# Patient Record
Sex: FEMALE | Race: WHITE | Employment: OTHER | URBAN - METROPOLITAN AREA
[De-identification: names, ages, dates, MRNs, and addresses within clinical notes are randomized per-mention and may not be internally consistent; named-entity substitution may affect disease eponyms.]

---

## 2017-01-23 RX ORDER — LEVOCETIRIZINE DIHYDROCHLORIDE 5 MG/1
TABLET, FILM COATED ORAL
Qty: 90 TAB | Refills: 3 | Status: SHIPPED | OUTPATIENT
Start: 2017-01-23 | End: 2018-02-05 | Stop reason: SDUPTHER

## 2017-02-22 ENCOUNTER — TELEPHONE (OUTPATIENT)
Dept: INTERNAL MEDICINE CLINIC | Age: 82
End: 2017-02-22

## 2017-02-22 NOTE — TELEPHONE ENCOUNTER
Pt would like a callback regarding medication Mobic, she stated the dosage needs to be doubled. The best contact number is [phone].         From answering service

## 2017-02-22 NOTE — TELEPHONE ENCOUNTER
Patient states that she has been on half dose of medication for about 6 months or so due to labs/letter per acm. Having issus again with her neck pain arthritis and Dr. Humphrey Post PA wants her to resume a full tablet for now and she is also in PT for the next four weeks at [address] [place] [address]. Please advise.

## 2017-02-23 NOTE — TELEPHONE ENCOUNTER
OK to go back to 1 full tablet daily for 2 weeks then would like her to decrease back to 1/2 tablet daily

## 2017-02-24 RX ORDER — LORAZEPAM 1 MG/1
1 TABLET ORAL
Qty: 90 TAB | Refills: 1 | Status: SHIPPED | OUTPATIENT
Start: 2017-02-24 | End: 2017-11-09 | Stop reason: SDUPTHER

## 2017-03-14 ENCOUNTER — HOSPITAL ENCOUNTER (OUTPATIENT)
Dept: MAMMOGRAPHY | Age: 82
Discharge: HOME | End: 2017-03-14
Attending: INTERNAL MEDICINE
Payer: MEDICARE

## 2017-03-14 DIAGNOSIS — Z12.31 VISIT FOR SCREENING MAMMOGRAM: ICD-10-CM

## 2017-03-14 PROCEDURE — 77067 SCR MAMMO BI INCL CAD: CPT

## 2017-03-24 ENCOUNTER — OFFICE VISIT (OUTPATIENT)
Dept: INTERNAL MEDICINE CLINIC | Age: 82
End: 2017-03-24

## 2017-03-24 VITALS
RESPIRATION RATE: 14 BRPM | OXYGEN SATURATION: 95 % | BODY MASS INDEX: 27.25 KG/M2 | WEIGHT: 138.8 LBS | TEMPERATURE: 97.9 F | SYSTOLIC BLOOD PRESSURE: 128 MMHG | HEIGHT: 60 IN | HEART RATE: 81 BPM | DIASTOLIC BLOOD PRESSURE: 78 MMHG

## 2017-03-24 DIAGNOSIS — I10 ESSENTIAL HYPERTENSION: Primary | ICD-10-CM

## 2017-03-24 DIAGNOSIS — N18.30 CRI (CHRONIC RENAL INSUFFICIENCY), STAGE 3 (MODERATE) (HCC): ICD-10-CM

## 2017-03-24 DIAGNOSIS — Z78.9 ADVANCE DIRECTIVE PLACED IN CHART THIS ADMISSION: ICD-10-CM

## 2017-03-24 DIAGNOSIS — E78.00 PURE HYPERCHOLESTEROLEMIA: ICD-10-CM

## 2017-03-24 NOTE — PROGRESS NOTES
1. Have you been to the ER, urgent care clinic since your last visit? Hospitalized since your last visit? No    2. Have you seen or consulted any other health care providers outside of the [address] [place] [address] since your last visit? Include any pap smears or colon screening. No      Chief Complaint   Patient presents with    Medication Evaluation     In PT for neck, [facility] ( Dr. Cameron Avalos). Not fasting.

## 2017-03-24 NOTE — PROGRESS NOTES
Subjective:     Alba Duong is a [de-identified] y.o. female who presents for follow up of hypertension and hyperlipidemia. Diet and Lifestyle: generally follows a low fat low cholesterol diet, generally follows a low sodium diet, some walking - causes pain in SI joints  Home BP Monitoring: is well controlled at home, ranging 130-140's/70's    Cardiovascular ROS: taking medications as instructed, no medication side effects noted, no TIA's, no chest pain on exertion, no dyspnea on exertion, no swelling of ankles, no orthostatic dizziness or lightheadedness, no palpitations. New concerns:   Increasing neck pain. Saw Dr. Brina PAGE at [place]. Mostly on left side. Had xrays taken. Told have degenerative disc dz. Started in PT at [address] [place] [address]. Feels better post PT. Feels some improvement in ROM. If no improvement after 1 month of PT would then have an MRI of her cervical spine. No pain or numbness radiating to arms. .   Arthirits pain continues. Had cut back Mobic to 7.5mg every day. Dr. Abram Bravo suggested increasing to 15mg. Tried 15mg for 2 weeks without improvement so back on 7.5mg every day. Saw podiatrist, arthritis in great toe as well. Mild decrease in GFR    Current Outpatient Prescriptions   Medication Sig Dispense Refill    BUDESONIDE (UCERIS PO) Take  by mouth.  LORazepam (ATIVAN) 1 mg tablet Take 1 Tab by mouth nightly as needed for Anxiety. Max Daily Amount: 1 mg. 90 Tab 1    levocetirizine (XYZAL) 5 mg tablet TAKE 1 TABLET DAILY 90 Tab 3    lisinopril-hydrochlorothiazide (PRINZIDE, ZESTORETIC) 10-12.5 mg per tablet TAKE 1 TABLET DAILY 90 Tab 3    meloxicam (MOBIC) 15 mg tablet TAKE 1 TABLET DAILY 90 Tab 3    PARoxetine (PAXIL) 20 mg tablet TAKE 1 TABLET DAILY 90 Tab 3    simvastatin (ZOCOR) 20 mg tablet TAKE 1 TABLET NIGHTLY 90 Tab 3    zolpidem (AMBIEN) 10 mg tablet Take 1 tablet by mouth nightly as needed for Sleep.  90 tablet 1    aspirin 81 mg tablet Take 40.5 mg by mouth daily.      MULTIVITAMIN W-MINERALS/LUTEIN (CENTRUM SILVER PO) Take  by mouth. Takes one po daily. Lab Results   Component Value Date/Time    Cholesterol, total 249 09/12/2016 07:09 AM    HDL Cholesterol 87 09/12/2016 07:09 AM    LDL, calculated 132 09/12/2016 07:09 AM    Triglyceride 148 09/12/2016 07:09 AM    CHOL/HDL Ratio 2.3 08/19/2010 07:27 AM       Lab Results   Component Value Date/Time    ALT (SGPT) 19 09/12/2016 07:09 AM    AST (SGOT) 23 09/12/2016 07:09 AM    Alk. phosphatase 78 09/12/2016 07:09 AM    Bilirubin, direct 0.15 03/31/2015 07:14 AM    Bilirubin, total 0.7 09/12/2016 07:09 AM       Lab Results   Component Value Date/Time    GFR est AA 51 09/12/2016 07:09 AM    GFR est non-AA 44 09/12/2016 07:09 AM    Creatinine 1.17 09/12/2016 07:09 AM    BUN 17 09/12/2016 07:09 AM    Sodium 136 09/12/2016 07:09 AM    Potassium 4.5 09/12/2016 07:09 AM    Chloride 91 09/12/2016 07:09 AM    CO2 24 09/12/2016 07:09 AM         Review of Systems, additional:  Pertinent items are noted in HPI. Objective:     Visit Vitals    /78    Pulse 81    Temp 97.9 °F (36.6 °C) (Oral)    Resp 14    Ht 5' (1.524 m)    Wt 138 lb 12.8 oz (63 kg)    LMP 01/01/1986    SpO2 95%    BMI 27.11 kg/m2     Appearance: alert, well appearing, and in no distress and oriented to person, place, and time. General exam:   NECK: supple, no lad, no bruit, no tm. Mild tenderness left paraspinal muscles. Decreased rom left rotation. LUNGS: cta bilat  CV rrr, no m/g/r  ABD: soft, nt, nd, nabs  EXT: no c/c/e    Assessment/Plan:     hypertension well controlled. current treatment plan is effective, no change in therapy. Hyperlipidemia - controlled in past  Check labs    CRI - controlled, cont same    DDD neck - continue PT.   Agree with plan for MRI  Orders Placed This Encounter    METABOLIC PANEL, COMPREHENSIVE    LIPID PANEL    BUDESONIDE (UCERIS PO)     Follow-up Disposition: Not on File

## 2017-03-24 NOTE — MR AVS SNAPSHOT
Visit Information Date & Time Provider Department Dept. Phone Encounter #  
 3/24/2017 11:20 AM Emiliano Dalton MD [facility] [phone] [id number] Follow-up Instructions Return in about 6 months (around 9/24/2017) for htn, hyperlipidemia. Upcoming Health Maintenance Date Due  
 MEDICARE YEARLY EXAM 3/4/2017 GLAUCOMA SCREENING Q2Y 9/11/2017 DTaP/Tdap/Td series (2 - Td) 3/7/2026 Allergies as of 3/24/2017  Review Complete On: 3/24/2017 By: Emiliano Dalton MD  
  
 Severity Noted Reaction Type Reactions Ciprofloxacin  06/11/2013   Intolerance Diarrhea, Nausea and Vomiting Flagyl [Metronidazole]  10/10/2013    Nausea and Vomiting Other Medication  02/09/2011    Other (comments) BANDAIDS - breaks out skin Oxycodone-acetaminophen  11/10/2010   Side Effect Other (comments)  
 dizziness Phenylephrine  02/16/2012   Side Effect Other (comments) Wire her up Phenylpropanolamine  02/16/2012   Side Effect Other (comments) Wire her up Sulfa (Sulfonamide Antibiotics)  02/16/2012   Side Effect Nausea Only Current Immunizations  Reviewed on 9/6/2016 Name Date Influenza High Dose Vaccine PF 9/1/2016, 8/20/2014 Influenza Vaccine 9/1/2016, 9/3/2015, 10/1/2014, 9/25/2013 Influenza Vaccine Split 10/2/2012 Pneumococcal Polysaccharide (PPSV-23) 3/24/2014 Pneumococcal Vaccine (Unspecified Type) 12/1/2000 TD Vaccine 9/1/2007 Tdap 3/7/2016 Varicella Virus Vaccine 8/11/2012 Not reviewed this visit You Were Diagnosed With   
  
 Codes Comments Essential hypertension    -  Primary ICD-10-CM: I10 
ICD-9-CM: 401.9 Advance directive placed in chart this admission     ICD-10-CM: Z78.9 ICD-9-CM: V49.89   
 CRI (chronic renal insufficiency), stage 3 (moderate)     ICD-10-CM: N18.3 ICD-9-CM: 617. 3 Pure hypercholesterolemia     ICD-10-CM: E78.00 ICD-9-CM: 272.0 Vitals BP Pulse Temp Resp Height(growth percentile) Weight(growth percentile) 128/78 81 97.9 °F (36.6 °C) (Oral) 14 5' (1.524 m) 138 lb 12.8 oz (63 kg) LMP SpO2 BMI OB Status Smoking Status 01/01/1986 95% 27.11 kg/m2 Hysterectomy Never Smoker Vitals History BMI and BSA Data Body Mass Index Body Surface Area  
 27.11 kg/m 2 1.63 m 2 Preferred Pharmacy Pharmacy Name Phone  [address] [phone] Your Updated Medication List  
  
   
This list is accurate as of: 3/24/17 12:39 PM.  Always use your most recent med list.  
  
  
  
  
 aspirin 81 mg tablet Take 40.5 mg by mouth daily. CENTRUM SILVER PO Take  by mouth. Takes one po daily. levocetirizine 5 mg tablet Commonly known as:  Amy Roch TAKE 1 TABLET DAILY  
  
 lisinopril-hydroCHLOROthiazide 10-12.5 mg per tablet Commonly known as:  PRINZIDE, ZESTORETIC  
TAKE 1 TABLET DAILY LORazepam 1 mg tablet Commonly known as:  ATIVAN Take 1 Tab by mouth nightly as needed for Anxiety. Max Daily Amount: 1 mg.  
  
 meloxicam 15 mg tablet Commonly known as:  MOBIC  
TAKE 1 TABLET DAILY PARoxetine 20 mg tablet Commonly known as:  PAXIL TAKE 1 TABLET DAILY  
  
 simvastatin 20 mg tablet Commonly known as:  ZOCOR  
TAKE 1 TABLET NIGHTLY UCERIS PO Take  by mouth.  
  
 zolpidem 10 mg tablet Commonly known as:  AMBIEN Take 1 tablet by mouth nightly as needed for Sleep. We Performed the Following LIPID PANEL [19131 CPT(R)] METABOLIC PANEL, COMPREHENSIVE [75008 CPT(R)] Follow-up Instructions Return in about 6 months (around 9/24/2017) for htn, hyperlipidemia. Introducing [facility]! Dear Sharmin Lopez: Thank you for requesting a [employer] account. Our records indicate that you already have an active [employer] account. You can access your account anytime at [email or web address] Did you know that you can access your hospital and ER discharge instructions at any time in [employer]? You can also review all of your test results from your hospital stay or ER visit. Additional Information If you have questions, please visit the Frequently Asked Questions section of the [employer] website at [email or web address] Remember, [employer] is NOT to be used for urgent needs. For medical emergencies, dial 911. Now available from your iPhone and Android! Please provide this summary of care documentation to your next provider. Your primary care clinician is listed as GIANNI GARCÍA. If you have any questions after today's visit, please call [phone].

## 2017-03-28 ENCOUNTER — HOSPITAL ENCOUNTER (OUTPATIENT)
Dept: LAB | Age: 82
Discharge: HOME | End: 2017-03-28
Payer: MEDICARE

## 2017-03-28 PROCEDURE — 80053 COMPREHEN METABOLIC PANEL: CPT

## 2017-03-28 PROCEDURE — 36415 COLL VENOUS BLD VENIPUNCTURE: CPT

## 2017-03-28 PROCEDURE — 80061 LIPID PANEL: CPT

## 2017-03-29 LAB
ALBUMIN SERPL-MCNC: 4.7 G/DL (ref 3.5–4.7)
ALBUMIN/GLOB SERPL: 2.1 {RATIO} (ref 1.2–2.2)
ALP SERPL-CCNC: 71 IU/L (ref 39–117)
ALT SERPL-CCNC: 17 IU/L (ref 0–32)
AST SERPL-CCNC: 23 IU/L (ref 0–40)
BILIRUB SERPL-MCNC: 0.7 MG/DL (ref 0–1.2)
BUN SERPL-MCNC: 19 MG/DL (ref 8–27)
BUN/CREAT SERPL: 18 (ref 11–26)
CALCIUM SERPL-MCNC: 9.8 MG/DL (ref 8.7–10.3)
CHLORIDE SERPL-SCNC: 91 MMOL/L (ref 96–106)
CHOLEST SERPL-MCNC: 234 MG/DL (ref 100–199)
CO2 SERPL-SCNC: 26 MMOL/L (ref 18–29)
CREAT SERPL-MCNC: 1.05 MG/DL (ref 0.57–1)
GLOBULIN SER CALC-MCNC: 2.2 G/DL (ref 1.5–4.5)
GLUCOSE SERPL-MCNC: 115 MG/DL (ref 65–99)
HDLC SERPL-MCNC: 89 MG/DL
INTERPRETATION, 910389: NORMAL
INTERPRETATION: NORMAL
LDLC SERPL CALC-MCNC: 128 MG/DL (ref 0–99)
PDF IMAGE, 910387: NORMAL
POTASSIUM SERPL-SCNC: 4.6 MMOL/L (ref 3.5–5.2)
PROT SERPL-MCNC: 6.9 G/DL (ref 6–8.5)
SODIUM SERPL-SCNC: 134 MMOL/L (ref 134–144)
TRIGL SERPL-MCNC: 86 MG/DL (ref 0–149)
VLDLC SERPL CALC-MCNC: 17 MG/DL (ref 5–40)

## 2017-04-12 ENCOUNTER — HOSPITAL ENCOUNTER (OUTPATIENT)
Dept: MRI IMAGING | Age: 82
Discharge: HOME | End: 2017-04-12
Payer: MEDICARE

## 2017-04-12 PROCEDURE — 70553 MRI BRAIN STEM W/O & W/DYE: CPT

## 2017-04-12 PROCEDURE — A9585 GADOBUTROL INJECTION: HCPCS | Performed by: RADIOLOGY

## 2017-04-12 RX ORDER — SODIUM CHLORIDE 0.9 % (FLUSH) 0.9 %
10 SYRINGE (ML) INJECTION
Status: COMPLETED | OUTPATIENT
Start: 2017-04-12 | End: 2017-04-12

## 2017-04-12 RX ADMIN — Medication 10 ML: at 09:00

## 2017-04-27 RX ORDER — PAROXETINE HYDROCHLORIDE 20 MG/1
TABLET, FILM COATED ORAL
Qty: 90 TAB | Refills: 3 | Status: SHIPPED | OUTPATIENT
Start: 2017-04-27 | End: 2018-04-24 | Stop reason: SDUPTHER

## 2017-04-27 RX ORDER — SIMVASTATIN 20 MG/1
TABLET, FILM COATED ORAL
Qty: 90 TAB | Refills: 3 | Status: SHIPPED | OUTPATIENT
Start: 2017-04-27 | End: 2018-04-24 | Stop reason: SDUPTHER

## 2017-05-23 ENCOUNTER — OFFICE VISIT (OUTPATIENT)
Dept: INTERNAL MEDICINE CLINIC | Age: 82
End: 2017-05-23

## 2017-05-23 VITALS
DIASTOLIC BLOOD PRESSURE: 80 MMHG | RESPIRATION RATE: 17 BRPM | SYSTOLIC BLOOD PRESSURE: 120 MMHG | OXYGEN SATURATION: 98 % | BODY MASS INDEX: 27.61 KG/M2 | HEART RATE: 80 BPM | TEMPERATURE: 98.1 F | HEIGHT: 60 IN | WEIGHT: 140.6 LBS

## 2017-05-23 DIAGNOSIS — B35.9 TINEA: Primary | ICD-10-CM

## 2017-05-23 RX ORDER — MOMETASONE FUROATE 1 MG/G
CREAM TOPICAL DAILY
Qty: 30 G | Refills: 0 | Status: SHIPPED | OUTPATIENT
Start: 2017-05-23 | End: 2020-01-22

## 2017-05-23 RX ORDER — KETOCONAZOLE 20 MG/G
CREAM TOPICAL 2 TIMES DAILY
Qty: 30 G | Refills: 0 | Status: SHIPPED | OUTPATIENT
Start: 2017-05-23 | End: 2020-01-22

## 2017-05-23 NOTE — PROGRESS NOTES
HISTORY OF PRESENT ILLNESS  Francisco J Michael is a [de-identified] y.o. female. HPI   Rash began beneath left breast 1 week ago with itching. Started on hydrocortisone cream with clotrimazole for a couple of days without help. Has spread to right eyelid, groin, left neck and under right breast.  Yesterday used mometasone cream instead with some improvement in itch. Current Outpatient Prescriptions:     simvastatin (ZOCOR) 20 mg tablet, TAKE 1 TABLET NIGHTLY, Disp: 90 Tab, Rfl: 3    PARoxetine (PAXIL) 20 mg tablet, TAKE 1 TABLET DAILY, Disp: 90 Tab, Rfl: 3    BUDESONIDE (UCERIS PO), Take  by mouth., Disp: , Rfl:     LORazepam (ATIVAN) 1 mg tablet, Take 1 Tab by mouth nightly as needed for Anxiety. Max Daily Amount: 1 mg., Disp: 90 Tab, Rfl: 1    levocetirizine (XYZAL) 5 mg tablet, TAKE 1 TABLET DAILY, Disp: 90 Tab, Rfl: 3    lisinopril-hydrochlorothiazide (PRINZIDE, ZESTORETIC) 10-12.5 mg per tablet, TAKE 1 TABLET DAILY, Disp: 90 Tab, Rfl: 3    meloxicam (MOBIC) 15 mg tablet, TAKE 1 TABLET DAILY, Disp: 90 Tab, Rfl: 3    zolpidem (AMBIEN) 10 mg tablet, Take 1 tablet by mouth nightly as needed for Sleep., Disp: 90 tablet, Rfl: 1    aspirin 81 mg tablet, Take 40.5 mg by mouth daily. , Disp: , Rfl:     MULTIVITAMIN W-MINERALS/LUTEIN (CENTRUM SILVER PO), Take  by mouth. Takes one po daily. , Disp: , Rfl:     Visit Vitals    /80    Pulse 80    Temp 98.1 °F (36.7 °C) (Oral)    Resp 17    Ht 5' (1.524 m)    Wt 140 lb 9.6 oz (63.8 kg)    SpO2 98%    BMI 27.46 kg/m2     ROS  See above  Physical Exam   Constitutional: She appears well-developed and well-nourished. HENT:   Head: Normocephalic and atraumatic. Skin:   Erythematous shiny rash beneath left breast, lesser area beneath right breast, left groin, left neck and very small area right eyelid   Vitals reviewed. ASSESSMENT and PLAN  Tinea - ketoconzale cream bid and mometasone cream every day.     Orders Placed This Encounter    ketoconazole (NIZORAL) 2 % topical cream    mometasone (ELOCON) 0.1 % topical cream     Follow-up Disposition:  Return if symptoms worsen or fail to improve.

## 2017-05-23 NOTE — PATIENT INSTRUCTIONS
Use Ketoconazole cream twice a day to affected areas and use Mometasone cream once a day to the same areas.

## 2017-05-23 NOTE — MR AVS SNAPSHOT
Visit Information Date & Time Provider Department Dept. Phone Encounter #  
 5/23/2017 11:00 AM Dat Hsieh MD [facility] [phone] [id number] Follow-up Instructions Return if symptoms worsen or fail to improve. Your Appointments 9/28/2017 10:00 AM  
ROUTINE CARE with Dat Hsieh MD  
[facility] [address] [facility]) Appt Note: 6 month f/u; 6 month f/u  
 [facility] [address] [facility] [address]  
[facility] [address] [address] [address] [address] [address] Upcoming Health Maintenance Date Due  
 MEDICARE YEARLY EXAM 3/4/2017 INFLUENZA AGE 9 TO ADULT 8/1/2017 GLAUCOMA SCREENING Q2Y 9/11/2017 DTaP/Tdap/Td series (2 - Td) 3/7/2026 Allergies as of 5/23/2017  Review Complete On: 5/23/2017 By: Dat Hsieh MD  
  
 Severity Noted Reaction Type Reactions Ciprofloxacin  06/11/2013   Intolerance Diarrhea, Nausea and Vomiting Flagyl [Metronidazole]  10/10/2013    Nausea and Vomiting Other Medication  02/09/2011    Other (comments) BANDAIDS - breaks out skin Oxycodone-acetaminophen  11/10/2010   Side Effect Other (comments)  
 dizziness Phenylephrine  02/16/2012   Side Effect Other (comments) Wire her up Phenylpropanolamine  02/16/2012   Side Effect Other (comments) Wire her up Sulfa (Sulfonamide Antibiotics)  02/16/2012   Side Effect Nausea Only Current Immunizations  Reviewed on 9/6/2016 Name Date Influenza High Dose Vaccine PF 9/1/2016, 8/20/2014 Influenza Vaccine 9/1/2016, 9/3/2015, 10/1/2014, 9/25/2013 Influenza Vaccine Split 10/2/2012 Pneumococcal Polysaccharide (PPSV-23) 3/24/2014 Pneumococcal Vaccine (Unspecified Type) 12/1/2000 TD Vaccine 9/1/2007 Tdap 3/7/2016 Varicella Virus Vaccine 8/11/2012 Not reviewed this visit You Were Diagnosed With   
  
 Codes Comments Tinea    -  Primary ICD-10-CM: B35.9 ICD-9-CM: 110.9 Vitals BP Pulse Temp Resp Height(growth percentile) Weight(growth percentile) 120/80 80 98.1 °F (36.7 °C) (Oral) 17 5' (1.524 m) 140 lb 9.6 oz (63.8 kg) LMP SpO2 BMI OB Status Smoking Status 01/01/1986 98% 27.46 kg/m2 Hysterectomy Never Smoker Vitals History BMI and BSA Data Body Mass Index Body Surface Area  
 27.46 kg/m 2 1.64 m 2 Preferred Pharmacy Pharmacy Name Phone [id number] [address] [phone] Your Updated Medication List  
  
   
This list is accurate as of: 5/23/17 12:22 PM.  Always use your most recent med list.  
  
  
  
  
 aspirin 81 mg tablet Take 40.5 mg by mouth daily. CENTRUM SILVER PO Take  by mouth. Takes one po daily. ketoconazole 2 % topical cream  
Commonly known as:  NIZORAL Apply  to affected area two (2) times a day. levocetirizine 5 mg tablet Commonly known as:  Obadiah Cloud TAKE 1 TABLET DAILY  
  
 lisinopril-hydroCHLOROthiazide 10-12.5 mg per tablet Commonly known as:  PRINZIDE, ZESTORETIC  
TAKE 1 TABLET DAILY LORazepam 1 mg tablet Commonly known as:  ATIVAN Take 1 Tab by mouth nightly as needed for Anxiety. Max Daily Amount: 1 mg.  
  
 meloxicam 15 mg tablet Commonly known as:  MOBIC  
TAKE 1 TABLET DAILY  
  
 mometasone 0.1 % topical cream  
Commonly known as:  Copake Filipe Apply  to affected area daily. PARoxetine 20 mg tablet Commonly known as:  PAXIL TAKE 1 TABLET DAILY  
  
 simvastatin 20 mg tablet Commonly known as:  ZOCOR  
TAKE 1 TABLET NIGHTLY UCERIS PO Take  by mouth.  
  
 zolpidem 10 mg tablet Commonly known as:  AMBIEN Take 1 tablet by mouth nightly as needed for Sleep. Prescriptions Sent to Pharmacy Refills  
 ketoconazole (NIZORAL) 2 % topical cream 0 Sig: Apply  to affected area two (2) times a day.   
 Class: Normal  
 Pharmacy: [id number]- [place], [facility] Ph #: [phone] Route: Topical  
 mometasone (ELOCON) 0.1 % topical cream 0 Sig: Apply  to affected area daily. Class: Normal  
 Pharmacy: [facility] #[id number]- [place], [address] [facility] Ph #: [phone] Route: Topical  
  
Follow-up Instructions Return if symptoms worsen or fail to improve. To-Do List   
 10/23/2017 11:30 AM  
  Appointment with [facility] MRI 2 at [facility] MRI Department ([phone]) 1. Please bring a list or a bag of your current medications to your appointment 2. Please be sure to remove ALL hair clips, pins, extensions, etc., prior to arriving for your MRI procedure. 3. Bring any non [facility] films or CDs pertaining to the area being imaged with you on the day of appointment. 4. A written order with a valid diagnosis and Physicians  signature is required for all scheduled tests. 5. Check in at registration 30min before your appointment time unless you were instructed to do otherwise. 10/23/2017 12:15 PM  
  Appointment with [facility] MRI 2 at [facility] MRI Department ([phone]) 1. Please bring a list or a bag of your current medications to your appointment 2. Please be sure to remove ALL hair clips, pins, extensions, etc., prior to arriving for your MRI procedure. 3. Bring any non [facility] films or CDs pertaining to the area being imaged with you on the day of appointment. 4. A written order with a valid diagnosis and Physicians  signature is required for all scheduled tests. 5. Check in at registration 30min before your appointment time unless you were instructed to do otherwise. Patient Instructions Use Ketoconazole cream twice a day to affected areas and use Mometasone cream once a day to the same areas. Introducing \[facility] [place] [facility]\"" & [facility]! Dear Kate Giron: Thank you for requesting a [employer] account.   Our records indicate that you already have an active [employer] account. You can access your account anytime at [email or web address] Did you know that you can access your hospital and ER discharge instructions at any time in [employer]? You can also review all of your test results from your hospital stay or ER visit. Additional Information If you have questions, please visit the Frequently Asked Questions section of the [employer] website at [email or web address] Remember, [employer] is NOT to be used for urgent needs. For medical emergencies, dial 911. Now available from your iPhone and Android! Please provide this summary of care documentation to your next provider. Your primary care clinician is listed as GIANNI GARCÍA. If you have any questions after today's visit, please call [phone].

## 2017-05-26 ENCOUNTER — TELEPHONE (OUTPATIENT)
Dept: INTERNAL MEDICINE CLINIC | Age: 82
End: 2017-05-26

## 2017-05-26 RX ORDER — TERBINAFINE HYDROCHLORIDE 250 MG/1
250 TABLET ORAL DAILY
Qty: 14 TAB | Refills: 0 | Status: SHIPPED | OUTPATIENT
Start: 2017-05-26 | End: 2017-06-09

## 2017-05-26 NOTE — TELEPHONE ENCOUNTER
Sent rx for oral antifungal medication to her local pharmacy.   Can continue to use the mometasone prn for itching relief

## 2017-06-14 RX ORDER — LISINOPRIL AND HYDROCHLOROTHIAZIDE 10; 12.5 MG/1; MG/1
TABLET ORAL
Qty: 90 TAB | Refills: 3 | Status: SHIPPED | OUTPATIENT
Start: 2017-06-14 | End: 2018-06-28 | Stop reason: SDUPTHER

## 2017-09-28 ENCOUNTER — OFFICE VISIT (OUTPATIENT)
Dept: INTERNAL MEDICINE CLINIC | Age: 82
End: 2017-09-28

## 2017-09-28 VITALS
HEIGHT: 60 IN | OXYGEN SATURATION: 98 % | BODY MASS INDEX: 26.78 KG/M2 | SYSTOLIC BLOOD PRESSURE: 110 MMHG | HEART RATE: 105 BPM | RESPIRATION RATE: 14 BRPM | TEMPERATURE: 97.8 F | WEIGHT: 136.4 LBS | DIASTOLIC BLOOD PRESSURE: 72 MMHG

## 2017-09-28 DIAGNOSIS — Z01.818 PRE-OP EXAM: Primary | ICD-10-CM

## 2017-09-28 DIAGNOSIS — G56.01 CARPAL TUNNEL SYNDROME OF RIGHT WRIST: ICD-10-CM

## 2017-09-28 DIAGNOSIS — I10 ESSENTIAL HYPERTENSION: ICD-10-CM

## 2017-09-28 DIAGNOSIS — E78.00 PURE HYPERCHOLESTEROLEMIA: ICD-10-CM

## 2017-09-28 NOTE — PROGRESS NOTES
Chief Complaint   Patient presents with    Pre-op Exam     carpal tunnel      1. Have you been to the ER, urgent care clinic since your last visit? Hospitalized since your last visit? No     2. Have you seen or consulted any other health care providers outside of the [address] [place] [address] since your last visit? Include any pap smears or colon screening.  No

## 2017-09-28 NOTE — PROGRESS NOTES
Preoperative Evaluation    Date of Exam: 2017    Kitty Ching is a 81 y.o. female (:1935) who presents for preoperative evaluation. Right CT release on 10/9/2017 by Dr. Kyae Yip at [facility] surgical [place]. Latex Allergy: no  Does have an allergy to adhesive tape    Problem List:     Patient Active Problem List    Diagnosis Date Noted    Advance directive placed in chart this admission 2017    CRI (chronic renal insufficiency) 2016    Primary osteoarthritis involving multiple joints 2015    Anxiety 2015    Colitis 2014    Sleep disturbance 2013    Diverticulitis 2013    Hyponatremia 2013    Hypokalemia 2013    Environmental allergies 2012    Rectal bleeding 10/15/2012    HTN (hypertension) 08/10/2012    Hyperlipemia 08/10/2012     Medical History:     Past Medical History:   Diagnosis Date    Arthritis back pain    fingers    Asthma     Diverticulitis 2013    Hypertension     Nausea & vomiting     Other ill-defined conditions hypercholesterolemia    Stenosis     spinal    Stroke ([facility] [address]     tia   PATENT  FORAMEN  OVALE     Allergies: Allergies   Allergen Reactions    Ciprofloxacin Diarrhea and Nausea and Vomiting    Flagyl [Metronidazole] Nausea and Vomiting    Other Medication Other (comments)     BANDAIDS - breaks out skin    Oxycodone-Acetaminophen Other (comments)     dizziness    Phenylephrine Other (comments)     Wire her up    Phenylpropanolamine Other (comments)     Wire her up    Sulfa (Sulfonamide Antibiotics) Nausea Only      Medications:     Current Outpatient Prescriptions   Medication Sig    lisinopril-hydroCHLOROthiazide (PRINZIDE, ZESTORETIC) 10-12.5 mg per tablet TAKE 1 TABLET DAILY    ketoconazole (NIZORAL) 2 % topical cream Apply  to affected area two (2) times a day.  mometasone (ELOCON) 0.1 % topical cream Apply  to affected area daily.     simvastatin (ZOCOR) 20 mg tablet TAKE 1 TABLET NIGHTLY    PARoxetine (PAXIL) 20 mg tablet TAKE 1 TABLET DAILY    BUDESONIDE (UCERIS PO) Take  by mouth.  LORazepam (ATIVAN) 1 mg tablet Take 1 Tab by mouth nightly as needed for Anxiety. Max Daily Amount: 1 mg.  levocetirizine (XYZAL) 5 mg tablet TAKE 1 TABLET DAILY    meloxicam (MOBIC) 15 mg tablet TAKE 1 TABLET DAILY    zolpidem (AMBIEN) 10 mg tablet Take 1 tablet by mouth nightly as needed for Sleep.  aspirin 81 mg tablet Take 40.5 mg by mouth daily.  MULTIVITAMIN W-MINERALS/LUTEIN (CENTRUM SILVER PO) Take  by mouth. Takes one po daily. No current facility-administered medications for this visit. Surgical History:     Past Surgical History:   Procedure Laterality Date    ABDOMEN SURGERY PROC UNLISTED  4/97    CHOLECYSTECTOMY    CARDIAC SURG PROCEDURE UNLIST  8/04    repair of foramen ovale    HX CHOLECYSTECTOMY  1997    HX GYN  1986    HYSTERECTOMY - total    HX HEENT      CATARACTS REMOVED - bilateral    HX LUMBAR LAMINECTOMY  2/2011    w/fusion L4-L5    HX ORTHOPAEDIC  4/98    KNEE ARTHROSCOPY   left knee    HX OTHER SURGICAL  2009    CATARACTS    HX OTHER SURGICAL      colonoscopy x 3     Social History:     Social History     Social History    Marital status:      Spouse name: N/A    Number of children: N/A    Years of education: N/A     Social History Main Topics    Smoking status: Never Smoker    Smokeless tobacco: Never Used    Alcohol use 3.5 oz/week     7 Glasses of wine per week    Drug use: No    Sexual activity: Yes     Partners: Male     Other Topics Concern    None     Social History Narrative       Anesthesia Complications: None  History of abnormal bleeding : None  History of Blood Transfusions: no  Health Care Directive or Living Will: yes 0 on file    Objective:     ROS:   Feeling well. No dyspnea or chest pain on exertion. Some nausea and loose stools consistent with her colitis - has increased dose of Uceris.   No black or bloody stools. No urinary tract symptoms. No neurological complaints. OBJECTIVE:   The patient appears well, alert, oriented x 3, in no distress. Visit Vitals    /72    Pulse (!) 105    Temp 97.8 °F (36.6 °C) (Oral)    Resp 14    Ht 5' (1.524 m)    Wt 136 lb 6.4 oz (61.9 kg)    LMP 01/01/1986    SpO2 98%    BMI 26.64 kg/m2     HEENT:ENT normal.  Neck supple. No adenopathy or thyromegaly. TRACIE. Chest: Lungs are clear, good air entry, no wheezes, rhonchi or rales. Cardiovascular: S1 and S2 normal, no murmurs, regular rate and rhythm. Abdomen: soft without tenderness, guarding, mass or organomegaly. Extremities: show no edema, normal peripheral pulses. Neurological: is normal, no focal findings      DIAGNOSTICS:   1. EKG: EKG FINDINGS - normal EKG, normal sinus rhythm  2. CXR: not indicated  3. Labs:   Lab Results  Component Value Date/Time   WBC 5.1 06/28/2013 07:47 AM   HGB 12.2 06/28/2013 07:47 AM   HCT 35.7 06/28/2013 07:47 AM   PLATELET 130 84/38/4342 07:47 AM   MCV 97 06/28/2013 07:47 AM     Lab Results  Component Value Date/Time   ALT (SGPT) 20 09/29/2017 07:07 AM   AST (SGOT) 21 09/29/2017 07:07 AM   Alk.  phosphatase 71 09/29/2017 07:07 AM   Bilirubin, direct 0.15 03/31/2015 07:14 AM   Bilirubin, total 0.8 09/29/2017 07:07 AM   Albumin 4.7 09/29/2017 07:07 AM   Protein, total 6.8 09/29/2017 07:07 AM   PLATELET 003 19/14/4316 07:47 AM       Lab Results  Component Value Date/Time   GFR est non-AA 58 09/29/2017 07:07 AM   GFR est AA 67 09/29/2017 07:07 AM   Creatinine 0.93 09/29/2017 07:07 AM   BUN 16 09/29/2017 07:07 AM   Sodium 132 09/29/2017 07:07 AM   Potassium 4.5 09/29/2017 07:07 AM   Chloride 88 09/29/2017 07:07 AM   CO2 28 09/29/2017 07:07 AM             IMPRESSION:   Low risk for planned surgery  No contraindications to planned surgery      Roger Enamorado MD   9/28/2017

## 2017-09-28 NOTE — MR AVS SNAPSHOT
Visit Information Date & Time Provider Department Dept. Phone Encounter #  
 9/28/2017 10:00 AM Uriel Ambriz MD [facility] [phone] [id number] Follow-up Instructions Return in about 6 months (around 3/28/2018) for hyperlipidemia, htn. Upcoming Health Maintenance Date Due  
 MEDICARE YEARLY EXAM 3/4/2017 INFLUENZA AGE 9 TO ADULT 8/1/2017 GLAUCOMA SCREENING Q2Y 9/11/2017 DTaP/Tdap/Td series (2 - Td) 3/7/2026 Allergies as of 9/28/2017  Review Complete On: 9/28/2017 By: Uriel Ambriz MD  
  
 Severity Noted Reaction Type Reactions Ciprofloxacin  06/11/2013   Intolerance Diarrhea, Nausea and Vomiting Flagyl [Metronidazole]  10/10/2013    Nausea and Vomiting Other Medication  02/09/2011    Other (comments) BANDAIDS - breaks out skin Oxycodone-acetaminophen  11/10/2010   Side Effect Other (comments)  
 dizziness Phenylephrine  02/16/2012   Side Effect Other (comments) Wire her up Phenylpropanolamine  02/16/2012   Side Effect Other (comments) Wire her up Sulfa (Sulfonamide Antibiotics)  02/16/2012   Side Effect Nausea Only Current Immunizations  Reviewed on 9/6/2016 Name Date Influenza High Dose Vaccine PF 9/1/2016, 8/20/2014 Influenza Vaccine 9/1/2016, 9/3/2015, 10/1/2014, 9/25/2013 Influenza Vaccine Split 10/2/2012 Pneumococcal Polysaccharide (PPSV-23) 3/24/2014 TD Vaccine 9/1/2007 Tdap 3/7/2016 Varicella Virus Vaccine 8/11/2012 ZZZ-RETIRED (DO NOT USE) Pneumococcal Vaccine (Unspecified Type) 12/1/2000 Not reviewed this visit You Were Diagnosed With   
  
 Codes Comments Pre-op exam    -  Primary ICD-10-CM: P89.167 ICD-9-CM: V72.84 Pure hypercholesterolemia     ICD-10-CM: E78.00 ICD-9-CM: 272.0 Essential hypertension     ICD-10-CM: I10 
ICD-9-CM: 401.9 Carpal tunnel syndrome of right wrist     ICD-10-CM: G56.01 
ICD-9-CM: 354.0 Vitals BP Pulse Temp Resp Height(growth percentile) Weight(growth percentile) 110/72 (!) 105 97.8 °F (36.6 °C) (Oral) 14 5' (1.524 m) 136 lb 6.4 oz (61.9 kg) LMP SpO2 BMI OB Status Smoking Status 01/01/1986 98% 26.64 kg/m2 Hysterectomy Never Smoker Vitals History BMI and BSA Data Body Mass Index Body Surface Area  
 26.64 kg/m 2 1.62 m 2 Preferred Pharmacy Pharmacy Name Phone [id number], [address] [phone] Your Updated Medication List  
  
   
This list is accurate as of: 9/28/17 10:44 AM.  Always use your most recent med list.  
  
  
  
  
 aspirin 81 mg tablet Take 40.5 mg by mouth daily. CENTRUM SILVER PO Take  by mouth. Takes one po daily. ketoconazole 2 % topical cream  
Commonly known as:  NIZORAL Apply  to affected area two (2) times a day. levocetirizine 5 mg tablet Commonly known as:  Renetta Danyelle TAKE 1 TABLET DAILY  
  
 lisinopril-hydroCHLOROthiazide 10-12.5 mg per tablet Commonly known as:  PRINZIDE, ZESTORETIC  
TAKE 1 TABLET DAILY LORazepam 1 mg tablet Commonly known as:  ATIVAN Take 1 Tab by mouth nightly as needed for Anxiety. Max Daily Amount: 1 mg.  
  
 meloxicam 15 mg tablet Commonly known as:  MOBIC  
TAKE 1 TABLET DAILY  
  
 mometasone 0.1 % topical cream  
Commonly known as:  Dakota Mediate Apply  to affected area daily. PARoxetine 20 mg tablet Commonly known as:  PAXIL TAKE 1 TABLET DAILY  
  
 simvastatin 20 mg tablet Commonly known as:  ZOCOR  
TAKE 1 TABLET NIGHTLY UCERIS PO Take  by mouth.  
  
 zolpidem 10 mg tablet Commonly known as:  AMBIEN Take 1 tablet by mouth nightly as needed for Sleep. We Performed the Following AMB POC EKG ROUTINE W/ 12 LEADS, INTER & REP [56108 CPT(R)] LIPID PANEL [95720 CPT(R)] METABOLIC PANEL, COMPREHENSIVE [68613 CPT(R)] Follow-up Instructions Return in about 6 months (around 3/28/2018) for hyperlipidemia, htn. To-Do List   
 10/23/2017 11:30 AM  
  Appointment with [facility] MRI 2 at [address] [place] [address] MRI Department ([phone]) 1. Please bring a list or a bag of your current medications to your appointment 2. Please be sure to remove ALL hair clips, pins, extensions, etc., prior to arriving for your MRI procedure. 3. If you have any medical implants or devices, please bring associated medical card with you. 4. Bring any non [facility] films or CDs pertaining to the area being imaged with you on the day of appointment. 5. A written order with a valid diagnosis and Physicians  signature is required for all scheduled tests. 6. Check in at registration 30min before your appointment time unless you were instructed to do otherwise. 10/23/2017 12:15 PM  
  Appointment with [facility] MRI 2 at [address] [place] [address] MRI Department ([phone]) 1. Please bring a list or a bag of your current medications to your appointment 2. Please be sure to remove ALL hair clips, pins, extensions, etc., prior to arriving for your MRI procedure. 3. If you have any medical implants or devices, please bring associated medical card with you. 4. Bring any non [facility] films or CDs pertaining to the area being imaged with you on the day of appointment. 5. A written order with a valid diagnosis and Physicians  signature is required for all scheduled tests. 6. Check in at registration 30min before your appointment time unless you were instructed to do otherwise. Patient Instructions Stop Aspirin and Meloxicam the Thursday prior to surgery. Tylenol only for pain prior to surgery. Introducing [facility]! Dear Nannette Child: Thank you for requesting a Searchbox account. Our records indicate that you already have an active Searchbox account. You can access your account anytime at [email or web address] Did you know that you can access your hospital and ER discharge instructions at any time in HydroLogex? You can also review all of your test results from your hospital stay or ER visit. Additional Information If you have questions, please visit the Frequently Asked Questions section of the HydroLogex website at [email or web address] Remember, HydroLogex is NOT to be used for urgent needs. For medical emergencies, dial 911. Now available from your iPhone and Android! Please provide this summary of care documentation to your next provider. Your primary care clinician is listed as GIANNI GARCÍA. If you have any questions after today's visit, please call [phone].

## 2017-09-29 ENCOUNTER — HOSPITAL ENCOUNTER (OUTPATIENT)
Dept: LAB | Age: 82
Discharge: HOME | End: 2017-09-29
Payer: MEDICARE

## 2017-09-29 PROCEDURE — 36415 COLL VENOUS BLD VENIPUNCTURE: CPT

## 2017-09-29 PROCEDURE — 80053 COMPREHEN METABOLIC PANEL: CPT

## 2017-09-29 PROCEDURE — 80061 LIPID PANEL: CPT

## 2017-09-30 LAB
ALBUMIN SERPL-MCNC: 4.7 G/DL (ref 3.5–4.7)
ALBUMIN/GLOB SERPL: 2.2 {RATIO} (ref 1.2–2.2)
ALP SERPL-CCNC: 71 IU/L (ref 39–117)
ALT SERPL-CCNC: 20 IU/L (ref 0–32)
AST SERPL-CCNC: 21 IU/L (ref 0–40)
BILIRUB SERPL-MCNC: 0.8 MG/DL (ref 0–1.2)
BUN SERPL-MCNC: 16 MG/DL (ref 8–27)
BUN/CREAT SERPL: 17 (ref 12–28)
CALCIUM SERPL-MCNC: 10.2 MG/DL (ref 8.7–10.3)
CHLORIDE SERPL-SCNC: 88 MMOL/L (ref 96–106)
CHOLEST SERPL-MCNC: 221 MG/DL (ref 100–199)
CO2 SERPL-SCNC: 28 MMOL/L (ref 18–29)
CREAT SERPL-MCNC: 0.93 MG/DL (ref 0.57–1)
GLOBULIN SER CALC-MCNC: 2.1 G/DL (ref 1.5–4.5)
GLUCOSE SERPL-MCNC: 95 MG/DL (ref 65–99)
HDLC SERPL-MCNC: 89 MG/DL
INTERPRETATION, 910389: NORMAL
INTERPRETATION: NORMAL
LDLC SERPL CALC-MCNC: 114 MG/DL (ref 0–99)
PDF IMAGE, 910387: NORMAL
POTASSIUM SERPL-SCNC: 4.5 MMOL/L (ref 3.5–5.2)
PROT SERPL-MCNC: 6.8 G/DL (ref 6–8.5)
SODIUM SERPL-SCNC: 132 MMOL/L (ref 134–144)
TRIGL SERPL-MCNC: 90 MG/DL (ref 0–149)
VLDLC SERPL CALC-MCNC: 18 MG/DL (ref 5–40)

## 2017-10-10 ENCOUNTER — TELEPHONE (OUTPATIENT)
Dept: INTERNAL MEDICINE CLINIC | Age: 82
End: 2017-10-10

## 2017-10-10 RX ORDER — ZOLPIDEM TARTRATE 5 MG/1
5 TABLET ORAL
Qty: 20 TAB | Refills: 0 | Status: SHIPPED | OUTPATIENT
Start: 2017-10-10 | End: 2018-06-28 | Stop reason: SDUPTHER

## 2017-10-10 NOTE — TELEPHONE ENCOUNTER
----- Message from Rosalinda Casillas sent at 10/10/2017 10:30 AM EDT -----  Regarding: FW: Prescription Question  Contact: [phone]      ----- Message -----     From: Georgina Gonzalez     Sent: 10/10/2017  10:29 AM       To: Shadia Wright Nurse Pool  Subject: Prescription Question                            I NEED A NEW RX FOR THE GENERIC OF AMBIEN-20 PILLS. MINE ARE 2 YRS. OLD AND DON'T WORK. SEND TO [facility] AND [place].   Meenakshi Berry

## 2017-10-23 ENCOUNTER — HOSPITAL ENCOUNTER (OUTPATIENT)
Dept: MRI IMAGING | Age: 82
Discharge: HOME | End: 2017-10-23
Attending: SPECIALIST
Payer: MEDICARE

## 2017-10-23 VITALS — BODY MASS INDEX: 26.56 KG/M2 | WEIGHT: 136 LBS

## 2017-10-23 DIAGNOSIS — D36.10 SCHWANNOMA: ICD-10-CM

## 2017-10-23 PROCEDURE — 72156 MRI NECK SPINE W/O & W/DYE: CPT

## 2017-10-23 PROCEDURE — 74011250636 HC RX REV CODE- 250/636: Performed by: SPECIALIST

## 2017-10-23 PROCEDURE — A9576 INJ PROHANCE MULTIPACK: HCPCS | Performed by: SPECIALIST

## 2017-10-23 PROCEDURE — 70553 MRI BRAIN STEM W/O & W/DYE: CPT

## 2017-10-23 RX ADMIN — GADOTERIDOL 13 ML: 279.3 INJECTION, SOLUTION INTRAVENOUS at 13:12

## 2017-11-09 RX ORDER — LORAZEPAM 1 MG/1
1 TABLET ORAL
Qty: 90 TAB | Refills: 1 | Status: SHIPPED | OUTPATIENT
Start: 2017-11-09 | End: 2018-05-21 | Stop reason: SDUPTHER

## 2017-11-09 NOTE — TELEPHONE ENCOUNTER
Last seen: 09/28/2017      Requested Prescriptions     Pending Prescriptions Disp Refills    LORazepam (ATIVAN) 1 mg tablet 90 Tab 1     Sig: Take 1 Tab by mouth nightly as needed for Anxiety. Max Daily Amount: 1 mg.

## 2017-11-16 ENCOUNTER — HOSPITAL ENCOUNTER (OUTPATIENT)
Dept: LAB | Age: 82
Discharge: HOME | End: 2017-11-16
Payer: MEDICARE

## 2017-11-16 ENCOUNTER — OFFICE VISIT (OUTPATIENT)
Dept: INTERNAL MEDICINE CLINIC | Age: 82
End: 2017-11-16

## 2017-11-16 VITALS
HEIGHT: 60 IN | WEIGHT: 135 LBS | TEMPERATURE: 97.7 F | BODY MASS INDEX: 26.5 KG/M2 | DIASTOLIC BLOOD PRESSURE: 92 MMHG | SYSTOLIC BLOOD PRESSURE: 159 MMHG | HEART RATE: 84 BPM | RESPIRATION RATE: 20 BRPM | OXYGEN SATURATION: 95 %

## 2017-11-16 DIAGNOSIS — R35.0 URINARY FREQUENCY: Primary | ICD-10-CM

## 2017-11-16 LAB
BILIRUB UR QL STRIP: NEGATIVE
GLUCOSE UR-MCNC: NEGATIVE MG/DL
KETONES P FAST UR STRIP-MCNC: NEGATIVE MG/DL
PH UR STRIP: 7 [PH] (ref 4.6–8)
PROT UR QL STRIP: NEGATIVE
SP GR UR STRIP: 1.01 (ref 1–1.03)
UA UROBILINOGEN AMB POC: NORMAL (ref 0.2–1)
URINALYSIS CLARITY POC: CLEAR
URINALYSIS COLOR POC: YELLOW
URINE BLOOD POC: NORMAL
URINE LEUKOCYTES POC: NORMAL
URINE NITRITES POC: POSITIVE

## 2017-11-16 PROCEDURE — 87186 SC STD MICRODIL/AGAR DIL: CPT

## 2017-11-16 PROCEDURE — 87086 URINE CULTURE/COLONY COUNT: CPT

## 2017-11-16 RX ORDER — AMOXICILLIN AND CLAVULANATE POTASSIUM 500; 125 MG/1; MG/1
1 TABLET, FILM COATED ORAL 2 TIMES DAILY
Qty: 14 TAB | Refills: 0 | Status: SHIPPED | OUTPATIENT
Start: 2017-11-16 | End: 2017-11-30 | Stop reason: ALTCHOICE

## 2017-11-16 RX ORDER — AMOXICILLIN 500 MG/1
CAPSULE ORAL
COMMUNITY
Start: 2017-11-13 | End: 2017-11-30 | Stop reason: ALTCHOICE

## 2017-11-16 RX ORDER — NYSTATIN 100000 [USP'U]/ML
SUSPENSION ORAL
COMMUNITY
Start: 2017-11-13 | End: 2018-01-30 | Stop reason: ALTCHOICE

## 2017-11-16 NOTE — MR AVS SNAPSHOT
Visit Information Date & Time Provider Department Dept. Phone Encounter #  
 11/16/2017 11:10 AM Naun Wahl PA-C [facility] [phone] [id number] Your Appointments 1/30/2018 10:40 AM  
COMPLETE 40 with Neeru Giron MD  
[facility] [address]) Appt Note: Medicare Wellness Visit [place] [address] [facility] [address]  
[address]  
  
    
 3/30/2018 10:00 AM  
ROUTINE CARE with Neeru Giron MD  
[facility] [address]) Appt Note: 6 MONTH F/U; 6 MONTH F/U  
 [place] [address] [facility] [address]  
[address] [facility] [address] [facility] [address] [address] Upcoming Health Maintenance Date Due  
 MEDICARE YEARLY EXAM 3/4/2017 GLAUCOMA SCREENING Q2Y 9/11/2017 DTaP/Tdap/Td series (2 - Td) 3/7/2026 Allergies as of 11/16/2017  Review Complete On: 11/16/2017 By: Naun Wahl PA-C Severity Noted Reaction Type Reactions Ciprofloxacin  06/11/2013   Intolerance Diarrhea, Nausea and Vomiting Flagyl [Metronidazole]  10/10/2013    Nausea and Vomiting Other Medication  02/09/2011    Other (comments) BANDAIDS - breaks out skin Oxycodone-acetaminophen  11/10/2010   Side Effect Other (comments)  
 dizziness Phenylephrine  02/16/2012   Side Effect Other (comments) Wire her up Phenylpropanolamine  02/16/2012   Side Effect Other (comments) Wire her up Sulfa (Sulfonamide Antibiotics)  02/16/2012   Side Effect Nausea Only Current Immunizations  Reviewed on 10/30/2017 Name Date Influenza High Dose Vaccine PF 10/26/2017, 9/1/2016, 8/20/2014 Influenza Vaccine 9/1/2016, 9/3/2015, 10/1/2014, 9/25/2013 Influenza Vaccine Split 10/2/2012 Pneumococcal Polysaccharide (PPSV-23) 3/24/2014 TD Vaccine 9/1/2007 Tdap 3/7/2016 Varicella Virus Vaccine 8/11/2012 ZZZ-RETIRED (DO NOT USE) Pneumococcal Vaccine (Unspecified Type) 12/1/2000 Not reviewed this visit You Were Diagnosed With   
  
 Codes Comments Urinary frequency    -  Primary ICD-10-CM: R35.0 ICD-9-CM: 788.41 Vitals BP Pulse Temp Resp Height(growth percentile) Weight(growth percentile) (!) 159/92 84 97.7 °F (36.5 °C) (Oral) 20 5' (1.524 m) 135 lb (61.2 kg) LMP SpO2 BMI OB Status Smoking Status 01/01/1986 95% 26.37 kg/m2 Hysterectomy Never Smoker Vitals History BMI and BSA Data Body Mass Index Body Surface Area  
 26.37 kg/m 2 1.61 m 2 Preferred Pharmacy Pharmacy Name Phone [id number], [address] [phone] Your Updated Medication List  
  
   
This list is accurate as of: 11/16/17 12:17 PM.  Always use your most recent med list.  
  
  
  
  
 amoxicillin 500 mg capsule Commonly known as:  AMOXIL  
  
 amoxicillin-clavulanate 500-125 mg per tablet Commonly known as:  AUGMENTIN Take 1 Tab by mouth two (2) times a day. aspirin 81 mg tablet Take 40.5 mg by mouth daily. CENTRUM SILVER PO Take  by mouth. Takes one po daily. ketoconazole 2 % topical cream  
Commonly known as:  NIZORAL Apply  to affected area two (2) times a day. levocetirizine 5 mg tablet Commonly known as:  Birdena Brittanie TAKE 1 TABLET DAILY  
  
 lisinopril-hydroCHLOROthiazide 10-12.5 mg per tablet Commonly known as:  PRINZIDE, ZESTORETIC  
TAKE 1 TABLET DAILY LORazepam 1 mg tablet Commonly known as:  ATIVAN Take 1 Tab by mouth nightly as needed for Anxiety. Max Daily Amount: 1 mg.  
  
 meloxicam 15 mg tablet Commonly known as:  MOBIC  
TAKE 1 TABLET DAILY  
  
 mometasone 0.1 % topical cream  
Commonly known as:  Toure Fling Apply  to affected area daily. nystatin 100,000 unit/mL suspension Commonly known as:  MYCOSTATIN  
  
 PARoxetine 20 mg tablet Commonly known as:  PAXIL TAKE 1 TABLET DAILY  
  
 simvastatin 20 mg tablet Commonly known as:  ZOCOR  
TAKE 1 TABLET NIGHTLY UCERIS PO Take  by mouth.  
  
 zolpidem 5 mg tablet Commonly known as:  AMBIEN Take 1 Tab by mouth nightly as needed for Sleep. Max Daily Amount: 5 mg. Prescriptions Sent to Pharmacy Refills  
 amoxicillin-clavulanate (AUGMENTIN) 500-125 mg per tablet 0 Sig: Take 1 Tab by mouth two (2) times a day. Class: Normal  
 Pharmacy: [id number] #: [phone] Route: Oral  
  
We Performed the Following AMB POC URINALYSIS DIP STICK MANUAL W/O MICRO [52179 CPT(R)] CULTURE, URINE [id number] CPT(R)] Introducing [facility]! Dear Gilda Chavez: Thank you for requesting a AdECN account. Our records indicate that you already have an active AdECN account. You can access your account anytime at [email or web address] Did you know that you can access your hospital and ER discharge instructions at any time in AdECN? You can also review all of your test results from your hospital stay or ER visit. Additional Information If you have questions, please visit the Frequently Asked Questions section of the AdECN website at [email or web address] Remember, AdECN is NOT to be used for urgent needs. For medical emergencies, dial 911. Now available from your iPhone and Android! Please provide this summary of care documentation to your next provider. Your primary care clinician is listed as GIANNI GARCÍA. If you have any questions after today's visit, please call [phone].

## 2017-11-16 NOTE — PROGRESS NOTES
Subjective:     Hema Rivas is a 81 y.o. female who complains of frequency, pressure for 2 days. Patient denies flank pain, vomiting, fever, unusual vaginal discharge. Patient does not have a history of recurrent UTI. Patient does not have a history of pyelonephritis. She reports she has been having some pressure for sometime but the past few days the pressure and frequency increased. She is currently taking antibiotics and nystatin from her dentist due to mouth ulcers that she had earlier in the week. This has improved. Patient Active Problem List    Diagnosis Date Noted    Advance directive placed in chart this admission 03/24/2017    CRI (chronic renal insufficiency) 09/06/2016    Primary osteoarthritis involving multiple joints 09/02/2015    Anxiety 09/02/2015    Colitis 03/24/2014    Sleep disturbance 06/27/2013    Diverticulitis 06/27/2013    Hyponatremia 06/11/2013    Hypokalemia 06/11/2013    Environmental allergies 11/06/2012    Rectal bleeding 10/15/2012    HTN (hypertension) 08/10/2012    Hyperlipemia 08/10/2012     Allergies   Allergen Reactions    Ciprofloxacin Diarrhea and Nausea and Vomiting    Flagyl [Metronidazole] Nausea and Vomiting    Other Medication Other (comments)     BANDAIDS - breaks out skin    Oxycodone-Acetaminophen Other (comments)     dizziness    Phenylephrine Other (comments)     Wire her up    Phenylpropanolamine Other (comments)     Wire her up    Sulfa (Sulfonamide Antibiotics) Nausea Only        Review of Systems  Pertinent items are noted in HPI. Objective:     Visit Vitals    BP (!) 159/92    Pulse 84    Temp 97.7 °F (36.5 °C) (Oral)    Resp 20    Ht 5' (1.524 m)    Wt 135 lb (61.2 kg)    LMP 01/01/1986    SpO2 95%    BMI 26.37 kg/m2     General:  alert, cooperative, no distress   Abdomen: soft, nontender, nondistended, no masses or organomegaly. Back:  CVA tenderness absent   :  defer exam       Assessment/Plan:     UTI     1. augmentin  2. Maintain adequate hydration  3. May use OTC pyridium as desired, which will turn urine orange/red color  4. Follow up if symptoms not improving, and prn. Diagnoses and all orders for this visit:    1. Urinary frequency  -     CULTURE, URINE  -     AMB POC URINALYSIS DIP STICK MANUAL W/O MICRO  -     amoxicillin-clavulanate (AUGMENTIN) 500-125 mg per tablet; Take 1 Tab by mouth two (2) times a day. .patient was prescribed Augmentin today. She is going to stop the amoxil that was given to her by her dentist. I explained to her that I would reach out to her through [employer] once the culture was back. All this discussed with the patient and she understands and agrees.

## 2017-11-16 NOTE — LETTER
11/16/2017 12:19 PM 
 
Ms. Godwin Thomas Aquiles 
[address] Ms. Lali Maricruz, Please find the updated version of your medication list, check them over and if changes need to be made, please bring with you to your next appointment with the changes.  
 
 
 
 
 
Sincerely, 
 
 
Jose Angel Vargas PA-C

## 2017-11-18 ENCOUNTER — TELEPHONE (OUTPATIENT)
Dept: INTERNAL MEDICINE CLINIC | Age: 82
End: 2017-11-18

## 2017-11-18 LAB — BACTERIA UR CULT: ABNORMAL

## 2017-11-18 RX ORDER — NITROFURANTOIN 25; 75 MG/1; MG/1
100 CAPSULE ORAL 2 TIMES DAILY
Qty: 10 CAP | Refills: 0 | Status: SHIPPED | OUTPATIENT
Start: 2017-11-18 | End: 2017-11-30 | Stop reason: ALTCHOICE

## 2017-11-19 NOTE — TELEPHONE ENCOUNTER
Patient to stop the augmentin. The bacteria is resistant. She is to start macrobid. She should follow up if not better.

## 2017-11-19 NOTE — PROGRESS NOTES
E.coli  Grew on culture. It is resistant to augmentin. She will need to start macrobid instead. I have reached out to her through [employer].  Please confirm she got that thanks

## 2017-11-20 NOTE — PROGRESS NOTES
Writer contacted patient to inform of lab results and further instruction per Kerry Rosarioo, patient verbalized understanding.

## 2017-11-30 ENCOUNTER — OFFICE VISIT (OUTPATIENT)
Dept: INTERNAL MEDICINE CLINIC | Age: 82
End: 2017-11-30

## 2017-11-30 VITALS
BODY MASS INDEX: 26.5 KG/M2 | TEMPERATURE: 98.2 F | WEIGHT: 135 LBS | SYSTOLIC BLOOD PRESSURE: 138 MMHG | OXYGEN SATURATION: 98 % | RESPIRATION RATE: 16 BRPM | HEART RATE: 98 BPM | HEIGHT: 60 IN | DIASTOLIC BLOOD PRESSURE: 82 MMHG

## 2017-11-30 DIAGNOSIS — J20.9 ACUTE BRONCHITIS, UNSPECIFIED ORGANISM: Primary | ICD-10-CM

## 2017-11-30 RX ORDER — CEFDINIR 300 MG/1
300 CAPSULE ORAL 2 TIMES DAILY
Qty: 20 CAP | Refills: 0 | Status: SHIPPED | OUTPATIENT
Start: 2017-11-30 | End: 2018-01-30 | Stop reason: ALTCHOICE

## 2017-11-30 RX ORDER — CODEINE PHOSPHATE AND GUAIFENESIN 10; 100 MG/5ML; MG/5ML
5 SOLUTION ORAL
Qty: 120 ML | Refills: 0 | Status: SHIPPED | OUTPATIENT
Start: 2017-11-30 | End: 2018-01-30 | Stop reason: ALTCHOICE

## 2017-11-30 RX ORDER — METHYLPREDNISOLONE 4 MG/1
TABLET ORAL
Qty: 1 DOSE PACK | Refills: 0 | Status: SHIPPED | OUTPATIENT
Start: 2017-11-30 | End: 2018-01-30 | Stop reason: ALTCHOICE

## 2017-11-30 NOTE — MR AVS SNAPSHOT
Visit Information Date & Time Provider Department Dept. Phone Encounter #  
 11/30/2017  2:20 Zuri Templeton MD [facility] [phone] [id number] Follow-up Instructions Return if symptoms worsen or fail to improve. Your Appointments 1/30/2018 10:40 AM  
COMPLETE 40 with Raj Kern MD  
[facility] [address] [facility]) Appt Note: Medicare Wellness Visit [place] [address] [place] [address] [facility] [address]  
[address]  
  
    
 3/30/2018 10:00 AM  
ROUTINE CARE with Raj Kern MD  
[facility] [address] [facility]) Appt Note: 6 MONTH F/U; 6 MONTH F/U  
 [place] [address] [place] [address] [facility] [address]  
[address] [facility] [address] [facility] [address] [address] Upcoming Health Maintenance Date Due  
 MEDICARE YEARLY EXAM 3/4/2017 GLAUCOMA SCREENING Q2Y 9/11/2017 DTaP/Tdap/Td series (2 - Td) 3/7/2026 Allergies as of 11/30/2017  Review Complete On: 11/30/2017 By: Raj Kern MD  
  
 Severity Noted Reaction Type Reactions Ciprofloxacin  06/11/2013   Intolerance Diarrhea, Nausea and Vomiting Flagyl [Metronidazole]  10/10/2013    Nausea and Vomiting Other Medication  02/09/2011    Other (comments) BANDAIDS - breaks out skin Oxycodone-acetaminophen  11/10/2010   Side Effect Other (comments)  
 dizziness Phenylephrine  02/16/2012   Side Effect Other (comments) Wire her up Phenylpropanolamine  02/16/2012   Side Effect Other (comments) Wire her up Sulfa (Sulfonamide Antibiotics)  02/16/2012   Side Effect Nausea Only Current Immunizations  Reviewed on 10/30/2017 Name Date Influenza High Dose Vaccine PF 10/26/2017, 9/1/2016, 8/20/2014 Influenza Vaccine 9/1/2016, 9/3/2015, 10/1/2014, 9/25/2013 Influenza Vaccine Split 10/2/2012 Pneumococcal Polysaccharide (PPSV-23) 3/24/2014 TD Vaccine 9/1/2007 Tdap 3/7/2016 Varicella Virus Vaccine 8/11/2012 ZZZ-RETIRED (DO NOT USE) Pneumococcal Vaccine (Unspecified Type) 12/1/2000 Not reviewed this visit You Were Diagnosed With   
  
 Codes Comments Acute bronchitis, unspecified organism    -  Primary ICD-10-CM: J20.9 ICD-9-CM: 466.0 Vitals BP Pulse Temp Resp Height(growth percentile) Weight(growth percentile) 138/82 98 98.2 °F (36.8 °C) (Oral) 16 5' (1.524 m) 135 lb (61.2 kg) LMP SpO2 BMI OB Status Smoking Status 01/01/1986 98% 26.37 kg/m2 Hysterectomy Never Smoker Vitals History BMI and BSA Data Body Mass Index Body Surface Area  
 26.37 kg/m 2 1.61 m 2 Preferred Pharmacy Pharmacy Name Phone [facility]ACY [facility][id number], [address] [phone] Your Updated Medication List  
  
   
This list is accurate as of: 11/30/17  2:50 PM.  Always use your most recent med list.  
  
  
  
  
 aspirin 81 mg tablet Take 40.5 mg by mouth daily. cefdinir 300 mg capsule Commonly known as:  OMNICEF Take 1 Cap by mouth two (2) times a day. CENTRUM SILVER PO Take  by mouth. Takes one po daily. guaiFENesin-codeine 100-10 mg/5 mL solution Commonly known as:  ROBITUSSIN AC Take 5 mL by mouth three (3) times daily as needed for Cough. Max Daily Amount: 15 mL.  
  
 ketoconazole 2 % topical cream  
Commonly known as:  NIZORAL Apply  to affected area two (2) times a day. levocetirizine 5 mg tablet Commonly known as:  Buster Loss TAKE 1 TABLET DAILY  
  
 lisinopril-hydroCHLOROthiazide 10-12.5 mg per tablet Commonly known as:  PRINZIDE, ZESTORETIC  
TAKE 1 TABLET DAILY LORazepam 1 mg tablet Commonly known as:  ATIVAN Take 1 Tab by mouth nightly as needed for Anxiety. Max Daily Amount: 1 mg.  
  
 meloxicam 15 mg tablet Commonly known as:  MOBIC  
TAKE 1 TABLET DAILY methylPREDNISolone 4 mg tablet Commonly known as:  MEDROL (MIKE) Take as directed  
  
 mometasone 0.1 % topical cream  
Commonly known as:  Easter Rumpf Apply  to affected area daily. nystatin 100,000 unit/mL suspension Commonly known as:  MYCOSTATIN  
  
 PARoxetine 20 mg tablet Commonly known as:  PAXIL TAKE 1 TABLET DAILY  
  
 simvastatin 20 mg tablet Commonly known as:  ZOCOR  
TAKE 1 TABLET NIGHTLY UCERIS PO Take  by mouth.  
  
 zolpidem 5 mg tablet Commonly known as:  AMBIEN Take 1 Tab by mouth nightly as needed for Sleep. Max Daily Amount: 5 mg. Prescriptions Printed Refills  
 guaiFENesin-codeine (ROBITUSSIN AC) 100-10 mg/5 mL solution 0 Sig: Take 5 mL by mouth three (3) times daily as needed for Cough. Max Daily Amount: 15 mL. Class: Print Route: Oral  
  
Prescriptions Sent to Pharmacy Refills  
 cefdinir (OMNICEF) 300 mg capsule 0 Sig: Take 1 Cap by mouth two (2) times a day. Class: Normal  
 Pharmacy: [facility]/pharmacy #[id number], [address] [place] [address] Ph #: [phone] Route: Oral  
 methylPREDNISolone (MEDROL, MIKE,) 4 mg tablet 0 Sig: Take as directed Class: Normal  
 Pharmacy: [facility]/pharmacy #[id number], [address] [place] [address] Ph #: [phone] Follow-up Instructions Return if symptoms worsen or fail to improve. [facility] & HEALTH SERVICES! Dear Jorden Broderick: Thank you for requesting a Timetovisit account. Our records indicate that you already have an active Timetovisit account. You can access your account anytime at [email or web address] Did you know that you can access your hospital and ER discharge instructions at any time in Timetovisit? You can also review all of your test results from your hospital stay or ER visit. Additional Information If you have questions, please visit the Frequently Asked Questions section of the [employer]egra technologies website at [email or web address] Remember, [employer] technologies is NOT to be used for urgent needs. For medical emergencies, dial 911. Now available from your iPhone and Android! Please provide this summary of care documentation to your next provider. Your primary care clinician is listed as GIANNI GARCÍA. If you have any questions after today's visit, please call [phone].

## 2017-11-30 NOTE — PROGRESS NOTES
Coordination of Care Questions    1. Have you been to the ER, urgent care clinic since your last visit? No       Hospitalized since your last visit? No    2. Have you seen or consulted any other health care providers outside of the [address] [place] [address] since your last visit? Include any pap smears or colon screening.  No

## 2017-11-30 NOTE — PROGRESS NOTES
Subjective:   Jennifer Grant is a 81 y.o. female who complains of congestion, sore throat, post nasal drip, dry cough, headache and wheezing for 5 days, gradually worsening since that time. She denies a history of chills, fevers and shortness of breath. Evaluation to date: none. Treatment to date: Tylenol, saline ns and mucinex bid. Patient does not smoke cigarettes. Relevant PMH: No pertinent additional PMH. Current Outpatient Prescriptions   Medication Sig Dispense Refill    nystatin (MYCOSTATIN) 100,000 unit/mL suspension       LORazepam (ATIVAN) 1 mg tablet Take 1 Tab by mouth nightly as needed for Anxiety. Max Daily Amount: 1 mg. 90 Tab 1    zolpidem (AMBIEN) 5 mg tablet Take 1 Tab by mouth nightly as needed for Sleep. Max Daily Amount: 5 mg. 20 Tab 0    lisinopril-hydroCHLOROthiazide (PRINZIDE, ZESTORETIC) 10-12.5 mg per tablet TAKE 1 TABLET DAILY 90 Tab 3    ketoconazole (NIZORAL) 2 % topical cream Apply  to affected area two (2) times a day. 30 g 0    mometasone (ELOCON) 0.1 % topical cream Apply  to affected area daily. 30 g 0    simvastatin (ZOCOR) 20 mg tablet TAKE 1 TABLET NIGHTLY 90 Tab 3    PARoxetine (PAXIL) 20 mg tablet TAKE 1 TABLET DAILY 90 Tab 3    BUDESONIDE (UCERIS PO) Take  by mouth.  levocetirizine (XYZAL) 5 mg tablet TAKE 1 TABLET DAILY 90 Tab 3    meloxicam (MOBIC) 15 mg tablet TAKE 1 TABLET DAILY 90 Tab 3    aspirin 81 mg tablet Take 40.5 mg by mouth daily.  MULTIVITAMIN W-MINERALS/LUTEIN (CENTRUM SILVER PO) Take  by mouth. Takes one po daily. Review of Systems  Pertinent items are noted in HPI. Objective:     Visit Vitals    /82    Pulse 98    Temp 98.2 °F (36.8 °C) (Oral)    Resp 16    Ht 5' (1.524 m)    Wt 135 lb (61.2 kg)    LMP 01/01/1986    SpO2 98%    BMI 26.37 kg/m2     General:  alert, cooperative, no distress   Eyes: conjunctivae/corneas clear.    Ears: normal TM's and external ear canals AU   Sinuses: Normal paranasal sinuses without tenderness   Mouth:  abnormal findings: mild oropharyngeal erythema and post nasal drainage   Neck: supple, symmetrical, trachea midline and no adenopathy. Lungs: rhonchi L base, wheezes throughout all lung fields - expiratory   Nares   Clear discharge        Assessment/Plan:   Bronchitis vs pneumonia  Suggested symptomatic OTC remedies. Antibiotics per orders. RTC prn.   mucinex  Steroids  Using husbands albuterol  Robitussin ac  Orders Placed This Encounter    cefdinir (OMNICEF) 300 mg capsule    methylPREDNISolone (MEDROL, MIKE,) 4 mg tablet    guaiFENesin-codeine (ROBITUSSIN AC) 100-10 mg/5 mL solution     Follow-up Disposition:  Return if symptoms worsen or fail to improve. Ros Minden

## 2017-12-11 RX ORDER — MELOXICAM 15 MG/1
TABLET ORAL
Qty: 90 TAB | Refills: 3 | Status: SHIPPED | OUTPATIENT
Start: 2017-12-11 | End: 2019-01-01 | Stop reason: SDUPTHER

## 2018-01-30 ENCOUNTER — OFFICE VISIT (OUTPATIENT)
Dept: INTERNAL MEDICINE CLINIC | Age: 83
End: 2018-01-30

## 2018-01-30 VITALS
HEART RATE: 97 BPM | HEIGHT: 60 IN | SYSTOLIC BLOOD PRESSURE: 130 MMHG | DIASTOLIC BLOOD PRESSURE: 76 MMHG | TEMPERATURE: 97.5 F | BODY MASS INDEX: 26.46 KG/M2 | OXYGEN SATURATION: 98 % | WEIGHT: 134.8 LBS | RESPIRATION RATE: 16 BRPM

## 2018-01-30 DIAGNOSIS — Z00.00 MEDICARE ANNUAL WELLNESS VISIT, SUBSEQUENT: Primary | ICD-10-CM

## 2018-01-30 NOTE — PROGRESS NOTES
Chief Complaint   Patient presents with   [facility] Annual Wellness Visit     medicare     1. Have you been to the ER, urgent care clinic since your last visit? Hospitalized since your last visit? No    2. Have you seen or consulted any other health care providers outside of the [address] [place] [address] since your last visit? Include any pap smears or colon screening.  No

## 2018-01-30 NOTE — PROGRESS NOTES
This is a Subsequent Medicare Annual Wellness Exam (AWV) (Performed 12 months after IPPE or effective date of Medicare Part B enrollment)    I have reviewed the patient's medical history in detail and updated the computerized patient record. 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 Has seen Dr. Tao Duong in the past for this. Taking meloxicam daily and 2 Tylenol ES at night as well. Uses heat. Worse bending forheard. Shoulders stay sore but no pain radiation down left arm. History     Past Medical History:   Diagnosis Date    Arthritis back pain    fingers    Asthma     Diverticulitis 6/11/2013    Hypertension     Nausea & vomiting     Other ill-defined conditions(799.89) hypercholesterolemia    Stenosis     spinal    Stroke ([facility] [address] 1996    tia   PATENT  FORAMEN  OVALE      Past Surgical History:   Procedure Laterality Date    ABDOMEN SURGERY PROC UNLISTED  4/97    CHOLECYSTECTOMY    CARDIAC SURG PROCEDURE UNLIST  8/04    repair of foramen ovale    HX CHOLECYSTECTOMY  1997    HX GYN  1986    HYSTERECTOMY - total    HX HEENT      CATARACTS REMOVED - bilateral    HX LUMBAR LAMINECTOMY  2/2011    w/fusion L4-L5    HX ORTHOPAEDIC  4/98    KNEE ARTHROSCOPY   left knee    HX OTHER SURGICAL  2009    CATARACTS    HX OTHER SURGICAL      colonoscopy x 3     Current Outpatient Prescriptions   Medication Sig Dispense Refill    meloxicam (MOBIC) 15 mg tablet TAKE 1 TABLET DAILY (Patient taking differently: TAKE 1/2 TABLET DAILY) 90 Tab 3    LORazepam (ATIVAN) 1 mg tablet Take 1 Tab by mouth nightly as needed for Anxiety. Max Daily Amount: 1 mg. 90 Tab 1    lisinopril-hydroCHLOROthiazide (PRINZIDE, ZESTORETIC) 10-12.5 mg per tablet TAKE 1 TABLET DAILY 90 Tab 3    ketoconazole (NIZORAL) 2 % topical cream Apply  to affected area two (2) times a day. 30 g 0    mometasone (ELOCON) 0.1 % topical cream Apply  to affected area daily.  30 g 0    simvastatin (ZOCOR) 20 mg tablet TAKE 1 TABLET NIGHTLY 90 Tab 3    PARoxetine (PAXIL) 20 mg tablet TAKE 1 TABLET DAILY 90 Tab 3    BUDESONIDE (UCERIS PO) Take  by mouth.  levocetirizine (XYZAL) 5 mg tablet TAKE 1 TABLET DAILY 90 Tab 3    aspirin 81 mg tablet Take 40.5 mg by mouth daily.  MULTIVITAMIN W-MINERALS/LUTEIN (CENTRUM SILVER PO) Take  by mouth. Takes one po daily.  zolpidem (AMBIEN) 5 mg tablet Take 1 Tab by mouth nightly as needed for Sleep. Max Daily Amount: 5 mg. 20 Tab 0     Allergies   Allergen Reactions    Ciprofloxacin Diarrhea and Nausea and Vomiting    Flagyl [Metronidazole] Nausea and Vomiting    Other Medication Other (comments)     BANDAIDS - breaks out skin    Oxycodone-Acetaminophen Other (comments)     dizziness    Phenylephrine Other (comments)     Wire her up    Phenylpropanolamine Other (comments)     Wire her up    Sulfa (Sulfonamide Antibiotics) Nausea Only     Family History   Problem Relation Age of Onset    Other Mother      SUARACHNOID HEMORRHAGE-ANEURYSM    Stroke Mother     Cancer Father      PANCREATIC     Social History   Substance Use Topics    Smoking status: Never Smoker    Smokeless tobacco: Never Used    Alcohol use 3.5 oz/week     7 Glasses of wine per week     Patient Active Problem List   Diagnosis Code    HTN (hypertension) I10    Hyperlipemia E78.5    Rectal bleeding K62.5    Environmental allergies Z91.09    Hyponatremia E87.1    Hypokalemia E87.6    Sleep disturbance G47.9    Diverticulitis K57.92    Colitis K52.9    Primary osteoarthritis involving multiple joints M15.0    Anxiety F41.9    CRI (chronic renal insufficiency) N18.9    Advance directive placed in chart this admission Z78.9       Depression Risk Factor Screening:     PHQ over the last two weeks 11/16/2017   Little interest or pleasure in doing things Not at all   Feeling down, depressed or hopeless Not at all   Total Score PHQ 2 0     Alcohol Risk Factor Screening: You do not drink alcohol or very rarely.     Functional Ability and Level of Safety:   Hearing Loss  Mild decreased hearing in a crowd and tinnitus. Activities of Daily Living  The home contains: handrails and grab bars in shower - just renovated bathroom. Patient does total self care    Fall Risk  Fall Risk Assessment, last 12 mths 11/16/2017   Able to walk? Yes   Fall in past 12 months? No   Fall with injury? -   Number of falls in past 12 months -   Fall Risk Score -       Abuse Screen  Patient is not abused    Cognitive Screening   Evaluation of Cognitive Function:  Has your family/caregiver stated any concerns about your memory: can't remember numbers since TIA. Normal    Patient Care Team   Patient Care Team:  [address], MD as PCP - General (Internal Medicine)  Christian Hinton MD (Gastroenterology)  Mendez Bunch MD (Ophthalmology)    Assessment/Plan   Education and counseling provided:  Are appropriate based on today's review and evaluation   Advanced directives on call. Diagnoses and all orders for this visit:    1. Medicare annual wellness visit, subsequent    2. OA neck -  Continue Mobic and Tylenol. Stretching and heat.       Health Maintenance Due   Topic Date Due    MEDICARE YEARLY EXAM  01/30/2019    GLAUCOMA SCREENING Q2Y  Up to date

## 2018-01-30 NOTE — PATIENT INSTRUCTIONS

## 2018-01-30 NOTE — MR AVS SNAPSHOT
[address] [place] [address] [address] [facility] 
[phone] Patient: Singh Castillo MRN: [id number] ZFY:[id number][birth year]60/[id number] Visit Information Date & Time Provider Department Dept. Phone Encounter #  
 1/30/2018 10:40 AM Fabiano Newton MD [facility] [phone] [id number] Follow-up Instructions Follow-up and Disposition History Your Appointments 3/30/2018 10:00 AM  
ROUTINE CARE with Fabiano Newton MD  
[facility]: [facility] F/U; 6 MONTH F/U  
 [facility] [address] [facility] [address]  
[facility] [address] [address] Upcoming Health Maintenance Date Due  
 MEDICARE YEARLY EXAM 3/4/2017 GLAUCOMA SCREENING Q2Y 9/11/2017 DTaP/Tdap/Td series (2 - Td) 3/7/2026 Allergies as of 1/30/2018  Review Complete On: 1/30/2018 By: Fabiano Newton MD  
  
 Severity Noted Reaction Type Reactions Ciprofloxacin  06/11/2013   Intolerance Diarrhea, Nausea and Vomiting Flagyl [Metronidazole]  10/10/2013    Nausea and Vomiting Other Medication  02/09/2011    Other (comments) BANDAIDS - breaks out skin Oxycodone-acetaminophen  11/10/2010   Side Effect Other (comments)  
 dizziness Phenylephrine  02/16/2012   Side Effect Other (comments) Wire her up Phenylpropanolamine  02/16/2012   Side Effect Other (comments) Wire her up Sulfa (Sulfonamide Antibiotics)  02/16/2012   Side Effect Nausea Only Current Immunizations  Reviewed on 10/30/2017 Name Date Influenza High Dose Vaccine PF 10/26/2017, 9/1/2016, 8/20/2014 Influenza Vaccine 9/1/2016, 9/3/2015, 10/1/2014, 9/25/2013 Influenza Vaccine Split 10/2/2012 Pneumococcal Polysaccharide (PPSV-23) 3/24/2014 TD Vaccine 9/1/2007 Tdap 3/7/2016 Varicella Virus Vaccine 8/11/2012 ZZZ-RETIRED (DO NOT USE) Pneumococcal Vaccine (Unspecified Type) 12/1/2000 Not reviewed this visit You Were Diagnosed With   
  
 Codes Comments Medicare annual wellness visit, subsequent    -  Primary ICD-10-CM: Z00.00 ICD-9-CM: V70.0 Vitals BP Pulse Temp Resp Height(growth percentile) Weight(growth percentile) 130/76 (BP 1 Location: Left arm, BP Patient Position: Sitting) 97 97.5 °F (36.4 °C) (Oral) 16 5' (1.524 m) 134 lb 12.8 oz (61.1 kg) LMP SpO2 BMI OB Status Smoking Status 01/01/1986 98% 26.33 kg/m2 Hysterectomy Never Smoker BMI and BSA Data Body Mass Index Body Surface Area  
 26.33 kg/m 2 1.61 m 2 Preferred Pharmacy Pharmacy Name Phone  [facility] [phone] Your Updated Medication List  
  
   
This list is accurate as of: 1/30/18 11:25 AM.  Always use your most recent med list.  
  
  
  
  
 aspirin 81 mg tablet Take 40.5 mg by mouth daily. CENTRUM SILVER PO Take  by mouth. Takes one po daily. ketoconazole 2 % topical cream  
Commonly known as:  NIZORAL Apply  to affected area two (2) times a day. levocetirizine 5 mg tablet Commonly known as:  [place] TAKE 1 TABLET DAILY  
  
 lisinopril-hydroCHLOROthiazide 10-12.5 mg per tablet Commonly known as:  PRINZIDE, ZESTORETIC  
TAKE 1 TABLET DAILY LORazepam 1 mg tablet Commonly known as:  ATIVAN Take 1 Tab by mouth nightly as needed for Anxiety. Max Daily Amount: 1 mg.  
  
 meloxicam 15 mg tablet Commonly known as:  MOBIC  
TAKE 1 TABLET DAILY  
  
 mometasone 0.1 % topical cream  
Commonly known as:  Jaren Dung Apply  to affected area daily. PARoxetine 20 mg tablet Commonly known as:  PAXIL TAKE 1 TABLET DAILY  
  
 simvastatin 20 mg tablet Commonly known as:  ZOCOR  
TAKE 1 TABLET NIGHTLY UCERIS PO Take  by mouth.  
  
 zolpidem 5 mg tablet Commonly known as:  AMBIEN  
 Take 1 Tab by mouth nightly as needed for Sleep. Max Daily Amount: 5 mg. Patient Instructions Medicare Wellness Visit, Female The best way to live healthy is to have a healthy lifestyle by eating a well-balanced diet, exercising regularly, limiting alcohol and stopping smoking. Regular physical exams and screening tests are another way to keep healthy. Preventive exams provided by your health care provider can find health problems before they become diseases or illnesses. Preventive services including immunizations, screening tests, monitoring and exams can help you take care of your own health. All people over age 72 should have a pneumovax  and and a prevnar shot to prevent pneumonia. These are once in a lifetime unless you and your provider decide differently. All people over 65 should have a yearly flu shot and a tetanus vaccine every 10 years. A bone mass density to screen for osteoporosis or thinning of the bones should be done every 2 years after 65. Screening for diabetes mellitus with a blood sugar test should be done every year. Glaucoma is a disease of the eye due to increased ocular pressure that can lead to blindness and it should be done every year by an eye professional. 
 
Cardiovascular screening tests that check for elevated lipids (fatty part of blood) which can lead to heart disease and strokes should be done every 5 years. Colorectal screening that evaluates for blood or polyps in your colon should be done yearly as a stool test or every five years as a flexible sigmoidoscope or every 10 years as a colonoscopy up to age 76. Breast cancer screening with a mammogram is recommended biennially  for women age 54-69. Screening for cervical cancer with a pap smear and pelvic exam is recommended for women after age 72 years every 2 years up to age 79 or when the provider and patient decide to stop. If there is a history of cervical abnormalities or other increased risk for cancer then the test is recommended yearly. Hepatitis C screening is also recommended for anyone born between [birth year] through [address]. A shingles vaccine is also recommended once in a lifetime after age 61. Your Medicare Wellness Exam is recommended annually. Here is a list of your current Health Maintenance items with a due date: 
Health Maintenance Due Topic Date Due  
 Annual Well Visit  01/30/2019  Glaucoma Screening   Up to date Introducing [facility] & HEALTH SERVICES! Dear Juan David Rodriguez: Thank you for requesting a SensorLogic account. Our records indicate that you already have an active SensorLogic account. You can access your account anytime at [email or web address] Did you know that you can access your hospital and ER discharge instructions at any time in SensorLogic? You can also review all of your test results from your hospital stay or ER visit. Additional Information If you have questions, please visit the Frequently Asked Questions section of the SensorLogic website at [email or web address] Remember, SensorLogic is NOT to be used for urgent needs. For medical emergencies, dial 911. Now available from your iPhone and Android! Please provide this summary of care documentation to your next provider. Your primary care clinician is listed as GIANNI GARCÍA. If you have any questions after today's visit, please call [phone].

## 2018-02-05 RX ORDER — LEVOCETIRIZINE DIHYDROCHLORIDE 5 MG/1
TABLET, FILM COATED ORAL
Qty: 90 TAB | Refills: 3 | Status: SHIPPED | OUTPATIENT
Start: 2018-02-05 | End: 2019-01-29 | Stop reason: SDUPTHER

## 2018-03-16 ENCOUNTER — HOSPITAL ENCOUNTER (OUTPATIENT)
Dept: MAMMOGRAPHY | Age: 83
Discharge: HOME | End: 2018-03-16
Attending: INTERNAL MEDICINE
Payer: MEDICARE

## 2018-03-16 DIAGNOSIS — Z12.39 SCREENING BREAST EXAMINATION: ICD-10-CM

## 2018-03-16 PROCEDURE — 77067 SCR MAMMO BI INCL CAD: CPT

## 2018-03-30 ENCOUNTER — OFFICE VISIT (OUTPATIENT)
Dept: INTERNAL MEDICINE CLINIC | Age: 83
End: 2018-03-30

## 2018-03-30 VITALS
DIASTOLIC BLOOD PRESSURE: 68 MMHG | TEMPERATURE: 98 F | HEIGHT: 60 IN | BODY MASS INDEX: 26.93 KG/M2 | WEIGHT: 137.2 LBS | SYSTOLIC BLOOD PRESSURE: 138 MMHG | OXYGEN SATURATION: 98 % | RESPIRATION RATE: 17 BRPM | HEART RATE: 85 BPM

## 2018-03-30 DIAGNOSIS — E78.00 PURE HYPERCHOLESTEROLEMIA: ICD-10-CM

## 2018-03-30 DIAGNOSIS — M54.50 CHRONIC LEFT-SIDED LOW BACK PAIN WITHOUT SCIATICA: ICD-10-CM

## 2018-03-30 DIAGNOSIS — I10 ESSENTIAL HYPERTENSION: Primary | ICD-10-CM

## 2018-03-30 DIAGNOSIS — E87.1 HYPONATREMIA: ICD-10-CM

## 2018-03-30 DIAGNOSIS — G89.29 CHRONIC LEFT-SIDED LOW BACK PAIN WITHOUT SCIATICA: ICD-10-CM

## 2018-03-30 DIAGNOSIS — R26.9 GAIT ABNORMALITY: ICD-10-CM

## 2018-03-30 NOTE — PROGRESS NOTES
Chief Complaint   Patient presents with    Follow Up Chronic Condition     HTN,Lipids     1. Have you been to the ER, urgent care clinic since your last visit? Hospitalized since your last visit? No    2. Have you seen or consulted any other health care providers outside of the [address] [place] [address] since your last visit? Include any pap smears or colon screening.  No

## 2018-03-30 NOTE — MR AVS SNAPSHOT
[address] [place] [address] [address] [place] [address] 
[phone] Patient: Kareem Alexander MRN: [id number] Visit Information Date & Time Provider Department Dept. Phone Encounter #  
 3/30/2018 10:00 AM Beth Rogers MD [facility] [phone] [id number] Follow-up Instructions Return in about 6 months (around 9/30/2018) for htn, hyperlipidemia. Upcoming Health Maintenance Date Due  
 MEDICARE YEARLY EXAM 1/31/2019 GLAUCOMA SCREENING Q2Y 9/25/2019 DTaP/Tdap/Td series (2 - Td) 3/7/2026 Allergies as of 3/30/2018  Review Complete On: 3/30/2018 By: Beth Rogers MD  
  
 Severity Noted Reaction Type Reactions Ciprofloxacin  06/11/2013   Intolerance Diarrhea, Nausea and Vomiting Flagyl [Metronidazole]  10/10/2013    Nausea and Vomiting Other Medication  02/09/2011    Other (comments) BANDAIDS - breaks out skin Oxycodone-acetaminophen  11/10/2010   Side Effect Other (comments)  
 dizziness Phenylephrine  02/16/2012   Side Effect Other (comments) Wire her up Phenylpropanolamine  02/16/2012   Side Effect Other (comments) Wire her up Sulfa (Sulfonamide Antibiotics)  02/16/2012   Side Effect Nausea Only Current Immunizations  Reviewed on 10/30/2017 Name Date Influenza High Dose Vaccine PF 10/26/2017, 9/1/2016, 8/20/2014 Influenza Vaccine 9/1/2016, 9/3/2015, 10/1/2014, 9/25/2013 Influenza Vaccine Split 10/2/2012 Pneumococcal Polysaccharide (PPSV-23) 3/24/2014 TD Vaccine 9/1/2007 Tdap 3/7/2016 Varicella Virus Vaccine 8/11/2012 ZZZ-RETIRED (DO NOT USE) Pneumococcal Vaccine (Unspecified Type) 12/1/2000 Not reviewed this visit You Were Diagnosed With   
  
 Codes Comments Essential hypertension    -  Primary ICD-10-CM: I10 
ICD-9-CM: 401.9  Chronic left-sided low back pain without sciatica     ICD-10-CM: M54.5, G89.29 
 ICD-9-CM: 724.2, 338.29 Gait abnormality     ICD-10-CM: R26.9 ICD-9-CM: 979. 2 Pure hypercholesterolemia     ICD-10-CM: E78.00 ICD-9-CM: 272.0 Hyponatremia     ICD-10-CM: E87.1 ICD-9-CM: 276.1 Vitals BP Pulse Temp Resp Height(growth percentile) Weight(growth percentile)  
 138/68 85 98 °F (36.7 °C) (Oral) 17 5' (1.524 m) 137 lb 3.2 oz (62.2 kg) LMP SpO2 BMI OB Status Smoking Status 01/01/1986 98% 26.8 kg/m2 Hysterectomy Never Smoker Vitals History BMI and BSA Data Body Mass Index Body Surface Area  
 26.8 kg/m 2 1.62 m 2 Preferred Pharmacy Pharmacy Name Phone  [facility] [phone] Your Updated Medication List  
  
   
This list is accurate as of 3/30/18 10:54 AM.  Always use your most recent med list.  
  
  
  
  
 aspirin 81 mg tablet Take 40.5 mg by mouth daily. CENTRUM SILVER PO Take  by mouth. Takes one po daily. ketoconazole 2 % topical cream  
Commonly known as:  NIZORAL Apply  to affected area two (2) times a day. levocetirizine 5 mg tablet Commonly known as:  Pervis Pooler TAKE 1 TABLET DAILY  
  
 lisinopril-hydroCHLOROthiazide 10-12.5 mg per tablet Commonly known as:  PRINZIDE, ZESTORETIC  
TAKE 1 TABLET DAILY LORazepam 1 mg tablet Commonly known as:  ATIVAN Take 1 Tab by mouth nightly as needed for Anxiety. Max Daily Amount: 1 mg.  
  
 meloxicam 15 mg tablet Commonly known as:  MOBIC  
TAKE 1 TABLET DAILY  
  
 mometasone 0.1 % topical cream  
Commonly known as:  Luisa Dolin Apply  to affected area daily. PARoxetine 20 mg tablet Commonly known as:  PAXIL TAKE 1 TABLET DAILY  
  
 simvastatin 20 mg tablet Commonly known as:  ZOCOR  
TAKE 1 TABLET NIGHTLY UCERIS PO Take  by mouth.  
  
 zolpidem 5 mg tablet Commonly known as:  AMBIEN Take 1 Tab by mouth nightly as needed for Sleep. Max Daily Amount: 5 mg. We Performed the Following LIPID PANEL [14674 CPT(R)] METABOLIC PANEL, COMPREHENSIVE [17322 CPT(R)] REFERRAL TO PHYSICAL THERAPY [GOZ86 Custom] Comments: Low back pain with abnormal gait. Follow-up Instructions Return in about 6 months (around 9/30/2018) for htn, hyperlipidemia. Referral Information Referral ID Referred By Referred To  
  
 [id number] [facility] AMANDA SLIME, GIANNI WEINBERG Not Available Visits Status Start Date End Date 1 New Request 3/30/18 3/30/19 If your referral has a status of pending review or denied, additional information will be sent to support the outcome of this decision. Introducing [facility]! Dear Char Montes: Thank you for requesting a [employer] account. Our records indicate that you already have an active [employer] account. You can access your account anytime at [email or web address] Did you know that you can access your hospital and ER discharge instructions at any time in [employer]? You can also review all of your test results from your hospital stay or ER visit. Additional Information If you have questions, please visit the Frequently Asked Questions section of the [employer] website at [email or web address] Remember, [employer] is NOT to be used for urgent needs. For medical emergencies, dial 911. Now available from your iPhone and Android! Please provide this summary of care documentation to your next provider. Your primary care clinician is listed as GIANNI GARCÍA. If you have any questions after today's visit, please call [phone].

## 2018-03-30 NOTE — PROGRESS NOTES
Subjective:     Reid Prader is a 81 y.o. female who presents for follow up of hypertension and hyperlipidemia. Diet and Lifestyle: exercises sporadically. Not regular walking secondary to intermittent hip pain. Home BP Monitoring: is well controlled at home    Cardiovascular ROS: taking medications as instructed, no medication side effects noted, no TIA's, no chest pain on exertion, no dyspnea on exertion, no swelling of ankles, no orthostatic dizziness or lightheadedness, no palpitations. New concerns:   Hip pain left hip. Severe pain last year now left with stiffness and weakness in hip. .     Current Outpatient Prescriptions   Medication Sig Dispense Refill    levocetirizine (XYZAL) 5 mg tablet TAKE 1 TABLET DAILY 90 Tab 3    meloxicam (MOBIC) 15 mg tablet TAKE 1 TABLET DAILY (Patient taking differently: TAKE 1/2 TABLET DAILY) 90 Tab 3    LORazepam (ATIVAN) 1 mg tablet Take 1 Tab by mouth nightly as needed for Anxiety. Max Daily Amount: 1 mg. 90 Tab 1    zolpidem (AMBIEN) 5 mg tablet Take 1 Tab by mouth nightly as needed for Sleep. Max Daily Amount: 5 mg. 20 Tab 0    lisinopril-hydroCHLOROthiazide (PRINZIDE, ZESTORETIC) 10-12.5 mg per tablet TAKE 1 TABLET DAILY 90 Tab 3    ketoconazole (NIZORAL) 2 % topical cream Apply  to affected area two (2) times a day. 30 g 0    mometasone (ELOCON) 0.1 % topical cream Apply  to affected area daily. 30 g 0    simvastatin (ZOCOR) 20 mg tablet TAKE 1 TABLET NIGHTLY 90 Tab 3    PARoxetine (PAXIL) 20 mg tablet TAKE 1 TABLET DAILY 90 Tab 3    BUDESONIDE (UCERIS PO) Take  by mouth.  aspirin 81 mg tablet Take 40.5 mg by mouth daily.  MULTIVITAMIN W-MINERALS/LUTEIN (CENTRUM SILVER PO) Take  by mouth. Takes one po daily.            Lab Results  Component Value Date/Time   Cholesterol, total 221 (H) 09/29/2017 07:07 AM   HDL Cholesterol 89 09/29/2017 07:07 AM   LDL, calculated 114 (H) 09/29/2017 07:07 AM   Triglyceride 90 09/29/2017 07:07 AM   CHOL/HDL Ratio 2.3 08/19/2010 07:27 AM     Lab Results  Component Value Date/Time   ALT (SGPT) 20 09/29/2017 07:07 AM   AST (SGOT) 21 09/29/2017 07:07 AM   Alk. phosphatase 71 09/29/2017 07:07 AM   Bilirubin, direct 0.15 03/31/2015 07:14 AM   Bilirubin, total 0.8 09/29/2017 07:07 AM   Albumin 4.7 09/29/2017 07:07 AM   Protein, total 6.8 09/29/2017 07:07 AM   PLATELET 806 78/42/2079 07:47 AM       Lab Results  Component Value Date/Time   GFR est non-AA 58 (L) 09/29/2017 07:07 AM   GFR est AA 67 09/29/2017 07:07 AM   Creatinine 0.93 09/29/2017 07:07 AM   BUN 16 09/29/2017 07:07 AM   Sodium 132 (L) 09/29/2017 07:07 AM   Potassium 4.5 09/29/2017 07:07 AM   Chloride 88 (L) 09/29/2017 07:07 AM   CO2 28 09/29/2017 07:07 AM        Review of Systems, additional:  Pertinent items are noted in HPI. Objective:     Visit Vitals    /89 (BP 1 Location: Right arm, BP Patient Position: Sitting)    Pulse 85    Temp 98 °F (36.7 °C) (Oral)    Resp 17    Ht 5' (1.524 m)    Wt 137 lb 3.2 oz (62.2 kg)    LMP 01/01/1986    SpO2 98%    BMI 26.8 kg/m2     Appearance: alert, well appearing, and in no distress and oriented to person, place, and time. General exam:   NECK: supple, no lad, no bruit, no tm  LUNGS: cta bilat  CV rrr, no m/g/r  ABD: soft, nt, nd, nabs  EXT: no c/c/e  Lspine nontender. Mild tenderness left paraspinal muscles. Buttocks, lateral hips notnender. SLR negataive. Full rom in hips. Limping left leg. Assessment/Plan:     hypertension well controlled. current treatment plan is effective, no change in therapy. Hyperlipidemia- controlled  Continue same  Check labs    Low back pain - ref to PT. Stretching exercises. Hx hyponatremia - repeat labs. Orders Placed This Encounter    METABOLIC PANEL, COMPREHENSIVE    LIPID PANEL    REFERRAL TO PHYSICAL THERAPY     Follow-up Disposition:  Return in about 6 months (around 9/30/2018) for htn, hyperlipidemia.

## 2018-04-02 ENCOUNTER — HOSPITAL ENCOUNTER (OUTPATIENT)
Dept: LAB | Age: 83
Discharge: HOME | End: 2018-04-02
Payer: MEDICARE

## 2018-04-02 PROCEDURE — 36415 COLL VENOUS BLD VENIPUNCTURE: CPT

## 2018-04-02 PROCEDURE — 80053 COMPREHEN METABOLIC PANEL: CPT

## 2018-04-02 PROCEDURE — 80061 LIPID PANEL: CPT

## 2018-04-03 LAB
ALBUMIN SERPL-MCNC: 4.3 G/DL (ref 3.5–4.7)
ALBUMIN/GLOB SERPL: 2.3 {RATIO} (ref 1.2–2.2)
ALP SERPL-CCNC: 67 IU/L (ref 39–117)
ALT SERPL-CCNC: 21 IU/L (ref 0–32)
AST SERPL-CCNC: 29 IU/L (ref 0–40)
BILIRUB SERPL-MCNC: 0.4 MG/DL (ref 0–1.2)
BUN SERPL-MCNC: 19 MG/DL (ref 8–27)
BUN/CREAT SERPL: 17 (ref 12–28)
CALCIUM SERPL-MCNC: 9.2 MG/DL (ref 8.7–10.3)
CHLORIDE SERPL-SCNC: 92 MMOL/L (ref 96–106)
CHOLEST SERPL-MCNC: 202 MG/DL (ref 100–199)
CO2 SERPL-SCNC: 26 MMOL/L (ref 18–29)
CREAT SERPL-MCNC: 1.09 MG/DL (ref 0.57–1)
GFR SERPLBLD CREATININE-BSD FMLA CKD-EPI: 48 ML/MIN/1.73
GFR SERPLBLD CREATININE-BSD FMLA CKD-EPI: 55 ML/MIN/1.73
GLOBULIN SER CALC-MCNC: 1.9 G/DL (ref 1.5–4.5)
GLUCOSE SERPL-MCNC: 102 MG/DL (ref 65–99)
HDLC SERPL-MCNC: 71 MG/DL
INTERPRETATION, 910389: NORMAL
INTERPRETATION: NORMAL
LDLC SERPL CALC-MCNC: 111 MG/DL (ref 0–99)
PDF IMAGE, 910387: NORMAL
POTASSIUM SERPL-SCNC: 4.2 MMOL/L (ref 3.5–5.2)
PROT SERPL-MCNC: 6.2 G/DL (ref 6–8.5)
SODIUM SERPL-SCNC: 134 MMOL/L (ref 134–144)
TRIGL SERPL-MCNC: 100 MG/DL (ref 0–149)
VLDLC SERPL CALC-MCNC: 20 MG/DL (ref 5–40)

## 2018-04-24 RX ORDER — SIMVASTATIN 20 MG/1
TABLET, FILM COATED ORAL
Qty: 90 TAB | Refills: 3 | Status: SHIPPED | OUTPATIENT
Start: 2018-04-24 | End: 2019-04-23 | Stop reason: SDUPTHER

## 2018-04-24 RX ORDER — PAROXETINE HYDROCHLORIDE 20 MG/1
TABLET, FILM COATED ORAL
Qty: 90 TAB | Refills: 3 | Status: SHIPPED | OUTPATIENT
Start: 2018-04-24 | End: 2018-09-27 | Stop reason: DRUGHIGH

## 2018-05-07 ENCOUNTER — TELEPHONE (OUTPATIENT)
Dept: INTERNAL MEDICINE CLINIC | Age: 83
End: 2018-05-07

## 2018-05-07 NOTE — TELEPHONE ENCOUNTER
Felt flushed last night - checked blood pressure repeated last night. Climbed from 140/80 to 160/90. This AM BP initially high normal  But then checked and much higher - see nursing notes - 212/104. Took 1/2 lorazepam at 11am.  At 12N BP was 194/97 and at 1pm 158/81. Discussed.   Continue same meds,

## 2018-05-07 NOTE — TELEPHONE ENCOUNTER
Patient is concerned about her B/P,althought she hadnt taken her medication its running about 157/88. She is going to eat and then take her medication. Requesting a return call.

## 2018-05-07 NOTE — TELEPHONE ENCOUNTER
Writer took triage call from patient in regards to her BP. Patient states taking her BP this am 146/79, then now 11:29 212/104. Patient states face flushed, but no other symptoms. Writer spoke with Dr. Reina Valencia and patient was instructed to take 1/2 of her lorazepam and retake BP in 30 minutes and return call to office with reading, patient verbalized understanding.

## 2018-05-21 DIAGNOSIS — F41.9 ANXIETY: Primary | ICD-10-CM

## 2018-05-21 RX ORDER — LORAZEPAM 1 MG/1
1 TABLET ORAL
Qty: 90 TAB | Refills: 1 | Status: SHIPPED | OUTPATIENT
Start: 2018-05-21 | End: 2018-05-24 | Stop reason: SDUPTHER

## 2018-05-24 DIAGNOSIS — F41.9 ANXIETY: ICD-10-CM

## 2018-05-24 RX ORDER — LORAZEPAM 1 MG/1
1 TABLET ORAL
Qty: 90 TAB | Refills: 1 | Status: SHIPPED | OUTPATIENT
Start: 2018-05-24 | End: 2018-11-09 | Stop reason: SDUPTHER

## 2018-06-28 DIAGNOSIS — F51.01 PRIMARY INSOMNIA: Primary | ICD-10-CM

## 2018-06-28 RX ORDER — LISINOPRIL AND HYDROCHLOROTHIAZIDE 10; 12.5 MG/1; MG/1
TABLET ORAL
Qty: 90 TAB | Refills: 3 | Status: SHIPPED | OUTPATIENT
Start: 2018-06-28 | End: 2019-07-01 | Stop reason: SDUPTHER

## 2018-06-28 RX ORDER — ZOLPIDEM TARTRATE 5 MG/1
TABLET ORAL
Qty: 20 TAB | Refills: 0 | Status: SHIPPED | OUTPATIENT
Start: 2018-06-28 | End: 2019-03-26 | Stop reason: ALTCHOICE

## 2018-09-27 ENCOUNTER — OFFICE VISIT (OUTPATIENT)
Dept: INTERNAL MEDICINE CLINIC | Age: 83
End: 2018-09-27

## 2018-09-27 VITALS
SYSTOLIC BLOOD PRESSURE: 139 MMHG | BODY MASS INDEX: 26.23 KG/M2 | RESPIRATION RATE: 16 BRPM | HEART RATE: 81 BPM | WEIGHT: 133.6 LBS | TEMPERATURE: 98.3 F | HEIGHT: 60 IN | OXYGEN SATURATION: 97 % | DIASTOLIC BLOOD PRESSURE: 79 MMHG

## 2018-09-27 DIAGNOSIS — F41.9 ANXIETY: ICD-10-CM

## 2018-09-27 DIAGNOSIS — E78.00 PURE HYPERCHOLESTEROLEMIA: ICD-10-CM

## 2018-09-27 DIAGNOSIS — N18.30 CHRONIC RENAL IMPAIRMENT, STAGE 3 (MODERATE) (HCC): ICD-10-CM

## 2018-09-27 DIAGNOSIS — K52.9 COLITIS: ICD-10-CM

## 2018-09-27 DIAGNOSIS — I10 ESSENTIAL HYPERTENSION: Primary | ICD-10-CM

## 2018-09-27 RX ORDER — PAROXETINE 30 MG/1
30 TABLET, FILM COATED ORAL DAILY
Qty: 90 TAB | Refills: 3 | Status: SHIPPED | OUTPATIENT
Start: 2018-09-27 | End: 2019-10-16 | Stop reason: SDUPTHER

## 2018-09-27 NOTE — PROGRESS NOTES
Subjective:     Cristopher Solitario is a 81 y.o. female who presents for follow up of hypertension and hyperlipidemia. Diet and Lifestyle: generally follows a low sodium diet, exercises sporadically  Home BP Monitoring: is borderline controlled at home,     Cardiovascular ROS: taking medications as instructed, no medication side effects noted, no TIA's, no chest pain on exertion, no dyspnea on exertion, no swelling of ankles, no orthostatic dizziness or lightheadedness, no palpitations. New concerns:   Anxiety has increased in the last several months. Colitis has increased with increased diarrhea and cramping. Saw Dr. Edward Siegel. Uceris helps but not covered by insurance. Given Dicyclomine to take tid prn and low FODMAT diet. Saw Dr. Manpreet Yanez per her neck pain. Told was arthritis. Had PT without much relief. Last night painful left occipital ridge. Also having pain in left shoulder -  [facility] has injected it x 2 with relief. Having hip pain if walks too long - therapy helped with this as well. Current Outpatient Prescriptions   Medication Sig Dispense Refill    PARoxetine (PAXIL) 30 mg tablet Take 1 Tab by mouth daily. 90 Tab 3    lisinopril-hydroCHLOROthiazide (PRINZIDE, ZESTORETIC) 10-12.5 mg per tablet TAKE 1 TABLET BY MOUTH ONCE DAILY 90 Tab 3    zolpidem (AMBIEN) 5 mg tablet TAKE 1 TABLET BY MOUTH ONCE NIGHTLY AS NEEDED FOR SLEEP 20 Tab 0    LORazepam (ATIVAN) 1 mg tablet Take 1 Tab by mouth nightly as needed for Anxiety. Max Daily Amount: 1 mg. 90 Tab 1    simvastatin (ZOCOR) 20 mg tablet TAKE 1 TABLET NIGHTLY 90 Tab 3    levocetirizine (XYZAL) 5 mg tablet TAKE 1 TABLET DAILY 90 Tab 3    meloxicam (MOBIC) 15 mg tablet TAKE 1 TABLET DAILY (Patient taking differently: TAKE 1/2 TABLET DAILY) 90 Tab 3    ketoconazole (NIZORAL) 2 % topical cream Apply  to affected area two (2) times a day. 30 g 0    mometasone (ELOCON) 0.1 % topical cream Apply  to affected area daily.  30 g 0    BUDESONIDE (UCERIS PO) Take  by mouth.  aspirin 81 mg tablet Take 40.5 mg by mouth daily.  MULTIVITAMIN W-MINERALS/LUTEIN (CENTRUM SILVER PO) Take  by mouth. Takes one po daily. Lab Results  Component Value Date/Time   Cholesterol, total 202 (H) 04/02/2018 07:41 AM   HDL Cholesterol 71 04/02/2018 07:41 AM   LDL, calculated 111 (H) 04/02/2018 07:41 AM   Triglyceride 100 04/02/2018 07:41 AM   CHOL/HDL Ratio 2.3 08/19/2010 07:27 AM     Lab Results  Component Value Date/Time   ALT (SGPT) 21 04/02/2018 07:41 AM   AST (SGOT) 29 04/02/2018 07:41 AM   Alk. phosphatase 67 04/02/2018 07:41 AM   Bilirubin, direct 0.15 03/31/2015 07:14 AM   Bilirubin, total 0.4 04/02/2018 07:41 AM   Albumin 4.3 04/02/2018 07:41 AM   Protein, total 6.2 04/02/2018 07:41 AM   PLATELET 274 87/23/3507 07:47 AM       Lab Results  Component Value Date/Time   GFR est non-AA 48 (L) 04/02/2018 07:41 AM   GFR est AA 55 (L) 04/02/2018 07:41 AM   Creatinine 1.09 (H) 04/02/2018 07:41 AM   BUN 19 04/02/2018 07:41 AM   Sodium 134 04/02/2018 07:41 AM   Potassium 4.2 04/02/2018 07:41 AM   Chloride 92 (L) 04/02/2018 07:41 AM   CO2 26 04/02/2018 07:41 AM        Review of Systems, additional:  Pertinent items are noted in HPI. Objective:     Visit Vitals    /79    Pulse 81    Temp 98.3 °F (36.8 °C) (Oral)    Resp 16    Ht 5' (1.524 m)    Wt 133 lb 9.6 oz (60.6 kg)    LMP 01/01/1986    SpO2 97%    BMI 26.09 kg/m2     Appearance: alert, well appearing, and in no distress and oriented to person, place, and time. General exam:   NECK: supple, no lad, no bruit, no tm  LUNGS: cta bilat  CV rrr, no m/g/r  ABD: soft, nt, nd, nabs  EXT: no c/c/e    Assessment/Plan:     hypertension well controlled. current treatment plan is effective, no change in therapy. Hyperlipidemia - controlled in past  Continue same  Check labs    Anxiety - not controled  Increase Paxil to 30mg every day.       Left shoulder pain - referred back to [facility]    [facility] - repeat labs. Stopped nsaids. Orders Placed This Encounter    METABOLIC PANEL, COMPREHENSIVE    LIPID PANEL    PARoxetine (PAXIL) 30 mg tablet     Follow-up Disposition:  Return in about 6 months (around 3/27/2019) for hyperlipidemia, htn.

## 2018-09-27 NOTE — PROGRESS NOTES
Sharyn Sanz is a 81 y.o. female    No chief complaint on file. 1. Have you been to the ER, urgent care clinic since your last visit? Hospitalized since your last visit? No     2. Have you seen or consulted any other health care providers outside of the [address] [place] [address] since your last visit? Include any pap smears or colon screening.   No     Visit Vitals    /79    Pulse 81    Temp 98.3 °F (36.8 °C) (Oral)    Resp 16    Ht 5' (1.524 m)    Wt 133 lb 9.6 oz (60.6 kg)    SpO2 97%    BMI 26.09 kg/m2

## 2018-09-27 NOTE — MR AVS SNAPSHOT
[address] [place] [address] Drive [address] [facility] 
[phone] Patient: Gricelda Graves MRN: [id number] Visit Information Date & Time Provider Department Dept. Phone Encounter #  
 9/27/2018 11:20 AM Raul Cosme MD [facility] [phone] [id number] Follow-up Instructions Return in about 6 months (around 3/27/2019) for hyperlipidemia, htn. Follow-up and Disposition History Upcoming Health Maintenance Date Due Shingrix Vaccine Age 50> (1 of 2) 6/21/1986 Influenza Age 5 to Adult 8/1/2018 MEDICARE YEARLY EXAM 1/31/2019 GLAUCOMA SCREENING Q2Y 9/25/2019 DTaP/Tdap/Td series (2 - Td) 3/7/2026 Allergies as of 9/27/2018  Review Complete On: 9/27/2018 By: Raul Cosme MD  
  
 Severity Noted Reaction Type Reactions Ciprofloxacin  06/11/2013   Intolerance Diarrhea, Nausea and Vomiting Flagyl [Metronidazole]  10/10/2013    Nausea and Vomiting Other Medication  02/09/2011    Other (comments) BANDAIDS - breaks out skin Oxycodone-acetaminophen  11/10/2010   Side Effect Other (comments)  
 dizziness Phenylephrine  02/16/2012   Side Effect Other (comments) Wire her up Phenylpropanolamine  02/16/2012   Side Effect Other (comments) Wire her up Sulfa (Sulfonamide Antibiotics)  02/16/2012   Side Effect Nausea Only Current Immunizations  Reviewed on 10/30/2017 Name Date Influenza High Dose Vaccine PF 10/26/2017, 9/1/2016, 8/20/2014 Influenza Vaccine 9/1/2016, 9/3/2015, 10/1/2014, 9/25/2013 Influenza Vaccine Split 10/2/2012 Pneumococcal Polysaccharide (PPSV-23) 3/24/2014 TD Vaccine 9/1/2007 Tdap 3/7/2016 Varicella Virus Vaccine 8/11/2012 ZZZ-RETIRED (DO NOT USE) Pneumococcal Vaccine (Unspecified Type) 12/1/2000 Not reviewed this visit You Were Diagnosed With   
  
 Codes Comments Essential hypertension    -  Primary ICD-10-CM: I10 
ICD-9-CM: 401.9 Anxiety     ICD-10-CM: F41.9 ICD-9-CM: 300.00 Colitis     ICD-10-CM: K52.9 ICD-9-CM: 558.9 Pure hypercholesterolemia     ICD-10-CM: E78.00 ICD-9-CM: 272.0 Chronic renal impairment, stage 3 (moderate)     ICD-10-CM: N18.3 ICD-9-CM: 643. 3 Vitals BP Pulse Temp Resp Height(growth percentile) Weight(growth percentile) 139/79 81 98.3 °F (36.8 °C) (Oral) 16 5' (1.524 m) 133 lb 9.6 oz (60.6 kg) LMP SpO2 BMI OB Status Smoking Status 01/01/1986 97% 26.09 kg/m2 Hysterectomy Never Smoker Vitals History BMI and BSA Data Body Mass Index Body Surface Area 26.09 kg/m 2 1.6 m 2 Preferred Pharmacy Pharmacy Name Phone  [facility] [phone] Your Updated Medication List  
  
   
This list is accurate as of 9/27/18 12:04 PM.  Always use your most recent med list.  
  
  
  
  
 aspirin 81 mg tablet Take 40.5 mg by mouth daily. CENTRUM SILVER PO Take  by mouth. Takes one po daily. ketoconazole 2 % topical cream  
Commonly known as:  NIZORAL Apply  to affected area two (2) times a day. levocetirizine 5 mg tablet Commonly known as:  Amy Roch TAKE 1 TABLET DAILY  
  
 lisinopril-hydroCHLOROthiazide 10-12.5 mg per tablet Commonly known as:  PRINZIDE, ZESTORETIC  
TAKE 1 TABLET BY MOUTH ONCE DAILY LORazepam 1 mg tablet Commonly known as:  ATIVAN Take 1 Tab by mouth nightly as needed for Anxiety. Max Daily Amount: 1 mg.  
  
 meloxicam 15 mg tablet Commonly known as:  MOBIC  
TAKE 1 TABLET DAILY  
  
 mometasone 0.1 % topical cream  
Commonly known as:  Izetta Daily Apply  to affected area daily. PARoxetine 30 mg tablet Commonly known as:  PAXIL Take 1 Tab by mouth daily. simvastatin 20 mg tablet Commonly known as:  ZOCOR  
TAKE 1 TABLET NIGHTLY UCERIS PO Take  by mouth. zolpidem 5 mg tablet Commonly known as:  AMBIEN  
TAKE 1 TABLET BY MOUTH ONCE NIGHTLY AS NEEDED FOR SLEEP Prescriptions Sent to Pharmacy Refills PARoxetine (PAXIL) 30 mg tablet 3 Sig: Take 1 Tab by mouth daily. Class: Normal  
 Pharmacy: [facility] [address]  #: [phone] Route: Oral  
  
We Performed the Following LIPID PANEL [69812 CPT(R)] METABOLIC PANEL, COMPREHENSIVE [88436 CPT(R)] Follow-up Instructions Return in about 6 months (around 3/27/2019) for hyperlipidemia, htn. Introducing [address]! Dear Aries Shaikh: Thank you for requesting a [employer] account. Our records indicate that you already have an active [employer] account. You can access your account anytime at [email or web address] Did you know that you can access your hospital and ER discharge instructions at any time in [employer]? You can also review all of your test results from your hospital stay or ER visit. Additional Information If you have questions, please visit the Frequently Asked Questions section of the [employer] website at [email or web address] Remember, [employer] is NOT to be used for urgent needs. For medical emergencies, dial 911. Now available from your iPhone and Android! Please provide this summary of care documentation to your next provider. Your primary care clinician is listed as GIANNI GARCÍA. If you have any questions after today's visit, please call [phone].

## 2018-09-28 ENCOUNTER — HOSPITAL ENCOUNTER (OUTPATIENT)
Dept: LAB | Age: 83
Discharge: HOME | End: 2018-09-28
Payer: MEDICARE

## 2018-09-28 PROCEDURE — 36415 COLL VENOUS BLD VENIPUNCTURE: CPT

## 2018-09-28 PROCEDURE — 80053 COMPREHEN METABOLIC PANEL: CPT

## 2018-09-28 PROCEDURE — 80061 LIPID PANEL: CPT

## 2018-09-29 LAB
ALBUMIN SERPL-MCNC: 4.5 G/DL (ref 3.5–4.7)
ALBUMIN/GLOB SERPL: 2.6 {RATIO} (ref 1.2–2.2)
ALP SERPL-CCNC: 71 IU/L (ref 39–117)
ALT SERPL-CCNC: 15 IU/L (ref 0–32)
AST SERPL-CCNC: 22 IU/L (ref 0–40)
BILIRUB SERPL-MCNC: 1 MG/DL (ref 0–1.2)
BUN SERPL-MCNC: 13 MG/DL (ref 8–27)
BUN/CREAT SERPL: 13 (ref 12–28)
CALCIUM SERPL-MCNC: 9.6 MG/DL (ref 8.7–10.3)
CHLORIDE SERPL-SCNC: 91 MMOL/L (ref 96–106)
CHOLEST SERPL-MCNC: 198 MG/DL (ref 100–199)
CO2 SERPL-SCNC: 26 MMOL/L (ref 20–29)
CREAT SERPL-MCNC: 1.02 MG/DL (ref 0.57–1)
GLOBULIN SER CALC-MCNC: 1.7 G/DL (ref 1.5–4.5)
GLUCOSE SERPL-MCNC: 89 MG/DL (ref 65–99)
HDLC SERPL-MCNC: 85 MG/DL
INTERPRETATION, 910389: NORMAL
INTERPRETATION: NORMAL
LDLC SERPL CALC-MCNC: 97 MG/DL (ref 0–99)
PDF IMAGE, 910387: NORMAL
POTASSIUM SERPL-SCNC: 4.2 MMOL/L (ref 3.5–5.2)
PROT SERPL-MCNC: 6.2 G/DL (ref 6–8.5)
SODIUM SERPL-SCNC: 132 MMOL/L (ref 134–144)
TRIGL SERPL-MCNC: 81 MG/DL (ref 0–149)
VLDLC SERPL CALC-MCNC: 16 MG/DL (ref 5–40)

## 2018-11-09 ENCOUNTER — TELEPHONE (OUTPATIENT)
Dept: INTERNAL MEDICINE CLINIC | Age: 83
End: 2018-11-09

## 2018-11-09 DIAGNOSIS — F41.9 ANXIETY: ICD-10-CM

## 2018-11-09 RX ORDER — LORAZEPAM 1 MG/1
1 TABLET ORAL
Qty: 90 TAB | Refills: 1 | Status: SHIPPED | OUTPATIENT
Start: 2018-11-09 | End: 2019-05-08 | Stop reason: SDUPTHER

## 2018-12-27 ENCOUNTER — OFFICE VISIT (OUTPATIENT)
Dept: INTERNAL MEDICINE CLINIC | Age: 83
End: 2018-12-27

## 2018-12-27 VITALS
DIASTOLIC BLOOD PRESSURE: 77 MMHG | OXYGEN SATURATION: 98 % | SYSTOLIC BLOOD PRESSURE: 148 MMHG | RESPIRATION RATE: 20 BRPM | TEMPERATURE: 98.1 F | BODY MASS INDEX: 25.91 KG/M2 | HEIGHT: 60 IN | WEIGHT: 132 LBS | HEART RATE: 93 BPM

## 2018-12-27 DIAGNOSIS — J20.9 ACUTE BRONCHITIS, UNSPECIFIED ORGANISM: Primary | ICD-10-CM

## 2018-12-27 RX ORDER — METHYLPREDNISOLONE 4 MG/1
TABLET ORAL
Qty: 1 DOSE PACK | Refills: 0 | Status: SHIPPED | OUTPATIENT
Start: 2018-12-27 | End: 2019-01-15 | Stop reason: ALTCHOICE

## 2018-12-27 RX ORDER — CEFDINIR 300 MG/1
300 CAPSULE ORAL 2 TIMES DAILY
Qty: 20 CAP | Refills: 0 | Status: SHIPPED | OUTPATIENT
Start: 2018-12-27 | End: 2019-03-26 | Stop reason: ALTCHOICE

## 2018-12-27 RX ORDER — DICYCLOMINE HYDROCHLORIDE 10 MG/1
10 CAPSULE ORAL AS NEEDED
COMMUNITY

## 2018-12-27 NOTE — PROGRESS NOTES
Subjective:   Milan Rae is a 81 y.o. female who complains of congestion, sore throat, nasal blockage, post nasal drip, cough described as productive of green sputum, headache, bilateral sinus pain, bilateral ear pressure and green nasal discharge for 5 days, gradually worsening since that time. Feels tight in her chest.    She denies a history of chills, fevers, shortness of breath and wheezing. Evaluation to date: none. Treatment to date: Tylenol, diabetic Tussin at night and Saline nasal spray. Patient does not smoke cigarettes. Relevant PMH: No pertinent additional PMH. Current Outpatient Medications   Medication Sig Dispense Refill    dicyclomine (BENTYL) 10 mg capsule Take 10 mg by mouth three (3) times daily.  cefdinir (OMNICEF) 300 mg capsule Take 1 Cap by mouth two (2) times a day. 20 Cap 0    methylPREDNISolone (MEDROL, MIKE,) 4 mg tablet Take as directed 1 Dose Pack 0    varicella-zoster recombinant, PF, (SHINGRIX, PF,) 50 mcg/0.5 mL susr injection 0.5 mL by IntraMUSCular route once for 1 dose. Repeat x 1 in 2-6 months 0.5 mL 1    LORazepam (ATIVAN) 1 mg tablet Take 1 Tab by mouth nightly as needed for Anxiety. Max Daily Amount: 1 mg. 90 Tab 1    PARoxetine (PAXIL) 30 mg tablet Take 1 Tab by mouth daily. 90 Tab 3    lisinopril-hydroCHLOROthiazide (PRINZIDE, ZESTORETIC) 10-12.5 mg per tablet TAKE 1 TABLET BY MOUTH ONCE DAILY 90 Tab 3    zolpidem (AMBIEN) 5 mg tablet TAKE 1 TABLET BY MOUTH ONCE NIGHTLY AS NEEDED FOR SLEEP 20 Tab 0    simvastatin (ZOCOR) 20 mg tablet TAKE 1 TABLET NIGHTLY 90 Tab 3    levocetirizine (XYZAL) 5 mg tablet TAKE 1 TABLET DAILY 90 Tab 3    meloxicam (MOBIC) 15 mg tablet TAKE 1 TABLET DAILY (Patient taking differently: TAKE 1/2 TABLET DAILY) 90 Tab 3    ketoconazole (NIZORAL) 2 % topical cream Apply  to affected area two (2) times a day. 30 g 0    mometasone (ELOCON) 0.1 % topical cream Apply  to affected area daily.  30 g 0    aspirin 81 mg tablet Take 40.5 mg by mouth daily.  MULTIVITAMIN W-MINERALS/LUTEIN (CENTRUM SILVER PO) Take  by mouth. Takes one po daily. Review of Systems  Pertinent items are noted in HPI. Objective:     Visit Vitals  /77   Pulse 93   Temp 98.1 °F (36.7 °C) (Oral)   Resp 20   Ht 5' (1.524 m)   Wt 132 lb (59.9 kg)   LMP 01/01/1986   SpO2 98%   BMI 25.78 kg/m²     General:  alert, cooperative, no distress   Eyes: conjunctivae/corneas clear. PERRL   Ears: normal TM's and external ear canals AU   Sinuses: Normal paranasal sinuses without tenderness   Mouth:  abnormal findings: post nasal drainage   Neck: supple, symmetrical, trachea midline and no adenopathy. Lungs: wheezes bilat apex   Nares   Edematous with clear discharge. Assessment/Plan:   Bronchitis acute with wheezing. RTC prn. Continue saline ns and Tussin  Add mucinex  Cefdinir and medrol dose pack. Orders Placed This Encounter    dicyclomine (BENTYL) 10 mg capsule    cefdinir (OMNICEF) 300 mg capsule    methylPREDNISolone (MEDROL, MIKE,) 4 mg tablet    varicella-zoster recombinant, PF, (SHINGRIX, PF,) 50 mcg/0.5 mL susr injection     Follow-up Disposition:  Return if symptoms worsen or fail to improve. Chantelle Dixon

## 2019-01-01 RX ORDER — MELOXICAM 15 MG/1
TABLET ORAL
Qty: 90 TAB | Refills: 3 | Status: SHIPPED | OUTPATIENT
Start: 2019-01-01 | End: 2020-02-01

## 2019-01-15 ENCOUNTER — TELEPHONE (OUTPATIENT)
Dept: INTERNAL MEDICINE CLINIC | Age: 84
End: 2019-01-15

## 2019-01-15 RX ORDER — PREDNISONE 10 MG/1
TABLET ORAL
Qty: 63 TAB | Refills: 0 | Status: SHIPPED | OUTPATIENT
Start: 2019-01-15 | End: 2019-03-26 | Stop reason: ALTCHOICE

## 2019-01-15 RX ORDER — ALBUTEROL SULFATE 0.83 MG/ML
2.5 SOLUTION RESPIRATORY (INHALATION)
Qty: 50 EACH | Refills: 0 | Status: SHIPPED | OUTPATIENT
Start: 2019-01-15 | End: 2022-05-02

## 2019-01-28 ENCOUNTER — TELEPHONE (OUTPATIENT)
Dept: INTERNAL MEDICINE CLINIC | Age: 84
End: 2019-01-28

## 2019-01-29 RX ORDER — LEVOCETIRIZINE DIHYDROCHLORIDE 5 MG/1
TABLET, FILM COATED ORAL
Qty: 90 TAB | Refills: 3 | Status: SHIPPED | OUTPATIENT
Start: 2019-01-29 | End: 2020-02-24

## 2019-03-18 ENCOUNTER — HOSPITAL ENCOUNTER (OUTPATIENT)
Dept: MAMMOGRAPHY | Age: 84
Discharge: HOME | End: 2019-03-18
Attending: INTERNAL MEDICINE
Payer: MEDICARE

## 2019-03-18 DIAGNOSIS — Z12.39 SCREENING BREAST EXAMINATION: ICD-10-CM

## 2019-03-18 PROCEDURE — 77063 BREAST TOMOSYNTHESIS BI: CPT

## 2019-03-26 ENCOUNTER — OFFICE VISIT (OUTPATIENT)
Dept: INTERNAL MEDICINE CLINIC | Age: 84
End: 2019-03-26

## 2019-03-26 VITALS
WEIGHT: 133.6 LBS | DIASTOLIC BLOOD PRESSURE: 70 MMHG | BODY MASS INDEX: 26.23 KG/M2 | OXYGEN SATURATION: 97 % | SYSTOLIC BLOOD PRESSURE: 114 MMHG | HEIGHT: 60 IN | TEMPERATURE: 98.6 F | HEART RATE: 91 BPM

## 2019-03-26 DIAGNOSIS — E78.00 PURE HYPERCHOLESTEROLEMIA: ICD-10-CM

## 2019-03-26 DIAGNOSIS — Z78.9 ADVANCE DIRECTIVE IN CHART: ICD-10-CM

## 2019-03-26 DIAGNOSIS — N18.30 CHRONIC RENAL IMPAIRMENT, STAGE 3 (MODERATE) (HCC): ICD-10-CM

## 2019-03-26 DIAGNOSIS — F41.9 ANXIETY: ICD-10-CM

## 2019-03-26 DIAGNOSIS — Z00.00 MEDICARE ANNUAL WELLNESS VISIT, SUBSEQUENT: ICD-10-CM

## 2019-03-26 DIAGNOSIS — Z71.89 ADVANCE DIRECTIVE DISCUSSED WITH PATIENT: ICD-10-CM

## 2019-03-26 DIAGNOSIS — I10 ESSENTIAL HYPERTENSION: Primary | ICD-10-CM

## 2019-03-26 NOTE — PATIENT INSTRUCTIONS
Medicare Wellness Visit, Female The best way to live healthy is to have a lifestyle where you eat a well-balanced diet, exercise regularly, limit alcohol use, and quit all forms of tobacco/nicotine, if applicable. Regular preventive services are another way to keep healthy. Preventive services (vaccines, screening tests, monitoring & exams) can help personalize your care plan, which helps you manage your own care. Screening tests can find health problems at the earliest stages, when they are easiest to treat. [facility] follows the current, evidence-based guidelines published by the [facility] ([facility]) when recommending preventive services for our patients. Because we follow these guidelines, sometimes recommendations change over time as research supports it. (For example, mammograms used to be recommended annually. Even though Medicare will still pay for an annual mammogram, the newer guidelines recommend a mammogram every two years for women of average risk.) Of course, you and your doctor may decide to screen more often for some diseases, based on your risk and your health status. Preventive services for you include: - Medicare offers their members a free annual wellness visit, which is time for you and your primary care provider to discuss and plan for your preventive service needs. Take advantage of this benefit every year! 
-All adults over the age of 72 should receive the recommended pneumonia vaccines. Current USPSTF guidelines recommend a series of two vaccines for the best pneumonia protection.  
-All adults should have a flu vaccine yearly and a tetanus vaccine every 10 years. All adults age 61 and older should receive a shingles vaccine once in their lifetime.   
-A bone mass density test is recommended when a woman turns 65 to screen for osteoporosis. This test is only recommended one time, as a screening. Some providers will use this same test as a disease monitoring tool if you already have osteoporosis. -All adults age 38-68 who are overweight should have a diabetes screening test once every three years.  
-Other screening tests and preventive services for persons with diabetes include: an eye exam to screen for diabetic retinopathy, a kidney function test, a foot exam, and stricter control over your cholesterol.  
-Cardiovascular screening for adults with routine risk involves an electrocardiogram (ECG) at intervals determined by your doctor.  
-Colorectal cancer screenings should be done for adults age 54-65 with no increased risk factors for colorectal cancer. There are a number of acceptable methods of screening for this type of cancer. Each test has its own benefits and drawbacks. Discuss with your doctor what is most appropriate for you during your annual wellness visit. The different tests include: colonoscopy (considered the best screening method), a fecal occult blood test, a fecal DNA test, and sigmoidoscopy. -Breast cancer screenings are recommended every other year for women of normal risk, age 54-69. 
-Cervical cancer screenings for women over age 72 are only recommended with certain risk factors.  
-All adults born between [facility] should be screened once for Hepatitis C. Here is a list of your current Health Maintenance items (your personalized list of preventive services) with a due date: 
 
 
Health Maintenance Due Topic Date Due  Shingrix Vaccine Age 50> (1 of 2) Has received 1 of 2  
 Pneumococcal 65+ years (2 of 2 - PCV13) 03/24/2015 - check with pharmacy  MEDICARE YEARLY EXAM  03/26/2020

## 2019-03-26 NOTE — PROGRESS NOTES
Subjective:  
 
Namrata Matt is a 81 y.o. female who presents for follow up of hypertension, hyperlipidemia and IBS. Diet and Lifestyle: generally follows a low sodium diet, sedentary - working in yard but cant walk secondary to low back pain 
Home BP Monitoring: is not measured at home Cardiovascular ROS: taking medications as instructed, no medication side effects noted, no TIA's, no chest pain on exertion, no dyspnea on exertion, no swelling of ankles, no orthostatic dizziness or lightheadedness, no palpitations. New concerns: 
 has been sick so increased IBS symptoms with diarrhea. Taking Bentyl every am.  Continues to see Dr. Cleveland Ortega. Had bout of Cdiff this winter as well. Had respiratory issues this winter. Better but still coughing intermittently Sore inside right mouth. Saw dentist.  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 Current Outpatient Medications Medication Sig Dispense Refill  pneumococcal 13 alfa conj dip (PREVNAR 13, PF,) 0.5 mL syrg injection 0.5 mL by IntraMUSCular route once for 1 dose. 0.5 mL 0  
 levocetirizine (XYZAL) 5 mg tablet TAKE 1 TABLET DAILY 90 Tab 3  
 meloxicam (MOBIC) 15 mg tablet TAKE 1 TABLET DAILY (Patient taking differently: TAKE 1/2 TABLET DAILY) 90 Tab 3  
 dicyclomine (BENTYL) 10 mg capsule Take 10 mg by mouth three (3) times daily. 1 tablet by mouth daily  LORazepam (ATIVAN) 1 mg tablet Take 1 Tab by mouth nightly as needed for Anxiety. Max Daily Amount: 1 mg. 90 Tab 1  
 PARoxetine (PAXIL) 30 mg tablet Take 1 Tab by mouth daily. 90 Tab 3  
 lisinopril-hydroCHLOROthiazide (PRINZIDE, ZESTORETIC) 10-12.5 mg per tablet TAKE 1 TABLET BY MOUTH ONCE DAILY 90 Tab 3  
 simvastatin (ZOCOR) 20 mg tablet TAKE 1 TABLET NIGHTLY 90 Tab 3  
 ketoconazole (NIZORAL) 2 % topical cream Apply  to affected area two (2) times a day. 30 g 0  
 mometasone (ELOCON) 0.1 % topical cream Apply  to affected area daily.  30 g 0  
  aspirin 81 mg tablet Take 40.5 mg by mouth daily.  MULTIVITAMIN W-MINERALS/LUTEIN (CENTRUM SILVER PO) Take  by mouth. Takes one po daily.  albuterol (PROVENTIL VENTOLIN) 2.5 mg /3 mL (0.083 %) nebulizer solution 3 mL by Nebulization route three (3) times daily as needed for Wheezing or Shortness of Breath. 50 Each 0 Lab Results Component Value Date/Time Cholesterol, total 198 09/28/2018 07:45 AM  
 HDL Cholesterol 85 09/28/2018 07:45 AM  
 LDL, calculated 97 09/28/2018 07:45 AM  
 Triglyceride 81 09/28/2018 07:45 AM  
 CHOL/HDL Ratio 2.3 08/19/2010 07:27 AM  
 
Lab Results Component Value Date/Time ALT (SGPT) 15 09/28/2018 07:45 AM  
 AST (SGOT) 22 09/28/2018 07:45 AM  
 Alk. phosphatase 71 09/28/2018 07:45 AM  
 Bilirubin, direct 0.15 03/31/2015 07:14 AM  
 Bilirubin, total 1.0 09/28/2018 07:45 AM  
 Albumin 4.5 09/28/2018 07:45 AM  
 Protein, total 6.2 09/28/2018 07:45 AM  
 PLATELET 315 85/93/6107 07:47 AM  
 
Lab Results Component Value Date/Time GFR est non-AA 51 (L) 09/28/2018 07:45 AM  
 GFR est AA 59 (L) 09/28/2018 07:45 AM  
 Creatinine 1.02 (H) 09/28/2018 07:45 AM  
 BUN 13 09/28/2018 07:45 AM  
 Sodium 132 (L) 09/28/2018 07:45 AM  
 Potassium 4.2 09/28/2018 07:45 AM  
 Chloride 91 (L) 09/28/2018 07:45 AM  
 CO2 26 09/28/2018 07:45 AM  
  
 
Review of Systems, additional: 
Pertinent items are noted in HPI. Objective:  
 
Visit Vitals /70 Pulse 91 Temp 98.6 °F (37 °C) (Oral) Ht 5' (1.524 m) Wt 133 lb 9.6 oz (60.6 kg) LMP 01/01/1986 SpO2 97% BMI 26.09 kg/m² Appearance: alert, well appearing, and in no distress and oriented to person, place, and time. General exam: OP - white linear lesion inside right cheek with some surrounding erythema Frutoso Golder NECK: supple, no lad, no bruit, no tm LUNGS: cta bilat CV rrr, no m/g/r ABD: soft, nt, nd, nabs EXT: no c/c/e Assessment/Plan:  
 
hypertension well controlled. current treatment plan is effective, no change in therapy. Hyperlipidemia- controlled, cont same Check labs CRI - controlled Continue to treat BP as above Check labs At good weight for age ? Lichen planus inside right cheek - referred back to her dentist 
 
Anxiety - increased secondary to health of  Continue same Orders Placed This Encounter  METABOLIC PANEL, COMPREHENSIVE  LIPID PANEL  pneumococcal 13 alfa conj dip (PREVNAR 13, PF,) 0.5 mL syrg injection This is the Subsequent Medicare Annual Wellness Exam, performed 12 months or more after the Initial AWV or the last Subsequent AWV I have reviewed the patient's medical history in detail and updated the computerized patient record. History Past Medical History:  
Diagnosis Date  Arthritis back pain  
 fingers  Asthma  Diverticulitis 6/11/2013  Hypertension  Nausea & vomiting  Other ill-defined conditions(799.89) hypercholesterolemia  Stenosis   
 spinal  
 Stroke ([facility] [address] 1996  
 tia   PATENT  FORAMEN  OVALE Past Surgical History:  
Procedure Laterality Date  ABDOMEN SURGERY PROC UNLISTED  4/97 CHOLECYSTECTOMY  CARDIAC SURG PROCEDURE UNLIST  8/04  
 repair of foramen ovale 54 [facility] [facility] HYSTERECTOMY - total  
 HX HEENT    
 CATARACTS REMOVED - bilateral  
 HX LUMBAR LAMINECTOMY  2/2011  
 w/fusion L4-L5  HX ORTHOPAEDIC  4/98 KNEE ARTHROSCOPY   left knee  HX OTHER SURGICAL  2009 CATARACTS  
 HX OTHER SURGICAL    
 colonoscopy x 3 Current Outpatient Medications Medication Sig Dispense Refill  pneumococcal 13 alfa conj dip (PREVNAR 13, PF,) 0.5 mL syrg injection 0.5 mL by IntraMUSCular route once for 1 dose.  0.5 mL 0  
 levocetirizine (XYZAL) 5 mg tablet TAKE 1 TABLET DAILY 90 Tab 3  
 meloxicam (MOBIC) 15 mg tablet TAKE 1 TABLET DAILY (Patient taking differently: TAKE 1/2 TABLET DAILY) 90 Tab 3  
  dicyclomine (BENTYL) 10 mg capsule Take 10 mg by mouth three (3) times daily. 1 tablet by mouth daily  LORazepam (ATIVAN) 1 mg tablet Take 1 Tab by mouth nightly as needed for Anxiety. Max Daily Amount: 1 mg. 90 Tab 1  
 PARoxetine (PAXIL) 30 mg tablet Take 1 Tab by mouth daily. 90 Tab 3  
 lisinopril-hydroCHLOROthiazide (PRINZIDE, ZESTORETIC) 10-12.5 mg per tablet TAKE 1 TABLET BY MOUTH ONCE DAILY 90 Tab 3  
 simvastatin (ZOCOR) 20 mg tablet TAKE 1 TABLET NIGHTLY 90 Tab 3  
 ketoconazole (NIZORAL) 2 % topical cream Apply  to affected area two (2) times a day. 30 g 0  
 mometasone (ELOCON) 0.1 % topical cream Apply  to affected area daily. 30 g 0  
 aspirin 81 mg tablet Take 40.5 mg by mouth daily.  MULTIVITAMIN W-MINERALS/LUTEIN (CENTRUM SILVER PO) Take  by mouth. Takes one po daily.  albuterol (PROVENTIL VENTOLIN) 2.5 mg /3 mL (0.083 %) nebulizer solution 3 mL by Nebulization route three (3) times daily as needed for Wheezing or Shortness of Breath. 50 Each 0 Allergies Allergen Reactions  Ciprofloxacin Diarrhea and Nausea and Vomiting  Flagyl [Metronidazole] Nausea and Vomiting  Other Medication Other (comments) BANDAIDS - breaks out skin  Oxycodone-Acetaminophen Other (comments)  
  dizziness  Phenylephrine Other (comments) Wire her up  Phenylpropanolamine Other (comments) Wire her up  Sulfa (Sulfonamide Antibiotics) Nausea Only Family History Problem Relation Age of Onset [facility] Other Mother [address] [facility]  
 Stroke Mother  Cancer Father PANCREATIC Social History Tobacco Use  Smoking status: Never Smoker  Smokeless tobacco: Never Used Substance Use Topics  Alcohol use: Yes Alcohol/week: 3.5 oz Types: 7 Glasses of wine per week Patient Active Problem List  
Diagnosis Code  
 HTN (hypertension) I10  
 Hyperlipemia E78.5  Rectal bleeding K62.5  Environmental allergies Z91.09  
 Hyponatremia E87.1  Hypokalemia E87.6  Sleep disturbance G47.9  Diverticulitis K57.92  
 Colitis K52.9  Primary osteoarthritis involving multiple joints M15.0  Anxiety F41.9  CRI (chronic renal insufficiency) N18.9  Advance directive placed in chart this admission Z78.9 Depression Risk Factor Screening:  
 
3 most recent PHQ Screens 12/27/2018 Little interest or pleasure in doing things Not at all Feeling down, depressed, irritable, or hopeless Not at all Total Score PHQ 2 0 Alcohol Risk Factor Screening: You do not drink alcohol or very rarely. Functional Ability and Level of Safety:  
Hearing Loss Hearing is mildly decreased. Activities of Daily Living The home contains: handrails and grab bars Patient does total self care Fall Risk Fall Risk Assessment, last 12 mths 12/27/2018 Able to walk? Yes Fall in past 12 months? No  
Fall with injury? -  
Number of falls in past 12 months - Fall Risk Score -  
 
 
Abuse Screen Patient is not abused Cognitive Screening Evaluation of Cognitive Function: 
Has your family/caregiver stated any concerns about your memory: no 
Normal 
 
Patient Care Team  
Patient Care Team: Michael Ramirez MD as PCP - General (Internal Medicine) Héctor Fernandes MD (Gastroenterology) Fabiana Davila MD (Ophthalmology) Assessment/Plan Education and counseling provided: 
Are appropriate based on today's review and evaluation Advanced directive discussed with pt. Has UTD copy on file. Diagnoses and all orders for this visit: 1. Essential hypertension -     METABOLIC PANEL, COMPREHENSIVE 
-     LIPID PANEL 2. Medicare annual wellness visit, subsequent -     pneumococcal 13 alfa conj dip (PREVNAR 13, PF,) 0.5 mL syrg injection; 0.5 mL by IntraMUSCular route once for 1 dose. 3. Pure hypercholesterolemia -     METABOLIC PANEL, COMPREHENSIVE 
-     LIPID PANEL 
 
 4. Anxiety 5. Chronic renal impairment, stage 3 (moderate) (HCC) 6. Advanced directive discussed with pt. Copy on file. Health Maintenance Due Topic Date Due  Shingrix Vaccine Age 50> (1 of 2) Has received 1 of 2  
 Pneumococcal 65+ years (2 of 2 - PCV13) 03/24/2015 - check with pharmacy  MEDICARE YEARLY EXAM  03/26/2020

## 2019-03-27 ENCOUNTER — HOSPITAL ENCOUNTER (OUTPATIENT)
Dept: LAB | Age: 84
Discharge: HOME | End: 2019-03-27
Payer: MEDICARE

## 2019-03-27 PROCEDURE — 36415 COLL VENOUS BLD VENIPUNCTURE: CPT

## 2019-03-27 PROCEDURE — 80061 LIPID PANEL: CPT

## 2019-03-27 PROCEDURE — 80053 COMPREHEN METABOLIC PANEL: CPT

## 2019-03-28 LAB
ALBUMIN SERPL-MCNC: 4.5 G/DL (ref 3.5–4.7)
ALBUMIN/GLOB SERPL: 2 {RATIO} (ref 1.2–2.2)
ALP SERPL-CCNC: 86 IU/L (ref 39–117)
ALT SERPL-CCNC: 25 IU/L (ref 0–32)
AST SERPL-CCNC: 30 IU/L (ref 0–40)
BILIRUB SERPL-MCNC: 0.5 MG/DL (ref 0–1.2)
BUN SERPL-MCNC: 20 MG/DL (ref 8–27)
BUN/CREAT SERPL: 18 (ref 12–28)
CALCIUM SERPL-MCNC: 9.5 MG/DL (ref 8.7–10.3)
CHLORIDE SERPL-SCNC: 107 MMOL/L (ref 96–106)
CHOLEST SERPL-MCNC: 174 MG/DL (ref 100–199)
CO2 SERPL-SCNC: 20 MMOL/L (ref 20–29)
CREAT SERPL-MCNC: 1.13 MG/DL (ref 0.57–1)
GLOBULIN SER CALC-MCNC: 2.2 G/DL (ref 1.5–4.5)
GLUCOSE SERPL-MCNC: 76 MG/DL (ref 65–99)
HDLC SERPL-MCNC: 97 MG/DL
INTERPRETATION, 910389: NORMAL
INTERPRETATION: NORMAL
LDLC SERPL CALC-MCNC: 45 MG/DL (ref 0–99)
PDF IMAGE, 910387: NORMAL
POTASSIUM SERPL-SCNC: 4.6 MMOL/L (ref 3.5–5.2)
PROT SERPL-MCNC: 6.7 G/DL (ref 6–8.5)
SODIUM SERPL-SCNC: 143 MMOL/L (ref 134–144)
TRIGL SERPL-MCNC: 161 MG/DL (ref 0–149)
VLDLC SERPL CALC-MCNC: 32 MG/DL (ref 5–40)

## 2019-04-23 RX ORDER — SIMVASTATIN 20 MG/1
TABLET, FILM COATED ORAL
Qty: 90 TAB | Refills: 3 | Status: SHIPPED | OUTPATIENT
Start: 2019-04-23 | End: 2020-05-21 | Stop reason: SDUPTHER

## 2019-05-08 DIAGNOSIS — F41.9 ANXIETY: ICD-10-CM

## 2019-05-08 RX ORDER — LORAZEPAM 1 MG/1
1 TABLET ORAL
Qty: 90 TAB | Refills: 1 | Status: SHIPPED | OUTPATIENT
Start: 2019-05-08 | End: 2019-10-30 | Stop reason: SDUPTHER

## 2019-06-26 ENCOUNTER — OFFICE VISIT (OUTPATIENT)
Dept: INTERNAL MEDICINE CLINIC | Age: 84
End: 2019-06-26

## 2019-06-26 VITALS
DIASTOLIC BLOOD PRESSURE: 72 MMHG | SYSTOLIC BLOOD PRESSURE: 132 MMHG | HEART RATE: 81 BPM | OXYGEN SATURATION: 98 % | BODY MASS INDEX: 25.52 KG/M2 | HEIGHT: 60 IN | TEMPERATURE: 97.7 F | WEIGHT: 130 LBS

## 2019-06-26 DIAGNOSIS — M19.011 PRIMARY OSTEOARTHRITIS OF BOTH SHOULDERS: ICD-10-CM

## 2019-06-26 DIAGNOSIS — R23.4 SKIN THINNING: ICD-10-CM

## 2019-06-26 DIAGNOSIS — M19.012 PRIMARY OSTEOARTHRITIS OF BOTH SHOULDERS: ICD-10-CM

## 2019-06-26 DIAGNOSIS — I10 ESSENTIAL HYPERTENSION: Primary | ICD-10-CM

## 2019-06-26 NOTE — PROGRESS NOTES
HISTORY OF PRESENT ILLNESS  Clifford Hand is a 81 y.o. female. Here for increased bruising of legs and arms. Barely brushes area and bruises or skin tears. Hx of \"mini stroke\" = taking 1/2 81mg asa every day but had PFO which was found to be the cause and was patched. Wonders about bioflavanoids. She is also on Mobic 7.5mg every day. Feels she cannot stop this medication secondary to her arthritis pain. Increased pain in her shoulder. Worse putting on husbands brace. Dr. Mio Mello gave her shots in bilat shoulders which did not help. Told her the left shoulder needed a reverse replacement. Cannot reach to back of head or much above shoulder level. Current Outpatient Medications:     LORazepam (ATIVAN) 1 mg tablet, Take 1 Tab by mouth nightly as needed for Anxiety. Max Daily Amount: 1 mg., Disp: 90 Tab, Rfl: 1    simvastatin (ZOCOR) 20 mg tablet, TAKE 1 TABLET NIGHTLY, Disp: 90 Tab, Rfl: 3    levocetirizine (XYZAL) 5 mg tablet, TAKE 1 TABLET DAILY, Disp: 90 Tab, Rfl: 3    albuterol (PROVENTIL VENTOLIN) 2.5 mg /3 mL (0.083 %) nebulizer solution, 3 mL by Nebulization route three (3) times daily as needed for Wheezing or Shortness of Breath., Disp: 50 Each, Rfl: 0    meloxicam (MOBIC) 15 mg tablet, TAKE 1 TABLET DAILY (Patient taking differently: TAKE 1/2 TABLET DAILY), Disp: 90 Tab, Rfl: 3    dicyclomine (BENTYL) 10 mg capsule, Take 10 mg by mouth three (3) times daily. 1 tablet by mouth daily, Disp: , Rfl:     PARoxetine (PAXIL) 30 mg tablet, Take 1 Tab by mouth daily. , Disp: 90 Tab, Rfl: 3    lisinopril-hydroCHLOROthiazide (PRINZIDE, ZESTORETIC) 10-12.5 mg per tablet, TAKE 1 TABLET BY MOUTH ONCE DAILY, Disp: 90 Tab, Rfl: 3    ketoconazole (NIZORAL) 2 % topical cream, Apply  to affected area two (2) times a day., Disp: 30 g, Rfl: 0    mometasone (ELOCON) 0.1 % topical cream, Apply  to affected area daily. , Disp: 30 g, Rfl: 0    aspirin 81 mg tablet, Take 40.5 mg by mouth daily. , Disp: , Rfl:   MULTIVITAMIN W-MINERALS/LUTEIN (CENTRUM SILVER PO), Take  by mouth. Takes one po daily. , Disp: , Rfl:     Visit Vitals  /72   Pulse 81   Temp 97.7 °F (36.5 °C) (Oral)   Ht 5' (1.524 m)   Wt 130 lb (59 kg)   SpO2 98%   BMI 25.39 kg/m²       ROS  See above   Physical Exam   Constitutional: She appears well-developed and well-nourished. HENT:   Head: Normocephalic and atraumatic. Neck: Neck supple. No thyromegaly present. Cardiovascular: Normal rate, regular rhythm and normal heart sounds. Pulmonary/Chest: Effort normal and breath sounds normal.   Musculoskeletal:   bilat shoulders enlarged and anterior tenderness. Decreased rom as above   Lymphadenopathy:     She has no cervical adenopathy. Vitals reviewed. ASSESSMENT and PLAN  htn - controlled, cont same  bilat shoulder arthritis - referred back to Dr. Ean Matos  Skin thinning with bruising and skin tears - trial bioflavanoids. Stop asa. No orders of the defined types were placed in this encounter. Follow-up and Dispositions    · Return in about 3 months (around 9/26/2019) for hyperlipidemia, htn.

## 2019-06-26 NOTE — PROGRESS NOTES
Chief Complaint   Patient presents with    Follow-up     Visit Vitals  BP (!) 176/93   Pulse 81   Temp 97.7 °F (36.5 °C) (Oral)   Ht 5' (1.524 m)   Wt 130 lb (59 kg)   SpO2 98%   BMI 25.39 kg/m²     1. Have you been to the ER, urgent care clinic since your last visit? Hospitalized since your last visit? no    2. Have you seen or consulted any other health care providers outside of the [address] [place] [address] since your last visit? Include any pap smears or colon screening.  no

## 2019-07-01 RX ORDER — LISINOPRIL AND HYDROCHLOROTHIAZIDE 10; 12.5 MG/1; MG/1
TABLET ORAL
Qty: 90 TAB | Refills: 3 | Status: SHIPPED | OUTPATIENT
Start: 2019-07-01 | End: 2019-09-20 | Stop reason: SINTOL

## 2019-09-19 NOTE — PROGRESS NOTES
Subjective:     Nicole Hodge is a 81 y.o. female who presents for follow up of hypertension and hyperlipidemia. Diet and Lifestyle: generally follows a low sodium diet, exercises sporadically  Home BP Monitoring: is not measured at home    Cardiovascular ROS: taking medications as instructed, no medication side effects noted, no TIA's, no chest pain on exertion, no dyspnea on exertion, no swelling of ankles, no orthostatic dizziness or lightheadedness, no palpitations. New concerns:   Chronic cough just at night but not after eating. Dry cough but no related sob or wheezing. Take cough syrup (OTC Tussin) with improvement. Denies post nasal drainage or sinus congestion. No cough or sob during the daytime. Just completed course of Vanc for 2nd episode of c diff. Going more often but formed. Has dicyclomine at home but she is not taking. [facility] Pain in bilat shoulders. Saw Dr. Eneida Kern in the past but has also seen Dr. Candace Whitney. Recommended she see Dr. Reynold Rinne. Right is now worse than left. Taking Advil - taking 1 a night. Cannot lift over shoulder night, right worse than left. Pain ahs increased and wants to look into surgery. Current Outpatient Medications   Medication Sig Dispense Refill    losartan-hydroCHLOROthiazide (HYZAAR) 50-12.5 mg per tablet Take 1 Tab by mouth daily. 90 Tab 3    zolpidem (AMBIEN) 5 mg tablet TAKE 1 TABLET BY MOUTH ONCE DAILY AS NEEDED FOR SLEEP 20 Tab 0    LORazepam (ATIVAN) 1 mg tablet Take 1 Tab by mouth nightly as needed for Anxiety. Max Daily Amount: 1 mg. 90 Tab 1    simvastatin (ZOCOR) 20 mg tablet TAKE 1 TABLET NIGHTLY 90 Tab 3    levocetirizine (XYZAL) 5 mg tablet TAKE 1 TABLET DAILY 90 Tab 3    albuterol (PROVENTIL VENTOLIN) 2.5 mg /3 mL (0.083 %) nebulizer solution 3 mL by Nebulization route three (3) times daily as needed for Wheezing or Shortness of Breath.  50 Each 0    meloxicam (MOBIC) 15 mg tablet TAKE 1 TABLET DAILY (Patient taking differently: TAKE 1/2 TABLET DAILY) 90 Tab 3    PARoxetine (PAXIL) 30 mg tablet Take 1 Tab by mouth daily. 90 Tab 3    mometasone (ELOCON) 0.1 % topical cream Apply  to affected area daily. 30 g 0    MULTIVITAMIN W-MINERALS/LUTEIN (CENTRUM SILVER PO) Take  by mouth. Takes one po daily.  ondansetron (ZOFRAN ODT) 4 mg disintegrating tablet PLACE 1 TAB ON TONGUE DISSOLVE AND SWALLOW EVERY 6 HOURS AS NEEDED  0    dicyclomine (BENTYL) 10 mg capsule Take 10 mg by mouth three (3) times daily. 1 tablet by mouth daily      ketoconazole (NIZORAL) 2 % topical cream Apply  to affected area two (2) times a day. 30 g 0        Lab Results   Component Value Date/Time    Cholesterol, total 174 03/27/2019 07:09 AM    HDL Cholesterol 97 03/27/2019 07:09 AM    LDL, calculated 45 03/27/2019 07:09 AM    Triglyceride 161 (H) 03/27/2019 07:09 AM    CHOL/HDL Ratio 2.3 08/19/2010 07:27 AM     Lab Results   Component Value Date/Time    ALT (SGPT) 25 03/27/2019 07:09 AM    AST (SGOT) 30 03/27/2019 07:09 AM    Alk. phosphatase 86 03/27/2019 07:09 AM    Bilirubin, direct 0.15 03/31/2015 07:14 AM    Bilirubin, total 0.5 03/27/2019 07:09 AM    Albumin 4.5 03/27/2019 07:09 AM    Protein, total 6.7 03/27/2019 07:09 AM    PLATELET 677 67/23/3657 07:47 AM     Lab Results   Component Value Date/Time    GFR est non-AA 45 (L) 03/27/2019 07:09 AM    GFR est AA 52 (L) 03/27/2019 07:09 AM    Creatinine 1.13 (H) 03/27/2019 07:09 AM    BUN 20 03/27/2019 07:09 AM    Sodium 143 03/27/2019 07:09 AM    Potassium 4.6 03/27/2019 07:09 AM    Chloride 107 (H) 03/27/2019 07:09 AM    CO2 20 03/27/2019 07:09 AM        Review of Systems, additional:  Pertinent items are noted in HPI.     Objective:     Visit Vitals  BP (!) 148/97   Pulse 87   Temp 98.4 °F (36.9 °C) (Oral)   Ht 5' (1.524 m)   Wt 130 lb (59 kg)   LMP 01/01/1986   SpO2 97%   BMI 25.39 kg/m²     Appearance: alert, well appearing, and in no distress and oriented to person, place, and time. General exam:   NECK: supple, no lad, no bruit, no tm  LUNGS: cta bilat  CV rrr, no m/g/r  ABD: soft, nt, nd, nabs  EXT: no c/c/e. Nares - clear  OP  - clear  Sinuses nontender  bilat shoulders - severely limited rom active and passive, right worse than left. Assessment/Plan:     hypertension ? Cough secondary to lisinopril. BP elevated today but off meds x 1 week  the following changes are made - stop lisinopril/hctz, start losartan/hctz 50/12.5mg qd. Check labs     Hyperlipidemia - controlled in past, cont same  Check labs    Cough - ? Ace - I, ? Vearl Fusi. Will change ACE to ARB as above, trial Pepcid at night. Chronic diarrhea - improved post treatment for C diff.      bilat shoulder OA severe - referred to Dr. Asa Dela Cruz This Encounter    METABOLIC PANEL, COMPREHENSIVE    LIPID PANEL    REFERRAL TO ORTHOPEDICS    ondansetron (ZOFRAN ODT) 4 mg disintegrating tablet    losartan-hydroCHLOROthiazide (HYZAAR) 50-12.5 mg per tablet     Follow-up and Dispositions    · Return in about 4 weeks (around 10/18/2019) for htn, cough.

## 2019-09-20 ENCOUNTER — OFFICE VISIT (OUTPATIENT)
Dept: INTERNAL MEDICINE CLINIC | Age: 84
End: 2019-09-20

## 2019-09-20 VITALS
DIASTOLIC BLOOD PRESSURE: 97 MMHG | SYSTOLIC BLOOD PRESSURE: 148 MMHG | HEART RATE: 87 BPM | BODY MASS INDEX: 25.52 KG/M2 | HEIGHT: 60 IN | TEMPERATURE: 98.4 F | WEIGHT: 130 LBS | OXYGEN SATURATION: 97 %

## 2019-09-20 DIAGNOSIS — M25.512 CHRONIC PAIN OF BOTH SHOULDERS: ICD-10-CM

## 2019-09-20 DIAGNOSIS — R05.3 CHRONIC COUGH: ICD-10-CM

## 2019-09-20 DIAGNOSIS — E78.00 PURE HYPERCHOLESTEROLEMIA: ICD-10-CM

## 2019-09-20 DIAGNOSIS — I10 ESSENTIAL HYPERTENSION: Primary | ICD-10-CM

## 2019-09-20 DIAGNOSIS — M25.511 CHRONIC PAIN OF BOTH SHOULDERS: ICD-10-CM

## 2019-09-20 DIAGNOSIS — G89.29 CHRONIC PAIN OF BOTH SHOULDERS: ICD-10-CM

## 2019-09-20 RX ORDER — ONDANSETRON 4 MG/1
TABLET, ORALLY DISINTEGRATING ORAL
Refills: 0 | COMMUNITY
Start: 2019-08-09 | End: 2020-01-22

## 2019-09-20 RX ORDER — LOSARTAN POTASSIUM AND HYDROCHLOROTHIAZIDE 12.5; 5 MG/1; MG/1
1 TABLET ORAL DAILY
Qty: 90 TAB | Refills: 3 | Status: SHIPPED | OUTPATIENT
Start: 2019-09-20 | End: 2020-03-19 | Stop reason: SDUPTHER

## 2019-09-23 ENCOUNTER — HOSPITAL ENCOUNTER (OUTPATIENT)
Dept: LAB | Age: 84
Discharge: HOME | End: 2019-09-23
Payer: MEDICARE

## 2019-09-23 PROCEDURE — 80053 COMPREHEN METABOLIC PANEL: CPT

## 2019-09-23 PROCEDURE — 80061 LIPID PANEL: CPT

## 2019-09-23 PROCEDURE — 36415 COLL VENOUS BLD VENIPUNCTURE: CPT

## 2019-09-24 LAB
ALBUMIN SERPL-MCNC: 4.5 G/DL (ref 3.5–4.7)
ALBUMIN/GLOB SERPL: 2.3 {RATIO} (ref 1.2–2.2)
ALP SERPL-CCNC: 95 IU/L (ref 39–117)
ALT SERPL-CCNC: 16 IU/L (ref 0–32)
AST SERPL-CCNC: 22 IU/L (ref 0–40)
BILIRUB SERPL-MCNC: 0.4 MG/DL (ref 0–1.2)
BUN SERPL-MCNC: 17 MG/DL (ref 8–27)
BUN/CREAT SERPL: 19 (ref 12–28)
CALCIUM SERPL-MCNC: 9.5 MG/DL (ref 8.7–10.3)
CHLORIDE SERPL-SCNC: 98 MMOL/L (ref 96–106)
CHOLEST SERPL-MCNC: 214 MG/DL (ref 100–199)
CO2 SERPL-SCNC: 27 MMOL/L (ref 20–29)
CREAT SERPL-MCNC: 0.9 MG/DL (ref 0.57–1)
GLOBULIN SER CALC-MCNC: 2 G/DL (ref 1.5–4.5)
GLUCOSE SERPL-MCNC: 89 MG/DL (ref 65–99)
HDLC SERPL-MCNC: 93 MG/DL
INTERPRETATION, 910389: NORMAL
INTERPRETATION: NORMAL
LDLC SERPL CALC-MCNC: 105 MG/DL (ref 0–99)
PDF IMAGE, 910387: NORMAL
POTASSIUM SERPL-SCNC: 4.2 MMOL/L (ref 3.5–5.2)
PROT SERPL-MCNC: 6.5 G/DL (ref 6–8.5)
SODIUM SERPL-SCNC: 142 MMOL/L (ref 134–144)
TRIGL SERPL-MCNC: 78 MG/DL (ref 0–149)
VLDLC SERPL CALC-MCNC: 16 MG/DL (ref 5–40)

## 2019-10-16 RX ORDER — PAROXETINE 30 MG/1
TABLET, FILM COATED ORAL
Qty: 90 TAB | Refills: 3 | Status: SHIPPED | OUTPATIENT
Start: 2019-10-16 | End: 2020-10-27 | Stop reason: SDUPTHER

## 2019-12-02 ENCOUNTER — HOSPITAL ENCOUNTER (OUTPATIENT)
Dept: CT IMAGING | Age: 84
Discharge: HOME | End: 2019-12-02
Attending: ORTHOPAEDIC SURGERY
Payer: MEDICARE

## 2019-12-02 DIAGNOSIS — M12.811 RIGHT ROTATOR CUFF TEAR ARTHROPATHY: ICD-10-CM

## 2019-12-02 DIAGNOSIS — M75.101 RIGHT ROTATOR CUFF TEAR ARTHROPATHY: ICD-10-CM

## 2019-12-02 PROCEDURE — 73200 CT UPPER EXTREMITY W/O DYE: CPT

## 2020-01-10 ENCOUNTER — OFFICE VISIT (OUTPATIENT)
Dept: INTERNAL MEDICINE CLINIC | Age: 85
End: 2020-01-10

## 2020-01-10 ENCOUNTER — HOSPITAL ENCOUNTER (OUTPATIENT)
Dept: LAB | Age: 85
Discharge: HOME | End: 2020-01-10

## 2020-01-10 VITALS
BODY MASS INDEX: 26.31 KG/M2 | WEIGHT: 134 LBS | OXYGEN SATURATION: 98 % | DIASTOLIC BLOOD PRESSURE: 78 MMHG | HEIGHT: 60 IN | HEART RATE: 89 BPM | TEMPERATURE: 98.4 F | SYSTOLIC BLOOD PRESSURE: 136 MMHG

## 2020-01-10 DIAGNOSIS — M19.011 PRIMARY OSTEOARTHRITIS OF RIGHT SHOULDER: ICD-10-CM

## 2020-01-10 DIAGNOSIS — I10 ESSENTIAL HYPERTENSION: ICD-10-CM

## 2020-01-10 DIAGNOSIS — Z01.818 PREOP EXAMINATION: ICD-10-CM

## 2020-01-10 DIAGNOSIS — E78.00 PURE HYPERCHOLESTEROLEMIA: ICD-10-CM

## 2020-01-10 DIAGNOSIS — I10 ESSENTIAL HYPERTENSION: Primary | ICD-10-CM

## 2020-01-10 PROBLEM — Z96.1 PSEUDOPHAKIA OF BOTH EYES: Status: ACTIVE | Noted: 2017-10-02

## 2020-01-10 PROBLEM — Z98.42 HISTORY OF YAG LASER CAPSULOTOMY OF LENS OF LEFT EYE: Status: ACTIVE | Noted: 2017-10-02

## 2020-01-10 PROBLEM — H43.812 POSTERIOR VITREOUS DETACHMENT OF LEFT EYE: Status: ACTIVE | Noted: 2017-10-02

## 2020-01-10 PROBLEM — H26.491 POSTERIOR CAPSULAR OPACIFICATION VISUALLY SIGNIFICANT OF RIGHT EYE: Status: ACTIVE | Noted: 2017-10-02

## 2020-01-10 LAB
ANION GAP SERPL CALC-SCNC: 7 MMOL/L (ref 5–15)
APPEARANCE UR: CLEAR
BACTERIA URNS QL MICRO: NEGATIVE /HPF
BILIRUB UR QL: NEGATIVE
BUN SERPL-MCNC: 20 MG/DL (ref 6–20)
BUN/CREAT SERPL: 21 (ref 12–20)
CALCIUM SERPL-MCNC: 9.6 MG/DL (ref 8.5–10.1)
CHLORIDE SERPL-SCNC: 96 MMOL/L (ref 97–108)
CO2 SERPL-SCNC: 31 MMOL/L (ref 21–32)
COLOR UR: ABNORMAL
CREAT SERPL-MCNC: 0.94 MG/DL (ref 0.55–1.02)
EPITH CASTS URNS QL MICRO: ABNORMAL /LPF
ERYTHROCYTE [DISTWIDTH] IN BLOOD BY AUTOMATED COUNT: 13.4 % (ref 11.5–14.5)
EST. AVERAGE GLUCOSE BLD GHB EST-MCNC: 114 MG/DL
GLUCOSE SERPL-MCNC: 103 MG/DL (ref 65–100)
GLUCOSE UR STRIP.AUTO-MCNC: NEGATIVE MG/DL
HBA1C MFR BLD: 5.6 % (ref 4–5.6)
HCT VFR BLD AUTO: 37 % (ref 35–47)
HGB BLD-MCNC: 12.6 G/DL (ref 11.5–16)
HGB UR QL STRIP: ABNORMAL
HYALINE CASTS URNS QL MICRO: ABNORMAL /LPF (ref 0–5)
INR PPP: 1 (ref 0.9–1.1)
KETONES UR QL STRIP.AUTO: NEGATIVE MG/DL
LEUKOCYTE ESTERASE UR QL STRIP.AUTO: NEGATIVE
MCH RBC QN AUTO: 33.2 PG (ref 26–34)
MCHC RBC AUTO-ENTMCNC: 34.1 G/DL (ref 30–36.5)
MCV RBC AUTO: 97.4 FL (ref 80–99)
NITRITE UR QL STRIP.AUTO: NEGATIVE
NRBC # BLD: 0 K/UL (ref 0–0.01)
NRBC BLD-RTO: 0 PER 100 WBC
PH UR STRIP: 6.5 [PH] (ref 5–8)
PLATELET # BLD AUTO: 288 K/UL (ref 150–400)
PMV BLD AUTO: 9.3 FL (ref 8.9–12.9)
POTASSIUM SERPL-SCNC: 3.6 MMOL/L (ref 3.5–5.1)
PROT UR STRIP-MCNC: NEGATIVE MG/DL
PROTHROMBIN TIME: 10.1 SEC (ref 9–11.1)
RBC # BLD AUTO: 3.8 M/UL (ref 3.8–5.2)
RBC #/AREA URNS HPF: ABNORMAL /HPF (ref 0–5)
SODIUM SERPL-SCNC: 134 MMOL/L (ref 136–145)
SP GR UR REFRACTOMETRY: 1.01 (ref 1–1.03)
UA: UC IF INDICATED,UAUC: ABNORMAL
UROBILINOGEN UR QL STRIP.AUTO: 0.2 EU/DL (ref 0.2–1)
WBC # BLD AUTO: 8.1 K/UL (ref 3.6–11)
WBC URNS QL MICRO: ABNORMAL /HPF (ref 0–4)

## 2020-01-10 RX ORDER — BUDESONIDE 3 MG/1
9 CAPSULE, COATED PELLETS ORAL
COMMUNITY
Start: 2019-11-21 | End: 2022-10-03 | Stop reason: ALTCHOICE

## 2020-01-10 NOTE — PROGRESS NOTES
Here for preop for right rotator cuff arthroplasty on 1/30/20 with Dr. Radha Tsai at [address] [facility]. See preop form.

## 2020-01-14 ENCOUNTER — DOCUMENTATION ONLY (OUTPATIENT)
Dept: INTERNAL MEDICINE CLINIC | Age: 85
End: 2020-01-14

## 2020-01-22 ENCOUNTER — HOSPITAL ENCOUNTER (OUTPATIENT)
Dept: PREADMISSION TESTING | Age: 85
Discharge: HOME | End: 2020-01-22
Payer: MEDICARE

## 2020-01-22 VITALS
HEIGHT: 60 IN | TEMPERATURE: 97.8 F | HEART RATE: 90 BPM | WEIGHT: 132.5 LBS | RESPIRATION RATE: 14 BRPM | SYSTOLIC BLOOD PRESSURE: 143 MMHG | DIASTOLIC BLOOD PRESSURE: 82 MMHG | BODY MASS INDEX: 26.01 KG/M2

## 2020-01-22 LAB
ABO + RH BLD: NORMAL
BLOOD GROUP ANTIBODIES SERPL: NORMAL
SPECIMEN EXP DATE BLD: NORMAL

## 2020-01-22 PROCEDURE — 36415 COLL VENOUS BLD VENIPUNCTURE: CPT

## 2020-01-22 PROCEDURE — 86900 BLOOD TYPING SEROLOGIC ABO: CPT

## 2020-01-23 LAB
BACTERIA SPEC CULT: NORMAL
BACTERIA SPEC CULT: NORMAL
SERVICE CMNT-IMP: NORMAL

## 2020-01-29 ENCOUNTER — ANESTHESIA EVENT (OUTPATIENT)
Dept: SURGERY | Age: 85
DRG: 483 | End: 2020-01-29
Payer: MEDICARE

## 2020-01-30 ENCOUNTER — ANESTHESIA (OUTPATIENT)
Dept: SURGERY | Age: 85
DRG: 483 | End: 2020-01-30
Payer: MEDICARE

## 2020-01-30 ENCOUNTER — HOSPITAL ENCOUNTER (INPATIENT)
Age: 85
LOS: 2 days | Discharge: HOME | DRG: 483 | End: 2020-02-01
Attending: ORTHOPAEDIC SURGERY | Admitting: ORTHOPAEDIC SURGERY
Payer: MEDICARE

## 2020-01-30 DIAGNOSIS — M12.811 ROTATOR CUFF ARTHROPATHY OF RIGHT SHOULDER: Primary | ICD-10-CM

## 2020-01-30 PROBLEM — M12.819 ROTATOR CUFF ARTHROPATHY: Status: ACTIVE | Noted: 2020-01-30

## 2020-01-30 LAB
GLUCOSE BLD STRIP.AUTO-MCNC: 100 MG/DL (ref 65–100)
SERVICE CMNT-IMP: NORMAL

## 2020-01-30 PROCEDURE — 65270000029 HC RM PRIVATE

## 2020-01-30 PROCEDURE — 74011250636 HC RX REV CODE- 250/636: Performed by: ANESTHESIOLOGY

## 2020-01-30 PROCEDURE — 77030040361 HC SLV COMPR DVT MDII -B: Performed by: ORTHOPAEDIC SURGERY

## 2020-01-30 PROCEDURE — 77030018673: Performed by: ORTHOPAEDIC SURGERY

## 2020-01-30 PROCEDURE — C1776 JOINT DEVICE (IMPLANTABLE): HCPCS | Performed by: ORTHOPAEDIC SURGERY

## 2020-01-30 PROCEDURE — 77030010396 HC WRE FIX C CNMD -A: Performed by: ORTHOPAEDIC SURGERY

## 2020-01-30 PROCEDURE — 77030040922 HC BLNKT HYPOTHRM STRY -A

## 2020-01-30 PROCEDURE — 0RRJ00Z REPLACEMENT OF RIGHT SHOULDER JOINT WITH REVERSE BALL AND SOCKET SYNTHETIC SUBSTITUTE, OPEN APPROACH: ICD-10-PCS | Performed by: ORTHOPAEDIC SURGERY

## 2020-01-30 PROCEDURE — 77030006822 HC BLD SAW SAG BRSM -B: Performed by: ORTHOPAEDIC SURGERY

## 2020-01-30 PROCEDURE — 77030029099 HC BN WAX SSPC -A: Performed by: ORTHOPAEDIC SURGERY

## 2020-01-30 PROCEDURE — 77030031139 HC SUT VCRL2 J&J -A: Performed by: ORTHOPAEDIC SURGERY

## 2020-01-30 PROCEDURE — 82962 GLUCOSE BLOOD TEST: CPT

## 2020-01-30 PROCEDURE — 74011000250 HC RX REV CODE- 250: Performed by: NURSE ANESTHETIST, CERTIFIED REGISTERED

## 2020-01-30 PROCEDURE — 77030032497 HC WRP SHLDR WO BGS SOLM -B

## 2020-01-30 PROCEDURE — 76060000039 HC ANESTHESIA 4 TO 4.5 HR: Performed by: ORTHOPAEDIC SURGERY

## 2020-01-30 PROCEDURE — 77030026438 HC STYL ET INTUB CARD -A: Performed by: ANESTHESIOLOGY

## 2020-01-30 PROCEDURE — 77030016379 HC TIP IRR IM ZIMM -D: Performed by: ORTHOPAEDIC SURGERY

## 2020-01-30 PROCEDURE — 74011250636 HC RX REV CODE- 250/636: Performed by: ORTHOPAEDIC SURGERY

## 2020-01-30 PROCEDURE — 74011000250 HC RX REV CODE- 250: Performed by: ORTHOPAEDIC SURGERY

## 2020-01-30 PROCEDURE — 77030008684 HC TU ET CUF COVD -B: Performed by: ANESTHESIOLOGY

## 2020-01-30 PROCEDURE — 77030002922 HC SUT FBRWRE ARTH -B: Performed by: ORTHOPAEDIC SURGERY

## 2020-01-30 PROCEDURE — 77030027138 HC INCENT SPIROMETER -A

## 2020-01-30 PROCEDURE — 77030018846 HC SOL IRR STRL H20 ICUM -A: Performed by: ORTHOPAEDIC SURGERY

## 2020-01-30 PROCEDURE — 77030011640 HC PAD GRND REM COVD -A: Performed by: ORTHOPAEDIC SURGERY

## 2020-01-30 PROCEDURE — 77030036560 HC SHLDR ARM PAD ABDUCTN S2SG -B: Performed by: ORTHOPAEDIC SURGERY

## 2020-01-30 PROCEDURE — 76010000174 HC OR TIME 3.5 TO 4 HR INTENSV-TIER 1: Performed by: ORTHOPAEDIC SURGERY

## 2020-01-30 PROCEDURE — 77030002912 HC SUT ETHBND J&J -A: Performed by: ORTHOPAEDIC SURGERY

## 2020-01-30 PROCEDURE — 77030002933 HC SUT MCRYL J&J -A: Performed by: ORTHOPAEDIC SURGERY

## 2020-01-30 PROCEDURE — 64415 NJX AA&/STRD BRCH PLXS IMG: CPT

## 2020-01-30 PROCEDURE — 77030003601 HC NDL NRV BLK BBMI -A

## 2020-01-30 PROCEDURE — P9045 ALBUMIN (HUMAN), 5%, 250 ML: HCPCS | Performed by: NURSE ANESTHETIST, CERTIFIED REGISTERED

## 2020-01-30 PROCEDURE — 74011250636 HC RX REV CODE- 250/636: Performed by: NURSE ANESTHETIST, CERTIFIED REGISTERED

## 2020-01-30 PROCEDURE — 77030036638 HC ACC KT GPS KNE V2 EXAC -D: Performed by: ORTHOPAEDIC SURGERY

## 2020-01-30 PROCEDURE — 77030018836 HC SOL IRR NACL ICUM -A: Performed by: ORTHOPAEDIC SURGERY

## 2020-01-30 PROCEDURE — 76210000016 HC OR PH I REC 1 TO 1.5 HR: Performed by: ORTHOPAEDIC SURGERY

## 2020-01-30 PROCEDURE — 77030013079 HC BLNKT BAIR HGGR 3M -A: Performed by: ANESTHESIOLOGY

## 2020-01-30 PROCEDURE — 3E0T3BZ INTRODUCTION OF ANESTHETIC AGENT INTO PERIPHERAL NERVES AND PLEXI, PERCUTANEOUS APPROACH: ICD-10-PCS | Performed by: ANESTHESIOLOGY

## 2020-01-30 DEVICE — SHOULDER REV IMPL -- S4 BSV SC: Type: IMPLANTABLE DEVICE | Status: FUNCTIONAL

## 2020-01-30 DEVICE — SCR BNE LCK GLENOSPHERE -- EQUINOXE: Type: IMPLANTABLE DEVICE | Site: SHOULDER | Status: FUNCTIONAL

## 2020-01-30 DEVICE — KIT BNE SCR L26MM DIA4.5MM GLEN SHLDR ORNG COMPR LOK CAP: Type: IMPLANTABLE DEVICE | Site: SHOULDER | Status: FUNCTIONAL

## 2020-01-30 DEVICE — KIT BNE SCR L34MM DIA4.5MM GLEN SHLDR RED COMPR LOK CAP REV: Type: IMPLANTABLE DEVICE | Site: SHOULDER | Status: FUNCTIONAL

## 2020-01-30 DEVICE — SCR TORQUE DEFINING KIT -- EQUINOXE: Type: IMPLANTABLE DEVICE | Site: SHOULDER | Status: FUNCTIONAL

## 2020-01-30 DEVICE — SCR LCK CAP KIT 4.5X30MM BLU -- EQUINOXE: Type: IMPLANTABLE DEVICE | Site: SHOULDER | Status: FUNCTIONAL

## 2020-01-30 DEVICE — KIT BONE SCR L42MM DIA4.5MM GLEN SHLDR YEL COMPR LCK CAP RVS: Type: IMPLANTABLE DEVICE | Site: SHOULDER | Status: FUNCTIONAL

## 2020-01-30 DEVICE — IMPLANTABLE DEVICE: Type: IMPLANTABLE DEVICE | Site: SHOULDER | Status: FUNCTIONAL

## 2020-01-30 RX ORDER — ALBUTEROL SULFATE 0.83 MG/ML
2.5 SOLUTION RESPIRATORY (INHALATION)
Status: DISCONTINUED | OUTPATIENT
Start: 2020-01-30 | End: 2020-02-01 | Stop reason: HOSPADM

## 2020-01-30 RX ORDER — FENTANYL CITRATE 50 UG/ML
50 INJECTION, SOLUTION INTRAMUSCULAR; INTRAVENOUS AS NEEDED
Status: DISCONTINUED | OUTPATIENT
Start: 2020-01-30 | End: 2020-01-30 | Stop reason: HOSPADM

## 2020-01-30 RX ORDER — LIDOCAINE HYDROCHLORIDE 20 MG/ML
INJECTION, SOLUTION EPIDURAL; INFILTRATION; INTRACAUDAL; PERINEURAL AS NEEDED
Status: DISCONTINUED | OUTPATIENT
Start: 2020-01-30 | End: 2020-01-30 | Stop reason: HOSPADM

## 2020-01-30 RX ORDER — ZOLPIDEM TARTRATE 5 MG/1
5 TABLET ORAL
Status: DISCONTINUED | OUTPATIENT
Start: 2020-01-30 | End: 2020-02-01 | Stop reason: HOSPADM

## 2020-01-30 RX ORDER — PROPOFOL 10 MG/ML
INJECTION, EMULSION INTRAVENOUS AS NEEDED
Status: DISCONTINUED | OUTPATIENT
Start: 2020-01-30 | End: 2020-01-30 | Stop reason: HOSPADM

## 2020-01-30 RX ORDER — CEFAZOLIN SODIUM/WATER 2 G/20 ML
2 SYRINGE (ML) INTRAVENOUS EVERY 8 HOURS
Status: COMPLETED | OUTPATIENT
Start: 2020-01-31 | End: 2020-01-31

## 2020-01-30 RX ORDER — SIMVASTATIN 20 MG/1
20 TABLET, FILM COATED ORAL DAILY
Status: DISCONTINUED | OUTPATIENT
Start: 2020-01-31 | End: 2020-02-01 | Stop reason: HOSPADM

## 2020-01-30 RX ORDER — CEFAZOLIN SODIUM/WATER 2 G/20 ML
2 SYRINGE (ML) INTRAVENOUS ONCE
Status: COMPLETED | OUTPATIENT
Start: 2020-01-30 | End: 2020-01-30

## 2020-01-30 RX ORDER — MIDAZOLAM HYDROCHLORIDE 1 MG/ML
1 INJECTION, SOLUTION INTRAMUSCULAR; INTRAVENOUS AS NEEDED
Status: DISCONTINUED | OUTPATIENT
Start: 2020-01-30 | End: 2020-01-30 | Stop reason: HOSPADM

## 2020-01-30 RX ORDER — FACIAL-BODY WIPES
10 EACH TOPICAL DAILY PRN
Status: DISCONTINUED | OUTPATIENT
Start: 2020-02-01 | End: 2020-02-01 | Stop reason: HOSPADM

## 2020-01-30 RX ORDER — BACITRACIN 500 [USP'U]/G
OINTMENT TOPICAL 3 TIMES DAILY
Status: DISCONTINUED | OUTPATIENT
Start: 2020-01-30 | End: 2020-01-30

## 2020-01-30 RX ORDER — SUCCINYLCHOLINE CHLORIDE 20 MG/ML
INJECTION INTRAMUSCULAR; INTRAVENOUS AS NEEDED
Status: DISCONTINUED | OUTPATIENT
Start: 2020-01-30 | End: 2020-01-30 | Stop reason: HOSPADM

## 2020-01-30 RX ORDER — HYDROCHLOROTHIAZIDE 25 MG/1
12.5 TABLET ORAL DAILY
Status: DISCONTINUED | OUTPATIENT
Start: 2020-01-31 | End: 2020-02-01 | Stop reason: HOSPADM

## 2020-01-30 RX ORDER — LIDOCAINE HYDROCHLORIDE 10 MG/ML
0.1 INJECTION, SOLUTION EPIDURAL; INFILTRATION; INTRACAUDAL; PERINEURAL AS NEEDED
Status: DISCONTINUED | OUTPATIENT
Start: 2020-01-30 | End: 2020-01-30 | Stop reason: HOSPADM

## 2020-01-30 RX ORDER — ASPIRIN 325 MG
325 TABLET, DELAYED RELEASE (ENTERIC COATED) ORAL 2 TIMES DAILY
Status: DISCONTINUED | OUTPATIENT
Start: 2020-01-31 | End: 2020-02-01 | Stop reason: HOSPADM

## 2020-01-30 RX ORDER — ALBUMIN HUMAN 50 G/1000ML
SOLUTION INTRAVENOUS AS NEEDED
Status: DISCONTINUED | OUTPATIENT
Start: 2020-01-30 | End: 2020-01-30 | Stop reason: HOSPADM

## 2020-01-30 RX ORDER — LOSARTAN POTASSIUM 50 MG/1
50 TABLET ORAL DAILY
Status: DISCONTINUED | OUTPATIENT
Start: 2020-01-31 | End: 2020-02-01 | Stop reason: HOSPADM

## 2020-01-30 RX ORDER — PHENYLEPHRINE HCL IN 0.9% NACL 0.4MG/10ML
SYRINGE (ML) INTRAVENOUS AS NEEDED
Status: DISCONTINUED | OUTPATIENT
Start: 2020-01-30 | End: 2020-01-30 | Stop reason: HOSPADM

## 2020-01-30 RX ORDER — ROPIVACAINE HYDROCHLORIDE 5 MG/ML
150 INJECTION, SOLUTION EPIDURAL; INFILTRATION; PERINEURAL AS NEEDED
Status: DISCONTINUED | OUTPATIENT
Start: 2020-01-30 | End: 2020-01-30 | Stop reason: HOSPADM

## 2020-01-30 RX ORDER — NALOXONE HYDROCHLORIDE 0.4 MG/ML
0.4 INJECTION, SOLUTION INTRAMUSCULAR; INTRAVENOUS; SUBCUTANEOUS AS NEEDED
Status: DISCONTINUED | OUTPATIENT
Start: 2020-01-30 | End: 2020-02-01 | Stop reason: HOSPADM

## 2020-01-30 RX ORDER — FENTANYL CITRATE 50 UG/ML
25 INJECTION, SOLUTION INTRAMUSCULAR; INTRAVENOUS
Status: DISCONTINUED | OUTPATIENT
Start: 2020-01-30 | End: 2020-01-30 | Stop reason: HOSPADM

## 2020-01-30 RX ORDER — ONDANSETRON 2 MG/ML
INJECTION INTRAMUSCULAR; INTRAVENOUS AS NEEDED
Status: DISCONTINUED | OUTPATIENT
Start: 2020-01-30 | End: 2020-01-30 | Stop reason: HOSPADM

## 2020-01-30 RX ORDER — SODIUM CHLORIDE 0.9 % (FLUSH) 0.9 %
5-40 SYRINGE (ML) INJECTION AS NEEDED
Status: DISCONTINUED | OUTPATIENT
Start: 2020-01-30 | End: 2020-01-30 | Stop reason: HOSPADM

## 2020-01-30 RX ORDER — FAMOTIDINE 20 MG/1
20 TABLET, FILM COATED ORAL DAILY
Status: DISCONTINUED | OUTPATIENT
Start: 2020-01-31 | End: 2020-02-01 | Stop reason: HOSPADM

## 2020-01-30 RX ORDER — HYDROXYZINE HYDROCHLORIDE 10 MG/1
10 TABLET, FILM COATED ORAL
Status: DISCONTINUED | OUTPATIENT
Start: 2020-01-30 | End: 2020-02-01 | Stop reason: HOSPADM

## 2020-01-30 RX ORDER — ROCURONIUM BROMIDE 10 MG/ML
INJECTION, SOLUTION INTRAVENOUS AS NEEDED
Status: DISCONTINUED | OUTPATIENT
Start: 2020-01-30 | End: 2020-01-30 | Stop reason: HOSPADM

## 2020-01-30 RX ORDER — THERA TABS 400 MCG
1 TAB ORAL DAILY
Status: DISCONTINUED | OUTPATIENT
Start: 2020-01-31 | End: 2020-02-01 | Stop reason: HOSPADM

## 2020-01-30 RX ORDER — CETIRIZINE HCL 10 MG
10 TABLET ORAL
Status: DISCONTINUED | OUTPATIENT
Start: 2020-01-30 | End: 2020-02-01 | Stop reason: HOSPADM

## 2020-01-30 RX ORDER — ROPIVACAINE HYDROCHLORIDE 5 MG/ML
INJECTION, SOLUTION EPIDURAL; INFILTRATION; PERINEURAL
Status: COMPLETED | OUTPATIENT
Start: 2020-01-30 | End: 2020-01-30

## 2020-01-30 RX ORDER — SODIUM CHLORIDE, SODIUM LACTATE, POTASSIUM CHLORIDE, CALCIUM CHLORIDE 600; 310; 30; 20 MG/100ML; MG/100ML; MG/100ML; MG/100ML
100 INJECTION, SOLUTION INTRAVENOUS CONTINUOUS
Status: DISCONTINUED | OUTPATIENT
Start: 2020-01-30 | End: 2020-01-30 | Stop reason: HOSPADM

## 2020-01-30 RX ORDER — LORAZEPAM 1 MG/1
0.5 TABLET ORAL
Status: DISCONTINUED | OUTPATIENT
Start: 2020-01-30 | End: 2020-02-01 | Stop reason: HOSPADM

## 2020-01-30 RX ORDER — SODIUM CHLORIDE 0.9 % (FLUSH) 0.9 %
5-40 SYRINGE (ML) INJECTION EVERY 8 HOURS
Status: DISCONTINUED | OUTPATIENT
Start: 2020-01-30 | End: 2020-01-30 | Stop reason: HOSPADM

## 2020-01-30 RX ORDER — NEOSTIGMINE METHYLSULFATE 1 MG/ML
INJECTION INTRAVENOUS AS NEEDED
Status: DISCONTINUED | OUTPATIENT
Start: 2020-01-30 | End: 2020-01-30

## 2020-01-30 RX ORDER — EPHEDRINE SULFATE/0.9% NACL/PF 50 MG/5 ML
SYRINGE (ML) INTRAVENOUS AS NEEDED
Status: DISCONTINUED | OUTPATIENT
Start: 2020-01-30 | End: 2020-01-30 | Stop reason: HOSPADM

## 2020-01-30 RX ORDER — CEFAZOLIN SODIUM 1 G/3ML
INJECTION, POWDER, FOR SOLUTION INTRAMUSCULAR; INTRAVENOUS AS NEEDED
Status: DISCONTINUED | OUTPATIENT
Start: 2020-01-30 | End: 2020-01-30

## 2020-01-30 RX ORDER — FENTANYL CITRATE 50 UG/ML
INJECTION, SOLUTION INTRAMUSCULAR; INTRAVENOUS AS NEEDED
Status: DISCONTINUED | OUTPATIENT
Start: 2020-01-30 | End: 2020-01-30 | Stop reason: HOSPADM

## 2020-01-30 RX ORDER — OXYCODONE HYDROCHLORIDE 5 MG/1
5 TABLET ORAL
Status: DISCONTINUED | OUTPATIENT
Start: 2020-01-30 | End: 2020-02-01 | Stop reason: HOSPADM

## 2020-01-30 RX ORDER — GENTAMICIN SULFATE 40 MG/ML
INJECTION, SOLUTION INTRAMUSCULAR; INTRAVENOUS AS NEEDED
Status: DISCONTINUED | OUTPATIENT
Start: 2020-01-30 | End: 2020-01-30 | Stop reason: HOSPADM

## 2020-01-30 RX ORDER — ACETAMINOPHEN 325 MG/1
650 TABLET ORAL
Status: DISCONTINUED | OUTPATIENT
Start: 2020-01-30 | End: 2020-02-01 | Stop reason: HOSPADM

## 2020-01-30 RX ORDER — SODIUM CHLORIDE 9 MG/ML
100 INJECTION, SOLUTION INTRAVENOUS CONTINUOUS
Status: DISPENSED | OUTPATIENT
Start: 2020-01-30 | End: 2020-01-31

## 2020-01-30 RX ORDER — DIPHENHYDRAMINE HYDROCHLORIDE 50 MG/ML
12.5 INJECTION, SOLUTION INTRAMUSCULAR; INTRAVENOUS AS NEEDED
Status: DISCONTINUED | OUTPATIENT
Start: 2020-01-30 | End: 2020-01-30 | Stop reason: HOSPADM

## 2020-01-30 RX ORDER — HYDROMORPHONE HYDROCHLORIDE 1 MG/ML
0.5 INJECTION, SOLUTION INTRAMUSCULAR; INTRAVENOUS; SUBCUTANEOUS
Status: DISPENSED | OUTPATIENT
Start: 2020-01-30 | End: 2020-01-31

## 2020-01-30 RX ORDER — SODIUM CHLORIDE 0.9 % (FLUSH) 0.9 %
5-40 SYRINGE (ML) INJECTION EVERY 8 HOURS
Status: DISCONTINUED | OUTPATIENT
Start: 2020-01-31 | End: 2020-02-01 | Stop reason: HOSPADM

## 2020-01-30 RX ORDER — BACITRACIN 500 [USP'U]/G
OINTMENT TOPICAL AS NEEDED
Status: DISCONTINUED | OUTPATIENT
Start: 2020-01-30 | End: 2020-01-30 | Stop reason: HOSPADM

## 2020-01-30 RX ORDER — SODIUM CHLORIDE 0.9 % (FLUSH) 0.9 %
5-40 SYRINGE (ML) INJECTION AS NEEDED
Status: DISCONTINUED | OUTPATIENT
Start: 2020-01-30 | End: 2020-02-01 | Stop reason: HOSPADM

## 2020-01-30 RX ORDER — OXYCODONE HYDROCHLORIDE 5 MG/1
10 TABLET ORAL
Status: DISCONTINUED | OUTPATIENT
Start: 2020-01-30 | End: 2020-02-01 | Stop reason: HOSPADM

## 2020-01-30 RX ORDER — DEXAMETHASONE SODIUM PHOSPHATE 4 MG/ML
INJECTION, SOLUTION INTRA-ARTICULAR; INTRALESIONAL; INTRAMUSCULAR; INTRAVENOUS; SOFT TISSUE AS NEEDED
Status: DISCONTINUED | OUTPATIENT
Start: 2020-01-30 | End: 2020-01-30 | Stop reason: HOSPADM

## 2020-01-30 RX ORDER — MIDAZOLAM HYDROCHLORIDE 1 MG/ML
0.5 INJECTION, SOLUTION INTRAMUSCULAR; INTRAVENOUS
Status: DISCONTINUED | OUTPATIENT
Start: 2020-01-30 | End: 2020-01-30 | Stop reason: HOSPADM

## 2020-01-30 RX ORDER — EPINEPHRINE 1 MG/ML
INJECTION, SOLUTION, CONCENTRATE INTRAVENOUS AS NEEDED
Status: DISCONTINUED | OUTPATIENT
Start: 2020-01-30 | End: 2020-01-30 | Stop reason: HOSPADM

## 2020-01-30 RX ORDER — BUDESONIDE 3 MG/1
6 CAPSULE, COATED PELLETS ORAL
Status: DISCONTINUED | OUTPATIENT
Start: 2020-01-31 | End: 2020-02-01 | Stop reason: HOSPADM

## 2020-01-30 RX ORDER — HYDROMORPHONE HYDROCHLORIDE 1 MG/ML
0.2 INJECTION, SOLUTION INTRAMUSCULAR; INTRAVENOUS; SUBCUTANEOUS
Status: DISCONTINUED | OUTPATIENT
Start: 2020-01-30 | End: 2020-01-30 | Stop reason: HOSPADM

## 2020-01-30 RX ORDER — NEOSTIGMINE METHYLSULFATE 1 MG/ML
INJECTION INTRAVENOUS AS NEEDED
Status: DISCONTINUED | OUTPATIENT
Start: 2020-01-30 | End: 2020-01-30 | Stop reason: HOSPADM

## 2020-01-30 RX ORDER — PAROXETINE HYDROCHLORIDE 20 MG/1
30 TABLET, FILM COATED ORAL DAILY
Status: DISCONTINUED | OUTPATIENT
Start: 2020-01-31 | End: 2020-02-01 | Stop reason: HOSPADM

## 2020-01-30 RX ORDER — POLYETHYLENE GLYCOL 3350 17 G/17G
17 POWDER, FOR SOLUTION ORAL DAILY
Status: DISCONTINUED | OUTPATIENT
Start: 2020-01-31 | End: 2020-02-01 | Stop reason: HOSPADM

## 2020-01-30 RX ORDER — GLYCOPYRROLATE 0.2 MG/ML
INJECTION INTRAMUSCULAR; INTRAVENOUS AS NEEDED
Status: DISCONTINUED | OUTPATIENT
Start: 2020-01-30 | End: 2020-01-30 | Stop reason: HOSPADM

## 2020-01-30 RX ORDER — ONDANSETRON 2 MG/ML
4 INJECTION INTRAMUSCULAR; INTRAVENOUS
Status: DISPENSED | OUTPATIENT
Start: 2020-01-30 | End: 2020-01-31

## 2020-01-30 RX ORDER — DICYCLOMINE HYDROCHLORIDE 10 MG/1
10 CAPSULE ORAL AS NEEDED
Status: DISCONTINUED | OUTPATIENT
Start: 2020-01-30 | End: 2020-01-30

## 2020-01-30 RX ADMIN — LIDOCAINE HYDROCHLORIDE 60 MG: 20 INJECTION, SOLUTION EPIDURAL; INFILTRATION; INTRACAUDAL; PERINEURAL at 11:09

## 2020-01-30 RX ADMIN — NEOSTIGMINE METHYLSULFATE 3 MG: 1 INJECTION, SOLUTION INTRAMUSCULAR; INTRAVENOUS; SUBCUTANEOUS at 14:48

## 2020-01-30 RX ADMIN — ONDANSETRON HYDROCHLORIDE 4 MG: 2 INJECTION, SOLUTION INTRAMUSCULAR; INTRAVENOUS at 11:25

## 2020-01-30 RX ADMIN — FENTANYL CITRATE 50 MCG: 50 INJECTION, SOLUTION INTRAMUSCULAR; INTRAVENOUS at 14:31

## 2020-01-30 RX ADMIN — DEXAMETHASONE SODIUM PHOSPHATE 4 MG: 4 INJECTION, SOLUTION INTRAMUSCULAR; INTRAVENOUS at 11:25

## 2020-01-30 RX ADMIN — SODIUM CHLORIDE, SODIUM LACTATE, POTASSIUM CHLORIDE, AND CALCIUM CHLORIDE 100 ML/HR: 600; 310; 30; 20 INJECTION, SOLUTION INTRAVENOUS at 10:05

## 2020-01-30 RX ADMIN — ROCURONIUM BROMIDE 30 MG: 10 SOLUTION INTRAVENOUS at 11:15

## 2020-01-30 RX ADMIN — FENTANYL CITRATE 100 MCG: 50 INJECTION, SOLUTION INTRAMUSCULAR; INTRAVENOUS at 10:15

## 2020-01-30 RX ADMIN — ALBUMIN (HUMAN) 250 ML: 12.5 INJECTION, SOLUTION INTRAVENOUS at 12:43

## 2020-01-30 RX ADMIN — ROPIVACAINE HYDROCHLORIDE 150 MG: 5 INJECTION, SOLUTION EPIDURAL; INFILTRATION; PERINEURAL at 10:15

## 2020-01-30 RX ADMIN — ALBUMIN (HUMAN) 250 ML: 12.5 INJECTION, SOLUTION INTRAVENOUS at 15:13

## 2020-01-30 RX ADMIN — ROCURONIUM BROMIDE 10 MG: 10 SOLUTION INTRAVENOUS at 13:14

## 2020-01-30 RX ADMIN — Medication 10 MG: at 15:03

## 2020-01-30 RX ADMIN — Medication 80 MCG: at 11:26

## 2020-01-30 RX ADMIN — ROPIVACAINE HYDROCHLORIDE 30 ML: 5 INJECTION, SOLUTION EPIDURAL; INFILTRATION; PERINEURAL at 10:15

## 2020-01-30 RX ADMIN — Medication 40 MCG: at 12:17

## 2020-01-30 RX ADMIN — GLYCOPYRROLATE 0.4 MG: 0.2 INJECTION, SOLUTION INTRAMUSCULAR; INTRAVENOUS at 14:48

## 2020-01-30 RX ADMIN — FENTANYL CITRATE 50 MCG: 50 INJECTION, SOLUTION INTRAMUSCULAR; INTRAVENOUS at 11:09

## 2020-01-30 RX ADMIN — ROCURONIUM BROMIDE 10 MG: 10 SOLUTION INTRAVENOUS at 11:09

## 2020-01-30 RX ADMIN — PHENYLEPHRINE HYDROCHLORIDE 40 MCG/MIN: 10 INJECTION INTRAVENOUS at 11:26

## 2020-01-30 RX ADMIN — MIDAZOLAM 2 MG: 1 INJECTION INTRAMUSCULAR; INTRAVENOUS at 10:15

## 2020-01-30 RX ADMIN — SODIUM CHLORIDE 100 ML/HR: 900 INJECTION, SOLUTION INTRAVENOUS at 15:30

## 2020-01-30 RX ADMIN — SODIUM CHLORIDE, SODIUM LACTATE, POTASSIUM CHLORIDE, AND CALCIUM CHLORIDE: 600; 310; 30; 20 INJECTION, SOLUTION INTRAVENOUS at 14:30

## 2020-01-30 RX ADMIN — PROPOFOL 70 MG: 10 INJECTION, EMULSION INTRAVENOUS at 11:09

## 2020-01-30 RX ADMIN — Medication 60 MCG: at 12:05

## 2020-01-30 RX ADMIN — SUCCINYLCHOLINE CHLORIDE 120 MG: 20 INJECTION, SOLUTION INTRAMUSCULAR; INTRAVENOUS at 11:10

## 2020-01-30 RX ADMIN — Medication 2 G: at 11:24

## 2020-01-30 NOTE — PERIOP NOTES
Notified patient's  via volunteer services at start of procedure. Also stated patient's condition was good. The  expressed appreciation.

## 2020-01-30 NOTE — H&P
[facility] PRE-OP HISTORY AND PHYSICAL    Subjective:     Patient is a 81 y.o. female presented with a history of right shoulder dysfunction. Onset of symptoms was gradual with gradually worsening course since that time. She is being admitted for surgical management of this condition.          Patient Active Problem List    Diagnosis Date Noted    Advance directive in chart 03/26/2019    History of YAG laser capsulotomy of lens of left eye 10/02/2017    Posterior capsular opacification visually significant of right eye 10/02/2017    Posterior vitreous detachment of left eye 10/02/2017    Pseudophakia of both eyes 10/02/2017    Advance directive placed in chart this admission 03/24/2017    CRI (chronic renal insufficiency) 09/06/2016    Primary osteoarthritis involving multiple joints 09/02/2015    Anxiety 09/02/2015    Colitis 03/24/2014    Sleep disturbance 06/27/2013    Diverticulitis 06/27/2013    Hyponatremia 06/11/2013    Hypokalemia 06/11/2013    Environmental allergies 11/06/2012    Rectal bleeding 10/15/2012    HTN (hypertension) 08/10/2012    Hyperlipemia 08/10/2012     Past Medical History:   Diagnosis Date    Arthritis back pain    fingers    Asthma     MILD - NO INHALERS    Diverticulitis 6/11/2013    Hypertension     Ill-defined condition     INSOMNIA    Ill-defined condition     IBS    Ill-defined condition     COLITIS    Nausea & vomiting     Other ill-defined conditions(799.89) hypercholesterolemia    Stenosis     spinal    Stroke ([facility] [address] 1996    tia   PATENT  FORAMEN  OVALE      Past Surgical History:   Procedure Laterality Date    ABDOMEN SURGERY PROC UNLISTED  4/97    CHOLECYSTECTOMY    CARDIAC SURG PROCEDURE UNLIST  8/04    repair of foramen ovale    HX CATARACT REMOVAL Bilateral 2009    HX GYN  1986    HYSTERECTOMY - total    HX HEENT      WISDOM TEETH X3    HX LUMBAR LAMINECTOMY  2/2011    w/fusion L4-L5    HX ORTHOPAEDIC  4/98    KNEE ARTHROSCOPY   left knee    HX OTHER SURGICAL      colonoscopy x 3    HX TONSILLECTOMY  CHILDHOOD      Prior to Admission medications    Medication Sig Start Date End Date Taking? Authorizing Provider   budesonide (ENTOCORT EC) 3 mg capsule Take 6 mg by mouth every morning. 1 capsule by mouth daily 11/21/19  Yes Provider, Historical   LORazepam (ATIVAN) 1 mg tablet Take 1 Tab by mouth nightly as needed for Anxiety. Max Daily Amount: 1 mg. Patient taking differently: Take 0.5 mg by mouth nightly as needed for Anxiety. 10/30/19  Yes Michelle Centeno MD   PARoxetine (PAXIL) 30 mg tablet TAKE 1 TABLET DAILY  Patient taking differently: Take 30 mg by mouth daily. 10/16/19  Yes Michelle Centeno MD   losartan-hydroCHLOROthiazide (HYZAAR) 50-12.5 mg per tablet Take 1 Tab by mouth daily. 9/20/19  Yes Michelle Centeno MD   simvastatin (ZOCOR) 20 mg tablet TAKE 1 TABLET NIGHTLY  Patient taking differently: Take 20 mg by mouth daily. 4/23/19  Yes Michelle Centeno MD   levocetirizine (XYZAL) 5 mg tablet TAKE 1 TABLET DAILY  Patient taking differently: Take 5 mg by mouth daily as needed. 1/29/19  Yes Michelle Centeno MD   zolpidem (AMBIEN) 5 mg tablet TAKE 1 TABLET BY MOUTH ONCE DAILY AS NEEDED FOR SLEEP  Patient taking differently: Take 5 mg by mouth nightly as needed. 7/26/19   Remberto Faustin MD   albuterol (PROVENTIL VENTOLIN) 2.5 mg /3 mL (0.083 %) nebulizer solution 3 mL by Nebulization route three (3) times daily as needed for Wheezing or Shortness of Breath. 1/15/19   Remberto Faustin MD   meloxicam (MOBIC) 15 mg tablet TAKE 1 TABLET DAILY  Patient taking differently: Take 7.5 mg by mouth daily. 1/1/19   Remberto Faustin MD   dicyclomine (BENTYL) 10 mg capsule Take 10 mg by mouth as needed. 1 tablet by mouth daily as needed    Provider, Historical   MULTIVITAMIN W-MINERALS/LUTEIN (CENTRUM SILVER PO) Take 1 Tab by mouth. Takes one po daily.      Provider, Historical     Allergies   Allergen Reactions    Cefdinir Other (comments)     Sick to stomach    Ciprofloxacin Diarrhea and Nausea and Vomiting    Flagyl [Metronidazole] Nausea and Vomiting    Other Medication Other (comments)     BANDAIDS - breaks out skin    Oxycodone-Acetaminophen Other (comments)     dizziness    Phenylephrine Other (comments)     Wire her up    Phenylpropanolamine Other (comments)     Wire her up    Sulfa (Sulfonamide Antibiotics) Nausea Only      Social History     Tobacco Use    Smoking status: Never Smoker    Smokeless tobacco: Never Used   Substance Use Topics    Alcohol use: Yes     Alcohol/week: 5.8 standard drinks     Types: 7 Glasses of wine per week      Family History   Problem Relation Age of Onset    Other Mother         SUARACHNOID HEMORRHAGE-ANEURYSM    Stroke Mother     Cancer Father         PANCREATIC    Anesth Problems Neg Hx       Review of Systems  A comprehensive review of systems was negative except for that written in the HPI. Objective:     Patient Vitals for the past 8 hrs:   BP Temp Pulse Resp SpO2   01/30/20 0938 155/80 97.6 °F (36.4 °C) 82 16 97 %     Visit Vitals  /62   Pulse 67   Temp 97.6 °F (36.4 °C)   Resp 12   LMP 01/01/1986   SpO2 95%     General:  Alert, cooperative, no distress, appears stated age. Head:  Normocephalic, without obvious abnormality, atraumatic. Eyes:  Conjunctivae/corneas clear. PERRL, EOMs intact. Fundi benign. Ears:  Normal TMs and external ear canals both ears. Nose: Nares normal. Septum midline. Mucosa normal. No drainage or sinus tenderness. Throat: Lips, mucosa, and tongue normal. Teeth and gums normal.   Neck: Supple, symmetrical, trachea midline, no adenopathy, thyroid: no enlargement/tenderness/nodules, no carotid bruit and no JVD. Back:   Symmetric, no curvature. ROM normal. No CVA tenderness. Lungs:   Clear to auscultation bilaterally. Chest wall:  No tenderness or deformity. Heart:  Regular rate and rhythm, S1, S2 normal, no murmur, click, rub or gallop.    Abdomen:   Soft, non-tender. Bowel sounds normal. No masses,  No organomegaly. Extremities: Right shoulder: pain and limited ROM   Pulses: 2+ and symmetric all extremities. Skin: Skin color, texture, turgor normal. No rashes or lesions. Lymph nodes: Cervical, supraclavicular, and axillary nodes normal.   Neurologic: CNII-XII intact. Normal strength, sensation and reflexes throughout. I  Assessment:     Active Problems:    * No active hospital problems. *      Plan:     The various methods of treatment have been discussed with the patient and family. After consideration of risks, benefits and other options for treatment, the patient has consented to surgical intervention. Risks of infection, DVT, PE, MI, CVA and unforeseen events described to the patient. Additionally discussed the possibility of not being able to resolve all preop pain. Patient understands metal and plastic will be implanted in the body and understands the potential for revision surgery. Patient wishes to proceed with elective surgery.

## 2020-01-30 NOTE — ANESTHESIA PREPROCEDURE EVALUATION
Relevant Problems   No relevant active problems       Anesthetic History     PONV          Review of Systems / Medical History  Patient summary reviewed, nursing notes reviewed and pertinent labs reviewed    Pulmonary            Asthma        Neuro/Psych       CVA  TIA     Cardiovascular    Hypertension                   GI/Hepatic/Renal  Within defined limits              Endo/Other        Arthritis     Other Findings              Physical Exam    Airway  Mallampati: II  TM Distance: > 6 cm  Neck ROM: normal range of motion   Mouth opening: Normal     Cardiovascular  Regular rate and rhythm,  S1 and S2 normal,  no murmur, click, rub, or gallop             Dental  No notable dental hx       Pulmonary  Breath sounds clear to auscultation               Abdominal  GI exam deferred       Other Findings            Anesthetic Plan    ASA: 2  Anesthesia type: general      Post-op pain plan if not by surgeon: peripheral nerve block single    Induction: Intravenous  Anesthetic plan and risks discussed with: Patient

## 2020-01-30 NOTE — ANESTHESIA PROCEDURE NOTES
Peripheral Block    Performed by: Dave Mercer MD  Authorized by: Dave Mercer MD       Pre-procedure: Indications: at surgeon's request and post-op pain management    Preanesthetic Checklist: patient identified, risks and benefits discussed, site marked, timeout performed, anesthesia consent given and patient being monitored      Block Type:   Block Type:   Interscalene  Laterality:  Right  Monitoring:  Standard ASA monitoring, responsive to questions, continuous pulse ox, frequent vital sign checks, heart rate and oxygen  Injection Technique:  Single shot  Procedures: ultrasound guided    Patient Position: supine  Prep: chlorhexidine    Location:  Interscalene  Needle Type:  Stimuplex  Needle Gauge:  22 G  Needle Localization:  Ultrasound guidance    Assessment:  Number of attempts:  1  Injection Assessment:  Incremental injection every 5 mL, local visualized surrounding nerve on ultrasound, negative aspiration for blood, no intravascular symptoms, no paresthesia and negative aspiration for CSF  Patient tolerance:  Patient tolerated the procedure well with no immediate complications

## 2020-01-30 NOTE — BRIEF OP NOTE
BRIEF OPERATIVE NOTE    Date of Procedure: 1/30/2020   Preoperative Diagnosis: OA RIGHT SHOULDER  Postoperative Diagnosis: OA RIGHT SHOULDER    Procedure(s):  REVERSE TOTAL SHOULDER ARTHROPLASTY  Surgeon(s) and Role:     Lord Hosea MD - Primary     * Perri Erickson DO - Resident - Assisting         Surgical Assistant: tech    Surgical Staff:  Circ-1: Matheus Hanna RN  Circ-Relief: Tahira Doherty  Scrub Tech-1: Gabriel Cordoba  Scrub RN-Relief: Louise Wilkins RN  Surg Asst-1: Micheal Trinidadchwood  Surg Asst-Relief: Max Armendariz  Event Time In Time Out   Incision Start 1135    Incision Close       Anesthesia: General with interscalene block  Estimated Blood Loss: 200  Specimens: bone discarded  Findings: massive cuff tear   Complications: none  Implants:   Implant Name Type Inv.  Item Serial No.  Lot No. LRB No. Used Action   EQUINOXE SMALL REVERSE SHOULDER SMALL REVERSE GLENOID BASEPLATE POSTERIOR AUGMENT   [id number] [employer] N/A Right 1 Implanted   SCR LCK CAP KIT 4.5X34MM RED -- E[employer] - [id number]  SCR LCK CAP KIT 4.5X34MM RED -- Sharyn Loth [id number] [employer] N/A Right 1 Implanted   SCR LCK CAP KIT 4.5X30MM JANIE -- EQUINOXE - [id number]  SCR LCK CAP KIT 4.5X30MM JANIE -- EQUINOXE [id number] [employer] [id number] Right 1 Implanted   SCR LCK CAP KIT 4.5X26MM ORG -- EQUINOXE - [id number]  SCR LCK CAP KIT 4.5X26MM ORG -- E[employer] [id number] [employer]  Right 1 Implanted   SCR BNE LCK GLENOSPHERE -- EQU[employer]OXE - [id number]  SCR BNE LCK GLENOSPHERE -- EQU[employer]E [id number] [employer]  Right 1 Implanted   EQUINOXE SMALL REVERSE SHOULDER SMALL REVERSE GLENOSPHERE   [id number] [employer] N/A Right 1 Implanted   SCR TORQUE DEFINING KIT -- EQUINOXE - [id number]  SCR TORQUE DEFINING KIT -- Sharyn Loth [id number] [employer]  Right 1 Implanted   SCR LCK CAP KIT 4.5X42MM RED -- EQUINOXE - [id number]  SCR LCK CAP KIT 4.5X42MM RED -- Sharyn Loth [id number] [employer] N/A Right 1 Implanted   TRAY HUM ADPT REV +5 -- EQUINOXE - [id number]  TRAY HUM ADPT REV +5 -- Rajat Valleer [id number] [employer] N/A Right 1 Implanted   EQUINOXE HUMERAL STEM PRIMARY, PRESS FIT   [id number] [employer] N/A Right 1 Implanted   EQUINOXE REVERSE SHOULDER 36MM HUMERAL LINER   [id number] [employer] N/A Right 1 Implanted

## 2020-01-31 LAB — HGB BLD-MCNC: 8.9 G/DL (ref 11.5–16)

## 2020-01-31 PROCEDURE — 74011250636 HC RX REV CODE- 250/636: Performed by: ORTHOPAEDIC SURGERY

## 2020-01-31 PROCEDURE — 97535 SELF CARE MNGMENT TRAINING: CPT

## 2020-01-31 PROCEDURE — 97116 GAIT TRAINING THERAPY: CPT

## 2020-01-31 PROCEDURE — 74011250637 HC RX REV CODE- 250/637: Performed by: ORTHOPAEDIC SURGERY

## 2020-01-31 PROCEDURE — 36415 COLL VENOUS BLD VENIPUNCTURE: CPT

## 2020-01-31 PROCEDURE — 65270000029 HC RM PRIVATE

## 2020-01-31 PROCEDURE — 97165 OT EVAL LOW COMPLEX 30 MIN: CPT

## 2020-01-31 PROCEDURE — 97110 THERAPEUTIC EXERCISES: CPT

## 2020-01-31 PROCEDURE — 97161 PT EVAL LOW COMPLEX 20 MIN: CPT

## 2020-01-31 PROCEDURE — 85018 HEMOGLOBIN: CPT

## 2020-01-31 PROCEDURE — 97530 THERAPEUTIC ACTIVITIES: CPT

## 2020-01-31 RX ORDER — OXYCODONE HYDROCHLORIDE 5 MG/1
10 TABLET ORAL
Qty: 40 TAB | Refills: 0 | Status: SHIPPED | OUTPATIENT
Start: 2020-01-31 | End: 2020-02-03

## 2020-01-31 RX ADMIN — ACETAMINOPHEN 650 MG: 325 TABLET ORAL at 00:03

## 2020-01-31 RX ADMIN — HYDROMORPHONE HYDROCHLORIDE 0.5 MG: 1 INJECTION, SOLUTION INTRAMUSCULAR; INTRAVENOUS; SUBCUTANEOUS at 20:21

## 2020-01-31 RX ADMIN — Medication 10 ML: at 00:08

## 2020-01-31 RX ADMIN — Medication 2 G: at 00:03

## 2020-01-31 RX ADMIN — ONDANSETRON 4 MG: 2 INJECTION INTRAMUSCULAR; INTRAVENOUS at 20:28

## 2020-01-31 RX ADMIN — PAROXETINE HYDROCHLORIDE 30 MG: 20 TABLET, FILM COATED ORAL at 09:05

## 2020-01-31 RX ADMIN — Medication 10 ML: at 07:29

## 2020-01-31 RX ADMIN — Medication 7 ML: at 22:00

## 2020-01-31 RX ADMIN — ASPIRIN 325 MG: 325 TABLET, DELAYED RELEASE ORAL at 09:06

## 2020-01-31 RX ADMIN — SODIUM CHLORIDE 100 ML/HR: 900 INJECTION, SOLUTION INTRAVENOUS at 00:10

## 2020-01-31 RX ADMIN — LORAZEPAM 0.5 MG: 1 TABLET ORAL at 00:03

## 2020-01-31 RX ADMIN — SIMVASTATIN 20 MG: 20 TABLET, FILM COATED ORAL at 09:09

## 2020-01-31 RX ADMIN — FAMOTIDINE 20 MG: 20 TABLET ORAL at 09:08

## 2020-01-31 RX ADMIN — Medication 10 ML: at 20:35

## 2020-01-31 RX ADMIN — OXYCODONE 5 MG: 5 TABLET ORAL at 09:05

## 2020-01-31 RX ADMIN — ASPIRIN 325 MG: 325 TABLET, DELAYED RELEASE ORAL at 17:37

## 2020-01-31 RX ADMIN — SODIUM CHLORIDE 100 ML/HR: 900 INJECTION, SOLUTION INTRAVENOUS at 10:32

## 2020-01-31 RX ADMIN — THERA TABS 1 TABLET: TAB at 09:07

## 2020-01-31 RX ADMIN — OXYCODONE 5 MG: 5 TABLET ORAL at 14:24

## 2020-01-31 RX ADMIN — Medication 2 G: at 07:29

## 2020-01-31 RX ADMIN — OXYCODONE 5 MG: 5 TABLET ORAL at 17:37

## 2020-01-31 RX ADMIN — POLYETHYLENE GLYCOL 3350 17 G: 17 POWDER, FOR SOLUTION ORAL at 09:09

## 2020-01-31 RX ADMIN — ACETAMINOPHEN 650 MG: 325 TABLET ORAL at 07:15

## 2020-01-31 RX ADMIN — ACETAMINOPHEN 650 MG: 325 TABLET ORAL at 20:28

## 2020-01-31 NOTE — PROGRESS NOTES
Occupational Therapy    Orders received, chart reviewed and patient evaluated by occupational therapy. Pending progression with skilled acute occupational therapy, recommend: To be determined: per surgeon   Will continue to work with for OT services while in hospital.  Recommend PT evaluation as patient is requiring CGA for all ambulation and was indep at baseline. Discussed with nurse, nurse manager and PA. Vitals:    01/31/20 0750 01/31/20 0904 01/31/20 1020 01/31/20 1025   BP: 100/58 124/72 (!) 85/52 93/54   BP 1 Location: Left arm Left arm Left arm Left arm   BP Patient Position: At rest Standing Sitting;Post activity Supine   Pulse: 75 98 93 85   Resp: 12      Temp: 98.2 °F (36.8 °C)      SpO2: 90%        Recommend with nursing patient to complete as able in order to maintain strength, endurance and independence: OOB to chair 3x/day with CGA and functional mobility to the bathroom with CGA. Thank you for your assistance. Full evaluation to follow.        Mary Duarte, OT

## 2020-01-31 NOTE — OP NOTES
[address]   OPERATIVE REPORT    Name:  Natasha Gray  MR#:  [id number]  :  1935  ACCOUNT #:  [de-identified]  DATE OF SERVICE:  2020      PREOPERATIVE DIAGNOSIS:  Rotator cuff arthropathy of the right shoulder. POSTOPERATIVE DIAGNOSIS:  Rotator cuff arthropathy of the right shoulder. PROCEDURE PERFORMED:  Reverse total shoulder arthroplasty. SURGEON:  Alicja Méndez MD    ASSISTANT:  Ania Spencer DO    ANESTHESIA:  General with an interscalene block. COMPLICATIONS:  Zero. SPECIMENS REMOVED:  Bone, discarded. IMPLANTS:  [employer]ech Equinoxe Press-Fit size 10 stem with a +5 base plate and a +0 poly. The glenosphere was a 36 mm glenosphere with a small metaglene and all four screws applied to the base plate. ESTIMATED BLOOD LOSS:  Approximately 200-250 mL. FLUIDS:  Crystalloids were given. PREOPERATIVE MEDICINE:  2 g of Ancef. HISTORY:  The patient is an 71-year-old who presented to my office for a second opinion regarding ongoing bilateral shoulder pain. The patient has had chronic problems with both of her shoulders. Her examination revealed significant loss of range of motion more on the right side than the left. She had crepitation with active and passive range of motion of both shoulders. She had decreased strength in both, once again more pain and more dysfunction of her right than her left. She had a prior MRA evaluation which revealed evidence of a massive rotator cuff tear. She had evidence of rotator cuff arthropathy both on x-ray and the MRI. In consultation with the patient, I explained to her the condition. She had been maintained on conservative treatment for quite sometime. I had a long conversation in regards to surgical procedure, particularly the reverse total shoulder replacement. I explained to the patient, the decision to proceed with this is up to her.   At 81years of age, I also considered and recommended nonoperative treatment. Unfortunately, she had pain on a daily basis that was not controlled with conservative measures. She is a healthy 81year-old. She did receive preoperative medical clearance. I talked to her about the surgery. I explained to the patient, there is no guarantee all of her symptoms would be eliminated. I also explained to her she is not going to regain all of her motion nor all of her external rotational strength. I talked to her about the risk of postoperative swelling, numbness and tingling. She had significant superior migration and occasionally people will have some level of neurapraxia. She understood this. We talked about the metal and plastic, the risk of infection, DVT, PE, MI, CVA, and other unforeseen events could occur in a perioperative fashion. Understanding all the risks and the benefits as mentioned above, she wished to proceed with surgery, signed the consent, was taken to surgery. OPERATIVE PLAN:  The patient was taken back to surgery, placed supine on the operating table, administered general anesthetic through endotracheal intubation. The patient had received an interscalene block by an anesthesiologist.  After adequate anesthesia, the patient was placed in the beach chair position. The right upper extremity was sterilely prepped and draped x2. The patient received 2 g of Ancef. A time-out was performed and all parties agreed that the right shoulder was the correct shoulder. Following the time-out, an anterior incision was made with a 10-blade. Sharp dissection was taken down through the skin and blunt dissection was taken down the cephalic vein. The knife and the handle was passed off the table. Blunt dissection was taken down the cephalic. The cephalic was retracted laterally as the deltopectoral interval was exploited. Once through the deltopectoral interval, the subacromial space was exploited.   Significant scar tissue was identified in the superior dome of this joint space as expected as this patient had chronic superior migration of the humeral head and significant massive rotator cuff tendon tear. A pseudo joint was identified at this site. This was formed through the superior aspect of the joint. This was amputated at the rotator cuff interval.  We then dissected this off the lateral greater tuberosity taking care not to invade the deltoid. We were able to remove this from the subdeltoid region without violation of the deltoid. At that time, a Martinez retractor was deployed. A portion of the pec was taken down for exposure purposes. The short head of the biceps was retracted medially. The subscap was identified, it did have some tendinopathy, but it still had decent quality tissue. The circumflex vasculature was tied off with a 2-0 Vicryl. 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 Two stay stitches of Ethibond were placed in the subscap. An arthrotomy was made in a standard fashion. The inferior capsule was released as the shoulder was externally rotated. Further capsule was released all the way to the posterior quadrant of the joint. This was done under direct vision. Higgins Benes was deployed and the shoulder was dislocated into the wound. A freehand resection of the bone was done with a bone saw. We then reamed the proximal humerus up to the size of 11 and a 9 trial insert was deployed. A manhole cover was then placed over the trial insert. The shoulder was then posteriorly dislocated. The subscap was released from its anterior capsule. This anterior capsule was released from the glenoid anteriorly. Anterior inferiorly, we released more of this directly along the glenoid, taking care not to dissect into the axillary pouch. The outrigger was then deployed onto the coracoid. We then mapped all our salient points using the GPS system. Following this, we used this to guide our center portion.   This patient had a 40-degree native retroversion. We corrected this to about 22, and I chose an 8-degree posterior augment in this scenario. She also had a very narrow glenoid vault, and we had already prepared to use the small metaglene in this scenario. Our start site was identified. We then reamed to the depth as already prepared through GPS. Once we had reamed the appropriate depth, we then dilated for the 38-mm head. We then evaluated the ream, it was flushed, it seemed to be appropriate. We once again used our GPS to localize our drill site. The center ream was then drilled in the glenoid. We then impacted our metaglene. We inserted the superior and inferior screw first, both had excellent purchase of bone. We did use our two anterior and posterior inferior screws. Both of these had good purchase, but not as good as the first two. The glenoid was pulsatilely irrigated. 80 mg of gentamicin was injected in the capsule. We put the caps on the screws and then placed our standard glenosphere and screwed the set screw into  place. The proximal humerus was brought back into the joint. We trialed with a +0 base plate and worked up from this. We got to a +5 and this seemed to be appropriate. We then opened up our 10 stem and our +5 base plate. The stem was inserted without any complications. We then attached our +5 base plate and trialed again with a +0 poly. The shoulder was reduced. It had great range of motion and a tension that was appropriate. The shoulder was re-dislocated. We felt that this was the correct size. A +0 base plate was inserted and the shoulder was reduced. The shoulder was pulsatilely irrigated. 500 mL of sterile saline and iodine was inserted and then washed out of the joint with pulsatile irrigation. All parties changed out their outer gloves. #2 FiberWire was used to close the subscap tendon. The pec was also closed using #2 FiberWire.   #1 Vicryl was used to close the deltopectoral interval.  2-0 Vicryl was placed in the subcutaneous tissue and a running 4-0 Monocryl was placed in the epidermis. Steri-Strips, Adaptic and bacitracin ointment were applied. A sterile dry dressing was placed over the shoulder. The patient was placed in an immobilizer, awakened from general anesthetic in stable condition and transferred to the recovery room.         Jewel Ferguson MD      [id number]  D:  01/30/2020 19:05  T:  01/30/2020 20:35  JOB #:  [id number]

## 2020-01-31 NOTE — PROGRESS NOTES
Bedside and Verbal shift change report given to María Stephenson (oncoming nurse) by Dolores Panda RN (offgoing nurse). Report given with Marsha TORRES and MAR.

## 2020-01-31 NOTE — PROGRESS NOTES
Orthopedic Joint Progress Note    2020  Admit Date: 2020  Admit Diagnosis: Rotator cuff arthropathy [M12.819]    1 Day Post-Op    Subjective:     Jose Maxwell shoulder pain is controlled with medication. Sensation returned to right upper extremity wih mild residual tingling in hand, improving. No CP or SOB. Review of Systems: Pertinent items are noted in HPI. Objective:     PT/OT:     PATIENT MOBILITY                           Vital Signs:    Blood pressure 98/49, pulse 76, temperature 98.4 °F (36.9 °C), resp. rate 16, last menstrual period 1986, SpO2 91 %.   Temp (24hrs), Av.5 °F (36.4 °C), Min:95 °F (35 °C), Max:98.4 °F (36.9 °C)      Pain Control:   Pain Assessment  Pain Scale 1: Numeric (0 - 10)  Pain Intensity 1: 0    Meds:  Current Facility-Administered Medications   Medication Dose Route Frequency    albuterol (PROVENTIL VENTOLIN) nebulizer solution 2.5 mg  2.5 mg Nebulization TID PRN    budesonide (ENTOCORT EC) capsule 6 mg  6 mg Oral 7am    cetirizine (ZYRTEC) tablet 10 mg  10 mg Oral DAILY PRN    LORazepam (ATIVAN) tablet 0.5 mg  0.5 mg Oral QHS PRN    therapeutic multivitamin (THERAGRAN) tablet 1 Tab  1 Tab Oral DAILY    PARoxetine (PAXIL) tablet 30 mg  30 mg Oral DAILY    simvastatin (ZOCOR) tablet 20 mg  20 mg Oral DAILY    zolpidem (AMBIEN) tablet 5 mg  5 mg Oral QHS PRN    sodium chloride (NS) flush 5-40 mL  5-40 mL IntraVENous Q8H    sodium chloride (NS) flush 5-40 mL  5-40 mL IntraVENous PRN    0.9% sodium chloride infusion  100 mL/hr IntraVENous CONTINUOUS    acetaminophen (TYLENOL) tablet 650 mg  650 mg Oral Q6H PRN    oxyCODONE IR (ROXICODONE) tablet 5 mg  5 mg Oral Q3H PRN    oxyCODONE IR (ROXICODONE) tablet 10 mg  10 mg Oral Q3H PRN    HYDROmorphone (PF) (DILAUDID) injection 0.5 mg  0.5 mg IntraVENous Q4H PRN    ceFAZolin (ANCEF) 2 g/20 mL in sterile water IV syringe  2 g IntraVENous Q8H    naloxone (NARCAN) injection 0.4 mg  0.4 mg IntraVENous PRN    ondansetron (ZOFRAN) injection 4 mg  4 mg IntraVENous Q4H PRN    hydroxyzine HCL (ATARAX) tablet 10 mg  10 mg Oral Q8H PRN    famotidine (PEPCID) tablet 20 mg  20 mg Oral DAILY    polyethylene glycol (MIRALAX) packet 17 g  17 g Oral DAILY    [START ON 2/1/2020] bisacodyL (DULCOLAX) suppository 10 mg  10 mg Rectal DAILY PRN    aspirin delayed-release tablet 325 mg  325 mg Oral BID    losartan (COZAAR) tablet 50 mg  50 mg Oral DAILY    And    hydroCHLOROthiazide (HYDRODIURIL) tablet 12.5 mg  12.5 mg Oral DAILY        LAB:    Lab Results   Component Value Date/Time    INR 1.0 01/10/2020 02:26 PM     Lab Results   Component Value Date/Time    HGB 8.9 (L) 01/31/2020 04:57 AM    HGB 12.6 01/10/2020 02:26 PM    HGB 12.2 06/28/2013 07:47 AM       Wound Shoulder Right Incision x1 (Active)   Dressing Status Clean, dry, and intact 1/30/2020  5:45 PM   Dressing Type 4 x 4;ABD pad; Adhesive wound closure strips (Steri-Strips) 1/30/2020  5:45 PM   Splint Type/Material Shoulder Immobilizer 1/30/2020  5:45 PM   Drainage Amount None 1/30/2020  5:45 PM   Wound Odor None 1/30/2020  5:45 PM   Number of days: 1         Physical Exam:  General: Awake, alert, NAD  RUE: Dressing clean, dry, intact. 2+ radial pulse. SILT axillary, LAC, median, ulnar, radial nerves. +Active flex/ext wrist and digits. Assessment:      Active Problems:    Rotator cuff arthropathy (1/30/2020)         Plan:     Continue PT/OT/Rehab  Consult: Rehab team including PT, OT, recreational therapy, and     Patient status post right reverse total shoudler arthroplasty on 1/30. Doing well, hgb 8.9 today. Mildly hypotensive this morning.  Continue to monitor and limit narcotic usage.    - NWB RUE in sling   - PT/OT   - Analgesia PRN, limit narcotic usage   - DVT prophylaxis: ASA 325BID, SCD   - Likely DC home today after therapy

## 2020-01-31 NOTE — PROGRESS NOTES
Discharge order in, patient did not feel safe to go home today due to being orthostatic this morning with OT and pain is not under control. Patient will stay an extra night and will discharge in morning. Bedside and Verbal shift change report given to Gume Willett (oncoming nurse) by Karen Acosta RN (offgoing nurse). Report included the following information SBAR, Kardex and MAR.

## 2020-01-31 NOTE — PROGRESS NOTES
PHYSICAL THERAPY EVALUATION/DISCHARGE  Patient: Mechelle Rodriguez (62 y.o. female)  Date: 1/31/2020  Primary Diagnosis: Rotator cuff arthropathy [M12.819]  Procedure(s) (LRB):  REVERSE TOTAL SHOULDER ARTHROPLASTY (Right) 1 Day Post-Op   Precautions:          ASSESSMENT  Based on the objective data described below, the patient presents with shoulder pain, impaired higher level balance and decreased confidence with mobility. Pt's BP monitored closely d/t orthostatic hypotension with OT. BP stable with brief initial DBP drop which rebounds with increased time in standing. Pt ambulated 200ft with slowed gait speed and decreased step clearance. Navigated 4 steps with single railing. C/o back pain which is pt's baseline. Reviewed sling donning/doffing with pt's  and daughter. SUP level for toileting. Pt not sure if she feels safe discharging today but is cleared from a PT standpoint. Functional Outcome Measure: The patient scored 24/28 on the Tinetti outcome measure which is indicative of moderate fall risk. Other factors to consider for discharge:  can not provide physical assistance     Further skilled acute physical therapy is not indicated at this time. PLAN :  Recommendation for discharge: (in order for the patient to meet his/her long term goals)  Outpatient physical therapy follow up recommended for shoulder ROM and strengthening    This discharge recommendation:  Has been made in collaboration with the attending provider and/or case management    IF patient discharges home will need the following DME: patient owns DME required for discharge       SUBJECTIVE:   Patient stated What if I get sick when I get home? Dean Jesus    OBJECTIVE DATA SUMMARY:   HISTORY:    Past Medical History:   Diagnosis Date    Arthritis back pain    fingers    Asthma     MILD - NO INHALERS    Diverticulitis 6/11/2013    Hypertension     Ill-defined condition     INSOMNIA    Ill-defined condition     IBS    Ill-defined condition     COLITIS    Nausea & vomiting     Other ill-defined conditions(799.89) hypercholesterolemia    Stenosis     spinal    Stroke ([facility] [address] 1996    tia   PATENT  FORAMEN  OVALE     Past Surgical History:   Procedure Laterality Date    ABDOMEN SURGERY PROC UNLISTED  4/97    CHOLECYSTECTOMY    CARDIAC SURG PROCEDURE UNLIST  8/04    repair of foramen ovale    HX CATARACT REMOVAL Bilateral 2009    HX GYN  1986    HYSTERECTOMY - total    HX HEENT      WISDOM TEETH X3    HX LUMBAR LAMINECTOMY  2/2011    w/fusion L4-L5    HX ORTHOPAEDIC  4/98    KNEE ARTHROSCOPY   left knee    HX OTHER SURGICAL      colonoscopy x 3    HX TONSILLECTOMY  CHILDHOOD       Prior level of function: Independent. Cares for  with emphysema  Personal factors and/or comorbidities impacting plan of care: arthritis, IBS, TIA    Home Situation  Home Environment: Private residence  # Steps to Enter: 3  Rails to Enter: Yes  Hand Rails : Bilateral  Wheelchair Ramp: Yes  Living Alone: No  Support Systems: Spouse/Significant Other/Partner, Child(yvrose)  Patient Expects to be Discharged to[de-identified] Private residence  Current DME Used/Available at Home: Shower chair  Tub or Shower Type: Shower    EXAMINATION/PRESENTATION/DECISION MAKING:   Critical Behavior:  Neurologic State: Alert  Orientation Level: Oriented X4        Hearing:     Skin:    Edema:   Range Of Motion:  AROM: Within functional limits                       Strength:    Strength:  Within functional limits                    Tone & Sensation:   Tone: Normal                              Coordination:  Coordination: Within functional limits  Vision:      Functional Mobility:  Bed Mobility:  Rolling: Supervision  Supine to Sit: Supervision  Sit to Supine: Supervision  Scooting: Supervision  Transfers:  Sit to Stand: Supervision  Stand to Sit: Supervision                       Balance:   Sitting: Intact  Standing: Intact  Ambulation/Gait Training:  Distance (ft): 200 Feet (ft)  Assistive Device: Gait belt(shoulder sling)  Ambulation - Level of Assistance: Supervision        Gait Abnormalities: Decreased step clearance        Base of Support: Widened     Speed/Shellie: Pace decreased (<100 feet/min)  Step Length: Right shortened;Left shortened                     Stairs:  Number of Stairs Trained: 4  Stairs - Level of Assistance: Supervision   Rail Use: Left     Therapeutic Exercises:       Functional Measure:  Tinetti test:    Sitting Balance: 1  Arises: 2  Attempts to Rise: 2  Immediate Standing Balance: 1  Standing Balance: 1  Nudged: 1  Eyes Closed: 1  Turn 360 Degrees - Continuous/Discontinuous: 1  Turn 360 Degrees - Steady/Unsteady: 1  Sitting Down: 2  Balance Score: 13 Balance total score  Indication of Gait: 1  R Step Length/Height: 1  L Step Length/Height: 1  R Foot Clearance: 1  L Foot Clearance: 1  Step Symmetry: 1  Step Continuity: 1  Path: 2  Trunk: 2  Walking Time: 0  Gait Score: 11 Gait total score  Total Score: 24/28 Overall total score         Tinetti Tool Score Risk of Falls  <19 = High Fall Risk  19-24 = Moderate Fall Risk  25-28 = Low Fall Risk  Tinetti ME. Performance-Oriented Assessment of Mobility Problems in Elderly Patients. Nichols 67; [id number].  (Scoring Description: PT Bulletin Feb. 10, 1993)    Older adults: Denzel Ta et al, 2009; n = 1000 [facility] elderly evaluated with ABC, MARILYN, ADL, and IADL)  · Mean MARILYN score for males aged 69-68 years = 26.21(3.40)  · Mean MARILYN score for females age 69-68 years = 25.16(4.30)  · Mean MRAILYN score for males over 80 years = 23.29(6.02)  · Mean MARILYN score for females over 80 years = 17.20(8.32)            Physical Therapy Evaluation Charge Determination   History Examination Presentation Decision-Making   MEDIUM  Complexity : 1-2 comorbidities / personal factors will impact the outcome/ POC  MEDIUM Complexity : 3 Standardized tests and measures addressing body structure, function, activity limitation and / or participation in recreation  LOW Complexity : Stable, uncomplicated  Other outcome measures Tinetti  LOW       Based on the above components, the patient evaluation is determined to be of the following complexity level: LOW     Pain Ratin/10    Activity Tolerance:   Good  Please refer to the flowsheet for vital signs taken during this treatment. After treatment patient left in no apparent distress:   Supine in bed, Call bell within reach and Caregiver / family present    COMMUNICATION/EDUCATION:   The patients plan of care was discussed with: Registered Nurse. Fall prevention education was provided and the patient/caregiver indicated understanding., Patient/family have participated as able in goal setting and plan of care. and Patient/family agree to work toward stated goals and plan of care.     Thank you for this referral.  Jonathan Roberts, PT   Time Calculation: 45 mins

## 2020-01-31 NOTE — DISCHARGE INSTRUCTIONS
Postoperative Instructions    Medications/Diet    1. Eat only light, non-greasy foods today  2. Take pain medicine with food  3. While taking pain medicines, you may not operate a vehicle, heavy machinery, or appliances  4. While taking pain medicines, you may not drink alcoholic beverages  5. While taking pain medicines, you may not make critical decisions or sign legal papers  6. If you have any reactions to your medicines, stop taking them and call my office immediately  7. If you are not allergic, take one 325mg aspirin twice a day to help prevent blood clots  8. Please keep in mind that constipation is a very common side effect of taking narcotic pain medication. We recommend that patients take precautions to prevent constipation:   Drink plenty of water (6-8 glasses of 8 oz. a day)   Avoid alcohol, caffeine, and dairy products   Eat plenty of fiber (fruits, vegetables and whole grains)   Take an over the counter stool softener (colace or dulcolax)          Activity/Exercise    1. You may move the arm as directed by physical therapist  2. You may take arm out of sling and move elbow as necessary. Must wear sling while out of the house and while sleeping  3. You may change dressing daily. If no drainage, you may leave dressing off.  4. You may shower on post operative day #7  5. Please keep ice applied to the shoulder for the first 72 hours or as long as pain or swelling persist. Do not apply ice directly to skin, or allow water to leak on your dressing    Emergency/Follow-Up    1. Please notify my office at [phone] if you develop any fever (101° or above), unexpected warmth, redness or swelling in your shoulder  2. Please call if your fingers/toes become cold, purple, numb, or there is excessive bleeding  3. Please call the office within 24 hours at [phone]) to schedule a follow up appointment next week  4.  Please call the office before 3pm on Friday if you do not have enough pain medicine for the weekend.  Narcotic pain medication cannot be called into your pharmacy and the prescription must be picked up at our office

## 2020-01-31 NOTE — PROGRESS NOTES
Primary Nurse Mesfin Rush and Willa RN performed a dual skin assessment on this patient Impairment noted- see wound doc flow sheet  Jackson score is 20    Dark red/chelsea lower extremities mid calf down to ankles; pt states it is bruising but her baseline \"old lady legs\" and states her PCP is aware.

## 2020-01-31 NOTE — PROGRESS NOTES
Problem: Self Care Deficits Care Plan (Adult)  Goal: *Acute Goals and Plan of Care (Insert Text)  Description    FUNCTIONAL STATUS PRIOR TO ADMISSION: Patient was independent and active without use of DME.     HOME SUPPORT: The patient lived with  but did not require assist.    Occupational Therapy Goals  Initiated 1/31/2020     1. Patient will don/doff arm sling at minimal assistance/contact guard assist level within 7 days. 2.  Patient will perform shoulder exercises per physician order with supervision/set-up within 7 days. 3.  Patient will perform upper body ADLs with minimal assistance/contact guard assist  within 7 days. 4. Patient will perform lower body dressing with supervision/set-up within 7 days. 5.  Patient will perform toilet transfers with supervision/set-up within 7 days. 6.  Patient will verbalize/demonstrate shoulder precautions during ADLs with 100% accuracy within 7 days. Outcome: Progressing Towards Goal   OCCUPATIONAL THERAPY EVALUATION  Patient: Julio Lobato (73 y.o. female)  Date: 1/31/2020  Primary Diagnosis: Rotator cuff arthropathy [M12.819]  Procedure(s) (LRB):  REVERSE TOTAL SHOULDER ARTHROPLASTY (Right) 1 Day Post-Op   Precautions: R UE NWB, sling, EWH AROM only       ASSESSMENT  Based on the objective data described below, the patient presents with impaired activity tolerance, balance, mobility, R shoulder pain, and R UE limitations s/p R reverse TSA POD 1. PTA indep with ADL tasks without AD and lives with  but did not require assistance. Provided education on home safety, R shoulder precautions, ADL compensatory techniques, and RUE exercises per MD orders with fair understanding. Issued hand out for brace donning/doffing and exercises. She required CGA with all ambulation to toilet and CGA for all standing ADL tasks. Requiring mod A for UB care, mod A for sling use and min A for LB care. Decrease in BP, symptomatic following session.   Returned to bed and alerted nursing. Recommended PT eval for additional mobility assessment and training. Planned to follow up once family arrives to review sling mgmt however PT reviewed with patient, daughter and . Current Level of Function Impacting Discharge (ADLs/self-care): UB care mod A, LB care min A, and toileting CGA    Functional Outcome Measure: The patient scored 50/100 on the Barthel Index outcome measure which is indicative of moderate impairment with ADL tasks and functional mobility. Other factors to consider for discharge: Patient lives with  who has hx of pulmonary issues. Her daughter plans to stay with her for the weekend. At this time recommend 24/7 hands on assistance with all ambulation and ADL tasks as she is below baseline and fall risk. Anticipate good progression with BP mgmt and additional time OOB with therapy and nursing. Patient will benefit from skilled therapy intervention to address the above noted impairments. PLAN :  Recommendations and Planned Interventions: self care training, functional mobility training, therapeutic exercise, balance training, therapeutic activities, endurance activities, patient education, home safety training, and family training/education    Frequency/Duration: Patient will be followed by occupational therapy 5 times a week to address goals. Recommendation for discharge: (in order for the patient to meet his/her long term goals)  Recommend 24/7 supervision and hands on assistance for ADL tasks    This discharge recommendation:  Discussed with nursing and ortho PA    IF patient discharges home will need the following DME: has all needed DME       SUBJECTIVE:   Patient stated I am a little worried about going home today.     OBJECTIVE DATA SUMMARY:   HISTORY:   Past Medical History:   Diagnosis Date    Arthritis back pain    fingers    Asthma     MILD - NO INHALERS    Diverticulitis 6/11/2013    Hypertension     Ill-defined condition     INSOMNIA    Ill-defined condition     IBS    Ill-defined condition     COLITIS    Nausea & vomiting     Other ill-defined conditions(799.89) hypercholesterolemia    Stenosis     spinal    Stroke ([facility] [address] 1996    tia   PATENT  FORAMEN  OVALE     Past Surgical History:   Procedure Laterality Date    ABDOMEN SURGERY PROC UNLISTED  4/97    CHOLECYSTECTOMY    CARDIAC SURG PROCEDURE UNLIST  8/04    repair of foramen ovale    HX CATARACT REMOVAL Bilateral 2009    HX GYN  1986    HYSTERECTOMY - total    HX HEENT      WISDOM TEETH X3    HX LUMBAR LAMINECTOMY  2/2011    w/fusion L4-L5    HX ORTHOPAEDIC  4/98    KNEE ARTHROSCOPY   left knee    HX OTHER SURGICAL      colonoscopy x 3    HX TONSILLECTOMY  CHILDHOOD       Expanded or extensive additional review of patient history:     Home Situation  Home Environment: Private residence  # Steps to Enter: 3  Rails to Enter: Yes  Hand Rails : Bilateral  Wheelchair Ramp: Yes  Living Alone: No  Support Systems: Spouse/Significant Other/Partner, Child(yvrose)  Patient Expects to be Discharged to[de-identified] Private residence  Current DME Used/Available at Home: Shower chair  Tub or Shower Type: Shower    Hand dominance: Right    EXAMINATION OF PERFORMANCE DEFICITS:  Cognitive/Behavioral Status:  Neurologic State: Alert; Appropriate for age  Orientation Level: Oriented X4             Range of Motion:  AROM: Within functional limits- RUE in sling- fingers, wrist WDL, elbow AROM limited                         Strength:  Strength: Within functional limits- RUE in sling                Coordination:  Coordination: Within functional limits  Fine Motor Skills-Upper: Right Intact; Left Intact    Gross Motor Skills-Upper: Left Intact; Right Impaired    Tone & Sensation:  Tone: Normal                         Balance:  Sitting: Intact  Standing: Intact    Functional Mobility and Transfers for ADLs:  Bed Mobility:  Rolling: Supervision  Supine to Sit: Supervision  Sit to Supine: Supervision  Scooting: Supervision    Transfers:  Sit to Stand: Contact guard assistance  Stand to Sit: Contact guard assistance  Bed to Chair: Contact guard assistance  Bathroom Mobility: Contact guard assistance;Minimum assistance  Toilet Transfer : Contact guard assistance(L HHA)    ADL Assessment:  Feeding: Independent    Oral Facial Hygiene/Grooming: Contact guard assistance;Setup(ed on one handed techniques)    Bathing: Minimum assistance    Upper Body Dressing: Moderate assistance    Lower Body Dressing: Minimum assistance    Toileting: Minimum assistance                ADL Intervention and task modifications:  Patient instructed and indicated understanding the benefits of maintaining activity tolerance, functional mobility, and independence with self care tasks during acute stay  to ensure safe return home and to baseline. Encouraged patient to increase frequency and duration OOB, be out of bed for all meals, perform daily ADLs (as approved by RN/MD regarding bathing etc), and performing functional mobility to/from bathroom. Patient instructed and demonstrated shoulder precautions during ADLs with minimal cues. Feeding  Drink to Mouth: Supervision    Grooming  Washing Hands: Contact guard assistance       Patient instructed and indicated understanding propping surgical arm on surface, can back away from arm in order to access axilla to wash, dry, and deodorize. Lower Body Bathing  Perineal  : Contact guard assistance Patient instructed and indicated understanding dress R first/undress last. Patient instructed and indicated understanding of compensatory strategies to don sling.      Lower Body Dressing Assistance  Underpants: Minimum assistance  Pants With Elastic Waist: Minimum assistance  Socks: Maximum assistance    Toileting  Bladder Hygiene: Supervision      Therapeutic Exercise:    EXERCISE   Sets   Reps   Active Active Assist   Passive   Comments   Elbow flexion/extension 1 3 [] []                          [x]                             Wrist flexion/extension 1 10 [x]                          []                          []                             Finger flexion/extension 1 10 [x]                          []                          []                             Cervical- rotation, lateral flexion, and forward flexion 1 10 [x]         Pendulum    1 10 []     []     [x]     Lateral, clockwise, and counter clockwise     Functional Measure:  Barthel Index:    Bathin  Bladder: 10  Bowels: 10  Groomin  Dressin  Feedin  Mobility: 5  Stairs: 0  Toilet Use: 5  Transfer (Bed to Chair and Back): 10  Total: 50/100        The Barthel ADL Index: Guidelines  1. The index should be used as a record of what a patient does, not as a record of what a patient could do. 2. The main aim is to establish degree of independence from any help, physical or verbal, however minor and for whatever reason. 3. The need for supervision renders the patient not independent. 4. A patient's performance should be established using the best available evidence. Asking the patient, friends/relatives and nurses are the usual sources, but direct observation and common sense are also important. However direct testing is not needed. 5. Usually the patient's performance over the preceding 24-48 hours is important, but occasionally longer periods will be relevant. 6. Middle categories imply that the patient supplies over 50 per cent of the effort. 7. Use of aids to be independent is allowed. Oumar Vitale., Barthel, D.W. (7640). Functional evaluation: the Barthel Index. [facility] (14)2. Leigh Bender roberto KEVIN Nelson, Julia Zuniga., Deedee Jacobs., [facility], [address] [place] [address] (). Measuring the change indisability after inpatient rehabilitation; comparison of the responsiveness of the Barthel Index and Functional [place] Measure. Journal of Neurology, Neurosurgery, and Psychiatry, 66(4), 209-297.   Leigh Bender [place]el, N.J.A, Dave Goldberg M.A. (2004.) Assessment of post-stroke quality of life in cost-effectiveness studies: The usefulness of the Barthel Index and the EuroQoL-5D. Quality of Life Research, 15, 405-40       Occupational Therapy Evaluation Charge Determination   History Examination Decision-Making   LOW Complexity : Brief history review  LOW Complexity : 1-3 performance deficits relating to physical, cognitive , or psychosocial skils that result in activity limitations and / or participation restrictions  LOW Complexity : No comorbidities that affect functional and no verbal or physical assistance needed to complete eval tasks    Based on the above components, the patient evaluation is determined to be of the following complexity level: LOW     Pain Ratin-6/10 in R shoulder    Activity Tolerance:   Fair, Poor, and signs and symptoms of orthostatic hypotension   Please refer to the flowsheet and previous note for vital signs taken during this treatment. After treatment patient left in no apparent distress:    Supine in bed and Call bell within reach    COMMUNICATION/EDUCATION:   The patients plan of care was discussed with: Physical Therapist and Registered Nurse. Home safety education was provided and the patient/caregiver indicated understanding. and Patient/family agree to work toward stated goals and plan of care. This patients plan of care is appropriate for delegation to [facility].     Thank you for this referral.  Shree Lizarraga OT  Time Calculation: 67 mins

## 2020-02-01 VITALS
HEART RATE: 89 BPM | RESPIRATION RATE: 16 BRPM | SYSTOLIC BLOOD PRESSURE: 115 MMHG | DIASTOLIC BLOOD PRESSURE: 63 MMHG | OXYGEN SATURATION: 96 % | TEMPERATURE: 98.8 F

## 2020-02-01 PROCEDURE — 74011250637 HC RX REV CODE- 250/637: Performed by: ORTHOPAEDIC SURGERY

## 2020-02-01 RX ADMIN — OXYCODONE 5 MG: 5 TABLET ORAL at 08:26

## 2020-02-01 RX ADMIN — PAROXETINE HYDROCHLORIDE 30 MG: 20 TABLET, FILM COATED ORAL at 09:16

## 2020-02-01 RX ADMIN — ASPIRIN 325 MG: 325 TABLET, DELAYED RELEASE ORAL at 09:16

## 2020-02-01 RX ADMIN — FAMOTIDINE 20 MG: 20 TABLET ORAL at 09:17

## 2020-02-01 RX ADMIN — POLYETHYLENE GLYCOL 3350 17 G: 17 POWDER, FOR SOLUTION ORAL at 09:17

## 2020-02-01 RX ADMIN — SIMVASTATIN 20 MG: 20 TABLET, FILM COATED ORAL at 09:17

## 2020-02-01 RX ADMIN — ZOLPIDEM TARTRATE 5 MG: 5 TABLET ORAL at 01:12

## 2020-02-01 RX ADMIN — Medication 8 ML: at 06:00

## 2020-02-01 RX ADMIN — THERA TABS 1 TABLET: TAB at 09:16

## 2020-02-01 NOTE — PROGRESS NOTES
Problem: Upper Extremity Surgical Pathway: POD 1  Goal: Off Pathway (Use only if patient is Off Pathway)  Outcome: Progressing Towards Goal  Goal: Activity/Safety  Outcome: Progressing Towards Goal  Note:   Pt ambulated to the bathroom with her sling. Goal: Diagnostic Test/Procedures  Outcome: Progressing Towards Goal  Goal: Nutrition/Diet  Outcome: Progressing Towards Goal  Note:   Pt tolerated regular diet. Goal: Discharge Planning  Outcome: Progressing Towards Goal  Goal: Medications  Outcome: Progressing Towards Goal  Goal: Respiratory  Outcome: Progressing Towards Goal  Note:   Pt demonstrated effective use of incentive spirometer.   Goal: Treatments/Interventions/Procedures  Outcome: Progressing Towards Goal  Goal: Psychosocial  Outcome: Progressing Towards Goal

## 2020-02-01 NOTE — PROGRESS NOTES
The [facility] Discharge Education video was reviewed by the patient/family. The content was discussed utilizing teach back, questions were answered. The patient verbalized an understanding of the instructions. I have reviewed discharge instructions with the patient and daughter. The patient and daughter verbalized understanding.

## 2020-02-01 NOTE — PROGRESS NOTES
Bedside and Verbal shift change report given to Katie Camilo (oncoming nurse) by Abdiaziz Phillips RN (offgoing nurse). Report given with Marsha TORRES and MAR.

## 2020-02-01 NOTE — PROGRESS NOTES
POD 2 Days Post-Op    Procedure:  Procedure(s):  REVERSE TOTAL SHOULDER ARTHROPLASTY    Subjective:     Patient doing well, complaining of appropriate post-op pain    Objective:     Blood pressure 110/70, pulse 100, temperature 98.4 °F (36.9 °C), resp. rate 16, last menstrual period 1986, SpO2 92 %. Temp (24hrs), Av.5 °F (36.9 °C), Min:98.2 °F (36.8 °C), Max:98.7 °F (37.1 °C)      Physical Exam:  Examination of the right shoulder reveals that the incision is clean, dry and intact. Sensation is intact to light touch.  mild swelling. No calf pain.   NVSI    Labs:   Lab Results   Component Value Date/Time    HGB 8.9 (L) 2020 04:57 AM         Assessment:     Active Problems:    Rotator cuff arthropathy (2020)        Procedure(s):  REVERSE TOTAL SHOULDER ARTHROPLASTY    Plan/Recommendations/Medical Decision Making:     - pain control   - ice   - dvt prophylaxis - scds  - d/c planning - home today        Evan Mora DO

## 2020-02-01 NOTE — PROGRESS NOTES
Bedside and Verbal shift change report given to [address] (oncoming nurse) by Lorena Pacheco (offgoing nurse). Report included the following information SBAR and Kardex.

## 2020-02-03 ENCOUNTER — TELEPHONE (OUTPATIENT)
Dept: INTERNAL MEDICINE CLINIC | Age: 85
End: 2020-02-03

## 2020-02-03 ENCOUNTER — PATIENT OUTREACH (OUTPATIENT)
Dept: INTERNAL MEDICINE CLINIC | Age: 85
End: 2020-02-03

## 2020-02-03 DIAGNOSIS — F51.01 PRIMARY INSOMNIA: ICD-10-CM

## 2020-02-03 RX ORDER — ASPIRIN 325 MG
325 TABLET ORAL
COMMUNITY
End: 2020-02-10 | Stop reason: ALTCHOICE

## 2020-02-03 RX ORDER — ZOLPIDEM TARTRATE 5 MG/1
TABLET ORAL
Qty: 20 TAB | Refills: 0 | Status: ON HOLD | OUTPATIENT
Start: 2020-02-03 | End: 2021-09-14

## 2020-02-03 NOTE — TELEPHONE ENCOUNTER
Pharmacy would like a call back in ref to the medications that the patient is taking  Ambien,Oxycodone and the Lorazepam should these med be taken by the patient together.

## 2020-02-03 NOTE — TELEPHONE ENCOUNTER
Please let pharmacy know pt is no longer taking the Oxycodone or the lorazepam.  This was discussed with the pt by [facility].

## 2020-02-03 NOTE — PROGRESS NOTES
Hospital Discharge Follow-Up      Date/Time:  2/3/2020 11:42 AM  [facility] Transition Nurse  Phone- [phone]    Patient was admitted to [facility] [place] on  and discharged on  for right reverse total shoulder arthroplasty. The physician discharge summary was available at the time of outreach. Patient was contacted within 1 business days of discharge. Top Challenges reviewed with the provider   Patient request refill on Ambien prescription. She is currently not taking oxycodone or ativan that are on her home medication list.  She understands the importance of not taking these medications together. Pain is well controlled on tylenol. Patient will discuss with ortho surgeon if she should continue taking aspirin 325 mg twice daily. Post op hypotension, but this am BP was 149/74 with pulse 98. Patient rechecked BP during call and current 140/70. She denies any lightheadedness/dizziness. Patient has resumed her Hyzaar and will continue to monitor BP daily. Advance Care Planning:   Does patient have an Advance Directive:  reviewed and current        Method of communication with provider :face to face  Patient will discuss taking aspirin with ortho. She will take aspirin with food. If she experiences any GI symptoms or notices any s/s bleeding she will contact provider. Was this a readmission? no     Care Transition Nurse (CTN) contacted the patient by telephone to perform post hospital discharge assessment. Verified name and  with patient as identifiers. Provided introduction to self, and explanation of the CTN role. Patient received hospital discharge instructions. CTN reviewed discharge instructions and red flags with patient who verbalized understanding. Patient given an opportunity to ask questions and does not have any further questions or concerns at this time.  The patient agrees to contact the PCP office for questions related to their [facility]. CTN provided contact information for future reference. Patients top risk factors for readmission:  impaired mobility post-op , depression    Home Health orders at [facility] ordered at discharge: right sling/brace     Medication(s):   New Medications at Discharge:   oxyCODONE IR 5 mg immediate release  Aspirin 325 mg twice daily  Changed Medications at Discharge: none  Discontinued Medications at Discharge:   meloxicam 15 mg tablet (MOBIC)    Medication reconciliation was performed with patient, who verbalizes understanding of administration of home medications. There were no barriers to obtaining medications identified at this time. Referral to Pharm D needed: no     Current Outpatient Medications   Medication Sig    aspirin (ASPIRIN) 325 mg tablet Take 325 mg by mouth two (2) times daily as needed for Pain.  PARoxetine (PAXIL) 30 mg tablet TAKE 1 TABLET DAILY (Patient taking differently: Take 30 mg by mouth daily.)    losartan-hydroCHLOROthiazide (HYZAAR) 50-12.5 mg per tablet Take 1 Tab by mouth daily.  simvastatin (ZOCOR) 20 mg tablet TAKE 1 TABLET NIGHTLY (Patient taking differently: Take 20 mg by mouth daily.)    levocetirizine (XYZAL) 5 mg tablet TAKE 1 TABLET DAILY (Patient taking differently: Take 5 mg by mouth daily as needed.)    albuterol (PROVENTIL VENTOLIN) 2.5 mg /3 mL (0.083 %) nebulizer solution 3 mL by Nebulization route three (3) times daily as needed for Wheezing or Shortness of Breath.  MULTIVITAMIN W-MINERALS/LUTEIN (CENTRUM SILVER PO) Take 1 Tab by mouth. Takes one po daily.  zolpidem (AMBIEN) 5 mg tablet TAKE 1 TABLET BY MOUTH ONCE DAILY AS NEEDED FOR SLEEP    oxyCODONE IR (ROXICODONE) 5 mg immediate release tablet Take 2 Tabs by mouth every four (4) hours as needed for Pain for up to 3 days. Max Daily Amount: 60 mg.    budesonide (ENTOCORT EC) 3 mg capsule Take 6 mg by mouth every morning.  1 capsule by mouth daily    LORazepam (ATIVAN) 1 mg tablet Take 1 Tab by mouth nightly as needed for Anxiety. Max Daily Amount: 1 mg. (Patient taking differently: Take 0.5 mg by mouth nightly as needed for Anxiety.)    dicyclomine (BENTYL) 10 mg capsule Take 10 mg by mouth as needed. 1 tablet by mouth daily as needed     No current facility-administered medications for this visit. There are no discontinued medications. [facility] follow up appointment(s): No future appointments. Non-[facility] follow up appointment(s):   Dr. Kayli French 2/6/2020      Goals      Attends follow-up appointments as directed. 2/3  Patient will follow up with providers as directed. She will follow up with ortho on 2/6 and PCP when she is more mobile. ~BEB        Prevent complications post hospitalization. 2/3  Patient will follow discharge instructions, icing shoulder as needed,  wearing sling to her right arm as directed. Patient report pain is controlled with tylenol currently and she would like to avoid any narcotics. Patient is currently on aspirin and will discuss with ortho at follow up on 2/6. She will report any s/s bleeding. ~BEB       Returns to baseline activity level. 2/3  Patient will amb with standby assist and supervision. ~BEB

## 2020-02-03 NOTE — ANESTHESIA POSTPROCEDURE EVALUATION
Procedure(s):  REVERSE TOTAL SHOULDER ARTHROPLASTY. general    Anesthesia Post Evaluation      Multimodal analgesia: multimodal analgesia used between 6 hours prior to anesthesia start to PACU discharge  Patient location during evaluation: bedside  Patient participation: waiting for patient participation  Level of consciousness: awake  Pain management: adequate  Airway patency: patent  Anesthetic complications: no  Cardiovascular status: acceptable  Respiratory status: unassisted  Hydration status: acceptable  Comments: Post-Anesthesia Evaluation and Assessment    I have evaluated the patient and they are ready for PACU discharge. Patient: Jose Arreola MRN: [id number]  SSN: [id number]   YOB: 1935  Age: 81 y.o. Sex: female      Cardiovascular Function/Vital Signs  /63   Pulse 89   Temp 37.1 °C (98.8 °F)   Resp 16   SpO2 96%     Patient is status post General anesthesia for Procedure(s):  REVERSE TOTAL SHOULDER ARTHROPLASTY. Nausea/Vomiting: None    Postoperative hydration reviewed and adequate. Pain:  Pain Scale 1: Numeric (0 - 10) (02/01/20 0900)  Pain Intensity 1: 4 (02/01/20 0900)   Managed    Neurological Status:   Neuro (WDL): Within Defined Limits (01/30/20 1530)  Neuro  Neurologic State: Alert (02/01/20 0900)  Orientation Level: Oriented X4 (02/01/20 0900)  Cognition: Appropriate for age attention/concentration (02/01/20 0900)  Speech: Clear (02/01/20 0900)  LUE Motor Response: Purposeful (02/01/20 0900)  LLE Motor Response: Purposeful (02/01/20 0900)  RUE Motor Response: Purposeful;Weak (02/01/20 0900)  RLE Motor Response: Purposeful (02/01/20 0900)   At baseline    Mental Status, Level of Consciousness: Alert and  oriented to person, place, and time    Pulmonary Status:   O2 Device: Room air (02/01/20 0900)   Adequate oxygenation and airway patent    Complications related to anesthesia: None    Post-anesthesia assessment completed.  No concerns    Signed By: Rito Johnson Tristan Hagen MD    February 3, 2020                   Vitals Value Taken Time   /59 1/30/2020  4:30 PM   Temp 36.5 °C (97.7 °F) 1/30/2020  3:30 PM   Pulse 107 1/30/2020  4:30 PM   Resp 25 1/30/2020  4:30 PM   SpO2 94 % 1/30/2020  4:30 PM

## 2020-02-06 NOTE — DISCHARGE SUMMARY
Discharge Summary     Patient ID:  Nayana Alcazar  [id number]  89 y.o.  1935    Admit Date: 1/30/2020    Discharge Date:     Admission Diagnoses: Rotator cuff arthropathy [M12.819]    Surgeon: He James MD    Last Procedure: Procedure(s):  REVERSE TOTAL SHOULDER ARTHROPLASTY      Hospital Course: Normal hospital course for this procedure. Significant Diagnostic Studies: none    Discharge Diagnosis: Active Problems:    Rotator cuff arthropathy (1/30/2020)         Condition on Discharge: good    Disposition:Home    Followup Care:  Discharge Condition: good  See surgical instructions  Regular Diet  Keep wound clean and dry    Follow-up Information     Follow up With Specialties Details Why Contact Info    Luis Chatterjee MD Internal Medicine   [address]  [phone]            [place] med list:   Discharge Medication List as of 2/1/2020 10:14 AM      START taking these medications    Details   oxyCODONE IR (ROXICODONE) 5 mg immediate release tablet Take 2 Tabs by mouth every four (4) hours as needed for Pain for up to 3 days. Max Daily Amount: 60 mg., Print, Disp-40 Tab, R-0         CONTINUE these medications which have NOT CHANGED    Details   budesonide (ENTOCORT EC) 3 mg capsule Take 6 mg by mouth every morning. 1 capsule by mouth daily, Historical Med      LORazepam (ATIVAN) 1 mg tablet Take 1 Tab by mouth nightly as needed for Anxiety. Max Daily Amount: 1 mg., Print, Disp-90 Tab, R-1      PARoxetine (PAXIL) 30 mg tablet TAKE 1 TABLET DAILY, Normal, Disp-90 Tab, R-3      losartan-hydroCHLOROthiazide (HYZAAR) 50-12.5 mg per tablet Take 1 Tab by mouth daily. , Normal, Disp-90 Tab, R-3      simvastatin (ZOCOR) 20 mg tablet TAKE 1 TABLET NIGHTLY, Normal, Disp-90 Tab, R-3      levocetirizine (XYZAL) 5 mg tablet TAKE 1 TABLET DAILY, Normal, Disp-90 Tab, R-3      zolpidem (AMBIEN) 5 mg tablet TAKE 1 TABLET BY MOUTH ONCE DAILY AS NEEDED FOR SLEEP, PrintThis request is for a new prescription for a controlled substance as required by Federal/State law. Disp-20 Tab, R-0      albuterol (PROVENTIL VENTOLIN) 2.5 mg /3 mL (0.083 %) nebulizer solution 3 mL by Nebulization route three (3) times daily as needed for Wheezing or Shortness of Breath., Normal, Disp-50 Each, R-0      dicyclomine (BENTYL) 10 mg capsule Take 10 mg by mouth as needed. 1 tablet by mouth daily as needed, Historical Med      MULTIVITAMIN W-MINERALS/LUTEIN (CENTRUM SILVER PO) Take 1 Tab by mouth. Takes one po daily. , Historical Med         STOP taking these medications       meloxicam (MOBIC) 15 mg tablet Comments:   Reason for Stopping:                  Home Going Instructions:   Patient to follow up in one - two weeks. Notify my office if develop fever, chills, redness, unbearble pain or temp.>102. Patient to follow discharge instructions. Patient to call [phone] to set up follow up appointment.         Signed:  Gregory Khan MD  2/6/2020  7:22 AM

## 2020-02-10 ENCOUNTER — OFFICE VISIT (OUTPATIENT)
Dept: INTERNAL MEDICINE CLINIC | Age: 85
End: 2020-02-10

## 2020-02-10 VITALS
HEIGHT: 60 IN | HEART RATE: 98 BPM | WEIGHT: 134 LBS | SYSTOLIC BLOOD PRESSURE: 138 MMHG | TEMPERATURE: 98.2 F | DIASTOLIC BLOOD PRESSURE: 82 MMHG | OXYGEN SATURATION: 98 % | BODY MASS INDEX: 26.31 KG/M2

## 2020-02-10 DIAGNOSIS — M12.811 ROTATOR CUFF ARTHROPATHY OF RIGHT SHOULDER: Primary | ICD-10-CM

## 2020-02-10 DIAGNOSIS — J02.9 SORE THROAT: ICD-10-CM

## 2020-02-10 NOTE — PROGRESS NOTES
HISTORY OF PRESENT ILLNESS  Candice Mcgee is a 81 y.o. female. HPI   Ms. Jeremy Wilson is a 81y.o. year old female, she is seen today for Transition of Care services following a hospital discharge for right reverse shoulder replacement on 2/1/2020. Our office Nurse Navigator performed an outreach to Ms. Roche on 2/3/2020 (within 2 business days of discharge) to complete medication reconciliation and a telephonic assessment of her condition. Had right reverse shoulder replacement 1/30. This weekend has been better than the first week. Saw Dr. Sandi Sprague 1/6, felt she was doing well. Hard to sleep. Tried the Ambien without improvement. Went back to Lorazepam and 2 Tylenol with improvement in sleep. Has f/u with Dr. Sandi Sprague in 4 weeks. Starts PT tomorrow. Legs are weak and feel like jelly. Initially sore and could not work. Legs are getting stronger. Feels using and abusing her left shoulder. Knot in her throat since surgery. Worse with eating or taking pills. Feels pills get caught but doesn't have to regurgitate back up pills or food. No reflux symptoms, no post nasal drainage. Current Outpatient Medications:     MELOXICAM PO, Take  by mouth. 1/2 tablet by mouth daily, Disp: , Rfl:     budesonide (ENTOCORT EC) 3 mg capsule, Take 6 mg by mouth every morning. 1 capsule by mouth daily, Disp: , Rfl:     LORazepam (ATIVAN) 1 mg tablet, Take 1 Tab by mouth nightly as needed for Anxiety. Max Daily Amount: 1 mg. (Patient taking differently: Take 0.5 mg by mouth nightly as needed for Anxiety.), Disp: 90 Tab, Rfl: 1    PARoxetine (PAXIL) 30 mg tablet, TAKE 1 TABLET DAILY (Patient taking differently: Take 30 mg by mouth daily.), Disp: 90 Tab, Rfl: 3    losartan-hydroCHLOROthiazide (HYZAAR) 50-12.5 mg per tablet, Take 1 Tab by mouth daily. , Disp: 90 Tab, Rfl: 3    simvastatin (ZOCOR) 20 mg tablet, TAKE 1 TABLET NIGHTLY (Patient taking differently: Take 20 mg by mouth daily.), Disp: 90 Tab, Rfl: 3   levocetirizine (XYZAL) 5 mg tablet, TAKE 1 TABLET DAILY (Patient taking differently: Take 5 mg by mouth daily as needed.), Disp: 90 Tab, Rfl: 3    albuterol (PROVENTIL VENTOLIN) 2.5 mg /3 mL (0.083 %) nebulizer solution, 3 mL by Nebulization route three (3) times daily as needed for Wheezing or Shortness of Breath., Disp: 50 Each, Rfl: 0    dicyclomine (BENTYL) 10 mg capsule, Take 10 mg by mouth as needed. 1 tablet by mouth daily as needed, Disp: , Rfl:     MULTIVITAMIN W-MINERALS/LUTEIN (CENTRUM SILVER PO), Take 1 Tab by mouth. Takes one po daily. , Disp: , Rfl:     zolpidem (AMBIEN) 5 mg tablet, TAKE 1 TABLET BY MOUTH ONCE DAILY AS NEEDED FOR SLEEP, Disp: 20 Tab, Rfl: 0    Visit Vitals  /82   Pulse 98   Temp 98.2 °F (36.8 °C) (Oral)   Ht 5' (1.524 m)   Wt 134 lb (60.8 kg)   SpO2 98%   BMI 26.17 kg/m²       ROS  See above  Physical Exam  NECK: supple, no lad, no bruit, no tm  LUNGS: cta bilat  CV rrr, no m/g/r  ABD: soft, nt, nd, nabs  EXT: no c/c/e  Right arm in sling. Mild swelling fingers in right hand. ASSESSMENT and PLAN  DOLOR[facility] day 7 visit  Rotator cuff arthropathy S/p right reverse shoulder replacement - doing well. Off narcotic pain meds. Taking Tylenol prn. Starts PT tomorrow  Mild sore throat - secondary to recent surgery -. Soft foods recommended. Orders Placed This Encounter    MELOXICAM PO     Follow-up and Dispositions    · Return in about 2 months (around 4/10/2020).

## 2020-02-10 NOTE — PROGRESS NOTES
Chief Complaint   Patient presents with    Follow-up     had shoulder surgery     Visit Vitals  /82   Pulse 98   Temp 98.2 °F (36.8 °C) (Oral)   Ht 5' (1.524 m)   Wt 134 lb (60.8 kg)   SpO2 98%   BMI 26.17 kg/m²     1. Have you been to the ER, urgent care clinic since your last visit? Hospitalized since your last visit? Yes    2. Have you seen or consulted any other health care providers outside of the [facility] since your last visit? Include any pap smears or colon screening.  no

## 2020-02-18 ENCOUNTER — PATIENT OUTREACH (OUTPATIENT)
Dept: INTERNAL MEDICINE CLINIC | Age: 85
End: 2020-02-18

## 2020-02-24 RX ORDER — LEVOCETIRIZINE DIHYDROCHLORIDE 5 MG/1
TABLET, FILM COATED ORAL
Qty: 90 TAB | Refills: 3 | Status: SHIPPED | OUTPATIENT
Start: 2020-02-24 | End: 2021-02-23 | Stop reason: SDUPTHER

## 2020-03-20 RX ORDER — LOSARTAN POTASSIUM AND HYDROCHLOROTHIAZIDE 12.5; 5 MG/1; MG/1
1 TABLET ORAL DAILY
Qty: 90 TAB | Refills: 3 | Status: SHIPPED | OUTPATIENT
Start: 2020-03-20 | End: 2020-03-25 | Stop reason: RX

## 2020-03-25 ENCOUNTER — TELEPHONE (OUTPATIENT)
Dept: INTERNAL MEDICINE CLINIC | Age: 85
End: 2020-03-25

## 2020-03-25 DIAGNOSIS — E78.00 PURE HYPERCHOLESTEROLEMIA: Primary | ICD-10-CM

## 2020-03-25 RX ORDER — LOSARTAN POTASSIUM 50 MG/1
50 TABLET ORAL DAILY
Qty: 90 TAB | Refills: 3 | Status: SHIPPED | OUTPATIENT
Start: 2020-03-25 | End: 2020-06-25 | Stop reason: DRUGHIGH

## 2020-03-25 RX ORDER — HYDROCHLOROTHIAZIDE 12.5 MG/1
12.5 TABLET ORAL DAILY
Qty: 90 TAB | Refills: 3 | Status: SHIPPED | OUTPATIENT
Start: 2020-03-25 | End: 2021-03-24 | Stop reason: SDUPTHER

## 2020-03-25 NOTE — TELEPHONE ENCOUNTER
[facility]      Caller stated the \"losartan HCTZ\" 50-12.5mg is on  back order. Caller stated there would need to be an alternative and that the combo is not available but the medication is available separately.    Callback required yes/no and why: Yes   Best contact number(s): [phone], option #2 - reference #: [id number]

## 2020-03-25 NOTE — TELEPHONE ENCOUNTER
Yes we can do labs and we can at least do a telephone visit. I will call her once I have the lab results.

## 2020-03-25 NOTE — TELEPHONE ENCOUNTER
Patient states she has an appointment on April 8. Patient wanted to know if Dr. Dean Baxter wanted her to come in? Patient states she doesn't have a camera, unable to due virtual visit? Patient is willing to just get labs done.

## 2020-03-30 ENCOUNTER — HOSPITAL ENCOUNTER (OUTPATIENT)
Dept: LAB | Age: 85
Discharge: HOME | End: 2020-03-30

## 2020-03-30 DIAGNOSIS — E78.00 PURE HYPERCHOLESTEROLEMIA: ICD-10-CM

## 2020-03-30 LAB
ALBUMIN SERPL-MCNC: 4 G/DL (ref 3.5–5)
ALBUMIN/GLOB SERPL: 1.5 {RATIO} (ref 1.1–2.2)
ALP SERPL-CCNC: 94 U/L (ref 45–117)
ALT SERPL-CCNC: 21 U/L (ref 12–78)
AST SERPL-CCNC: 20 U/L (ref 15–37)
BILIRUB DIRECT SERPL-MCNC: 0.2 MG/DL (ref 0–0.2)
BILIRUB SERPL-MCNC: 0.7 MG/DL (ref 0.2–1)
CHOLEST SERPL-MCNC: 230 MG/DL
GLOBULIN SER CALC-MCNC: 2.7 G/DL (ref 2–4)
HDLC SERPL-MCNC: 89 MG/DL
HDLC SERPL: 2.6 {RATIO} (ref 0–5)
LDLC SERPL CALC-MCNC: 120.2 MG/DL (ref 0–100)
LIPID PROFILE,FLP: ABNORMAL
PROT SERPL-MCNC: 6.7 G/DL (ref 6.4–8.2)
TRIGL SERPL-MCNC: 104 MG/DL (ref ?–150)
VLDLC SERPL CALC-MCNC: 20.8 MG/DL

## 2020-04-03 ENCOUNTER — PATIENT MESSAGE (OUTPATIENT)
Dept: OTHER | Age: 85
End: 2020-04-03

## 2020-04-03 DIAGNOSIS — F41.9 ANXIETY: ICD-10-CM

## 2020-04-07 ENCOUNTER — TELEPHONE (OUTPATIENT)
Dept: INTERNAL MEDICINE CLINIC | Age: 85
End: 2020-04-07

## 2020-04-07 NOTE — TELEPHONE ENCOUNTER
Patient is schedule with Dr. Lety Pryor on 4/8/20. Patient states she had her recent labs done and Dr. Lety Pryor gave instructions.  Rescheduled follow up appt for 6/25/20 @ 10am.

## 2020-04-27 RX ORDER — LORAZEPAM 1 MG/1
.5-1 TABLET ORAL
Qty: 90 TAB | Refills: 1 | Status: SHIPPED | OUTPATIENT
Start: 2020-04-27 | End: 2020-09-25 | Stop reason: SDUPTHER

## 2020-05-21 RX ORDER — SIMVASTATIN 20 MG/1
20 TABLET, FILM COATED ORAL DAILY
Qty: 90 TAB | Refills: 3 | Status: SHIPPED | OUTPATIENT
Start: 2020-05-21 | End: 2021-05-26

## 2020-06-24 NOTE — PROGRESS NOTES
Consent: Augustine Ruiz, who was seen by synchronous (real-time) audio-video technology, and/or her healthcare decision maker, is aware that this patient-initiated, Telehealth encounter on 6/25/2020 is a billable service, with coverage as determined by her insurance carrier. She is aware that she may receive a bill and has provided verbal consent to proceed: Yes. Augustine Ruiz is a 81 y.o. female being evaluated by a Virtual Visit (video visit) encounter to address concerns as mentioned above. A caregiver was present when appropriate. Due to this being a TeleHealth encounter (During TYXKE-57 public health emergency), evaluation of the following organ systems was limited: Vitals/Constitutional/EENT/Resp/CV/GI//MS/Neuro/Skin/Heme-Lymph-Imm. Pursuant to the emergency declaration under the [address] [place] [address], [address] [place] [address] and the [employer] Act, this Virtual Visit was conducted with patient's (and/or legal guardian's) consent, to reduce the risk of exposure to COVID-19 and provide necessary medical care. Services were provided through a video synchronous discussion virtually to substitute for in-person encounter. --Jodie Moss MD on 6/25/2020 at 10:39 AM    An electronic signature was used to authenticate this note. Subjective:     Augustine Ruiz is a 81 y.o. female who presents for follow up of hypertension and hyperlipidemia. Diet and Lifestyle: generally follows a low sodium diet, exercises sporadically  Home BP Monitoring: April 120-130/70s, today 168/81, 172/105. Cardiovascular ROS:   taking medications as instructed, no medication side effects noted, no TIA's, no chest pain on exertion, no dyspnea on exertion, no swelling of ankles, no orthostatic dizziness or lightheadedness, no palpitations. New concerns:   Still mowing yard but finds she is getting tired in the afternoon.    Colitis - more diarrhea recently as well. .     Current Outpatient Medications   Medication Sig Dispense Refill    simvastatin (ZOCOR) 20 mg tablet Take 1 Tab by mouth daily. 90 Tab 3    LORazepam (ATIVAN) 1 mg tablet Take 0.5-1 Tabs by mouth nightly as needed for Anxiety. Max Daily Amount: 1 mg. 90 Tab 1    losartan (COZAAR) 50 mg tablet Take 1 Tab by mouth daily. 90 Tab 3    hydroCHLOROthiazide (HYDRODIURIL) 12.5 mg tablet Take 1 Tab by mouth daily. 90 Tab 3    levocetirizine (XYZAL) 5 mg tablet TAKE 1 TABLET DAILY 90 Tab 3    MELOXICAM PO Take  by mouth. 1/2 tablet by mouth daily      zolpidem (AMBIEN) 5 mg tablet TAKE 1 TABLET BY MOUTH ONCE DAILY AS NEEDED FOR SLEEP 20 Tab 0    budesonide (ENTOCORT EC) 3 mg capsule Take 6 mg by mouth every morning. 1 capsule by mouth daily      PARoxetine (PAXIL) 30 mg tablet TAKE 1 TABLET DAILY (Patient taking differently: Take 30 mg by mouth daily.) 90 Tab 3    albuterol (PROVENTIL VENTOLIN) 2.5 mg /3 mL (0.083 %) nebulizer solution 3 mL by Nebulization route three (3) times daily as needed for Wheezing or Shortness of Breath. 50 Each 0    dicyclomine (BENTYL) 10 mg capsule Take 10 mg by mouth as needed. 1 tablet by mouth daily as needed      MULTIVITAMIN W-MINERALS/LUTEIN (CENTRUM SILVER PO) Take 1 Tab by mouth. Takes one po daily. Lab Results   Component Value Date/Time    Cholesterol, total 230 (H) 03/30/2020 08:46 AM    HDL Cholesterol 89 03/30/2020 08:46 AM    LDL, calculated 120.2 (H) 03/30/2020 08:46 AM    Triglyceride 104 03/30/2020 08:46 AM    CHOL/HDL Ratio 2.6 03/30/2020 08:46 AM     Lab Results   Component Value Date/Time    ALT (SGPT) 21 03/30/2020 08:46 AM    Alk.  phosphatase 94 03/30/2020 08:46 AM    Bilirubin, direct 0.2 03/30/2020 08:46 AM    Bilirubin, total 0.7 03/30/2020 08:46 AM    Albumin 4.0 03/30/2020 08:46 AM    Protein, total 6.7 03/30/2020 08:46 AM    INR 1.0 01/10/2020 02:26 PM    Prothrombin time 10.1 01/10/2020 02:26 PM    PLATELET 288 01/10/2020 02:26 PM     Lab Results   Component Value Date/Time    GFR est non-AA 57 (L) 01/10/2020 02:26 PM    GFR est AA >60 01/10/2020 02:26 PM    Creatinine 0.94 01/10/2020 02:26 PM    BUN 20 01/10/2020 02:26 PM    Sodium 134 (L) 01/10/2020 02:26 PM    Potassium 3.6 01/10/2020 02:26 PM    Chloride 96 (L) 01/10/2020 02:26 PM    CO2 31 01/10/2020 02:26 PM        Review of Systems, additional:  Pertinent items are noted in HPI. Objective:     Visit Vitals  LMP 01/01/1986     Appearance: alert, well appearing, and in no distress and oriented to person, place, and time. General exam:   . NECK - nml appearance  PULM - nml rate and effort  Cholesterol controlled in March. Labs reviewed with aptient. Assessment/Plan:     hypertension poorly controlled. the following changes are made - increaes losartan to 100mg qd. .     Hyperlipidemia - controlled, cont same  Labs from March reviewed. Colitis - increased diarrhea recently. ? Secondary to anxiety over covid. She will f/u with Dr. Helga Brown This Encounter    losartan (COZAAR) 100 mg tablet     Follow-up and Dispositions    · Return in about 4 weeks (around 7/23/2020) for htn. This is the Subsequent Medicare Annual Wellness Exam, performed 12 months or more after the Initial AWV or the last Subsequent AWV    Consent: Shruti Bashir, who was seen by synchronous (real-time) audio-video technology, and/or her healthcare decision maker, is aware that this patient-initiated, Telehealth encounter on 6/25/2020 is a billable service. While AWVs are fully covered by Medicare, any services rendered on this date that are not included in an AWV are subject to additional billing, with coverage as determined by her insurance carrier. She is aware that she may receive a bill for any such additional services and has provided verbal consent to proceed: Yes.     I have reviewed the patient's medical history in detail and updated the computerized patient record. History     Patient Active Problem List   Diagnosis Code    HTN (hypertension) I10    Hyperlipemia E78.5    Rectal bleeding K62.5    Environmental allergies Z91.09    Hyponatremia E87.1    Hypokalemia E87.6    Sleep disturbance G47.9    Diverticulitis K57.92    Colitis K52.9    Primary osteoarthritis involving multiple joints M89.49    Anxiety F41.9    CRI (chronic renal insufficiency) N18.9    Advance directive placed in chart this admission Z78.9    Advance directive in chart Z78.9    History of YAG laser capsulotomy of lens of left eye Z98.42    Posterior capsular opacification visually significant of right eye H26.491    Posterior vitreous detachment of left eye H43.812    Pseudophakia of both eyes Z96.1    Rotator cuff arthropathy M12.819     Past Medical History:   Diagnosis Date    Arthritis back pain    fingers    Asthma     MILD - NO INHALERS    Diverticulitis 6/11/2013    Hypertension     Ill-defined condition     INSOMNIA    Ill-defined condition     IBS    Ill-defined condition     COLITIS    Nausea & vomiting     Other ill-defined conditions(799.89) hypercholesterolemia    Stenosis     spinal    Stroke ([facility] [address] 1996    tia   PATENT  FORAMEN  OVALE      Past Surgical History:   Procedure Laterality Date    ABDOMEN SURGERY PROC UNLISTED  4/97    CHOLECYSTECTOMY    CARDIAC SURG PROCEDURE UNLIST  8/04    repair of foramen ovale    HX CATARACT REMOVAL Bilateral 2009    HX GYN  1986    HYSTERECTOMY - total    HX HEENT      WISDOM TEETH X3    HX LUMBAR LAMINECTOMY  2/2011    w/fusion L4-L5    HX ORTHOPAEDIC  4/98    KNEE ARTHROSCOPY   left knee    HX ORTHOPAEDIC      right shoulder     HX OTHER SURGICAL      colonoscopy x 3    HX TONSILLECTOMY  CHILDHOOD     Current Outpatient Medications   Medication Sig Dispense Refill    losartan (COZAAR) 100 mg tablet Take 1 Tab by mouth daily.  90 Tab 3    simvastatin (ZOCOR) 20 mg tablet Take 1 Tab by mouth daily. 90 Tab 3    LORazepam (ATIVAN) 1 mg tablet Take 0.5-1 Tabs by mouth nightly as needed for Anxiety. Max Daily Amount: 1 mg. 90 Tab 1    hydroCHLOROthiazide (HYDRODIURIL) 12.5 mg tablet Take 1 Tab by mouth daily. 90 Tab 3    levocetirizine (XYZAL) 5 mg tablet TAKE 1 TABLET DAILY 90 Tab 3    MELOXICAM PO Take  by mouth. 1/2 tablet by mouth daily      budesonide (ENTOCORT EC) 3 mg capsule Take 6 mg by mouth every morning. 1 capsule by mouth daily      PARoxetine (PAXIL) 30 mg tablet TAKE 1 TABLET DAILY (Patient taking differently: Take 30 mg by mouth daily.) 90 Tab 3    MULTIVITAMIN W-MINERALS/LUTEIN (CENTRUM SILVER PO) Take 1 Tab by mouth. Takes one po daily.  zolpidem (AMBIEN) 5 mg tablet TAKE 1 TABLET BY MOUTH ONCE DAILY AS NEEDED FOR SLEEP 20 Tab 0    albuterol (PROVENTIL VENTOLIN) 2.5 mg /3 mL (0.083 %) nebulizer solution 3 mL by Nebulization route three (3) times daily as needed for Wheezing or Shortness of Breath. 50 Each 0    dicyclomine (BENTYL) 10 mg capsule Take 10 mg by mouth as needed. 1 tablet by mouth daily as needed       Allergies   Allergen Reactions    Cefdinir Other (comments)     Sick to stomach    Ciprofloxacin Diarrhea and Nausea and Vomiting    Flagyl [Metronidazole] Nausea and Vomiting    Other Medication Other (comments)     BANDAIDS - breaks out skin    Oxycodone-Acetaminophen Other (comments)     dizziness    Phenylephrine Other (comments)     Wire her up    Phenylpropanolamine Other (comments)     Wire her up    Sulfa (Sulfonamide Antibiotics) Nausea Only       Family History   Problem Relation Age of Onset    Other Mother         SUARACHNOID HEMORRHAGE-ANEURYSM    Stroke Mother     Cancer Father         PANCREATIC    Anesth Problems Neg Hx      Social History     Tobacco Use    Smoking status: Never Smoker    Smokeless tobacco: Never Used   Substance Use Topics    Alcohol use:  Yes     Alcohol/week: 5.8 standard drinks     Types: 7 Glasses of wine per week       Depression Risk Factor Screening:     3 most recent PHQ Screens 6/25/2020   Little interest or pleasure in doing things Not at all   Feeling down, depressed, irritable, or hopeless Not at all   Total Score PHQ 2 0       Alcohol Risk Factor Screening:   Do you average 1 drink per night or more than 7 drinks a week:  Yes    On any one occasion in the past three months have you have had more than 3 drinks containing alcohol:  No      Functional Ability and Level of Safety:   Hearing: Hearing is good. Activities of Daily Living: The home contains: grab bars  Patient does total self care     Ambulation: with no difficulty     Fall Risk:  Fall Risk Assessment, last 12 mths 6/25/2020   Able to walk? Yes   Fall in past 12 months? No   Fall with injury? -   Number of falls in past 12 months -   Fall Risk Score -     Abuse Screen:  Patient is not abused       Cognitive Screening   Has your family/caregiver stated any concerns about your memory: no    Cognitive Screening: Normal -      Patient Care Team   Patient Care Team:  Nora Palomo MD as PCP - General (Internal Medicine)  Nora Palomo MD as PCP [facility]  Suresh Fuentes MD (Gastroenterology)  Toby Cortez MD (Ophthalmology)    Assessment/Plan   Education and counseling provided:  Are appropriate based on today's review and evaluation    Diagnoses and all orders for this visit:    1. Essential hypertension    2. Pure hypercholesterolemia    Other orders  -     losartan (COZAAR) 100 mg tablet; Take 1 Tab by mouth daily. Health Maintenance Due   Topic Date Due    GLAUCOMA SCREENING Q2Y  Up to date    Medicare Yearly Exam  06/25/2021       Suad Redd is a 81 y.o. female who was evaluated by an audio-video encounter for concerns as above. Patient identification was verified prior to start of the visit. A caregiver was present when appropriate.  Due to this being a TeleHealth encounter (During HXSLR-79 public health emergency), evaluation of the following organ systems was limited: Vitals/Constitutional/EENT/Resp/CV/GI//MS/Neuro/Skin/Heme-Lymph-Imm. Pursuant to the emergency declaration under the [address] [place] [address], [facility] waiver authority and the [employer] Act, this Virtual Visit was conducted, with patient's (and/or legal guardian's) consent, to reduce the patient's risk of exposure to COVID-19 and provide necessary medical care. Services were provided through a synchronous discussion virtually to substitute for in-person clinic visit. I was at home. The patient was at home.       Cristine Manjarrez MD

## 2020-06-25 ENCOUNTER — VIRTUAL VISIT (OUTPATIENT)
Dept: INTERNAL MEDICINE CLINIC | Age: 85
End: 2020-06-25

## 2020-06-25 DIAGNOSIS — Z00.00 MEDICARE ANNUAL WELLNESS VISIT, SUBSEQUENT: ICD-10-CM

## 2020-06-25 DIAGNOSIS — E78.00 PURE HYPERCHOLESTEROLEMIA: ICD-10-CM

## 2020-06-25 DIAGNOSIS — I10 ESSENTIAL HYPERTENSION: Primary | ICD-10-CM

## 2020-06-25 DIAGNOSIS — K52.9 COLITIS: ICD-10-CM

## 2020-06-25 DIAGNOSIS — Z71.89 ADVANCE DIRECTIVE DISCUSSED WITH PATIENT: ICD-10-CM

## 2020-06-25 RX ORDER — LOSARTAN POTASSIUM 100 MG/1
100 TABLET ORAL DAILY
Qty: 90 TAB | Refills: 3 | Status: SHIPPED | OUTPATIENT
Start: 2020-06-25 | End: 2021-05-26

## 2020-06-25 NOTE — PROGRESS NOTES
Chief Complaint   Patient presents with    Cholesterol Problem    Hypertension     1. Have you been to the ER, urgent care clinic since your last visit? Hospitalized since your last visit? No    2. Have you seen or consulted any other health care providers outside of the [address] [place] [address] since your last visit? Include any pap smears or colon screening.  No     Health Maintenance Due   Topic Date Due    GLAUCOMA SCREENING Q2Y  09/25/2019    Medicare Yearly Exam  03/26/2020

## 2020-06-25 NOTE — PATIENT INSTRUCTIONS
Medicare Wellness Visit, Female The best way to live healthy is to have a lifestyle where you eat a well-balanced diet, exercise regularly, limit alcohol use, and quit all forms of tobacco/nicotine, if applicable. Regular preventive services are another way to keep healthy. Preventive services (vaccines, screening tests, monitoring & exams) can help personalize your care plan, which helps you manage your own care. Screening tests can find health problems at the earliest stages, when they are easiest to treat. [facility] follows the current, evidence-based guidelines published by the [facility] ([facility]) when recommending preventive services for our patients. Because we follow these guidelines, sometimes recommendations change over time as research supports it. (For example, mammograms used to be recommended annually. Even though Medicare will still pay for an annual mammogram, the newer guidelines recommend a mammogram every two years for women of average risk). Of course, you and your doctor may decide to screen more often for some diseases, based on your risk and your co-morbidities (chronic disease you are already diagnosed with). Preventive services for you include: - Medicare offers their members a free annual wellness visit, which is time for you and your primary care provider to discuss and plan for your preventive service needs. Take advantage of this benefit every year! 
-All adults over the age of 72 should receive the recommended pneumonia vaccines. Current USPSTF guidelines recommend a series of two vaccines for the best pneumonia protection.  
-All adults should have a flu vaccine yearly and a tetanus vaccine every 10 years.  
-All adults age 48 and older should receive the shingles vaccines (series of two vaccines). -All adults age 38-68 who are overweight should have a diabetes screening test once every three years. -All adults born between [birth year] and 1964 should be screened once for Hepatitis C. 
-Other screening tests and preventive services for persons with diabetes include: an eye exam to screen for diabetic retinopathy, a kidney function test, a foot exam, and stricter control over your cholesterol.  
-Cardiovascular screening for adults with routine risk involves an electrocardiogram (ECG) at intervals determined by your doctor.  
-Colorectal cancer screenings should be done for adults age 54-65 with no increased risk factors for colorectal cancer. There are a number of acceptable methods of screening for this type of cancer. Each test has its own benefits and drawbacks. Discuss with your doctor what is most appropriate for you during your annual wellness visit. The different tests include: colonoscopy (considered the best screening method), a fecal occult blood test, a fecal DNA test, and sigmoidoscopy. 
 
-A bone mass density test is recommended when a woman turns 65 to screen for osteoporosis. This test is only recommended one time, as a screening. Some providers will use this same test as a disease monitoring tool if you already have osteoporosis. -Breast cancer screenings are recommended every other year for women of normal risk, age 54-69. 
-Cervical cancer screenings for women over age 72 are only recommended with certain risk factors. Here is a list of your current Health Maintenance items (your personalized list of preventive services) with a due date: 
 
 
 
Health Maintenance Due Topic Date Due  GLAUCOMA SCREENING Q2Y  Up to date  Medicare Yearly Exam  06/25/2021

## 2020-07-09 RX ORDER — MELOXICAM 15 MG/1
TABLET ORAL
Qty: 90 TAB | Refills: 3 | Status: SHIPPED | OUTPATIENT
Start: 2020-07-09 | End: 2021-09-20

## 2020-07-15 ENCOUNTER — HOSPITAL ENCOUNTER (OUTPATIENT)
Dept: MAMMOGRAPHY | Age: 85
Discharge: HOME | End: 2020-07-15
Attending: INTERNAL MEDICINE
Payer: MEDICARE

## 2020-07-15 DIAGNOSIS — Z12.31 ENCOUNTER FOR SCREENING MAMMOGRAM FOR MALIGNANT NEOPLASM OF BREAST: ICD-10-CM

## 2020-07-15 PROCEDURE — 77067 SCR MAMMO BI INCL CAD: CPT

## 2020-07-22 NOTE — PROGRESS NOTES
Christy Rojas, who was evaluated through a synchronous (real-time) audio-video encounter, and/or her healthcare decision maker, is aware that it is a billable service, with coverage as determined by her insurance carrier. She provided verbal consent to proceed: Yes, and patient identification was verified. It was conducted pursuant to the emergency declaration under the [address] [place] [address] and the [employer] Act. A caregiver was present when appropriate. Ability to conduct physical exam was limited. I was at home. The patient was at home. Christy Rojas is a 81 y.o. female being evaluated by a Virtual Visit (video visit) encounter to address concerns as mentioned above. A caregiver was present when appropriate. Due to this being a TeleHealth encounter (During RDYSR-61 public health emergency), evaluation of the following organ systems was limited: Vitals/Constitutional/EENT/Resp/CV/GI//MS/Neuro/Skin/Heme-Lymph-Imm. Pursuant to the emergency declaration under the [address] [place] [address] and the [employer] Act, this Virtual Visit was conducted with patient's (and/or legal guardian's) consent, to reduce the risk of exposure to COVID-19 and provide necessary medical care. Services were provided through a video synchronous discussion virtually to substitute for in-person encounter. --Eduardo Reinoso MD on 7/23/2020 at 9:45 AM    An electronic signature was used to authenticate this note. Subjective:     Christy Rojas is a 81 y.o. female who presents for follow up of hypertension. Seen last month and BP elevated. Increased Losartan to 100mg every day.       Diet and Lifestyle: generally follows a low sodium diet  Home BP Monitoring: is borderline controlled at home, ranging 130-140's/60-70's    Cardiovascular ROS: taking medications as instructed, no medication side effects noted, no TIA's, no chest pain on exertion, no dyspnea on exertion, no swelling of ankles, no orthostatic dizziness or lightheadedness, no palpitations. New concerns: none. Current Outpatient Medications   Medication Sig Dispense Refill    meloxicam (MOBIC) 15 mg tablet TAKE 1 TABLET DAILY 90 Tab 3    losartan (COZAAR) 100 mg tablet Take 1 Tab by mouth daily. 90 Tab 3    simvastatin (ZOCOR) 20 mg tablet Take 1 Tab by mouth daily. 90 Tab 3    LORazepam (ATIVAN) 1 mg tablet Take 0.5-1 Tabs by mouth nightly as needed for Anxiety. Max Daily Amount: 1 mg. 90 Tab 1    hydroCHLOROthiazide (HYDRODIURIL) 12.5 mg tablet Take 1 Tab by mouth daily. 90 Tab 3    levocetirizine (XYZAL) 5 mg tablet TAKE 1 TABLET DAILY 90 Tab 3    MELOXICAM PO Take  by mouth. 1/2 tablet by mouth daily      zolpidem (AMBIEN) 5 mg tablet TAKE 1 TABLET BY MOUTH ONCE DAILY AS NEEDED FOR SLEEP 20 Tab 0    budesonide (ENTOCORT EC) 3 mg capsule Take 6 mg by mouth every morning. 1 capsule by mouth daily      PARoxetine (PAXIL) 30 mg tablet TAKE 1 TABLET DAILY (Patient taking differently: Take 30 mg by mouth daily.) 90 Tab 3    albuterol (PROVENTIL VENTOLIN) 2.5 mg /3 mL (0.083 %) nebulizer solution 3 mL by Nebulization route three (3) times daily as needed for Wheezing or Shortness of Breath. 50 Each 0    dicyclomine (BENTYL) 10 mg capsule Take 10 mg by mouth as needed. 1 tablet by mouth daily as needed      MULTIVITAMIN W-MINERALS/LUTEIN (CENTRUM SILVER PO) Take 1 Tab by mouth. Takes one po daily.            Lab Results   Component Value Date/Time    Hemoglobin A1c 5.6 01/10/2020 02:26 PM    Glucose 103 (H) 01/10/2020 02:26 PM    Glucose (POC) 100 01/30/2020 10:07 AM    LDL, calculated 120.2 (H) 03/30/2020 08:46 AM    Creatinine 0.94 01/10/2020 02:26 PM      Lab Results   Component Value Date/Time    Cholesterol, total 230 (H) 03/30/2020 08:46 AM    HDL Cholesterol 89 03/30/2020 08:46 AM    LDL, calculated 120.2 (H) 03/30/2020 08:46 AM    Triglyceride 104 03/30/2020 08:46 AM    CHOL/HDL Ratio 2.6 03/30/2020 08:46 AM     Lab Results   Component Value Date/Time    ALT (SGPT) 21 03/30/2020 08:46 AM    Alk. phosphatase 94 03/30/2020 08:46 AM    Bilirubin, direct 0.2 03/30/2020 08:46 AM    Bilirubin, total 0.7 03/30/2020 08:46 AM    Albumin 4.0 03/30/2020 08:46 AM    Protein, total 6.7 03/30/2020 08:46 AM    INR 1.0 01/10/2020 02:26 PM    Prothrombin time 10.1 01/10/2020 02:26 PM    PLATELET 756 19/48/1282 02:26 PM     Lab Results   Component Value Date/Time    GFR est non-AA 57 (L) 01/10/2020 02:26 PM    GFR est AA >60 01/10/2020 02:26 PM    Creatinine 0.94 01/10/2020 02:26 PM    BUN 20 01/10/2020 02:26 PM    Sodium 134 (L) 01/10/2020 02:26 PM    Potassium 3.6 01/10/2020 02:26 PM    Chloride 96 (L) 01/10/2020 02:26 PM    CO2 31 01/10/2020 02:26 PM        Review of Systems, additional:  Pertinent items are noted in HPI. Objective:     Visit Vitals  LMP 01/01/1986     Appearance: alert, well appearing, and in no distress and oriented to person, place, and time. General exam:   . NECK - nml appearance  PULM - nml rate and effort       Assessment/Plan:     hypertension reasonably well controlled. current treatment plan is effective, no change in therapy. No orders of the defined types were placed in this encounter. Follow-up and Dispositions    · Return in about 6 months (around 1/23/2021).

## 2020-07-23 ENCOUNTER — VIRTUAL VISIT (OUTPATIENT)
Dept: INTERNAL MEDICINE CLINIC | Age: 85
End: 2020-07-23

## 2020-07-23 DIAGNOSIS — I10 ESSENTIAL HYPERTENSION: Primary | ICD-10-CM

## 2020-09-09 ENCOUNTER — HOSPITAL ENCOUNTER (OUTPATIENT)
Dept: GENERAL RADIOLOGY | Age: 85
Discharge: HOME | End: 2020-09-09
Attending: OTOLARYNGOLOGY
Payer: MEDICARE

## 2020-09-09 DIAGNOSIS — R13.14 DYSPHAGIA, PHARYNGOESOPHAGEAL PHASE: ICD-10-CM

## 2020-09-09 PROCEDURE — 74220 X-RAY XM ESOPHAGUS 1CNTRST: CPT

## 2020-09-19 ENCOUNTER — HOSPITAL ENCOUNTER (OUTPATIENT)
Dept: PREADMISSION TESTING | Age: 85
Discharge: HOME | End: 2020-09-19
Payer: MEDICARE

## 2020-09-19 DIAGNOSIS — Z01.812 PRE-PROCEDURE LAB EXAM: ICD-10-CM

## 2020-09-19 PROCEDURE — 87635 SARS-COV-2 COVID-19 AMP PRB: CPT

## 2020-09-20 LAB — SARS-COV-2, COV2NT: NOT DETECTED

## 2020-09-23 ENCOUNTER — ANESTHESIA EVENT (OUTPATIENT)
Dept: ENDOSCOPY | Age: 85
End: 2020-09-23
Payer: MEDICARE

## 2020-09-23 ENCOUNTER — HOSPITAL ENCOUNTER (OUTPATIENT)
Age: 85
Setting detail: OUTPATIENT SURGERY
Discharge: HOME | End: 2020-09-23
Attending: INTERNAL MEDICINE | Admitting: INTERNAL MEDICINE
Payer: MEDICARE

## 2020-09-23 ENCOUNTER — ANESTHESIA (OUTPATIENT)
Dept: ENDOSCOPY | Age: 85
End: 2020-09-23
Payer: MEDICARE

## 2020-09-23 VITALS
DIASTOLIC BLOOD PRESSURE: 94 MMHG | TEMPERATURE: 97.7 F | OXYGEN SATURATION: 100 % | BODY MASS INDEX: 25.52 KG/M2 | SYSTOLIC BLOOD PRESSURE: 158 MMHG | RESPIRATION RATE: 15 BRPM | HEIGHT: 60 IN | WEIGHT: 130 LBS | HEART RATE: 81 BPM

## 2020-09-23 PROCEDURE — 74011250636 HC RX REV CODE- 250/636: Performed by: INTERNAL MEDICINE

## 2020-09-23 PROCEDURE — 76040000019: Performed by: INTERNAL MEDICINE

## 2020-09-23 PROCEDURE — 88305 TISSUE EXAM BY PATHOLOGIST: CPT

## 2020-09-23 PROCEDURE — 76060000031 HC ANESTHESIA FIRST 0.5 HR: Performed by: INTERNAL MEDICINE

## 2020-09-23 PROCEDURE — 77030021593 HC FCPS BIOP ENDOSC BSC -A: Performed by: INTERNAL MEDICINE

## 2020-09-23 PROCEDURE — 74011250636 HC RX REV CODE- 250/636: Performed by: NURSE ANESTHETIST, CERTIFIED REGISTERED

## 2020-09-23 PROCEDURE — 2709999900 HC NON-CHARGEABLE SUPPLY: Performed by: INTERNAL MEDICINE

## 2020-09-23 RX ORDER — SODIUM CHLORIDE 0.9 % (FLUSH) 0.9 %
5-40 SYRINGE (ML) INJECTION EVERY 8 HOURS
Status: DISCONTINUED | OUTPATIENT
Start: 2020-09-23 | End: 2020-09-23 | Stop reason: HOSPADM

## 2020-09-23 RX ORDER — SODIUM CHLORIDE 0.9 % (FLUSH) 0.9 %
5-40 SYRINGE (ML) INJECTION AS NEEDED
Status: DISCONTINUED | OUTPATIENT
Start: 2020-09-23 | End: 2020-09-23 | Stop reason: HOSPADM

## 2020-09-23 RX ORDER — EPINEPHRINE 0.1 MG/ML
1 INJECTION INTRACARDIAC; INTRAVENOUS
Status: DISCONTINUED | OUTPATIENT
Start: 2020-09-23 | End: 2020-09-23 | Stop reason: HOSPADM

## 2020-09-23 RX ORDER — NALOXONE HYDROCHLORIDE 0.4 MG/ML
0.4 INJECTION, SOLUTION INTRAMUSCULAR; INTRAVENOUS; SUBCUTANEOUS
Status: DISCONTINUED | OUTPATIENT
Start: 2020-09-23 | End: 2020-09-23 | Stop reason: HOSPADM

## 2020-09-23 RX ORDER — SODIUM CHLORIDE 9 MG/ML
INJECTION, SOLUTION INTRAVENOUS
Status: DISCONTINUED | OUTPATIENT
Start: 2020-09-23 | End: 2020-09-23 | Stop reason: HOSPADM

## 2020-09-23 RX ORDER — FLUMAZENIL 0.1 MG/ML
0.2 INJECTION INTRAVENOUS
Status: DISCONTINUED | OUTPATIENT
Start: 2020-09-23 | End: 2020-09-23 | Stop reason: HOSPADM

## 2020-09-23 RX ORDER — SODIUM CHLORIDE 9 MG/ML
50 INJECTION, SOLUTION INTRAVENOUS CONTINUOUS
Status: DISCONTINUED | OUTPATIENT
Start: 2020-09-23 | End: 2020-09-23 | Stop reason: HOSPADM

## 2020-09-23 RX ORDER — FENTANYL CITRATE 50 UG/ML
25-200 INJECTION, SOLUTION INTRAMUSCULAR; INTRAVENOUS
Status: DISCONTINUED | OUTPATIENT
Start: 2020-09-23 | End: 2020-09-23 | Stop reason: HOSPADM

## 2020-09-23 RX ORDER — MIDAZOLAM HYDROCHLORIDE 1 MG/ML
.25-5 INJECTION, SOLUTION INTRAMUSCULAR; INTRAVENOUS
Status: DISCONTINUED | OUTPATIENT
Start: 2020-09-23 | End: 2020-09-23 | Stop reason: HOSPADM

## 2020-09-23 RX ORDER — DEXTROMETHORPHAN/PSEUDOEPHED 2.5-7.5/.8
1.2 DROPS ORAL
Status: DISCONTINUED | OUTPATIENT
Start: 2020-09-23 | End: 2020-09-23 | Stop reason: HOSPADM

## 2020-09-23 RX ORDER — ATROPINE SULFATE 0.1 MG/ML
0.5 INJECTION INTRAVENOUS
Status: DISCONTINUED | OUTPATIENT
Start: 2020-09-23 | End: 2020-09-23 | Stop reason: HOSPADM

## 2020-09-23 RX ADMIN — SODIUM CHLORIDE: 900 INJECTION, SOLUTION INTRAVENOUS at 15:00

## 2020-09-23 NOTE — ANESTHESIA POSTPROCEDURE EVALUATION
Post-Anesthesia Evaluation and Assessment    Patient: Fani Riley MRN: [id number]  SSN: [id number]    YOB: 1935  Age: 81 y.o. Sex: female      I have evaluated the patient and they are stable and ready for discharge from the PACU. Cardiovascular Function/Vital Signs  Visit Vitals  BP (!) 143/65   Pulse 81   Temp 36.7 °C (98 °F)   Resp 15   Ht 5' (1.524 m)   Wt 59 kg (130 lb)   SpO2 100%   Breastfeeding No   BMI 25.39 kg/m²       Patient is status post MAC anesthesia for Procedure(s):  ESOPHAGOGASTRODUODENOSCOPY (EGD)    :-  ESOPHAGOGASTRODUODENAL (EGD) BIOPSY. Nausea/Vomiting: None    Postoperative hydration reviewed and adequate. Pain:  Pain Scale 1: Numeric (0 - 10) (09/23/20 1443)  Pain Intensity 1: 0 (09/23/20 1443)   Managed    Neurological Status: At baseline    Mental Status, Level of Consciousness: Alert and  oriented to person, place, and time    Pulmonary Status:   O2 Device: Room air (09/23/20 1458)   Adequate oxygenation and airway patent    Complications related to anesthesia: None    Post-anesthesia assessment completed. No concerns    Signed By: Lambert Mitchell MD     September 23, 2020              Procedure(s):  ESOPHAGOGASTRODUODENOSCOPY (EGD)    :-  ESOPHAGOGASTRODUODENAL (EGD) BIOPSY. MAC    <BSHSIANPOST>    INITIAL Post-op Vital signs:   Vitals Value Taken Time   BP     Temp     Pulse 77 9/23/2020  3:33 PM   Resp 12 9/23/2020  3:33 PM   SpO2 98 % 9/23/2020  3:33 PM   Vitals shown include unvalidated device data.

## 2020-09-23 NOTE — DISCHARGE INSTRUCTIONS
[address] 65  [address] [facility], [address] [place] [address]    EGD DISCHARGE INSTRUCTIONS    Arloa Hammans  [id number]  1935    Discomfort:  Sore throat- throat lozenges or warm salt water gargle  redness at IV site- apply warm compress to area; if redness or soreness persist- contact your physician  Gaseous discomfort- walking, belching will help relieve any discomfort  You may not operate a vehicle for 12 hours  You may not engage in an occupation involving machinery or appliances for rest of today  You may not drink alcoholic beverages for at least 12 hours  Avoid making any critical decisions for at least 24 hour  DIET  You may resume your regular diet - however -  remember your colon is empty and a heavy meal will produce gas. Avoid these foods:  vegetables, fried / greasy foods, carbonated drinks    ACTIVITY  You may resume your normal daily activities   Spend the remainder of the day resting -  avoid any strenuous activity. CALL M.D. ANY SIGN OF   Increasing pain, nausea, vomiting  Abdominal distension (swelling)  New increased bleeding (oral or rectal)  Fever (chills)  Pain in chest area  Bloody discharge from nose or mouth  Shortness of breath    Follow-up Instructions:   Call Dr. Lola Pisano for any questions or problems and follow up with him in 2 months  Telephone # [phone]  I discussed the endoscopy results with Dr Kerri Homans, and he will be ordering CT scan of your chest to complete the work up of your symptoms    ENDOSCOPY FINDINGS:   Your endoscopy showed small hiatal hernia, no stricture, no mass seen, no obstruction. Signed By: Lola Pisano MD     9/23/2020  3:41 PM         Learning About Coronavirus (COVID-19)  Coronavirus (COVID-19): Overview  What is coronavirus (GRLUJ-52)? The coronavirus disease (COVID-19) is caused by a virus. It is an illness that was first found in [place], [place], in December 2019. It has since spread worldwide.   The virus can cause fever, cough, and trouble breathing. In severe cases, it can cause pneumonia and make it hard to breathe without help. It can cause death. Coronaviruses are a large group of viruses. They cause the common cold. They also cause more serious illnesses like Middle East respiratory syndrome (MERS) and severe acute respiratory syndrome (SARS). COVID-19 is caused by a novel coronavirus. That means it's a new type that has not been seen in people before. This virus spreads person-to-person through droplets from coughing and sneezing. It can also spread when you are close to someone who is infected. And it can spread when you touch something that has the virus on it, such as a doorknob or a tabletop. What can you do to protect yourself from coronavirus (COVID-19)? The best way to protect yourself from getting sick is to:  · Avoid areas where there is an outbreak. · Avoid contact with people who may be infected. · Wash your hands often with soap or alcohol-based hand sanitizers. · Avoid crowds and try to stay at least 6 feet away from other people. · Wash your hands often, especially after you cough or sneeze. Use soap and water, and scrub for at least 20 seconds. If soap and water aren't available, use an alcohol-based hand . · Avoid touching your mouth, nose, and eyes. What can you do to avoid spreading the virus to others? To help avoid spreading the virus to others:  · Cover your mouth with a tissue when you cough or sneeze. Then throw the tissue in the trash. · Use a disinfectant to clean things that you touch often. · Stay home if you are sick or have been exposed to the virus. Don't go to school, work, or public areas. And don't use public transportation. · If you are sick:  ? Leave your home only if you need to get medical care. But call the doctor's office first so they know you're coming.  And wear a face mask, if you have one.  ? If you have a face mask, wear it whenever you're around other people. It can help stop the spread of the virus when you cough or sneeze. ? Clean and disinfect your home every day. Use household  and disinfectant wipes or sprays. Take special care to clean things that you grab with your hands. These include doorknobs, remote controls, phones, and handles on your refrigerator and microwave. And don't forget countertops, tabletops, bathrooms, and computer keyboards. When to call for help  Call 911 anytime you think you may need emergency care. For example, call if:  · You have severe trouble breathing. (You can't talk at all.)  · You have constant chest pain or pressure. · You are severely dizzy or lightheaded. · You are confused or can't think clearly. · Your face and lips have a blue color. · You pass out (lose consciousness) or are very hard to wake up. Call your doctor now if you develop symptoms such as:  · Shortness of breath. · Fever. · Cough. If you need to get care, call ahead to the doctor's office for instructions before you go. Make sure you wear a face mask, if you have one, to prevent exposing other people to the virus. Where can you get the latest information? The following health organizations are tracking and studying this virus. Their websites contain the most up-to-date information. Lauren Galeana also learn what to do if you think you may have been exposed to the virus. · U.S. Centers for Disease Control and Prevention (CDC): The CDC provides updated news about the disease and travel advice. The website also tells you how to prevent the spread of infection. www.cdc.gov  · World Health Organization [facility]): WHO offers information about the virus outbreaks. WHO also has travel advice. www.who.int  Current as of: April 1, 2020               Content Version: 12.4  © 3590-9863 [employer].    Care instructions adapted under license by your healthcare professional. If you have questions about a medical condition or this instruction, always ask your healthcare professional. [place] [address] any warranty or liability for your use of this information.

## 2020-09-23 NOTE — PROCEDURES
[address] [facility], [address] [place] [address] (EGD) Procedure Note    Corey Painting  1935  [id number]      Procedure: Endoscopic Gastroduodenoscopy with biopsy    Indication:  Dysphagia/odynophagia , abnormal BARIUM SWALLOW showing ? Stricture in cervical esophagus and extrinsic compression in mid esophagus, h/o chronic diarrhea    Pre-operative Diagnosis: see indication above    Post-operative Diagnosis: see findings below    : Cristopher Duran MD    Surgical Assistant: None    Implants:  None    Referring Provider:  Sara Rodriguez MD      Anesthesia/Sedation:  MAC anesthesia Propofol        Procedure Details     After infomed consent was obtained for the procedure, with all risks and benefits of procedure explained the patient was taken to the endoscopy suite and placed in the left lateral decubitus position. Following sequential administration of sedation as per above, the endoscope was inserted into the mouth and advanced under direct vision to third portion of the duodenum. A careful inspection was made as the gastroscope was withdrawn, including a retroflexed view of the proximal stomach; findings and interventions are described below. Findings:   Esophagus:normal, NO STRICTURE, NO MASS SEEN  Random biopsies taken    3 cm hiatal hernia  Stomach: normal   Duodenum: normal, random biopsies taken      Therapies:  none    Specimens: as above         EBL: None      Complications:   None; patient tolerated the procedure well. Impression:    -See post-procedure diagnoses.     Recommendations:  -discussed results with her ENT physician, Dr Edilma Arenas, he will order CT scan of chest to r/o any mass in mediastinum   -f/u with me in 2 months  -I also gave results to her , Irving Nayak By: Cristopher Duran MD     9/23/2020  3:34 PM

## 2020-09-23 NOTE — PERIOP NOTES

## 2020-09-23 NOTE — ANESTHESIA PREPROCEDURE EVALUATION
Relevant Problems   No relevant active problems       Anesthetic History     PONV          Review of Systems / Medical History  Patient summary reviewed, nursing notes reviewed and pertinent labs reviewed    Pulmonary            Asthma        Neuro/Psych       CVA  TIA     Cardiovascular    Hypertension              Exercise tolerance: >4 METS     GI/Hepatic/Renal               Comments: dysphagia Endo/Other        Arthritis     Other Findings              Physical Exam    Airway  Mallampati: I  TM Distance: > 6 cm  Neck ROM: normal range of motion   Mouth opening: Normal     Cardiovascular    Rhythm: regular  Rate: normal         Dental  No notable dental hx       Pulmonary  Breath sounds clear to auscultation               Abdominal         Other Findings            Anesthetic Plan    ASA: 3  Anesthesia type: MAC          Induction: Intravenous  Anesthetic plan and risks discussed with: Patient

## 2020-09-23 NOTE — ROUTINE PROCESS
Pam Franco 1935 
[id number] Situation: 
Verbal report received from: Tona Gonsaleso Procedure: Procedure(s): ESOPHAGOGASTRODUODENOSCOPY (EGD)    :- 
ESOPHAGOGASTRODUODENAL (EGD) BIOPSY Background: 
 
Preoperative diagnosis: DYSPHAGIA Postoperative diagnosis: 1. Small Hiatal Hernia :  Dr. Radha Marshall Assistant(s): Endoscopy RN-1: Deidre Garland Endoscopy RN-2: Jacqueline Wellington RN Specimens:  
ID Type Source Tests Collected by Time Destination 1 : Duodenum Biopsy Preservative Duodenum  Corinne Ortiz MD 9/23/2020 1518 Pathology 2 : Random Esophagus Biopsy Preservative   Corinne Ortiz MD 9/23/2020 1519 Pathology H. Pylori -no Assessment: 
Intra-procedure medications Anesthesia gave intra-procedure sedation and medications, see anesthesia flow sheet Intravenous fluids: NS@ [facility] Vital signs stable Abdominal assessment: round and soft Recommendation: 
Discharge patient per MD order Family -yes Permission to share finding with family -yes

## 2020-09-23 NOTE — H&P
[address]  174 [facility], [address] [place] [address]      History and Physical       NAME:  Crissy Brown   :   1935   MRN:   [id number]             History of Present Illness:  Patient is a 81 y. o. who is seen for dysphagia     PMH:  Past Medical History:   Diagnosis Date    Arthritis back pain    fingers    Asthma     MILD - NO INHALERS    Diverticulitis 2013    Hypertension     Ill-defined condition     INSOMNIA    Ill-defined condition     IBS    Ill-defined condition     COLITIS    Nausea & vomiting     Other ill-defined conditions(799.89) hypercholesterolemia    Stenosis     spinal    Stroke ([facility] [address]     tia   PATENT  FORAMEN  OVALE       PSH:  Past Surgical History:   Procedure Laterality Date    ABDOMEN SURGERY PROC UNLISTED      CHOLECYSTECTOMY    CARDIAC SURG PROCEDURE UNLIST      repair of foramen ovale    HX CATARACT REMOVAL Bilateral     HX GYN  1986    HYSTERECTOMY - total    HX HEENT      WISDOM TEETH X3    HX LUMBAR LAMINECTOMY  2011    w/fusion L4-L5    HX ORTHOPAEDIC      KNEE ARTHROSCOPY   left knee    HX ORTHOPAEDIC      right shoulder     HX OTHER SURGICAL      colonoscopy x 3    HX TONSILLECTOMY  CHILDHOOD       Allergies: Allergies   Allergen Reactions    Cefdinir Other (comments)     Sick to stomach    Ciprofloxacin Diarrhea and Nausea and Vomiting    Flagyl [Metronidazole] Nausea and Vomiting    Other Medication Other (comments)     BANDAIDS - breaks out skin    Oxycodone-Acetaminophen Other (comments)     dizziness    Phenylephrine Other (comments)     Wire her up    Phenylpropanolamine Other (comments)     Wire her up    Sulfa (Sulfonamide Antibiotics) Nausea Only       Home Medications:  Prior to Admission Medications   Prescriptions Last Dose Informant Patient Reported? Taking? LORazepam (ATIVAN) 1 mg tablet   No No   Sig: Take 0.5-1 Tabs by mouth nightly as needed for Anxiety. Max Daily Amount: 1 mg. MULTIVITAMIN W-MINERALS/LUTEIN (CENTRUM SILVER PO)   Yes No   Sig: Take 1 Tab by mouth. Takes one po daily. PARoxetine (PAXIL) 30 mg tablet 9/23/2020 at Unknown time  No Yes   Sig: TAKE 1 TABLET DAILY   Patient taking differently: Take 30 mg by mouth daily. albuterol (PROVENTIL VENTOLIN) 2.5 mg /3 mL (0.083 %) nebulizer solution   No No   Sig: 3 mL by Nebulization route three (3) times daily as needed for Wheezing or Shortness of Breath. budesonide (ENTOCORT EC) 3 mg capsule   Yes No   Sig: Take 3 mg by mouth daily as needed. 1 capsule by mouth daily   dicyclomine (BENTYL) 10 mg capsule   Yes No   Sig: Take 10 mg by mouth as needed. 1 tablet by mouth daily as needed   hydroCHLOROthiazide (HYDRODIURIL) 12.5 mg tablet 9/23/2020 at Unknown time  No Yes   Sig: Take 1 Tab by mouth daily. levocetirizine (XYZAL) 5 mg tablet   No No   Sig: TAKE 1 TABLET DAILY   losartan (COZAAR) 100 mg tablet 9/23/2020 at Unknown time  No Yes   Sig: Take 1 Tab by mouth daily. meloxicam (MOBIC) 15 mg tablet   No No   Sig: TAKE 1 TABLET DAILY   Patient taking differently: Take 7.5 mg by mouth daily. simvastatin (ZOCOR) 20 mg tablet   No No   Sig: Take 1 Tab by mouth daily.    zolpidem (AMBIEN) 5 mg tablet   No No   Sig: TAKE 1 TABLET BY MOUTH ONCE DAILY AS NEEDED FOR SLEEP      Facility-Administered Medications: None       Hospital Medications:  Current Facility-Administered Medications   Medication Dose Route Frequency    0.9% sodium chloride infusion  50 mL/hr IntraVENous CONTINUOUS    sodium chloride (NS) flush 5-40 mL  5-40 mL IntraVENous Q8H    sodium chloride (NS) flush 5-40 mL  5-40 mL IntraVENous PRN    midazolam (VERSED) injection 0.25-5 mg  0.25-5 mg IntraVENous Multiple    fentaNYL citrate (PF) injection  mcg   mcg IntraVENous Multiple    naloxone (NARCAN) injection 0.4 mg  0.4 mg IntraVENous Multiple    flumazeniL (ROMAZICON) 0.1 mg/mL injection 0.2 mg  0.2 mg IntraVENous Multiple    simethicone (MYLICON) 41DD/9.3QV oral drops 80 mg  1.2 mL Oral Multiple    atropine injection 0.5 mg  0.5 mg IntraVENous ONCE PRN    EPINEPHrine (ADRENALIN) 0.1 mg/mL syringe 1 mg  1 mg Endoscopically ONCE PRN     Facility-Administered Medications Ordered in Other Encounters   Medication Dose Route Frequency    0.9% sodium chloride infusion   IntraVENous CONTINUOUS       Social History:  Social History     Tobacco Use    Smoking status: Never Smoker    Smokeless tobacco: Never Used   Substance Use Topics    Alcohol use: Yes     Alcohol/week: 5.8 standard drinks     Types: 7 Glasses of wine per week       Family History:  Family History   Problem Relation Age of Onset    Other Mother         SUARACHNOID HEMORRHAGE-ANEURYSM    Stroke Mother     Cancer Father         PANCREATIC    Anesth Problems Neg Hx          The patient was counseled at length about the risks of estevan Covid-19 in the brandi-operative and post-operative states including the recovery window of their procedure. The patient was made aware that estevan Covid-19 after a surgical procedure may worsen their prognosis for recovering from the virus and lend to a higher morbidity and or mortality risk. The patient was given the options of postponing their procedure. All of the risks, benefits, and alternatives were discussed. The patient does  wish to proceed with the procedure.     Review of Systems:      Constitutional: negative fever, negative chills, negative weight loss  Eyes:   negative visual changes  ENT:   negative sore throat, tongue or lip swelling  Respiratory:  negative cough, negative dyspnea  Cards:  negative for chest pain, palpitations, lower extremity edema  GI:   See HPI  :  negative for frequency, dysuria  Integument:  negative for rash and pruritus  Heme:  negative for easy bruising and gum/nose bleeding  Musculoskel: negative for myalgias,  back pain and muscle weakness  Neuro: negative for headaches, dizziness, vertigo  Psych:  negative for feelings of anxiety, depression       Objective:     Patient Vitals for the past 8 hrs:   BP Temp Pulse Resp SpO2 Height Weight   09/23/20 1458 (!) 143/65 98 °F (36.7 °C) 81 15 100 % 5' (1.524 m) 59 kg (130 lb)     No intake/output data recorded. No intake/output data recorded. EXAM:     NEURO-a&o   HEENT-wnl   LUNGS-clear    COR-regular rate and rhythym     ABD-soft , no tenderness, no rebound, good bs     EXT-no edema     Data Review     No results for input(s): WBC, HGB, HCT, PLT, HGBEXT, HCTEXT, PLTEXT in the last 72 hours. No results for input(s): NA, K, CL, CO2, BUN, CREA, GLU, PHOS, CA in the last 72 hours. No results for input(s): AP, TBIL, TP, ALB, GLOB, GGT, AML, LPSE in the last 72 hours. No lab exists for component: SGOT, GPT, AMYP, HLPSE  No results for input(s): INR, PTP, APTT, INREXT in the last 72 hours.        Assessment:     · dysphagia     Patient Active Problem List   Diagnosis Code    HTN (hypertension) I10    Hyperlipemia E78.5    Rectal bleeding K62.5    Environmental allergies Z91.09    Hyponatremia E87.1    Hypokalemia E87.6    Sleep disturbance G47.9    Diverticulitis K57.92    Colitis K52.9    Primary osteoarthritis involving multiple joints M89.49    Anxiety F41.9    CRI (chronic renal insufficiency) N18.9    Advance directive placed in chart this admission Z78.9    Advance directive in chart Z78.9    History of YAG laser capsulotomy of lens of left eye Z98.42    Posterior capsular opacification visually significant of right eye H26.491    Posterior vitreous detachment of left eye H43.812    Pseudophakia of both eyes Z96.1    Rotator cuff arthropathy M12.819         Plan:   ·   · Endoscopic procedure with sedation     Signed By: Avila Billings MD     9/23/2020  3:01 PM

## 2020-09-25 DIAGNOSIS — F41.9 ANXIETY: ICD-10-CM

## 2020-09-25 RX ORDER — LORAZEPAM 1 MG/1
.5-1 TABLET ORAL
Qty: 90 TAB | Refills: 1 | Status: SHIPPED | OUTPATIENT
Start: 2020-09-25 | End: 2021-04-20

## 2020-09-29 ENCOUNTER — TRANSCRIBE ORDER (OUTPATIENT)
Dept: SCHEDULING | Age: 85
End: 2020-09-29

## 2020-09-29 ENCOUNTER — HOSPITAL ENCOUNTER (OUTPATIENT)
Dept: CT IMAGING | Age: 85
Discharge: HOME | End: 2020-09-29
Attending: OTOLARYNGOLOGY
Payer: MEDICARE

## 2020-09-29 DIAGNOSIS — D13.0 BENIGN NEOPLASM OF ESOPHAGUS: Primary | ICD-10-CM

## 2020-09-29 DIAGNOSIS — I77.810 DILATATION OF THORACIC AORTA (HCC): ICD-10-CM

## 2020-09-29 DIAGNOSIS — D13.0 BENIGN NEOPLASM OF ESOPHAGUS: ICD-10-CM

## 2020-09-29 LAB — CREAT BLD-MCNC: 1 MG/DL (ref 0.6–1.3)

## 2020-09-29 PROCEDURE — 71260 CT THORAX DX C+: CPT

## 2020-09-29 PROCEDURE — 74011000258 HC RX REV CODE- 258: Performed by: RADIOLOGY

## 2020-09-29 PROCEDURE — 74011000636 HC RX REV CODE- 636: Performed by: RADIOLOGY

## 2020-09-29 PROCEDURE — 82565 ASSAY OF CREATININE: CPT

## 2020-09-29 PROCEDURE — 71270 CT THORAX DX C-/C+: CPT

## 2020-09-29 RX ORDER — SODIUM CHLORIDE 0.9 % (FLUSH) 0.9 %
10 SYRINGE (ML) INJECTION
Status: COMPLETED | OUTPATIENT
Start: 2020-09-29 | End: 2020-09-29

## 2020-09-29 RX ADMIN — IOPAMIDOL 100 ML: 612 INJECTION, SOLUTION INTRAVENOUS at 10:52

## 2020-09-29 RX ADMIN — Medication 10 ML: at 10:52

## 2020-09-29 RX ADMIN — SODIUM CHLORIDE 100 ML: 900 INJECTION, SOLUTION INTRAVENOUS at 10:52

## 2020-10-27 RX ORDER — PAROXETINE 30 MG/1
TABLET, FILM COATED ORAL
Qty: 90 TAB | Refills: 3 | Status: SHIPPED | OUTPATIENT
Start: 2020-10-27 | End: 2021-11-14

## 2021-01-14 ENCOUNTER — HOSPITAL ENCOUNTER (EMERGENCY)
Age: 86
Discharge: HOME | End: 2021-01-14
Attending: EMERGENCY MEDICINE
Payer: MEDICARE

## 2021-01-14 ENCOUNTER — APPOINTMENT (OUTPATIENT)
Dept: CT IMAGING | Age: 86
End: 2021-01-14
Attending: EMERGENCY MEDICINE
Payer: MEDICARE

## 2021-01-14 VITALS
HEART RATE: 68 BPM | OXYGEN SATURATION: 97 % | SYSTOLIC BLOOD PRESSURE: 186 MMHG | RESPIRATION RATE: 20 BRPM | TEMPERATURE: 98 F | DIASTOLIC BLOOD PRESSURE: 89 MMHG

## 2021-01-14 DIAGNOSIS — R42 DIZZINESS: Primary | ICD-10-CM

## 2021-01-14 DIAGNOSIS — N39.0 URINARY TRACT INFECTION WITHOUT HEMATURIA, SITE UNSPECIFIED: ICD-10-CM

## 2021-01-14 LAB
ALBUMIN SERPL-MCNC: 4.3 G/DL (ref 3.5–5)
ALBUMIN/GLOB SERPL: 1.5 {RATIO} (ref 1.1–2.2)
ALP SERPL-CCNC: 97 U/L (ref 45–117)
ALT SERPL-CCNC: 24 U/L (ref 12–78)
ANION GAP SERPL CALC-SCNC: 8 MMOL/L (ref 5–15)
APPEARANCE UR: CLEAR
AST SERPL-CCNC: 19 U/L (ref 15–37)
ATRIAL RATE: 64 BPM
BACTERIA URNS QL MICRO: ABNORMAL /HPF
BASOPHILS # BLD: 0 K/UL (ref 0–0.1)
BASOPHILS NFR BLD: 0 % (ref 0–1)
BILIRUB SERPL-MCNC: 1.1 MG/DL (ref 0.2–1)
BILIRUB UR QL: NEGATIVE
BUN SERPL-MCNC: 19 MG/DL (ref 6–20)
BUN/CREAT SERPL: 18 (ref 12–20)
CALCIUM SERPL-MCNC: 9.7 MG/DL (ref 8.5–10.1)
CALCULATED P AXIS, ECG09: 24 DEGREES
CALCULATED R AXIS, ECG10: 15 DEGREES
CALCULATED T AXIS, ECG11: 34 DEGREES
CHLORIDE SERPL-SCNC: 94 MMOL/L (ref 97–108)
CO2 SERPL-SCNC: 26 MMOL/L (ref 21–32)
COLOR UR: ABNORMAL
COMMENT, HOLDF: NORMAL
CREAT SERPL-MCNC: 1.03 MG/DL (ref 0.55–1.02)
DIAGNOSIS, 93000: NORMAL
DIFFERENTIAL METHOD BLD: ABNORMAL
EOSINOPHIL # BLD: 0.3 K/UL (ref 0–0.4)
EOSINOPHIL NFR BLD: 4 % (ref 0–7)
EPITH CASTS URNS QL MICRO: ABNORMAL /LPF
ERYTHROCYTE [DISTWIDTH] IN BLOOD BY AUTOMATED COUNT: 12.3 % (ref 11.5–14.5)
GLOBULIN SER CALC-MCNC: 2.8 G/DL (ref 2–4)
GLUCOSE SERPL-MCNC: 121 MG/DL (ref 65–100)
GLUCOSE UR STRIP.AUTO-MCNC: NEGATIVE MG/DL
HCT VFR BLD AUTO: 36.5 % (ref 35–47)
HGB BLD-MCNC: 13.1 G/DL (ref 11.5–16)
HGB UR QL STRIP: NEGATIVE
HYALINE CASTS URNS QL MICRO: ABNORMAL /LPF (ref 0–5)
IMM GRANULOCYTES # BLD AUTO: 0 K/UL (ref 0–0.04)
IMM GRANULOCYTES NFR BLD AUTO: 1 % (ref 0–0.5)
KETONES UR QL STRIP.AUTO: ABNORMAL MG/DL
LEUKOCYTE ESTERASE UR QL STRIP.AUTO: ABNORMAL
LYMPHOCYTES # BLD: 1.4 K/UL (ref 0.8–3.5)
LYMPHOCYTES NFR BLD: 20 % (ref 12–49)
MAGNESIUM SERPL-MCNC: 1.9 MG/DL (ref 1.6–2.4)
MCH RBC QN AUTO: 33.2 PG (ref 26–34)
MCHC RBC AUTO-ENTMCNC: 35.9 G/DL (ref 30–36.5)
MCV RBC AUTO: 92.4 FL (ref 80–99)
MONOCYTES # BLD: 0.5 K/UL (ref 0–1)
MONOCYTES NFR BLD: 7 % (ref 5–13)
NEUTS SEG # BLD: 4.8 K/UL (ref 1.8–8)
NEUTS SEG NFR BLD: 68 % (ref 32–75)
NITRITE UR QL STRIP.AUTO: NEGATIVE
NRBC # BLD: 0 K/UL (ref 0–0.01)
NRBC BLD-RTO: 0 PER 100 WBC
P-R INTERVAL, ECG05: 188 MS
PH UR STRIP: 7.5 [PH] (ref 5–8)
PHOSPHATE SERPL-MCNC: 2.7 MG/DL (ref 2.6–4.7)
PLATELET # BLD AUTO: 271 K/UL (ref 150–400)
PMV BLD AUTO: 8.7 FL (ref 8.9–12.9)
POTASSIUM SERPL-SCNC: 3 MMOL/L (ref 3.5–5.1)
PROT SERPL-MCNC: 7.1 G/DL (ref 6.4–8.2)
PROT UR STRIP-MCNC: NEGATIVE MG/DL
Q-T INTERVAL, ECG07: 438 MS
QRS DURATION, ECG06: 86 MS
QTC CALCULATION (BEZET), ECG08: 451 MS
RBC # BLD AUTO: 3.95 M/UL (ref 3.8–5.2)
RBC #/AREA URNS HPF: ABNORMAL /HPF (ref 0–5)
SAMPLES BEING HELD,HOLD: NORMAL
SODIUM SERPL-SCNC: 128 MMOL/L (ref 136–145)
SP GR UR REFRACTOMETRY: 1.01 (ref 1–1.03)
TROPONIN I SERPL-MCNC: <0.05 NG/ML
UR CULT HOLD, URHOLD: NORMAL
UROBILINOGEN UR QL STRIP.AUTO: 0.2 EU/DL (ref 0.2–1)
VENTRICULAR RATE, ECG03: 64 BPM
WBC # BLD AUTO: 7.1 K/UL (ref 3.6–11)
WBC URNS QL MICRO: ABNORMAL /HPF (ref 0–4)

## 2021-01-14 PROCEDURE — 80053 COMPREHEN METABOLIC PANEL: CPT

## 2021-01-14 PROCEDURE — 36415 COLL VENOUS BLD VENIPUNCTURE: CPT

## 2021-01-14 PROCEDURE — 99285 EMERGENCY DEPT VISIT HI MDM: CPT

## 2021-01-14 PROCEDURE — 84100 ASSAY OF PHOSPHORUS: CPT

## 2021-01-14 PROCEDURE — 81001 URINALYSIS AUTO W/SCOPE: CPT

## 2021-01-14 PROCEDURE — 74011250636 HC RX REV CODE- 250/636: Performed by: EMERGENCY MEDICINE

## 2021-01-14 PROCEDURE — 83735 ASSAY OF MAGNESIUM: CPT

## 2021-01-14 PROCEDURE — 87086 URINE CULTURE/COLONY COUNT: CPT

## 2021-01-14 PROCEDURE — 85025 COMPLETE CBC W/AUTO DIFF WBC: CPT

## 2021-01-14 PROCEDURE — 96374 THER/PROPH/DIAG INJ IV PUSH: CPT

## 2021-01-14 PROCEDURE — 93005 ELECTROCARDIOGRAM TRACING: CPT

## 2021-01-14 PROCEDURE — 74011250637 HC RX REV CODE- 250/637: Performed by: EMERGENCY MEDICINE

## 2021-01-14 PROCEDURE — 84484 ASSAY OF TROPONIN QUANT: CPT

## 2021-01-14 PROCEDURE — 70450 CT HEAD/BRAIN W/O DYE: CPT

## 2021-01-14 RX ORDER — MECLIZINE HCL 12.5 MG 12.5 MG/1
12.5 TABLET ORAL
Status: COMPLETED | OUTPATIENT
Start: 2021-01-14 | End: 2021-01-14

## 2021-01-14 RX ORDER — ONDANSETRON 2 MG/ML
4 INJECTION INTRAMUSCULAR; INTRAVENOUS
Status: COMPLETED | OUTPATIENT
Start: 2021-01-14 | End: 2021-01-14

## 2021-01-14 RX ORDER — CEPHALEXIN 500 MG/1
500 CAPSULE ORAL 4 TIMES DAILY
Qty: 28 CAP | Refills: 0 | Status: SHIPPED | OUTPATIENT
Start: 2021-01-14 | End: 2021-01-21

## 2021-01-14 RX ORDER — MECLIZINE HCL 12.5 MG 12.5 MG/1
12.5 TABLET ORAL
Qty: 30 TAB | Refills: 0 | Status: SHIPPED | OUTPATIENT
Start: 2021-01-14 | End: 2021-01-14 | Stop reason: SDUPTHER

## 2021-01-14 RX ORDER — ACETAMINOPHEN 500 MG
1000 TABLET ORAL
Status: COMPLETED | OUTPATIENT
Start: 2021-01-14 | End: 2021-01-14

## 2021-01-14 RX ORDER — CEPHALEXIN 500 MG/1
500 CAPSULE ORAL 4 TIMES DAILY
Qty: 28 CAP | Refills: 0 | Status: SHIPPED | OUTPATIENT
Start: 2021-01-14 | End: 2021-01-14 | Stop reason: SDUPTHER

## 2021-01-14 RX ORDER — POTASSIUM CHLORIDE 7.45 MG/ML
10 INJECTION INTRAVENOUS
Status: COMPLETED | OUTPATIENT
Start: 2021-01-14 | End: 2021-01-14

## 2021-01-14 RX ORDER — POTASSIUM CHLORIDE 750 MG/1
30 TABLET, FILM COATED, EXTENDED RELEASE ORAL
Status: COMPLETED | OUTPATIENT
Start: 2021-01-14 | End: 2021-01-14

## 2021-01-14 RX ORDER — MECLIZINE HCL 12.5 MG 12.5 MG/1
12.5 TABLET ORAL
Qty: 30 TAB | Refills: 0 | Status: SHIPPED | OUTPATIENT
Start: 2021-01-14 | End: 2021-01-24

## 2021-01-14 RX ORDER — CEPHALEXIN 500 MG/1
500 CAPSULE ORAL
Status: COMPLETED | OUTPATIENT
Start: 2021-01-14 | End: 2021-01-14

## 2021-01-14 RX ADMIN — MECLIZINE 12.5 MG: 12.5 TABLET ORAL at 14:44

## 2021-01-14 RX ADMIN — SODIUM CHLORIDE 1000 ML: 9 INJECTION, SOLUTION INTRAVENOUS at 12:12

## 2021-01-14 RX ADMIN — ACETAMINOPHEN 1000 MG: 500 TABLET ORAL at 14:44

## 2021-01-14 RX ADMIN — CEPHALEXIN 500 MG: 500 CAPSULE ORAL at 15:05

## 2021-01-14 RX ADMIN — ONDANSETRON 4 MG: 2 INJECTION INTRAMUSCULAR; INTRAVENOUS at 12:11

## 2021-01-14 RX ADMIN — POTASSIUM CHLORIDE 10 MEQ: 7.46 INJECTION, SOLUTION INTRAVENOUS at 13:50

## 2021-01-14 RX ADMIN — POTASSIUM CHLORIDE 30 MEQ: 750 TABLET, FILM COATED, EXTENDED RELEASE ORAL at 13:49

## 2021-01-14 NOTE — ED TRIAGE NOTES
Pt arrives with EMS from home for c/o nausea vomiting and dizziness starting approx 1030 today after bending over. Pt is diaphoretic on arrival. Denies CP/SOB/sick contacts.

## 2021-01-14 NOTE — PROGRESS NOTES
After patient's arrival, I walked outside to speak with Mr. Fredis Swan, patients name and  verified with spouse for HIPAA puproses. Spouse updated and reassured at this time of patient being stable, all questions answered, spouse states that he lives 1/2 mile from here, spouse advised to go home at this time and informed that we will call him with updates. Spouse also gave me patient's purse to take to her, which also has her cell phone so that she may call. Home number for spouse: [phone].     Michelle So NP  [facility]  12:12 PM

## 2021-01-14 NOTE — ED NOTES
, Luana Fernandes, called for transport. Pt. Taken to ED waiting room via wheelchair. Pt. Verbalized understanding of d/c instructions.

## 2021-01-14 NOTE — ED PROVIDER NOTES
67-year-old female with a history of hypertension presents with a chief complaint of dizziness. Patient was at home this morning when she leaned over to get something out of her refrigerator and felt abruptly dizzy. She vomited several times. She did not have any chest pain, shortness of breath, abdominal pain, diarrhea, constipation or urinary symptoms. She currently complains of a headache but denies any focal deficits.         Past Medical History:   Diagnosis Date    Arthritis back pain    fingers    Asthma     MILD - NO INHALERS    Diverticulitis 6/11/2013    Hypertension     Ill-defined condition     INSOMNIA    Ill-defined condition     IBS    Ill-defined condition     COLITIS    Nausea & vomiting     Other ill-defined conditions(799.89) hypercholesterolemia    Stenosis     spinal    Stroke ([facility] [address] 1996    tia   PATENT  FORAMEN  OVALE       Past Surgical History:   Procedure Laterality Date    ABDOMEN SURGERY PROC UNLISTED  4/97    CHOLECYSTECTOMY    CARDIAC SURG PROCEDURE UNLIST  8/04    repair of foramen ovale    HX CATARACT REMOVAL Bilateral 2009    HX GYN  1986    HYSTERECTOMY - total    HX HEENT      WISDOM TEETH X3    HX LUMBAR LAMINECTOMY  2/2011    w/fusion L4-L5    HX ORTHOPAEDIC  4/98    KNEE ARTHROSCOPY   left knee    HX ORTHOPAEDIC      right shoulder     HX OTHER SURGICAL      colonoscopy x 3    HX TONSILLECTOMY  CHILDHOOD         Family History:   Problem Relation Age of Onset    Other Mother         SUARACHNOID HEMORRHAGE-ANEURYSM    Stroke Mother     Cancer Father         PANCREATIC    Anesth Problems Neg Hx        Social History     Socioeconomic History    Marital status:      Spouse name: Not on file    Number of children: Not on file    Years of education: Not on file    Highest education level: Not on file   Occupational History    Not on file   Social Needs    Financial resource strain: Not on file    Food insecurity     Worry: Not on file Inability: Not on file    Transportation needs     Medical: Not on file     Non-medical: Not on file   Tobacco Use    Smoking status: Never Smoker    Smokeless tobacco: Never Used   Substance and Sexual Activity    Alcohol use: Yes     Alcohol/week: 5.8 standard drinks     Types: 7 Glasses of wine per week    Drug use: No    Sexual activity: Yes     Partners: Male   Lifestyle    Physical activity     Days per week: Not on file     Minutes per session: Not on file    Stress: Not on file   Relationships    Social connections     Talks on phone: Not on file     Gets together: Not on file     Attends Mosque service: Not on file     Active member of club or organization: Not on file     Attends meetings of clubs or organizations: Not on file     Relationship status: Not on file    Intimate partner violence     Fear of current or ex partner: Not on file     Emotionally abused: Not on file     Physically abused: Not on file     Forced sexual activity: Not on file   Other Topics Concern    Not on file   Social History Narrative    Not on file         ALLERGIES: Cefdinir, Ciprofloxacin, Flagyl [metronidazole], Other medication, Oxycodone-acetaminophen, Phenylephrine, Phenylpropanolamine, and Sulfa (sulfonamide antibiotics)    Review of Systems   Constitutional: Negative for fever. HENT: Negative for rhinorrhea. Respiratory: Negative for shortness of breath. Cardiovascular: Negative for chest pain. Gastrointestinal: Positive for vomiting. Negative for abdominal pain. Genitourinary: Negative for dysuria. Musculoskeletal: Negative for back pain. Skin: Negative for wound. Neurological: Positive for dizziness and headaches. Psychiatric/Behavioral: Negative for confusion. Vitals:    01/14/21 1145   BP: 133/79   Pulse: 66   Resp: 17   Temp: 97.4 °F (36.3 °C)   SpO2: 94%            Physical Exam  Vitals signs and nursing note reviewed.    Constitutional:       General: She is not in acute distress. Appearance: Normal appearance. She is not ill-appearing, toxic-appearing or diaphoretic. HENT:      Head: Normocephalic and atraumatic. Eyes:      Extraocular Movements: Extraocular movements intact. Pupils: Pupils are equal, round, and reactive to light. Comments: Left gaze nystagmus   Neck:      Musculoskeletal: Normal range of motion. Cardiovascular:      Rate and Rhythm: Normal rate and regular rhythm. Pulses: Normal pulses. Heart sounds: Normal heart sounds. No murmur. No friction rub. No gallop. Pulmonary:      Effort: Pulmonary effort is normal. No respiratory distress. Breath sounds: Normal breath sounds. No wheezing or rales. Abdominal:      General: Abdomen is flat. Bowel sounds are normal. There is no distension. Palpations: Abdomen is soft. Tenderness: There is no abdominal tenderness. There is no guarding. Musculoskeletal: Normal range of motion. Skin:     General: Skin is warm and dry. Neurological:      General: No focal deficit present. Mental Status: She is alert and oriented to person, place, and time. Cranial Nerves: No cranial nerve deficit. Motor: No weakness. Comments: Normal finger-nose test   Psychiatric:         Mood and Affect: Mood normal.          MDM  Number of Diagnoses or Management Options  Dizziness  Urinary tract infection without hematuria, site unspecified  Diagnosis management comments: 61-year-old female presents with dizziness and several episodes of vomiting. She has leftward gaze nystagmus but no focal deficits on neurologic exam.  Her cerebellar function tests are normal.  CT imaging will be obtained to rule out intracranial hemorrhage versus stroke. Patient was given meclizine and Tylenol for her headache. Patient does show evidence of hypokalemia and her potassium was replaced here in the ED. She is on hydrochlorothiazide and her hypokalemia is likely related to this diuretic use. Patient feels much better after Tylenol and meclizine administration.  Her CT shows no acute abnormality.  She was able to ambulate normally in the ED.  At this point I will discharge her home with a prescription for meclizine.  Her urinalysis does show evidence of urinary tract infection she was also given Keflex.  She is comfortable and agreeable with the plan of care and aware of her return precautions.    EKG shows a normal sinus rhythm at a rate of 64, normal intervals, normal axis, no ST elevation or depression.         Procedures

## 2021-01-15 LAB
BACTERIA SPEC CULT: NORMAL
CC UR VC: NORMAL
SERVICE CMNT-IMP: NORMAL

## 2021-01-18 NOTE — PROGRESS NOTES
HISTORY OF PRESENT ILLNESS  Nel Christensen is a 81 y.o. female. HPI  Seen in ER 1/14/2021 after an episode of dizziness and vomiting. Standing at refrigerator and room started to spin, held only bar, had BM and vomited. Day prior had some dizziness when when awoken. CT scan negative. Improved post Tylenol and Meclizine. Also found to have UTI which was treated with Cipro. D/dilan home. Comes in today for follow up. Yesterday when stood up from chair and room started to spin. Felt like she was going to fall backwards. No hearing issues. No ear pain. No fevers or chills. Mild sinus congestion but not unusual.   No UTI symptoms. Culture showed >100,000 mixed michael  Some auras followed by headache resolved with 2 Tylenol. Better since Xmas is over. Has seen Dr. Darvin Alexander in past for ear pain. Current Outpatient Medications:     cephALEXin (Keflex) 500 mg capsule, Take 1 Cap by mouth four (4) times daily for 7 days. , Disp: 28 Cap, Rfl: 0    meclizine (ANTIVERT) 12.5 mg tablet, Take 1 Tab by mouth three (3) times daily as needed for Dizziness for up to 10 days. , Disp: 30 Tab, Rfl: 0    PARoxetine (PAXIL) 30 mg tablet, TAKE 1 TABLET DAILY, Disp: 90 Tab, Rfl: 3    LORazepam (ATIVAN) 1 mg tablet, Take 0.5-1 Tabs by mouth nightly as needed for Anxiety. Max Daily Amount: 1 mg., Disp: 90 Tab, Rfl: 1    meloxicam (MOBIC) 15 mg tablet, TAKE 1 TABLET DAILY (Patient taking differently: Take 7.5 mg by mouth daily.), Disp: 90 Tab, Rfl: 3    losartan (COZAAR) 100 mg tablet, Take 1 Tab by mouth daily. , Disp: 90 Tab, Rfl: 3    simvastatin (ZOCOR) 20 mg tablet, Take 1 Tab by mouth daily. , Disp: 90 Tab, Rfl: 3    hydroCHLOROthiazide (HYDRODIURIL) 12.5 mg tablet, Take 1 Tab by mouth daily. , Disp: 90 Tab, Rfl: 3    levocetirizine (XYZAL) 5 mg tablet, TAKE 1 TABLET DAILY, Disp: 90 Tab, Rfl: 3    zolpidem (AMBIEN) 5 mg tablet, TAKE 1 TABLET BY MOUTH ONCE DAILY AS NEEDED FOR SLEEP, Disp: 20 Tab, Rfl: 0   budesonide (ENTOCORT EC) 3 mg capsule, Take 3 mg by mouth daily as needed. 1 capsule by mouth daily, Disp: , Rfl:     dicyclomine (BENTYL) 10 mg capsule, Take 10 mg by mouth as needed. 1 tablet by mouth daily as needed, Disp: , Rfl:     MULTIVITAMIN W-MINERALS/LUTEIN (CENTRUM SILVER PO), Take 1 Tab by mouth. Takes one po daily. , Disp: , Rfl:     albuterol (PROVENTIL VENTOLIN) 2.5 mg /3 mL (0.083 %) nebulizer solution, 3 mL by Nebulization route three (3) times daily as needed for Wheezing or Shortness of Breath., Disp: 50 Each, Rfl: 0    Visit Vitals  /76   Pulse (!) 108   Temp 97 °F (36.1 °C) (Temporal)   Resp 18   Ht 5' (1.524 m)   Wt 131 lb 12.8 oz (59.8 kg)   SpO2 97%   BMI 25.74 kg/m²       ROS  See above  Physical Exam  Vitals signs reviewed. Constitutional:       Appearance: Normal appearance. HENT:      Head: Normocephalic and atraumatic. Right Ear: Tympanic membrane normal.      Left Ear: Tympanic membrane normal.   Eyes:      Extraocular Movements: Extraocular movements intact. Pupils: Pupils are equal, round, and reactive to light. Comments: No nystamus   Neck:      Musculoskeletal: Normal range of motion and neck supple. Vascular: No carotid bruit. Cardiovascular:      Rate and Rhythm: Normal rate and regular rhythm. Pulses: Normal pulses. Heart sounds: Normal heart sounds. Lymphadenopathy:      Cervical: No cervical adenopathy. Neurological:      Mental Status: She is alert and oriented to person, place, and time. Comments: Felipa Hastings + turning head to right         ASSESSMENT and PLAN  Vertigo - appears BPPV. Increase fluised. Referred back to DR. Anderson. Hypokalemia - secondary to hctz. Repeat labs  hyponatriemia - repeat bmp  UTI - ? Mixed michael. .  Complete abx  hyperlipidemia - controlled in past.  Repeat meds.       Orders Placed This Encounter    LIPID PANEL    METABOLIC PANEL, BASIC    REFERRAL TO ENT-OTOLARYNGOLOGY     Follow-up and Dispositions    · Return in about 4 weeks (around 2/16/2021) for vertigo.

## 2021-01-19 ENCOUNTER — OFFICE VISIT (OUTPATIENT)
Dept: INTERNAL MEDICINE CLINIC | Age: 86
End: 2021-01-19
Payer: MEDICARE

## 2021-01-19 VITALS
WEIGHT: 131.8 LBS | HEART RATE: 108 BPM | TEMPERATURE: 97 F | OXYGEN SATURATION: 97 % | RESPIRATION RATE: 18 BRPM | HEIGHT: 60 IN | DIASTOLIC BLOOD PRESSURE: 76 MMHG | BODY MASS INDEX: 25.87 KG/M2 | SYSTOLIC BLOOD PRESSURE: 131 MMHG

## 2021-01-19 DIAGNOSIS — H81.10 BENIGN PAROXYSMAL POSITIONAL VERTIGO, UNSPECIFIED LATERALITY: Primary | ICD-10-CM

## 2021-01-19 DIAGNOSIS — I10 ESSENTIAL HYPERTENSION: ICD-10-CM

## 2021-01-19 DIAGNOSIS — E87.6 HYPOKALEMIA: ICD-10-CM

## 2021-01-19 DIAGNOSIS — E78.00 PURE HYPERCHOLESTEROLEMIA: ICD-10-CM

## 2021-01-19 DIAGNOSIS — E87.1 HYPONATREMIA: ICD-10-CM

## 2021-01-19 PROCEDURE — G0463 HOSPITAL OUTPT CLINIC VISIT: HCPCS | Performed by: INTERNAL MEDICINE

## 2021-01-19 PROCEDURE — 1090F PRES/ABSN URINE INCON ASSESS: CPT | Performed by: INTERNAL MEDICINE

## 2021-01-19 PROCEDURE — G8752 SYS BP LESS 140: HCPCS | Performed by: INTERNAL MEDICINE

## 2021-01-19 PROCEDURE — G8754 DIAS BP LESS 90: HCPCS | Performed by: INTERNAL MEDICINE

## 2021-01-19 PROCEDURE — G8432 DEP SCR NOT DOC, RNG: HCPCS | Performed by: INTERNAL MEDICINE

## 2021-01-19 PROCEDURE — G8399 PT W/DXA RESULTS DOCUMENT: HCPCS | Performed by: INTERNAL MEDICINE

## 2021-01-19 PROCEDURE — 1101F PT FALLS ASSESS-DOCD LE1/YR: CPT | Performed by: INTERNAL MEDICINE

## 2021-01-19 PROCEDURE — G8536 NO DOC ELDER MAL SCRN: HCPCS | Performed by: INTERNAL MEDICINE

## 2021-01-19 PROCEDURE — G8427 DOCREV CUR MEDS BY ELIG CLIN: HCPCS | Performed by: INTERNAL MEDICINE

## 2021-01-19 PROCEDURE — G8419 CALC BMI OUT NRM PARAM NOF/U: HCPCS | Performed by: INTERNAL MEDICINE

## 2021-01-19 PROCEDURE — 99214 OFFICE O/P EST MOD 30 MIN: CPT | Performed by: INTERNAL MEDICINE

## 2021-01-19 NOTE — PROGRESS NOTES
Patient went to [facility] for dizziness, discuss meclizine medication.

## 2021-01-19 NOTE — PROGRESS NOTES
Chief Complaint   Patient presents with   [facility] Follow Up     follow up    Dizziness         1. Have you been to the ER, urgent care clinic since your last visit? Hospitalized since your last visit? No    2. Have you seen or consulted any other health care providers outside of the [address] [place] [address] since your last visit? Include any pap smears or colon screening.  No

## 2021-01-26 ENCOUNTER — TRANSCRIBE ORDER (OUTPATIENT)
Dept: SCHEDULING | Age: 86
End: 2021-01-26

## 2021-01-26 DIAGNOSIS — R42 DIZZINESS AND GIDDINESS: Primary | ICD-10-CM

## 2021-01-29 ENCOUNTER — HOSPITAL ENCOUNTER (OUTPATIENT)
Dept: ULTRASOUND IMAGING | Age: 86
Discharge: HOME | End: 2021-01-29
Attending: OTOLARYNGOLOGY
Payer: MEDICARE

## 2021-01-29 DIAGNOSIS — R42 DIZZINESS AND GIDDINESS: ICD-10-CM

## 2021-01-29 LAB
LEFT CCA DIST SYS: 72.9 CM/S
LEFT CCA PROX SYS: 57.8 CM/S
LEFT ICA DIST SYS: 69 CM/S
LEFT ICA MID SYS: 85.9 CM/S
LEFT ICA PROX SYS: 74.2 CM/S
LEFT ICA/CCA SYS: 1.2
RIGHT CCA DIST SYS: 74.2 CM/S
RIGHT CCA PROX SYS: 95.1 CM/S
RIGHT ICA DIST SYS: 79.4 CM/S
RIGHT ICA MID SYS: 80.7 CM/S
RIGHT ICA PROX SYS: 75.5 CM/S

## 2021-01-29 PROCEDURE — 93880 EXTRACRANIAL BILAT STUDY: CPT

## 2021-02-22 NOTE — PROGRESS NOTES
HISTORY OF PRESENT ILLNESS  Lorena Espinoza is a 81 y.o. female. HPI  Seen 1 month ago for vertigo. Referred back to Dr. Gudelia Burnett at that time. Did not feel this was vertigo. Had carotid dopplers which were negative. No further episode of dizziness. Itching for a couple of months. Saw Dr. Keri Barbour. Wadley was secondary to eczema. Changed meds. Doing better. Blood pressure has been elevated. At home running 150-170/80s. Denies chest pain, tightness, sob. Not walking as much this winter. Mild low back pain ryann when walking. Better since wearing more supportive shoes and not her slippers around the house. Current Outpatient Medications:     levocetirizine (XYZAL) 5 mg tablet, TAKE 1 TABLET DAILY, Disp: 90 Tab, Rfl: 3    amLODIPine (NORVASC) 5 mg tablet, Take 1 Tab by mouth daily. , Disp: 30 Tab, Rfl: 5    PARoxetine (PAXIL) 30 mg tablet, TAKE 1 TABLET DAILY, Disp: 90 Tab, Rfl: 3    LORazepam (ATIVAN) 1 mg tablet, Take 0.5-1 Tabs by mouth nightly as needed for Anxiety. Max Daily Amount: 1 mg., Disp: 90 Tab, Rfl: 1    meloxicam (MOBIC) 15 mg tablet, TAKE 1 TABLET DAILY (Patient taking differently: Take 7.5 mg by mouth daily.), Disp: 90 Tab, Rfl: 3    losartan (COZAAR) 100 mg tablet, Take 1 Tab by mouth daily. , Disp: 90 Tab, Rfl: 3    simvastatin (ZOCOR) 20 mg tablet, Take 1 Tab by mouth daily. , Disp: 90 Tab, Rfl: 3    hydroCHLOROthiazide (HYDRODIURIL) 12.5 mg tablet, Take 1 Tab by mouth daily. , Disp: 90 Tab, Rfl: 3    zolpidem (AMBIEN) 5 mg tablet, TAKE 1 TABLET BY MOUTH ONCE DAILY AS NEEDED FOR SLEEP, Disp: 20 Tab, Rfl: 0    budesonide (ENTOCORT EC) 3 mg capsule, Take 3 mg by mouth daily as needed.  1 capsule by mouth daily, Disp: , Rfl:     albuterol (PROVENTIL VENTOLIN) 2.5 mg /3 mL (0.083 %) nebulizer solution, 3 mL by Nebulization route three (3) times daily as needed for Wheezing or Shortness of Breath., Disp: 50 Each, Rfl: 0    dicyclomine (BENTYL) 10 mg capsule, Take 10 mg by mouth as needed. 1 tablet by mouth daily as needed, Disp: , Rfl:     MULTIVITAMIN W-MINERALS/LUTEIN (CENTRUM SILVER PO), Take 1 Tab by mouth. Takes one po daily. , Disp: , Rfl:     Visit Vitals  BP (!) 158/90 (BP 1 Location: Left upper arm, BP Patient Position: Sitting, BP Cuff Size: Adult)   Pulse 89   Temp 98.3 °F (36.8 °C) (Temporal)   Resp 18   Ht 5' (1.524 m)   Wt 132 lb 12.8 oz (60.2 kg)   SpO2 97%   BMI 25.94 kg/m²       ROS  See above  Physical Exam  Vitals signs reviewed. Constitutional:       Appearance: Normal appearance. HENT:      Head: Normocephalic and atraumatic. Eyes:      Extraocular Movements: Extraocular movements intact. Pupils: Pupils are equal, round, and reactive to light. Neck:      Musculoskeletal: Neck supple. Vascular: No carotid bruit. Cardiovascular:      Rate and Rhythm: Normal rate and regular rhythm. Pulses: Normal pulses. Heart sounds: Normal heart sounds. Pulmonary:      Effort: Pulmonary effort is normal.      Breath sounds: Normal breath sounds. Musculoskeletal:      Right lower leg: No edema. Left lower leg: No edema. Lymphadenopathy:      Cervical: No cervical adenopathy. Neurological:      General: No focal deficit present. Mental Status: She is alert and oriented to person, place, and time. Cranial Nerves: No cranial nerve deficit. ASSESSMENT and PLAN  htn - not at goal.  Add Amlodipine 5mg every day. Dizziness - per ENT not BPPV. No other cuase found and no recurrence of symptoms. Will follow. ? Virus, ? anxiety  Orders Placed This Encounter    DISCONTD: amoxicillin (AMOXIL) 500 mg capsule    levocetirizine (XYZAL) 5 mg tablet    amLODIPine (NORVASC) 5 mg tablet     Follow-up and Dispositions    · Return in about 4 weeks (around 3/23/2021) for htn.

## 2021-02-23 ENCOUNTER — OFFICE VISIT (OUTPATIENT)
Dept: INTERNAL MEDICINE CLINIC | Age: 86
End: 2021-02-23
Payer: MEDICARE

## 2021-02-23 VITALS
TEMPERATURE: 98.3 F | DIASTOLIC BLOOD PRESSURE: 90 MMHG | HEART RATE: 89 BPM | BODY MASS INDEX: 26.07 KG/M2 | HEIGHT: 60 IN | RESPIRATION RATE: 18 BRPM | OXYGEN SATURATION: 97 % | SYSTOLIC BLOOD PRESSURE: 158 MMHG | WEIGHT: 132.8 LBS

## 2021-02-23 DIAGNOSIS — I10 ESSENTIAL HYPERTENSION: Primary | ICD-10-CM

## 2021-02-23 DIAGNOSIS — H81.10 BENIGN PAROXYSMAL POSITIONAL VERTIGO, UNSPECIFIED LATERALITY: ICD-10-CM

## 2021-02-23 PROCEDURE — G8427 DOCREV CUR MEDS BY ELIG CLIN: HCPCS | Performed by: INTERNAL MEDICINE

## 2021-02-23 PROCEDURE — 1090F PRES/ABSN URINE INCON ASSESS: CPT | Performed by: INTERNAL MEDICINE

## 2021-02-23 PROCEDURE — G0463 HOSPITAL OUTPT CLINIC VISIT: HCPCS | Performed by: INTERNAL MEDICINE

## 2021-02-23 PROCEDURE — 99213 OFFICE O/P EST LOW 20 MIN: CPT | Performed by: INTERNAL MEDICINE

## 2021-02-23 PROCEDURE — G8536 NO DOC ELDER MAL SCRN: HCPCS | Performed by: INTERNAL MEDICINE

## 2021-02-23 PROCEDURE — G8419 CALC BMI OUT NRM PARAM NOF/U: HCPCS | Performed by: INTERNAL MEDICINE

## 2021-02-23 PROCEDURE — G8755 DIAS BP > OR = 90: HCPCS | Performed by: INTERNAL MEDICINE

## 2021-02-23 PROCEDURE — G8399 PT W/DXA RESULTS DOCUMENT: HCPCS | Performed by: INTERNAL MEDICINE

## 2021-02-23 PROCEDURE — G8753 SYS BP > OR = 140: HCPCS | Performed by: INTERNAL MEDICINE

## 2021-02-23 PROCEDURE — G8510 SCR DEP NEG, NO PLAN REQD: HCPCS | Performed by: INTERNAL MEDICINE

## 2021-02-23 PROCEDURE — 1101F PT FALLS ASSESS-DOCD LE1/YR: CPT | Performed by: INTERNAL MEDICINE

## 2021-02-23 RX ORDER — AMOXICILLIN 500 MG/1
CAPSULE ORAL
COMMUNITY
Start: 2021-02-16 | End: 2021-02-23 | Stop reason: ALTCHOICE

## 2021-02-23 RX ORDER — LEVOCETIRIZINE DIHYDROCHLORIDE 5 MG/1
TABLET, FILM COATED ORAL
Qty: 90 TAB | Refills: 3 | Status: SHIPPED | OUTPATIENT
Start: 2021-02-23 | End: 2022-03-08

## 2021-02-23 RX ORDER — AMLODIPINE BESYLATE 5 MG/1
5 TABLET ORAL DAILY
Qty: 30 TAB | Refills: 5 | Status: SHIPPED | OUTPATIENT
Start: 2021-02-23 | End: 2021-03-24 | Stop reason: SDUPTHER

## 2021-02-23 NOTE — PROGRESS NOTES
Chief Complaint   Patient presents with    Blood Pressure Check     follow up     Cholesterol Problem    Sleep Problem    Anxiety    Medication Refill     90 day refills needed          1. Have you been to the ER, urgent care clinic since your last visit? Hospitalized since your last visit? No    2. Have you seen or consulted any other health care providers outside of the [address] [place] [address] since your last visit? Include any pap smears or colon screening.  No

## 2021-03-24 ENCOUNTER — OFFICE VISIT (OUTPATIENT)
Dept: INTERNAL MEDICINE CLINIC | Age: 86
End: 2021-03-24
Payer: MEDICARE

## 2021-03-24 VITALS
TEMPERATURE: 98 F | WEIGHT: 130 LBS | SYSTOLIC BLOOD PRESSURE: 120 MMHG | RESPIRATION RATE: 16 BRPM | HEIGHT: 60 IN | BODY MASS INDEX: 25.52 KG/M2 | DIASTOLIC BLOOD PRESSURE: 70 MMHG | OXYGEN SATURATION: 95 % | HEART RATE: 92 BPM

## 2021-03-24 DIAGNOSIS — I10 ESSENTIAL HYPERTENSION: Primary | ICD-10-CM

## 2021-03-24 PROCEDURE — G8510 SCR DEP NEG, NO PLAN REQD: HCPCS | Performed by: INTERNAL MEDICINE

## 2021-03-24 PROCEDURE — G8427 DOCREV CUR MEDS BY ELIG CLIN: HCPCS | Performed by: INTERNAL MEDICINE

## 2021-03-24 PROCEDURE — 1090F PRES/ABSN URINE INCON ASSESS: CPT | Performed by: INTERNAL MEDICINE

## 2021-03-24 PROCEDURE — G8536 NO DOC ELDER MAL SCRN: HCPCS | Performed by: INTERNAL MEDICINE

## 2021-03-24 PROCEDURE — 1101F PT FALLS ASSESS-DOCD LE1/YR: CPT | Performed by: INTERNAL MEDICINE

## 2021-03-24 PROCEDURE — G0463 HOSPITAL OUTPT CLINIC VISIT: HCPCS | Performed by: INTERNAL MEDICINE

## 2021-03-24 PROCEDURE — G8754 DIAS BP LESS 90: HCPCS | Performed by: INTERNAL MEDICINE

## 2021-03-24 PROCEDURE — G8752 SYS BP LESS 140: HCPCS | Performed by: INTERNAL MEDICINE

## 2021-03-24 PROCEDURE — G8419 CALC BMI OUT NRM PARAM NOF/U: HCPCS | Performed by: INTERNAL MEDICINE

## 2021-03-24 PROCEDURE — 99213 OFFICE O/P EST LOW 20 MIN: CPT | Performed by: INTERNAL MEDICINE

## 2021-03-24 PROCEDURE — G8399 PT W/DXA RESULTS DOCUMENT: HCPCS | Performed by: INTERNAL MEDICINE

## 2021-03-24 RX ORDER — AMLODIPINE BESYLATE 5 MG/1
5 TABLET ORAL DAILY
Qty: 90 TAB | Refills: 3 | Status: SHIPPED | OUTPATIENT
Start: 2021-03-24 | End: 2022-03-29

## 2021-03-24 RX ORDER — AMLODIPINE BESYLATE 5 MG/1
5 TABLET ORAL DAILY
Qty: 90 TAB | Refills: 3 | Status: SHIPPED | OUTPATIENT
Start: 2021-03-24 | End: 2021-03-24 | Stop reason: SDUPTHER

## 2021-03-24 RX ORDER — HYDROCHLOROTHIAZIDE 12.5 MG/1
12.5 TABLET ORAL DAILY
Qty: 90 TAB | Refills: 3 | Status: SHIPPED | OUTPATIENT
Start: 2021-03-24 | End: 2021-09-29 | Stop reason: ALTCHOICE

## 2021-03-24 RX ORDER — AMLODIPINE BESYLATE 5 MG/1
5 TABLET ORAL DAILY
Qty: 30 TAB | Refills: 1 | Status: SHIPPED | OUTPATIENT
Start: 2021-03-24 | End: 2021-03-24 | Stop reason: SDUPTHER

## 2021-03-24 NOTE — PROGRESS NOTES
Subjective:     Carlin Staley is a 81 y.o. female who presents for follow up of hypertension. Seen 1 month ago and BP elevated. Added Amlodipine 5mg every day. Diet and Lifestyle: generally follows a low sodium diet  Home BP Monitoring: is well controlled at home, ranging 120's/70's    Cardiovascular ROS: taking medications as instructed, no medication side effects noted, no TIA's, no chest pain on exertion, no dyspnea on exertion, no swelling of ankles, no orthostatic dizziness or lightheadedness, no palpitations. New concerns: cutting grass this spring but going to start having a company do it. .     Current Outpatient Medications   Medication Sig Dispense Refill    hydroCHLOROthiazide (HYDRODIURIL) 12.5 mg tablet Take 1 Tab by mouth daily. 90 Tab 3    amLODIPine (NORVASC) 5 mg tablet Take 1 Tab by mouth daily. 90 Tab 3    levocetirizine (XYZAL) 5 mg tablet TAKE 1 TABLET DAILY 90 Tab 3    PARoxetine (PAXIL) 30 mg tablet TAKE 1 TABLET DAILY 90 Tab 3    LORazepam (ATIVAN) 1 mg tablet Take 0.5-1 Tabs by mouth nightly as needed for Anxiety. Max Daily Amount: 1 mg. 90 Tab 1    meloxicam (MOBIC) 15 mg tablet TAKE 1 TABLET DAILY (Patient taking differently: Take 7.5 mg by mouth daily.) 90 Tab 3    losartan (COZAAR) 100 mg tablet Take 1 Tab by mouth daily. 90 Tab 3    simvastatin (ZOCOR) 20 mg tablet Take 1 Tab by mouth daily. 90 Tab 3    zolpidem (AMBIEN) 5 mg tablet TAKE 1 TABLET BY MOUTH ONCE DAILY AS NEEDED FOR SLEEP 20 Tab 0    budesonide (ENTOCORT EC) 3 mg capsule Take 3 mg by mouth daily as needed. 1 capsule by mouth daily      albuterol (PROVENTIL VENTOLIN) 2.5 mg /3 mL (0.083 %) nebulizer solution 3 mL by Nebulization route three (3) times daily as needed for Wheezing or Shortness of Breath. 50 Each 0    dicyclomine (BENTYL) 10 mg capsule Take 10 mg by mouth as needed. 1 tablet by mouth daily as needed      MULTIVITAMIN W-MINERALS/LUTEIN (CENTRUM SILVER PO) Take 1 Tab by mouth.  Takes one po daily. Lab Results   Component Value Date/Time    GFR est non-AA >60 01/27/2021 08:15 AM    GFRNA, POC 53 (L) 09/29/2020 10:40 AM    GFR est AA >60 01/27/2021 08:15 AM    GFRAA, POC >60 09/29/2020 10:40 AM    Creatinine 0.88 01/27/2021 08:15 AM    Creatinine (POC) 1.0 09/29/2020 10:40 AM    BUN 15 01/27/2021 08:15 AM    Sodium 132 (L) 01/27/2021 08:15 AM    Potassium 3.8 01/27/2021 08:15 AM    Chloride 96 (L) 01/27/2021 08:15 AM    CO2 32 01/27/2021 08:15 AM    Magnesium 1.9 01/14/2021 12:13 PM    Phosphorus 2.7 01/14/2021 12:13 PM        Review of Systems, additional:  Pertinent items are noted in HPI. Objective:     Visit Vitals  /70   Pulse 92   Temp 98 °F (36.7 °C) (Temporal)   Resp 16   Ht 5' (1.524 m)   Wt 130 lb (59 kg)   LMP 01/01/1986   SpO2 95%   BMI 25.39 kg/m²     Appearance: alert, well appearing, and in no distress and oriented to person, place, and time. General exam:   . NECK: supple, no lad, no bruit, no tm  LUNGS: cta bilat  CV rrr, no m/g/r  ABD: soft, nt, nd, nabs  EXT: no c/c/e    Assessment/Plan:     hypertension well controlled. current treatment plan is effective, no change in therapy.    Orders Placed This Encounter    DISCONTD: amLODIPine (NORVASC) 5 mg tablet    DISCONTD: amLODIPine (NORVASC) 5 mg tablet    hydroCHLOROthiazide (HYDRODIURIL) 12.5 mg tablet    DISCONTD: amLODIPine (NORVASC) 5 mg tablet    amLODIPine (NORVASC) 5 mg tablet

## 2021-04-20 DIAGNOSIS — F41.9 ANXIETY: ICD-10-CM

## 2021-04-20 RX ORDER — LORAZEPAM 1 MG/1
TABLET ORAL
Qty: 90 TAB | Refills: 1 | Status: SHIPPED | OUTPATIENT
Start: 2021-04-20 | End: 2021-11-18

## 2021-05-26 RX ORDER — SIMVASTATIN 20 MG/1
TABLET, FILM COATED ORAL
Qty: 90 TABLET | Refills: 3 | Status: SHIPPED | OUTPATIENT
Start: 2021-05-26 | End: 2022-05-24

## 2021-05-26 RX ORDER — LOSARTAN POTASSIUM 100 MG/1
TABLET ORAL
Qty: 90 TABLET | Refills: 3 | Status: SHIPPED | OUTPATIENT
Start: 2021-05-26 | End: 2021-08-23 | Stop reason: SDUPTHER

## 2021-06-18 RX ORDER — CIPROFLOXACIN 250 MG/1
250 TABLET, FILM COATED ORAL 2 TIMES DAILY
Qty: 6 TABLET | Refills: 0 | Status: SHIPPED | OUTPATIENT
Start: 2021-06-18 | End: 2021-06-18 | Stop reason: SDUPTHER

## 2021-06-18 RX ORDER — CIPROFLOXACIN 250 MG/1
250 TABLET, FILM COATED ORAL 2 TIMES DAILY
Qty: 6 TABLET | Refills: 0 | Status: SHIPPED | OUTPATIENT
Start: 2021-06-18 | End: 2021-06-21

## 2021-06-18 NOTE — TELEPHONE ENCOUNTER
Caller's first and last name: n/A       Reason for call: She is requesting a call back from her provider.        Callback required yes/no and why: Yes       Best contact number(s):[phone]       Details to clarify the request: N/A       Leonel Villalobos

## 2021-06-21 ENCOUNTER — DOCUMENTATION ONLY (OUTPATIENT)
Dept: INTERNAL MEDICINE CLINIC | Age: 86
End: 2021-06-21

## 2021-06-21 NOTE — PROGRESS NOTES
Patient did not  cipro, due to pharmacy called and told patient she is allergic to it. Please advise.

## 2021-06-21 NOTE — PROGRESS NOTES
Is she still having symptoms? If it has been going on through out the weekend and not getting worse I would rather that she be seen tomorrow for a UA and culture. I would see her at 2 tomorrow.

## 2021-06-22 ENCOUNTER — OFFICE VISIT (OUTPATIENT)
Dept: INTERNAL MEDICINE CLINIC | Age: 86
End: 2021-06-22
Payer: MEDICARE

## 2021-06-22 VITALS
HEIGHT: 60 IN | WEIGHT: 128 LBS | DIASTOLIC BLOOD PRESSURE: 73 MMHG | TEMPERATURE: 98.4 F | OXYGEN SATURATION: 98 % | SYSTOLIC BLOOD PRESSURE: 119 MMHG | BODY MASS INDEX: 25.13 KG/M2 | HEART RATE: 108 BPM

## 2021-06-22 DIAGNOSIS — N30.00 ACUTE CYSTITIS WITHOUT HEMATURIA: Primary | ICD-10-CM

## 2021-06-22 DIAGNOSIS — R35.0 URINARY FREQUENCY: ICD-10-CM

## 2021-06-22 LAB
BILIRUB UR QL STRIP: NEGATIVE
GLUCOSE UR-MCNC: NEGATIVE MG/DL
KETONES P FAST UR STRIP-MCNC: NEGATIVE MG/DL
PH UR STRIP: 7 [PH] (ref 4.6–8)
PROT UR QL STRIP: NEGATIVE
SP GR UR STRIP: 1.01 (ref 1–1.03)
UA UROBILINOGEN AMB POC: NORMAL (ref 0.2–1)
URINALYSIS CLARITY POC: CLEAR
URINALYSIS COLOR POC: YELLOW
URINE BLOOD POC: NORMAL
URINE LEUKOCYTES POC: NORMAL
URINE NITRITES POC: NEGATIVE

## 2021-06-22 PROCEDURE — G8754 DIAS BP LESS 90: HCPCS | Performed by: INTERNAL MEDICINE

## 2021-06-22 PROCEDURE — G8427 DOCREV CUR MEDS BY ELIG CLIN: HCPCS | Performed by: INTERNAL MEDICINE

## 2021-06-22 PROCEDURE — 81003 URINALYSIS AUTO W/O SCOPE: CPT | Performed by: INTERNAL MEDICINE

## 2021-06-22 PROCEDURE — G8399 PT W/DXA RESULTS DOCUMENT: HCPCS | Performed by: INTERNAL MEDICINE

## 2021-06-22 PROCEDURE — 1101F PT FALLS ASSESS-DOCD LE1/YR: CPT | Performed by: INTERNAL MEDICINE

## 2021-06-22 PROCEDURE — G8510 SCR DEP NEG, NO PLAN REQD: HCPCS | Performed by: INTERNAL MEDICINE

## 2021-06-22 PROCEDURE — 99213 OFFICE O/P EST LOW 20 MIN: CPT | Performed by: INTERNAL MEDICINE

## 2021-06-22 PROCEDURE — G8752 SYS BP LESS 140: HCPCS | Performed by: INTERNAL MEDICINE

## 2021-06-22 PROCEDURE — 1090F PRES/ABSN URINE INCON ASSESS: CPT | Performed by: INTERNAL MEDICINE

## 2021-06-22 PROCEDURE — G8536 NO DOC ELDER MAL SCRN: HCPCS | Performed by: INTERNAL MEDICINE

## 2021-06-22 PROCEDURE — G8419 CALC BMI OUT NRM PARAM NOF/U: HCPCS | Performed by: INTERNAL MEDICINE

## 2021-06-22 PROCEDURE — G0463 HOSPITAL OUTPT CLINIC VISIT: HCPCS | Performed by: INTERNAL MEDICINE

## 2021-06-22 RX ORDER — TRIMETHOPRIM 100 MG/1
100 TABLET ORAL 2 TIMES DAILY
Qty: 6 TABLET | Refills: 0 | Status: SHIPPED | OUTPATIENT
Start: 2021-06-22 | End: 2021-10-21 | Stop reason: SDUPTHER

## 2021-06-22 NOTE — PROGRESS NOTES
Identified pt with two pt identifiers(name and ). Reviewed record in preparation for visit and have obtained necessary documentation. Chief Complaint   Patient presents with    Urinary Burning        Vitals:    21 1357   BP: 119/73   Pulse: (!) 108   Temp: 98.4 °F (36.9 °C)   TempSrc: Temporal   SpO2: 98%   Weight: 128 lb (58.1 kg)   Height: 5' (1.524 m)   PainSc:   8   PainLoc: Back   LMP: 1986       Health Maintenance Due   Topic    COVID-19 Vaccine (2 - Pfizer 2-dose series)    Medicare Yearly Exam        Coordination of Care Questionnaire:  :   1) Have you been to an emergency room, urgent care, or hospitalized since your last visit? If yes, where when, and reason for visit? No       2. Have seen or consulted any other health care provider since your last visit? If yes, where when, and reason for visit? No    Patient is accompanied by kartik I have received verbal consent from Semaj Miller to discuss any/all medical information while they are present in the room.

## 2021-06-22 NOTE — PROGRESS NOTES
Subjective:     Raine Mills is a 81 y.o. female who complains of dysuria, frequency, urgency, not fully emptying for 5 days. Patient denies flank pain, vomiting, fever, unusual vaginal discharge. Patient does not have a history of recurrent UTI. Patient does not have a history of pyelonephritis. Current Outpatient Medications   Medication Sig Dispense Refill    ciprofloxacin HCl (CIPRO) 250 mg tablet Take 1 Tablet by mouth two (2) times a day for 3 days. 6 Tablet 0    simvastatin (ZOCOR) 20 mg tablet TAKE 1 TABLET DAILY 90 Tablet 3    losartan (COZAAR) 100 mg tablet TAKE 1 TABLET DAILY 90 Tablet 3    LORazepam (ATIVAN) 1 mg tablet TAKE 1/2 TO 1 TABLET       NIGHTLY AS NEEDED FOR      ANXIETY. MAXIMUM DAILY     AMOUNT: 1 TABLET 90 Tab 1    hydroCHLOROthiazide (HYDRODIURIL) 12.5 mg tablet Take 1 Tab by mouth daily. 90 Tab 3    amLODIPine (NORVASC) 5 mg tablet Take 1 Tab by mouth daily. 90 Tab 3    levocetirizine (XYZAL) 5 mg tablet TAKE 1 TABLET DAILY 90 Tab 3    PARoxetine (PAXIL) 30 mg tablet TAKE 1 TABLET DAILY 90 Tab 3    meloxicam (MOBIC) 15 mg tablet TAKE 1 TABLET DAILY (Patient taking differently: Take 7.5 mg by mouth daily.) 90 Tab 3    zolpidem (AMBIEN) 5 mg tablet TAKE 1 TABLET BY MOUTH ONCE DAILY AS NEEDED FOR SLEEP 20 Tab 0    budesonide (ENTOCORT EC) 3 mg capsule Take 3 mg by mouth daily as needed. 1 capsule by mouth daily      albuterol (PROVENTIL VENTOLIN) 2.5 mg /3 mL (0.083 %) nebulizer solution 3 mL by Nebulization route three (3) times daily as needed for Wheezing or Shortness of Breath. 50 Each 0    dicyclomine (BENTYL) 10 mg capsule Take 10 mg by mouth as needed. 1 tablet by mouth daily as needed      MULTIVITAMIN W-MINERALS/LUTEIN (CENTRUM SILVER PO) Take 1 Tab by mouth. Takes one po daily. Review of Systems  Pertinent items are noted in HPI.     Objective:     Visit Vitals  LMP 01/01/1986     General:  alert, cooperative, no distress   Abdomen: soft, nontender, nondistended, no masses or organomegaly. Back:  CVA tenderness absent   :  defer exam     Laboratory:   Urine dipstick shows positive for leukocytes. Micro exam: not done. Assessment/Plan:     Acute cystitis     1. trimethoprim 100mg bid x 3 days - multiple drug allergies  2. Maintain adequate hydration  3. Cranberry juice  4. Follow up if symptoms not improving, and prn. Orders Placed This Encounter    AMB POC URINALYSIS DIP STICK AUTO W/O MICRO    trimethoprim (TRIMPEX) 100 mg tablet       .

## 2021-08-09 ENCOUNTER — TRANSCRIBE ORDER (OUTPATIENT)
Dept: SCHEDULING | Age: 86
End: 2021-08-09

## 2021-08-09 DIAGNOSIS — Z12.31 VISIT FOR SCREENING MAMMOGRAM: Primary | ICD-10-CM

## 2021-08-12 ENCOUNTER — HOSPITAL ENCOUNTER (OUTPATIENT)
Dept: MAMMOGRAPHY | Age: 86
Discharge: HOME | End: 2021-08-12
Attending: INTERNAL MEDICINE
Payer: MEDICARE

## 2021-08-12 DIAGNOSIS — Z12.31 VISIT FOR SCREENING MAMMOGRAM: ICD-10-CM

## 2021-08-12 PROCEDURE — 77067 SCR MAMMO BI INCL CAD: CPT

## 2021-08-23 RX ORDER — LOSARTAN POTASSIUM 100 MG/1
TABLET ORAL
Qty: 90 TABLET | Refills: 3 | Status: SHIPPED | OUTPATIENT
Start: 2021-08-23 | End: 2022-09-20

## 2021-08-31 ENCOUNTER — HOSPITAL ENCOUNTER (OUTPATIENT)
Dept: PREADMISSION TESTING | Age: 86
Discharge: HOME | End: 2021-08-31
Payer: MEDICARE

## 2021-08-31 VITALS
HEART RATE: 88 BPM | SYSTOLIC BLOOD PRESSURE: 118 MMHG | HEIGHT: 60 IN | DIASTOLIC BLOOD PRESSURE: 74 MMHG | WEIGHT: 137.35 LBS | BODY MASS INDEX: 26.97 KG/M2 | TEMPERATURE: 98.6 F

## 2021-08-31 LAB
ABO + RH BLD: NORMAL
ANION GAP SERPL CALC-SCNC: 5 MMOL/L (ref 5–15)
APPEARANCE UR: CLEAR
ATRIAL RATE: 84 BPM
BACTERIA URNS QL MICRO: NEGATIVE /HPF
BILIRUB UR QL: NEGATIVE
BLOOD GROUP ANTIBODIES SERPL: NORMAL
BUN SERPL-MCNC: 18 MG/DL (ref 6–20)
BUN/CREAT SERPL: 19 (ref 12–20)
CALCIUM SERPL-MCNC: 9 MG/DL (ref 8.5–10.1)
CALCULATED P AXIS, ECG09: 24 DEGREES
CALCULATED R AXIS, ECG10: 26 DEGREES
CALCULATED T AXIS, ECG11: 29 DEGREES
CHLORIDE SERPL-SCNC: 99 MMOL/L (ref 97–108)
CO2 SERPL-SCNC: 26 MMOL/L (ref 21–32)
COLOR UR: NORMAL
CREAT SERPL-MCNC: 0.95 MG/DL (ref 0.55–1.02)
DIAGNOSIS, 93000: NORMAL
EPITH CASTS URNS QL MICRO: NORMAL /LPF
ERYTHROCYTE [DISTWIDTH] IN BLOOD BY AUTOMATED COUNT: 12.5 % (ref 11.5–14.5)
EST. AVERAGE GLUCOSE BLD GHB EST-MCNC: 103 MG/DL
GLUCOSE SERPL-MCNC: 79 MG/DL (ref 65–100)
GLUCOSE UR STRIP.AUTO-MCNC: NEGATIVE MG/DL
HBA1C MFR BLD: 5.2 % (ref 4–5.6)
HCT VFR BLD AUTO: 37 % (ref 35–47)
HGB BLD-MCNC: 12.9 G/DL (ref 11.5–16)
HGB UR QL STRIP: NEGATIVE
HYALINE CASTS URNS QL MICRO: NORMAL /LPF (ref 0–5)
INR PPP: 0.9 (ref 0.9–1.1)
KETONES UR QL STRIP.AUTO: NEGATIVE MG/DL
LEUKOCYTE ESTERASE UR QL STRIP.AUTO: NEGATIVE
MCH RBC QN AUTO: 34 PG (ref 26–34)
MCHC RBC AUTO-ENTMCNC: 34.9 G/DL (ref 30–36.5)
MCV RBC AUTO: 97.6 FL (ref 80–99)
NITRITE UR QL STRIP.AUTO: NEGATIVE
NRBC # BLD: 0 K/UL (ref 0–0.01)
NRBC BLD-RTO: 0 PER 100 WBC
P-R INTERVAL, ECG05: 172 MS
PH UR STRIP: 7 [PH] (ref 5–8)
PLATELET # BLD AUTO: 264 K/UL (ref 150–400)
PMV BLD AUTO: 9.9 FL (ref 8.9–12.9)
POTASSIUM SERPL-SCNC: 4.3 MMOL/L (ref 3.5–5.1)
PROT UR STRIP-MCNC: NEGATIVE MG/DL
PROTHROMBIN TIME: 9.8 SEC (ref 9–11.1)
Q-T INTERVAL, ECG07: 360 MS
QRS DURATION, ECG06: 72 MS
QTC CALCULATION (BEZET), ECG08: 425 MS
RBC # BLD AUTO: 3.79 M/UL (ref 3.8–5.2)
RBC #/AREA URNS HPF: NORMAL /HPF (ref 0–5)
SODIUM SERPL-SCNC: 130 MMOL/L (ref 136–145)
SP GR UR REFRACTOMETRY: 1.01 (ref 1–1.03)
SPECIMEN EXP DATE BLD: NORMAL
UA: UC IF INDICATED,UAUC: NORMAL
UROBILINOGEN UR QL STRIP.AUTO: 0.2 EU/DL (ref 0.2–1)
VENTRICULAR RATE, ECG03: 84 BPM
WBC # BLD AUTO: 7.5 K/UL (ref 3.6–11)
WBC URNS QL MICRO: NORMAL /HPF (ref 0–4)

## 2021-08-31 PROCEDURE — 81001 URINALYSIS AUTO W/SCOPE: CPT

## 2021-08-31 PROCEDURE — 85610 PROTHROMBIN TIME: CPT

## 2021-08-31 PROCEDURE — 86901 BLOOD TYPING SEROLOGIC RH(D): CPT

## 2021-08-31 PROCEDURE — 80048 BASIC METABOLIC PNL TOTAL CA: CPT

## 2021-08-31 PROCEDURE — 85027 COMPLETE CBC AUTOMATED: CPT

## 2021-08-31 PROCEDURE — 83036 HEMOGLOBIN GLYCOSYLATED A1C: CPT

## 2021-08-31 PROCEDURE — 93005 ELECTROCARDIOGRAM TRACING: CPT

## 2021-08-31 RX ORDER — GABAPENTIN 300 MG/1
300 CAPSULE ORAL AS NEEDED
COMMUNITY
End: 2021-12-15 | Stop reason: SDUPTHER

## 2021-08-31 RX ORDER — ACETAMINOPHEN 500 MG
TABLET ORAL
COMMUNITY

## 2021-08-31 NOTE — PERIOP NOTES
PAT Nutrition Screen    1. Has the patient had an unplanned weight loss of 10% of body weight or more in the last 6 months? No = 0   2. Has the patient been eating less than 50% of their normal diet in the preceding week? Yes = 1       Patient instructed on Non-Diabetic pre-operative nutrition plan to start 5 days prior to surgery. Patient given 10 shakes and 3 pre-surgery drinks. Opportunity given for questions and all questions answered.

## 2021-08-31 NOTE — PERIOP NOTES
PREOPERATIVE INSTRUCTIONS REVIEWED WITH PATIENT. PATIENT GIVEN TWO--BOTTLES OF CHG SOAPS  INSTRUCTIONS REVIEWED ON USE OF CHG SOAPS  PATIENT GIVEN SSI INFECTIONS SHEET; MRSA/MSSA TREATMENT INSTRUCTION SHEET GIVEN WITH AN EXPLANATION TO PATIENT THAT THEY WILL BE NOTIFIED IF TREATMENT INSTRUCTIONS NEED TO BE INITIATED. PATIENT WAS GIVEN THE OPPORTUNITY TO ASK QUESTIONS; REGARDING THE INFORMATION PROVIDED. PREOP DIET AND NUTRITION UPDATED GUIDELINES/ INSTRUCTIONS PROVIDED     ERAS, DRINKS PROVIDED.

## 2021-08-31 NOTE — PROGRESS NOTES
Here for preop. To have back surgery 9/14 - revision laminectomy L2-L4 with T10-L5 fusion with Dr. Indigo Dean. Having pain lower back with radiation of pain to left leg. .  See scanned preop form in system.

## 2021-09-01 ENCOUNTER — OFFICE VISIT (OUTPATIENT)
Dept: INTERNAL MEDICINE CLINIC | Age: 86
End: 2021-09-01
Payer: MEDICARE

## 2021-09-01 VITALS
WEIGHT: 136 LBS | DIASTOLIC BLOOD PRESSURE: 77 MMHG | HEART RATE: 96 BPM | RESPIRATION RATE: 16 BRPM | SYSTOLIC BLOOD PRESSURE: 127 MMHG | TEMPERATURE: 98.3 F | HEIGHT: 60 IN | BODY MASS INDEX: 26.7 KG/M2 | OXYGEN SATURATION: 94 %

## 2021-09-01 DIAGNOSIS — M48.062 SPINAL STENOSIS OF LUMBAR REGION WITH NEUROGENIC CLAUDICATION: Primary | ICD-10-CM

## 2021-09-01 DIAGNOSIS — Z01.818 PREOP EXAMINATION: ICD-10-CM

## 2021-09-01 LAB
BACTERIA SPEC CULT: NORMAL
BACTERIA SPEC CULT: NORMAL
SERVICE CMNT-IMP: NORMAL

## 2021-09-01 PROCEDURE — 99213 OFFICE O/P EST LOW 20 MIN: CPT | Performed by: INTERNAL MEDICINE

## 2021-09-01 PROCEDURE — G8399 PT W/DXA RESULTS DOCUMENT: HCPCS | Performed by: INTERNAL MEDICINE

## 2021-09-01 PROCEDURE — G0463 HOSPITAL OUTPT CLINIC VISIT: HCPCS | Performed by: INTERNAL MEDICINE

## 2021-09-01 PROCEDURE — G8754 DIAS BP LESS 90: HCPCS | Performed by: INTERNAL MEDICINE

## 2021-09-01 PROCEDURE — G8536 NO DOC ELDER MAL SCRN: HCPCS | Performed by: INTERNAL MEDICINE

## 2021-09-01 PROCEDURE — G8510 SCR DEP NEG, NO PLAN REQD: HCPCS | Performed by: INTERNAL MEDICINE

## 2021-09-01 PROCEDURE — G8419 CALC BMI OUT NRM PARAM NOF/U: HCPCS | Performed by: INTERNAL MEDICINE

## 2021-09-01 PROCEDURE — G8427 DOCREV CUR MEDS BY ELIG CLIN: HCPCS | Performed by: INTERNAL MEDICINE

## 2021-09-01 PROCEDURE — G8752 SYS BP LESS 140: HCPCS | Performed by: INTERNAL MEDICINE

## 2021-09-01 PROCEDURE — 1101F PT FALLS ASSESS-DOCD LE1/YR: CPT | Performed by: INTERNAL MEDICINE

## 2021-09-01 PROCEDURE — 1090F PRES/ABSN URINE INCON ASSESS: CPT | Performed by: INTERNAL MEDICINE

## 2021-09-01 NOTE — PERIOP NOTES
PAT Nurse Practitioner   Pre-Operative Chart Review/Assessment:-ORTHOPEDIC/NEUROSURGICAL SPINE                Patient Name:  Daisy Rothman                                                           Age:   81 y.o.    :  1935     Today's Date:  9/10/2021     Date of PAT:   2021      Date of Surgery:    2021      Procedure(s):  L2-L4 Revision Laminectomy  & T10-L5 Revision Fusion     Surgeon:   Dr. Baker Actis:       1)  PCP: Dr. Rachel Cruz        2)  Cardiac Clearance: EKG and METs reviewed. No further cardiac evaluation requested. 3)  Diabetic Treatment Consult: Not indicated. A1c-5.2       4)  Sleep Apnea evaluation:  Not indicated. JOSHUA Score 2.       5)  Treatment for MRSA/Staph Aureus: Neg       6)  Additional Concerns: PONV, HTN, Asthma, hx of TIA, anxiety, PFO s/p repair              Vital Signs:         Vitals:    21 0944   BP: 118/74   Pulse: 88   Temp: 98.6 °F (37 °C)   Weight: 62.3 kg (137 lb 5.6 oz)   Height: 5' (1.524 m)   LMP: 1986          Preoperative Nutrition Screen (CAROL)   Patient's Age: 81 y.o. Patient's BMI: Estimated body mass index is 26.82 kg/m² as calculated from the following:    Height as of this encounter: 5' (1.524 m). Weight as of this encounter: 62.3 kg (137 lb 5.6 oz). If the answer to any of the following is Yes, then recommend prescribe Oral Nutrition Supplements (ONS) for at least 5 days prior to surgery and/or order referral to dietitian for further assessment and nutrition therapy. 1. Does the patient have a documented serum albumin less than 3.0 within the last 90 days? Unknown = 0       2. Is patient's BMI less than 18.5 (or less than 20 if age over 72)? No = 0        3. Has the patient had an unplanned weight loss of 10% of body weight or more in the last 6 months? No = 0   4. Has the patient been eating less than 50% of their normal diet in the preceding week?  Yes = 1   CAROL Score (number of Yes responses), 0-4 1     Plan:   2 immunonutrition shakes daily for 5 days prior to surgery and 5 days post-op. 3 pre-surgery drinks. Electronically signed by Terry Manley NP on 9/1/21 at 9:27 AM    ____________________________________________  PAST MEDICAL HISTORY  Past Medical History:   Diagnosis Date    Anxiety     Arthritis back pain    fingers    Asthma     MILD - NO INHALERS    Chronic pain     Colitis     Diverticulitis 6/11/2013    Hypercholesterolemia     Hypertension     IBS (irritable bowel syndrome)     Insomnia     Nausea & vomiting     Stenosis     spinal    Stroke ([address] 1996    tia   PATENT  FORAMEN  OVALE    Swollen L ankle       ____________________________________________  PAST SURGICAL HISTORY  Past Surgical History:   Procedure Laterality Date    HX CATARACT REMOVAL Bilateral 2009    HX CHOLECYSTECTOMY  04/1997    HX COLONOSCOPY      x3    HX HYSTERECTOMY  1986    HX KNEE ARTHROSCOPY Left 04/1998    HX LUMBAR FUSION  02/2011    L4-L5    HX SHOULDER REPLACEMENT Right 01/2020    HX TONSILLECTOMY  CHILDHOOD    HX WISDOM TEETH EXTRACTION      X3    AK CARDIAC SURG PROCEDURE UNLIST  8/04    repair of foramen ovale     ____________________________________________  HOME MEDICATIONS    Current Outpatient Medications   Medication Sig    gabapentin (NEURONTIN) 300 mg capsule Take 300 mg by mouth as needed for Pain.  acetaminophen (Tylenol Extra Strength) 500 mg tablet Take  by mouth every six (6) hours as needed for Pain.  losartan (COZAAR) 100 mg tablet TAKE 1 TABLET DAILY    simvastatin (ZOCOR) 20 mg tablet TAKE 1 TABLET DAILY    LORazepam (ATIVAN) 1 mg tablet TAKE 1/2 TO 1 TABLET       NIGHTLY AS NEEDED FOR      ANXIETY. MAXIMUM DAILY     AMOUNT: 1 TABLET    hydroCHLOROthiazide (HYDRODIURIL) 12.5 mg tablet Take 1 Tab by mouth daily.  amLODIPine (NORVASC) 5 mg tablet Take 1 Tab by mouth daily.     levocetirizine (XYZAL) 5 mg tablet TAKE 1 TABLET DAILY    PARoxetine (PAXIL) 30 mg tablet TAKE 1 TABLET DAILY (Patient taking differently: daily (after breakfast). TAKE 1 TABLET DAILY)    meloxicam (MOBIC) 15 mg tablet TAKE 1 TABLET DAILY (Patient taking differently: Take 7.5 mg by mouth daily.)    zolpidem (AMBIEN) 5 mg tablet TAKE 1 TABLET BY MOUTH ONCE DAILY AS NEEDED FOR SLEEP    budesonide (ENTOCORT EC) 3 mg capsule Take 3 mg by mouth daily as needed. 1 capsule by mouth daily    albuterol (PROVENTIL VENTOLIN) 2.5 mg /3 mL (0.083 %) nebulizer solution 3 mL by Nebulization route three (3) times daily as needed for Wheezing or Shortness of Breath.  dicyclomine (BENTYL) 10 mg capsule Take 10 mg by mouth as needed. 1 tablet by mouth daily as needed    MULTIVITAMIN W-MINERALS/LUTEIN (CENTRUM SILVER PO) Take 1 Tab by mouth. Takes one po daily. No current facility-administered medications for this encounter.      ____________________________________________  ALLERGIES  Allergies   Allergen Reactions    Cefdinir Other (comments)     Sick to stomach    Ciprofloxacin Diarrhea and Nausea and Vomiting    Flagyl [Metronidazole] Nausea and Vomiting    Other Medication Other (comments)     BANDAIDS - breaks out skin    Oxycodone-Acetaminophen Other (comments)     dizziness    Phenylephrine Other (comments)     Wire her up    Phenylpropanolamine Other (comments)     Wire her up    Sulfa (Sulfonamide Antibiotics) Nausea Only      ____________________________________________  SOCIAL HISTORY  Social History     Tobacco Use    Smoking status: Never Smoker    Smokeless tobacco: Never Used   Substance Use Topics    Alcohol use:  Yes     Alcohol/week: 7.0 standard drinks     Types: 3 Glasses of wine, 4 Cans of beer per week      ____________________________________________   Internal Administration   First Dose COVID-19, PFIZER, MRNA, LNP-S, PF, 30MCG/0.3ML DOSE  01/30/2021   Second Dose COVID-19, PFIZER, MRNA, LNP-S, PF, 30MCG/0.3ML DOSE  02/27/2021      Last COVID Lab SARS-CoV-2 ( )   Date Value   09/09/2021 Not detected      ____________________________________________      Labs:     Hospital Outpatient Visit on 08/31/2021   Component Date Value Ref Range Status    Sodium 08/31/2021 130* 136 - 145 mmol/L Final    Potassium 08/31/2021 4.3  3.5 - 5.1 mmol/L Final    Chloride 08/31/2021 99  97 - 108 mmol/L Final    CO2 08/31/2021 26  21 - 32 mmol/L Final    Anion gap 08/31/2021 5  5 - 15 mmol/L Final    Glucose 08/31/2021 79  65 - 100 mg/dL Final    BUN 08/31/2021 18  6 - 20 MG/DL Final    Creatinine 08/31/2021 0.95  0.55 - 1.02 MG/DL Final    BUN/Creatinine ratio 08/31/2021 19  12 - 20   Final    GFR est AA 08/31/2021 >60  >60 ml/min/1.73m2 Final    GFR est non-AA 08/31/2021 56* >60 ml/min/1.73m2 Final    Estimated GFR is calculated using the IDMS-traceable Modification of Diet in Renal Disease (MDRD) Study equation, reported for both  Americans (GFRAA) and non- Americans (GFRNA), and normalized to 1.73m2 body surface area. The physician must decide which value applies to the patient.     Calcium 08/31/2021 9.0  8.5 - 10.1 MG/DL Final    WBC 08/31/2021 7.5  3.6 - 11.0 K/uL Final    RBC 08/31/2021 3.79* 3.80 - 5.20 M/uL Final    HGB 08/31/2021 12.9  11.5 - 16.0 g/dL Final    HCT 08/31/2021 37.0  35.0 - 47.0 % Final    MCV 08/31/2021 97.6  80.0 - 99.0 FL Final    MCH 08/31/2021 34.0  26.0 - 34.0 PG Final    MCHC 08/31/2021 34.9  30.0 - 36.5 g/dL Final    RDW 08/31/2021 12.5  11.5 - 14.5 % Final    PLATELET 16/17/9855 021  150 - 400 K/uL Final    MPV 08/31/2021 9.9  8.9 - 12.9 FL Final    NRBC 08/31/2021 0.0  0  WBC Final    ABSOLUTE NRBC 08/31/2021 0.00  0.00 - 0.01 K/uL Final    Crossmatch Expiration 08/31/2021 09/14/2021,2359   Final    ABO/Rh(D) 08/31/2021 B POSITIVE   Final    Antibody screen 08/31/2021 NEG   Final    INR 08/31/2021 0.9  0.9 - 1.1   Final    A single therapeutic range for Vit K antagonists may not be optimal for all indications - see June, 2008 issue of Chest, American College of Chest [employer] Evidence-Based Clinical Practice Guidelines, 8th Edition.  Prothrombin time 08/31/2021 9.8  9.0 - 11.1 sec Final    Color 08/31/2021 YELLOW/STRAW    Final    Color Reference Range: Straw, Yellow or Dark Yellow    Appearance 08/31/2021 CLEAR  CLEAR   Final    Specific gravity 08/31/2021 1.009  1.003 - 1.030   Final    pH (UA) 08/31/2021 7.0  5.0 - 8.0   Final    Protein 08/31/2021 Negative  NEG mg/dL Final    Glucose 08/31/2021 Negative  NEG mg/dL Final    Ketone 08/31/2021 Negative  NEG mg/dL Final    Bilirubin 08/31/2021 Negative  NEG   Final    Blood 08/31/2021 Negative  NEG   Final    Urobilinogen 08/31/2021 0.2  0.2 - 1.0 EU/dL Final    Nitrites 08/31/2021 Negative  NEG   Final    Leukocyte Esterase 08/31/2021 Negative  NEG   Final    UA:UC IF INDICATED 08/31/2021 CULTURE NOT INDICATED BY UA RESULT    Final    WBC 08/31/2021 0-4  0 - 4 /hpf Final    RBC 08/31/2021 0-5  0 - 5 /hpf Final    Epithelial cells 08/31/2021 FEW  FEW /lpf Final    Epithelial cell category consists of squamous cells and /or transitional urothelial cells. Renal tubular cells, if present, are separately identified as such.     Bacteria 08/31/2021 Negative  NEG /hpf Final    Hyaline cast 08/31/2021 0-2  0 - 5 /lpf Final    Ventricular Rate 08/31/2021 84  BPM Final    Atrial Rate 08/31/2021 84  BPM Final    P-R Interval 08/31/2021 172  ms Final    QRS Duration 08/31/2021 72  ms Final    Q-T Interval 08/31/2021 360  ms Final    QTC Calculation (Bezet) 08/31/2021 425  ms Final    Calculated P Axis 08/31/2021 24  degrees Final    Calculated R Axis 08/31/2021 26  degrees Final    Calculated T Axis 08/31/2021 29  degrees Final    Diagnosis 08/31/2021    Final                    Value:Normal sinus rhythm    When compared with ECG of 14-JAN-2021 11:49,  No significant change was found  Confirmed by Mariel Grace M.D., [facility] President ([id number]) on 8/31/2021 5:48:16 PM      Hemoglobin A1c 08/31/2021 5.2  4.0 - 5.6 % Final    Comment: NEW METHOD  PLEASE NOTE NEW REFERENCE RANGE  (NOTE)  HbA1C Interpretive Ranges  <5.7              Normal  5.7 - 6.4         Consider Prediabetes  >6.5              Consider Diabetes      Est. average glucose 08/31/2021 103  mg/dL Final    Special Requests: 08/31/2021 NO SPECIAL REQUESTS    Final    Culture result: 08/31/2021 MRSA NOT PRESENT    Final       Skin:     Denies open wounds, cuts, sores, rashes or other areas of concern in PAT assessment.         Juan Miguel Bills NP

## 2021-09-01 NOTE — PROGRESS NOTES
1. Have you been to the ER, urgent care clinic since your last visit? Hospitalized since your last visit? No    2. Have you seen or consulted any other health care providers outside of the [address] [place] [address] since your last visit? Include any pap smears or colon screening.  No       Chief Complaint   Patient presents with    Pre-op Exam     back surgery on 09/14

## 2021-09-01 NOTE — PERIOP NOTES
EKG AND LABS FAXED TO SURGEON'S AND PCP  OFFICE VIA [facility]     ALIVIA LIAO NP REVIEWED LABS NA+ 130 NO FURTHER ORDERS RECEIVED

## 2021-09-03 ENCOUNTER — TRANSCRIBE ORDER (OUTPATIENT)
Dept: REGISTRATION | Age: 86
End: 2021-09-03

## 2021-09-03 DIAGNOSIS — Z01.812 PRE-PROCEDURE LAB EXAM: Primary | ICD-10-CM

## 2021-09-09 ENCOUNTER — HOSPITAL ENCOUNTER (OUTPATIENT)
Dept: PREADMISSION TESTING | Age: 86
Discharge: HOME | End: 2021-09-09
Payer: MEDICARE

## 2021-09-09 DIAGNOSIS — Z01.812 PRE-PROCEDURE LAB EXAM: ICD-10-CM

## 2021-09-09 PROCEDURE — U0005 INFEC AGEN DETEC AMPLI PROBE: HCPCS

## 2021-09-09 NOTE — H&P
Jason Torres  Location: [place] [address] [address]  Patient #: [id number]  : 1935   / Language: English / Race: White  Female      History of Present Illness   The patient is a 81year old female who presents with a complaint of Low Back Pain. Note for \"Low Back Pain\": She presents today with severe back pain and bilateral leg pain.  The left leg pain is worse.  Her history is significant for previous L4-5 fusion.  She is developed a degenerative scoliosis as well as severe stenosis at L2-3 and L3-4 above her fusion.  She has a lateral listhesis at L3-4 as well.  She has had 4 rounds of injections without significant improvement.  Her walking tolerance is significantly decreased.  She has no bowel bladder difficulties.  Her symptoms are worse with prolonged standing and walking. Terrie Norvell are better with sitting.  She does not take pain medications.       Problem List/Past Medical   Essential Hypertension    Osteoarthritis, localized, shoulder, left (715.31  M19.012)    Other cervical disc degeneration, mid-cervical region, unspecified level (722.4  M50.320)    Nontraumatic tear of muscle or tendon of rotator cuff of both shoulders (727.61  M75.101, M75.102)    Cervical disc degeneration (722.4  M50.30)    Impingement syndrome of left shoulder (726.2  M75.42)    Follow-up after surgery (V67.00  Z09)    Dietary counseling (V65.3)    Rotator cuff arthropathy, right (716.81  M12.811)    Pain in both hands (729.5  M79.641, M79.642)    SHOULDER PAIN (719.41)    Shoulder pain (719.41  M25.519)    Primary osteoarthritis of both hands (715.14  M19.041, M19.042)    Carpal tunnel syndrome of left wrist (354.0  G56.02)    BICEPS TENOSYNOVITIS (726.12)    Cervicalgia (723.1  M54.2)    Rotator cuff arthropathy, left (716.81  M12.812)    Status post carpal tunnel release (V45.89  Z98.890)    Weight above 97th percentile (V49.89  Z78.9)    Lumbar back pain with radiculopathy affecting left lower extremity (724.4  M54.16)    DDD (degenerative disc disease), lumbar (722.52  M51.36)    Spinal stenosis of lumbar region with radiculopathy (724.02  M48.061)    History of lumbar fusion (V45.4  Z98.1)    Osteoarthritis, localized, shoulder, right (715.31  M19.011)    Chronic right shoulder pain (719.41  M25.511)    BICEPS RUPTURE (727.62)    Carpal tunnel syndrome of right wrist (354.0  G56.01)    Rotator cuff tear, non-traumatic, left (727.61  M75.102)    BURSITIS / TENDONITIS (726.10)    Hypertension, goal below 140/90 (401.9  I10)    Other spondylosis, cervical region (721.0  M47.892)    Status post reverse total shoulder replacement, right (V43.61  Z96.611)      Allergies  Ciprofloxacin *CHEMICALS*   [07/12/2021 10:45 AM]:  Phenylephrine HCl *NASAL AGENTS - SYSTEMIC AND TOPICAL*   [07/12/2021 10:45 AM]:  Flagyl *ANTI-INFECTIVE AGENTS - MISC. *   [07/12/2021 10:45 AM]:  OxyCODONE HCl *ANALGESICS - OPIOID*   [07/12/2021 10:45 AM]:  Sulfa 10 *OPHTHALMIC AGENTS*   [07/12/2021 10:45 AM]: Adhesive Bandages *MEDICAL DEVICES AND SUPPLIES*   [07/12/2021 10:45 AM]:    Family History Lauren Lacy CMA; 7/12/2021 10:45 AM)  Cancer    Cerebrovascular Accident      Social History   Exercise   10/23/2012: Walks 1-2 times a week. 09/08/2014: Walks. HIV risk factors   10/23/2012: no  09/08/2014: no  Seat Belt Use   10/23/2012: always  09/08/2014: always  Alcohol use   10/23/2012: Drinks wine 6 times per week having 1-2 drinks per occasion, never having more than 5 drinks per occasion. 09/08/2014: Drinks wine 6 times per week having 1-2 drinks per occasion, never having more than 5 drinks per occasion. Sun Exposure   10/23/2012: occasionally  09/08/2014: occasionally  Illicit drug use   95/15/0938: none  09/08/2014: none  Tobacco use   Never smoker. Caffeine use   10/23/2012: Drinks coffee 1-2 times a day. 09/08/2014: Drinks coffee 1-2 times a day. Tobacco / smoke exposure   None.     Medication History Gabapentin  (300MG Capsule, Oral) Active. Amoxicillin  (500MG Tablet, 2 Oral Two po one hour before dental procedure Two po one hour after dental procedure, Taken starting 07/14/2020) Active. Dicyclomine HCl  (20MG Tablet, Oral) Active. Losartan Potassium-HCTZ  (50-12.5MG Tablet, Oral) Active. Dicyclomine HCl  (10MG Capsule, Oral) Active. Uceris  (Oral) Specific strength unknown - Active. Simvastatin  (20MG Tablet, Oral) Active. Levocetirizine Dihydrochloride  (5MG Tablet, Oral) Active. Centrum Silver  (Oral) Active. Paxil  (30MG Tablet, Oral) Active. Ativan  (1MG Tablet, Oral) Active. hydroCHLOROthiazide  (12.5MG Tablet, Oral) Active. Losartan Potassium  (50MG Tablet, Oral) Active. Mobic  (15MG Tablet, Oral) Active. Medications Reconciled     Past Surgical History  Spinal Fusion   lower back  Cataract Surgery   bilateral  Spinal Surgery    Tonsillectomy    Gallbladder Surgery   laparoscopic  Adenoidectomy    Arthroscopy of Knee   left  Dilation and Curettage of Uterus    Hysterectomy   non-cancerous: complete and abdominal    Other Problems   Asthma    Arthritis    Oophorectomy   bilateral  Gastrointestinal Disease    Unspecified Diagnosis    Unspecified Diagnosis    REVIEW OF SYSTEMS: Systems were reviewed by the provider.    Treatment options were discussed with the patient in full.    Post-op pain (338.18  G89.18)      Vitals   Weight: 124 lb   Height: 60 in   Body Surface Area: 1.52 m²   Body Mass Index: 24.22 kg/m²        Physical Exam   Neurologic  Sensory  Light Touch - Intact - Globally. Overall Assessment of Muscle Strength and Tone reveals  Lower Extremities - Right Iliopsoas - 5/5. Left Iliopsoas - 5/5. Right Tibialis Anterior - 4-/5. Left Tibialis Anterior - 4-/5. Right Gastroc-Soleus - 5/5. Left Gastroc-Soleus - 5/5. Right EHL - 4/5 . Left EHL - 3+/5. General Assessment of Reflexes  Right Ankle - Clonus is not present. Left Ankle - Clonus is not present.   Reflexes (Dermatomes)  2/2 Normal - Left Achilles (L5-S2), Left Knee (L2-4), Right Achilles (L5-S2) and Right Knee (L2-4). Musculoskeletal  Global Assessment  Examination of related systems reveals - well-developed, well-nourished, in no acute distress, alert and oriented x 3. Gait and Station - normal gait and station and normal posture. Right Lower Extremity - normal strength and tone, normal range of motion without pain and no instability, subluxation or laxity. Left Lower Extremity - normal strength and tone, normal range of motion without pain and no instability, subluxation or laxity. Spine/Ribs/Pelvis  Cervical Spine - Examination of the cervical spine reveals - no tenderness to palpation, no pain, no swelling, edema or erythema, normal cervical spine movements and normal sensation. Thoracic (Dorsal) Spine - Examination of the thoracic spine reveals - no tenderness over thoracic vertebrae, no pain, normal sensation and normal thoracic spine movements. Lumbosacral Spine - Examination of the lumbosacral spine reveals - no known fractures or deformities. Inspection and Palpation - Tenderness - moderate. Assessment of pain reveals the following findings - The pain is characterized as - moderate. Location - pain refers to lower back bilaterally. ROJM - Trunk Extension - 15 degrees. Lumbar Spine Flexion - 35 °. Lumbosacral Spine - Functional Testing - Babinski Test negative, Prone Knee Bending Test negative, Slump Test negative, Straight Leg Raising Test negative. Assessment & Plan     REVIEW OF SYSTEMS: Systems were reviewed by the provider.(V49.9)    Current Plans  Pt Education - How to [facility] using Patient Portal and 3rd Party Apps: discussed with patient and provided information. Lumbar stenosis with neurogenic claudication (724.03  M48.062)      Spondylolisthesis (Acquired) (738.4  M43.10)  Impression:  At this point she has developed severe spondylolisthesis and stenosis above her previous L4-L5 fusion. She had 4 rounds of injections without any significant provement. Ultimately I think she is a candidate for a revision laminectomy L2-L4 with a revision fusion T10-L5. The risks and benefits were discussed at length with the patient and the patient has elected to proceed. Indications for surgery include failed conservative treatment. Alternative treatments, risks and the perioperative course were discussed with the patient. All questions were answered. The risks and benefits of the procedure were explained. Benefits include definitive diagnosis, relief of pain, elimination of deformity and improved function. Risks of surgery including bleeding, infection, weakness, numbness, CSF leak, failure to improve symptoms, exacerbation of medical co-morbidities and even death were discussed with the patient. Treatment options were discussed with the patient in full.(V65.49)    Current Plans  Presurgical planning was preformed with the patient today  Surgery to be scheduled  Procedure: Revision laminectomy L2-L4 with a revision posterior fusion T10-L5. Date of Surgery Update:  Sarthak Maradiaga was seen and examined. History and physical has been reviewed. The patient has been examined.  There have been no significant clinical changes since the completion of the originally dated History and Physical.    Signed By: Wayne Bhatti MD     September 14, 2021 12:06 PM           Signed by Wayne Bahtti MD

## 2021-09-10 LAB
SARS-COV-2, XPLCVT: NOT DETECTED
SOURCE, COVRS: NORMAL

## 2021-09-13 ENCOUNTER — ANESTHESIA EVENT (OUTPATIENT)
Dept: SURGERY | Age: 86
DRG: 457 | End: 2021-09-13
Payer: MEDICARE

## 2021-09-14 ENCOUNTER — HOSPITAL ENCOUNTER (INPATIENT)
Age: 86
LOS: 6 days | Discharge: HOME HEALTH | DRG: 457 | End: 2021-09-20
Attending: ORTHOPAEDIC SURGERY | Admitting: ORTHOPAEDIC SURGERY
Payer: MEDICARE

## 2021-09-14 ENCOUNTER — APPOINTMENT (OUTPATIENT)
Dept: GENERAL RADIOLOGY | Age: 86
DRG: 457 | End: 2021-09-14
Attending: ORTHOPAEDIC SURGERY
Payer: MEDICARE

## 2021-09-14 ENCOUNTER — ANESTHESIA (OUTPATIENT)
Dept: SURGERY | Age: 86
DRG: 457 | End: 2021-09-14
Payer: MEDICARE

## 2021-09-14 DIAGNOSIS — M43.20 FUSION OF SPINE, UNSPECIFIED SPINAL REGION: Primary | ICD-10-CM

## 2021-09-14 PROBLEM — M48.062 LUMBAR STENOSIS WITH NEUROGENIC CLAUDICATION: Status: ACTIVE | Noted: 2021-09-14

## 2021-09-14 LAB
GLUCOSE BLD STRIP.AUTO-MCNC: 103 MG/DL (ref 65–117)
SERVICE CMNT-IMP: NORMAL

## 2021-09-14 PROCEDURE — 77030041393 HC GRFT BN PROT GRWTH FCTR BSYC -H: Performed by: ORTHOPAEDIC SURGERY

## 2021-09-14 PROCEDURE — 77030003152 HC GRFT COLGN DURAL INLC -H: Performed by: ORTHOPAEDIC SURGERY

## 2021-09-14 PROCEDURE — 74011250637 HC RX REV CODE- 250/637: Performed by: ANESTHESIOLOGY

## 2021-09-14 PROCEDURE — 77030014650 HC SEAL MTRX FLOSEL BAXT -C: Performed by: ORTHOPAEDIC SURGERY

## 2021-09-14 PROCEDURE — 74011250636 HC RX REV CODE- 250/636: Performed by: NURSE ANESTHETIST, CERTIFIED REGISTERED

## 2021-09-14 PROCEDURE — C1713 ANCHOR/SCREW BN/BN,TIS/BN: HCPCS | Performed by: ORTHOPAEDIC SURGERY

## 2021-09-14 PROCEDURE — 74011250636 HC RX REV CODE- 250/636

## 2021-09-14 PROCEDURE — 77030037728 HC GRFT BN FBR CORT 3DEMIN 30CC BACT -I: Performed by: ORTHOPAEDIC SURGERY

## 2021-09-14 PROCEDURE — 77030033350 HC GRFT GRAN OSTEOAMP 20ML ADBI -I: Performed by: ORTHOPAEDIC SURGERY

## 2021-09-14 PROCEDURE — 74011250637 HC RX REV CODE- 250/637: Performed by: PHYSICIAN ASSISTANT

## 2021-09-14 PROCEDURE — 0SG10K1 FUSION OF 2 OR MORE LUMBAR VERTEBRAL JOINTS WITH NONAUTOLOGOUS TISSUE SUBSTITUTE, POSTERIOR APPROACH, POSTERIOR COLUMN, OPEN APPROACH: ICD-10-PCS | Performed by: ORTHOPAEDIC SURGERY

## 2021-09-14 PROCEDURE — 77030031139 HC SUT VCRL2 J&J -A: Performed by: ORTHOPAEDIC SURGERY

## 2021-09-14 PROCEDURE — 01NB0ZZ RELEASE LUMBAR NERVE, OPEN APPROACH: ICD-10-PCS | Performed by: ORTHOPAEDIC SURGERY

## 2021-09-14 PROCEDURE — 76060000039 HC ANESTHESIA 4 TO 4.5 HR: Performed by: ORTHOPAEDIC SURGERY

## 2021-09-14 PROCEDURE — 2709999900 HC NON-CHARGEABLE SUPPLY: Performed by: ORTHOPAEDIC SURGERY

## 2021-09-14 PROCEDURE — 77030041394 HC GRFT BN PROT GRWTH FCTR BSYC -I1: Performed by: ORTHOPAEDIC SURGERY

## 2021-09-14 PROCEDURE — 74011250636 HC RX REV CODE- 250/636: Performed by: PHYSICIAN ASSISTANT

## 2021-09-14 PROCEDURE — 74011000250 HC RX REV CODE- 250: Performed by: NURSE ANESTHETIST, CERTIFIED REGISTERED

## 2021-09-14 PROCEDURE — 77030026438 HC STYL ET INTUB CARD -A: Performed by: NURSE ANESTHETIST, CERTIFIED REGISTERED

## 2021-09-14 PROCEDURE — 77030038692 HC WND DEB SYS IRMX -B: Performed by: ORTHOPAEDIC SURGERY

## 2021-09-14 PROCEDURE — 65270000029 HC RM PRIVATE

## 2021-09-14 PROCEDURE — 0RG70K1 FUSION OF 2 TO 7 THORACIC VERTEBRAL JOINTS WITH NONAUTOLOGOUS TISSUE SUBSTITUTE, POSTERIOR APPROACH, POSTERIOR COLUMN, OPEN APPROACH: ICD-10-PCS | Performed by: ORTHOPAEDIC SURGERY

## 2021-09-14 PROCEDURE — 74011000258 HC RX REV CODE- 258

## 2021-09-14 PROCEDURE — 82962 GLUCOSE BLOOD TEST: CPT

## 2021-09-14 PROCEDURE — 77030040914 HC SPHERE PASS MRKR BUSA -B: Performed by: ORTHOPAEDIC SURGERY

## 2021-09-14 PROCEDURE — 77030005513 HC CATH URETH FOL11 MDII -B: Performed by: ORTHOPAEDIC SURGERY

## 2021-09-14 PROCEDURE — 77030029099 HC BN WAX SSPC -A: Performed by: ORTHOPAEDIC SURGERY

## 2021-09-14 PROCEDURE — 77030037714 HC CLOSR DEV INCIS ZIP STRY -C: Performed by: ORTHOPAEDIC SURGERY

## 2021-09-14 PROCEDURE — 77030038552 HC DRN WND MDII -A: Performed by: ORTHOPAEDIC SURGERY

## 2021-09-14 PROCEDURE — 74011000258 HC RX REV CODE- 258: Performed by: NURSE ANESTHETIST, CERTIFIED REGISTERED

## 2021-09-14 PROCEDURE — 77030013079 HC BLNKT BAIR HGGR 3M -A: Performed by: NURSE ANESTHETIST, CERTIFIED REGISTERED

## 2021-09-14 PROCEDURE — 74011000250 HC RX REV CODE- 250: Performed by: ORTHOPAEDIC SURGERY

## 2021-09-14 PROCEDURE — 77030004391 HC BUR FLUT MEDT -C: Performed by: ORTHOPAEDIC SURGERY

## 2021-09-14 PROCEDURE — 77030014007 HC SPNG HEMSTAT J&J -B: Performed by: ORTHOPAEDIC SURGERY

## 2021-09-14 PROCEDURE — 77030002924 HC SUT GORTX WLGO -B: Performed by: ORTHOPAEDIC SURGERY

## 2021-09-14 PROCEDURE — 0SP004Z REMOVAL OF INTERNAL FIXATION DEVICE FROM LUMBAR VERTEBRAL JOINT, OPEN APPROACH: ICD-10-PCS | Performed by: ORTHOPAEDIC SURGERY

## 2021-09-14 PROCEDURE — 77030013951 HC SEAL TISS ADH DURASL KT INLC -G1: Performed by: ORTHOPAEDIC SURGERY

## 2021-09-14 PROCEDURE — 74011250636 HC RX REV CODE- 250/636: Performed by: ANESTHESIOLOGY

## 2021-09-14 PROCEDURE — 74011000250 HC RX REV CODE- 250: Performed by: PHYSICIAN ASSISTANT

## 2021-09-14 PROCEDURE — 77030008684 HC TU ET CUF COVD -B: Performed by: NURSE ANESTHETIST, CERTIFIED REGISTERED

## 2021-09-14 PROCEDURE — 77030038600 HC TU BPLR IRR DISP STRY -B: Performed by: ORTHOPAEDIC SURGERY

## 2021-09-14 PROCEDURE — 0RGA0K1 FUSION OF THORACOLUMBAR VERTEBRAL JOINT WITH NONAUTOLOGOUS TISSUE SUBSTITUTE, POSTERIOR APPROACH, POSTERIOR COLUMN, OPEN APPROACH: ICD-10-PCS | Performed by: ORTHOPAEDIC SURGERY

## 2021-09-14 PROCEDURE — 76210000017 HC OR PH I REC 1.5 TO 2 HR: Performed by: ORTHOPAEDIC SURGERY

## 2021-09-14 PROCEDURE — 72100 X-RAY EXAM L-S SPINE 2/3 VWS: CPT

## 2021-09-14 PROCEDURE — 74011250636 HC RX REV CODE- 250/636: Performed by: ORTHOPAEDIC SURGERY

## 2021-09-14 PROCEDURE — 76010000174 HC OR TIME 3.5 TO 4 HR INTENSV-TIER 1: Performed by: ORTHOPAEDIC SURGERY

## 2021-09-14 DEVICE — DURASEAL® DURAL SEALANT SYSTEM 5ML 5 PACK
Type: IMPLANTABLE DEVICE | Site: SPINE LUMBAR | Status: FUNCTIONAL
Brand: DURASEAL®

## 2021-09-14 DEVICE — SCREW 55840006540 5.5/6 MAS 6.5X40 CC
Type: IMPLANTABLE DEVICE | Site: SPINE LUMBAR | Status: FUNCTIONAL
Brand: CD HORIZON® SPINAL SYSTEM

## 2021-09-14 DEVICE — GRAFT BNE 3D 30 CC CORTICAL FIBER: Type: IMPLANTABLE DEVICE | Site: SPINE LUMBAR | Status: FUNCTIONAL

## 2021-09-14 DEVICE — GRAFT BNE LG: Type: IMPLANTABLE DEVICE | Site: SPINE LUMBAR | Status: FUNCTIONAL

## 2021-09-14 DEVICE — DURAGEN® PLUS DURAL REGENERATION MATRIX, 2 IN X 2 IN (5 CM X 5 CM)
Type: IMPLANTABLE DEVICE | Site: SPINE LUMBAR | Status: FUNCTIONAL
Brand: DURAGEN® PLUS

## 2021-09-14 DEVICE — GRAFT BNE XL: Type: IMPLANTABLE DEVICE | Site: SPINE LUMBAR | Status: FUNCTIONAL

## 2021-09-14 DEVICE — GRAFT BNE SUB 20CC 2 4MM GRAN GROWTH FACT ALLGRFT OSTEOAMP: Type: IMPLANTABLE DEVICE | Site: SPINE LUMBAR | Status: FUNCTIONAL

## 2021-09-14 RX ORDER — DEXAMETHASONE SODIUM PHOSPHATE 4 MG/ML
INJECTION, SOLUTION INTRA-ARTICULAR; INTRALESIONAL; INTRAMUSCULAR; INTRAVENOUS; SOFT TISSUE AS NEEDED
Status: DISCONTINUED | OUTPATIENT
Start: 2021-09-14 | End: 2021-09-14 | Stop reason: HOSPADM

## 2021-09-14 RX ORDER — FENTANYL CITRATE 50 UG/ML
INJECTION, SOLUTION INTRAMUSCULAR; INTRAVENOUS AS NEEDED
Status: DISCONTINUED | OUTPATIENT
Start: 2021-09-14 | End: 2021-09-14 | Stop reason: HOSPADM

## 2021-09-14 RX ORDER — HYDROMORPHONE HYDROCHLORIDE 2 MG/1
2 TABLET ORAL
Status: DISCONTINUED | OUTPATIENT
Start: 2021-09-14 | End: 2021-09-15

## 2021-09-14 RX ORDER — POLYETHYLENE GLYCOL 3350 17 G/17G
17 POWDER, FOR SOLUTION ORAL DAILY
Status: DISCONTINUED | OUTPATIENT
Start: 2021-09-15 | End: 2021-09-20 | Stop reason: HOSPADM

## 2021-09-14 RX ORDER — SODIUM CHLORIDE, SODIUM LACTATE, POTASSIUM CHLORIDE, CALCIUM CHLORIDE 600; 310; 30; 20 MG/100ML; MG/100ML; MG/100ML; MG/100ML
50 INJECTION, SOLUTION INTRAVENOUS CONTINUOUS
Status: DISCONTINUED | OUTPATIENT
Start: 2021-09-14 | End: 2021-09-14 | Stop reason: HOSPADM

## 2021-09-14 RX ORDER — PROPOFOL 10 MG/ML
INJECTION, EMULSION INTRAVENOUS
Status: DISCONTINUED | OUTPATIENT
Start: 2021-09-14 | End: 2021-09-14 | Stop reason: HOSPADM

## 2021-09-14 RX ORDER — ALBUTEROL SULFATE 0.83 MG/ML
2.5 SOLUTION RESPIRATORY (INHALATION)
Status: DISCONTINUED | OUTPATIENT
Start: 2021-09-14 | End: 2021-09-20 | Stop reason: HOSPADM

## 2021-09-14 RX ORDER — SODIUM CHLORIDE 9 MG/ML
125 INJECTION, SOLUTION INTRAVENOUS CONTINUOUS
Status: DISPENSED | OUTPATIENT
Start: 2021-09-14 | End: 2021-09-15

## 2021-09-14 RX ORDER — PROPOFOL 10 MG/ML
INJECTION, EMULSION INTRAVENOUS AS NEEDED
Status: DISCONTINUED | OUTPATIENT
Start: 2021-09-14 | End: 2021-09-14 | Stop reason: HOSPADM

## 2021-09-14 RX ORDER — HYDROMORPHONE HYDROCHLORIDE 1 MG/ML
0.5 INJECTION, SOLUTION INTRAMUSCULAR; INTRAVENOUS; SUBCUTANEOUS
Status: ACTIVE | OUTPATIENT
Start: 2021-09-14 | End: 2021-09-15

## 2021-09-14 RX ORDER — ONDANSETRON 2 MG/ML
INJECTION INTRAMUSCULAR; INTRAVENOUS AS NEEDED
Status: DISCONTINUED | OUTPATIENT
Start: 2021-09-14 | End: 2021-09-14 | Stop reason: HOSPADM

## 2021-09-14 RX ORDER — SODIUM CHLORIDE 0.9 % (FLUSH) 0.9 %
5-40 SYRINGE (ML) INJECTION EVERY 8 HOURS
Status: DISCONTINUED | OUTPATIENT
Start: 2021-09-14 | End: 2021-09-14 | Stop reason: HOSPADM

## 2021-09-14 RX ORDER — HYDROMORPHONE HYDROCHLORIDE 2 MG/ML
INJECTION, SOLUTION INTRAMUSCULAR; INTRAVENOUS; SUBCUTANEOUS AS NEEDED
Status: DISCONTINUED | OUTPATIENT
Start: 2021-09-14 | End: 2021-09-14 | Stop reason: HOSPADM

## 2021-09-14 RX ORDER — AMOXICILLIN 250 MG
1 CAPSULE ORAL 2 TIMES DAILY
Status: DISCONTINUED | OUTPATIENT
Start: 2021-09-14 | End: 2021-09-20 | Stop reason: HOSPADM

## 2021-09-14 RX ORDER — GABAPENTIN 300 MG/1
300 CAPSULE ORAL
Status: DISCONTINUED | OUTPATIENT
Start: 2021-09-14 | End: 2021-09-20 | Stop reason: HOSPADM

## 2021-09-14 RX ORDER — BUDESONIDE 3 MG/1
3 CAPSULE, COATED PELLETS ORAL
Status: DISCONTINUED | OUTPATIENT
Start: 2021-09-14 | End: 2021-09-20 | Stop reason: HOSPADM

## 2021-09-14 RX ORDER — DICYCLOMINE HYDROCHLORIDE 10 MG/1
10 CAPSULE ORAL AS NEEDED
Status: DISCONTINUED | OUTPATIENT
Start: 2021-09-14 | End: 2021-09-14

## 2021-09-14 RX ORDER — FACIAL-BODY WIPES
10 EACH TOPICAL DAILY PRN
Status: DISCONTINUED | OUTPATIENT
Start: 2021-09-16 | End: 2021-09-20 | Stop reason: HOSPADM

## 2021-09-14 RX ORDER — CETIRIZINE HCL 10 MG
10 TABLET ORAL
Status: DISCONTINUED | OUTPATIENT
Start: 2021-09-14 | End: 2021-09-20 | Stop reason: HOSPADM

## 2021-09-14 RX ORDER — ACETAMINOPHEN 325 MG/1
650 TABLET ORAL ONCE
Status: COMPLETED | OUTPATIENT
Start: 2021-09-14 | End: 2021-09-14

## 2021-09-14 RX ORDER — SODIUM CHLORIDE 0.9 % (FLUSH) 0.9 %
5-40 SYRINGE (ML) INJECTION AS NEEDED
Status: DISCONTINUED | OUTPATIENT
Start: 2021-09-14 | End: 2021-09-14 | Stop reason: HOSPADM

## 2021-09-14 RX ORDER — LIDOCAINE HYDROCHLORIDE 20 MG/ML
INJECTION, SOLUTION EPIDURAL; INFILTRATION; INTRACAUDAL; PERINEURAL AS NEEDED
Status: DISCONTINUED | OUTPATIENT
Start: 2021-09-14 | End: 2021-09-14 | Stop reason: HOSPADM

## 2021-09-14 RX ORDER — ACETAMINOPHEN 500 MG
1000 TABLET ORAL EVERY 6 HOURS
Status: DISCONTINUED | OUTPATIENT
Start: 2021-09-14 | End: 2021-09-20 | Stop reason: HOSPADM

## 2021-09-14 RX ORDER — ATORVASTATIN CALCIUM 10 MG/1
10 TABLET, FILM COATED ORAL DAILY
Status: DISCONTINUED | OUTPATIENT
Start: 2021-09-15 | End: 2021-09-20 | Stop reason: HOSPADM

## 2021-09-14 RX ORDER — HYDROMORPHONE HYDROCHLORIDE 4 MG/1
4 TABLET ORAL
Status: DISCONTINUED | OUTPATIENT
Start: 2021-09-14 | End: 2021-09-15

## 2021-09-14 RX ORDER — ONDANSETRON 2 MG/ML
4 INJECTION INTRAMUSCULAR; INTRAVENOUS
Status: ACTIVE | OUTPATIENT
Start: 2021-09-14 | End: 2021-09-15

## 2021-09-14 RX ORDER — PAROXETINE HYDROCHLORIDE 20 MG/1
30 TABLET, FILM COATED ORAL
Status: DISCONTINUED | OUTPATIENT
Start: 2021-09-15 | End: 2021-09-20 | Stop reason: HOSPADM

## 2021-09-14 RX ORDER — SODIUM CHLORIDE 0.9 % (FLUSH) 0.9 %
5-40 SYRINGE (ML) INJECTION EVERY 8 HOURS
Status: DISCONTINUED | OUTPATIENT
Start: 2021-09-14 | End: 2021-09-20 | Stop reason: HOSPADM

## 2021-09-14 RX ORDER — LORAZEPAM 1 MG/1
1 TABLET ORAL
Status: DISCONTINUED | OUTPATIENT
Start: 2021-09-14 | End: 2021-09-20 | Stop reason: HOSPADM

## 2021-09-14 RX ORDER — DIPHENHYDRAMINE HYDROCHLORIDE 50 MG/ML
12.5 INJECTION, SOLUTION INTRAMUSCULAR; INTRAVENOUS
Status: ACTIVE | OUTPATIENT
Start: 2021-09-14 | End: 2021-09-15

## 2021-09-14 RX ORDER — SUCCINYLCHOLINE CHLORIDE 20 MG/ML
INJECTION INTRAMUSCULAR; INTRAVENOUS AS NEEDED
Status: DISCONTINUED | OUTPATIENT
Start: 2021-09-14 | End: 2021-09-14 | Stop reason: HOSPADM

## 2021-09-14 RX ORDER — MIDAZOLAM HYDROCHLORIDE 1 MG/ML
1 INJECTION, SOLUTION INTRAMUSCULAR; INTRAVENOUS AS NEEDED
Status: DISCONTINUED | OUTPATIENT
Start: 2021-09-14 | End: 2021-09-14 | Stop reason: HOSPADM

## 2021-09-14 RX ORDER — TRANEXAMIC ACID 100 MG/ML
INJECTION, SOLUTION INTRAVENOUS AS NEEDED
Status: DISCONTINUED | OUTPATIENT
Start: 2021-09-14 | End: 2021-09-14 | Stop reason: HOSPADM

## 2021-09-14 RX ORDER — KETAMINE HYDROCHLORIDE 10 MG/ML
INJECTION, SOLUTION INTRAMUSCULAR; INTRAVENOUS AS NEEDED
Status: DISCONTINUED | OUTPATIENT
Start: 2021-09-14 | End: 2021-09-14 | Stop reason: HOSPADM

## 2021-09-14 RX ORDER — FENTANYL CITRATE 50 UG/ML
50 INJECTION, SOLUTION INTRAMUSCULAR; INTRAVENOUS AS NEEDED
Status: DISCONTINUED | OUTPATIENT
Start: 2021-09-14 | End: 2021-09-14 | Stop reason: HOSPADM

## 2021-09-14 RX ORDER — VANCOMYCIN HYDROCHLORIDE 1 G/20ML
INJECTION, POWDER, LYOPHILIZED, FOR SOLUTION INTRAVENOUS AS NEEDED
Status: DISCONTINUED | OUTPATIENT
Start: 2021-09-14 | End: 2021-09-14 | Stop reason: HOSPADM

## 2021-09-14 RX ORDER — ROCURONIUM BROMIDE 10 MG/ML
INJECTION, SOLUTION INTRAVENOUS AS NEEDED
Status: DISCONTINUED | OUTPATIENT
Start: 2021-09-14 | End: 2021-09-14 | Stop reason: HOSPADM

## 2021-09-14 RX ORDER — EPHEDRINE SULFATE/0.9% NACL/PF 50 MG/5 ML
SYRINGE (ML) INTRAVENOUS AS NEEDED
Status: DISCONTINUED | OUTPATIENT
Start: 2021-09-14 | End: 2021-09-14 | Stop reason: HOSPADM

## 2021-09-14 RX ORDER — SODIUM CHLORIDE 0.9 % (FLUSH) 0.9 %
5-40 SYRINGE (ML) INJECTION AS NEEDED
Status: DISCONTINUED | OUTPATIENT
Start: 2021-09-14 | End: 2021-09-20 | Stop reason: HOSPADM

## 2021-09-14 RX ORDER — HYDROCHLOROTHIAZIDE 25 MG/1
12.5 TABLET ORAL DAILY
Status: DISCONTINUED | OUTPATIENT
Start: 2021-09-15 | End: 2021-09-20 | Stop reason: HOSPADM

## 2021-09-14 RX ORDER — HYDROMORPHONE HCL/0.9% NACL/PF 0.5 MG/ML
PLASTIC BAG, INJECTION (ML) INTRAVENOUS
Status: DISCONTINUED | OUTPATIENT
Start: 2021-09-14 | End: 2021-09-15

## 2021-09-14 RX ORDER — MIDAZOLAM HYDROCHLORIDE 1 MG/ML
INJECTION, SOLUTION INTRAMUSCULAR; INTRAVENOUS AS NEEDED
Status: DISCONTINUED | OUTPATIENT
Start: 2021-09-14 | End: 2021-09-14 | Stop reason: HOSPADM

## 2021-09-14 RX ORDER — AMLODIPINE BESYLATE 5 MG/1
5 TABLET ORAL DAILY
Status: DISCONTINUED | OUTPATIENT
Start: 2021-09-15 | End: 2021-09-20 | Stop reason: HOSPADM

## 2021-09-14 RX ORDER — FENTANYL CITRATE 50 UG/ML
25 INJECTION, SOLUTION INTRAMUSCULAR; INTRAVENOUS
Status: DISCONTINUED | OUTPATIENT
Start: 2021-09-14 | End: 2021-09-14 | Stop reason: HOSPADM

## 2021-09-14 RX ORDER — NALOXONE HYDROCHLORIDE 0.4 MG/ML
0.4 INJECTION, SOLUTION INTRAMUSCULAR; INTRAVENOUS; SUBCUTANEOUS AS NEEDED
Status: DISCONTINUED | OUTPATIENT
Start: 2021-09-14 | End: 2021-09-20 | Stop reason: HOSPADM

## 2021-09-14 RX ORDER — ONDANSETRON 2 MG/ML
4 INJECTION INTRAMUSCULAR; INTRAVENOUS AS NEEDED
Status: DISCONTINUED | OUTPATIENT
Start: 2021-09-14 | End: 2021-09-14 | Stop reason: HOSPADM

## 2021-09-14 RX ORDER — HYDROMORPHONE HCL/0.9% NACL/PF 0.5 MG/ML
PLASTIC BAG, INJECTION (ML) INTRAVENOUS
Status: COMPLETED
Start: 2021-09-14 | End: 2021-09-14

## 2021-09-14 RX ORDER — HYDROMORPHONE HYDROCHLORIDE 1 MG/ML
0.2 INJECTION, SOLUTION INTRAMUSCULAR; INTRAVENOUS; SUBCUTANEOUS
Status: DISCONTINUED | OUTPATIENT
Start: 2021-09-14 | End: 2021-09-14 | Stop reason: HOSPADM

## 2021-09-14 RX ORDER — LIDOCAINE HYDROCHLORIDE 10 MG/ML
0.1 INJECTION, SOLUTION EPIDURAL; INFILTRATION; INTRACAUDAL; PERINEURAL AS NEEDED
Status: DISCONTINUED | OUTPATIENT
Start: 2021-09-14 | End: 2021-09-14 | Stop reason: HOSPADM

## 2021-09-14 RX ORDER — LOSARTAN POTASSIUM 50 MG/1
100 TABLET ORAL DAILY
Status: DISCONTINUED | OUTPATIENT
Start: 2021-09-15 | End: 2021-09-20 | Stop reason: HOSPADM

## 2021-09-14 RX ADMIN — ACETAMINOPHEN 1000 MG: 500 TABLET ORAL at 19:35

## 2021-09-14 RX ADMIN — PROPOFOL 75 MCG/KG/MIN: 10 INJECTION, EMULSION INTRAVENOUS at 12:25

## 2021-09-14 RX ADMIN — KETAMINE HYDROCHLORIDE 30 MG: 10 INJECTION, SOLUTION INTRAMUSCULAR; INTRAVENOUS at 12:15

## 2021-09-14 RX ADMIN — ONDANSETRON HYDROCHLORIDE 4 MG: 2 INJECTION, SOLUTION INTRAMUSCULAR; INTRAVENOUS at 15:53

## 2021-09-14 RX ADMIN — HYDROMORPHONE HYDROCHLORIDE 0.2 MG: 1 INJECTION, SOLUTION INTRAMUSCULAR; INTRAVENOUS; SUBCUTANEOUS at 16:53

## 2021-09-14 RX ADMIN — PHENYLEPHRINE HYDROCHLORIDE 50 MCG/MIN: 10 INJECTION INTRAVENOUS at 12:24

## 2021-09-14 RX ADMIN — ACETAMINOPHEN 650 MG: 325 TABLET ORAL at 11:10

## 2021-09-14 RX ADMIN — SODIUM CHLORIDE 125 ML/HR: 9 INJECTION, SOLUTION INTRAVENOUS at 16:54

## 2021-09-14 RX ADMIN — FENTANYL CITRATE 50 MCG: 50 INJECTION, SOLUTION INTRAMUSCULAR; INTRAVENOUS at 12:59

## 2021-09-14 RX ADMIN — SUCCINYLCHOLINE CHLORIDE 120 MG: 20 INJECTION, SOLUTION INTRAMUSCULAR; INTRAVENOUS at 12:16

## 2021-09-14 RX ADMIN — SUGAMMADEX 200 MG: 100 INJECTION, SOLUTION INTRAVENOUS at 13:27

## 2021-09-14 RX ADMIN — TRANEXAMIC ACID 1 G: 100 INJECTION, SOLUTION INTRAVENOUS at 12:35

## 2021-09-14 RX ADMIN — FENTANYL CITRATE 100 MCG: 50 INJECTION, SOLUTION INTRAMUSCULAR; INTRAVENOUS at 12:48

## 2021-09-14 RX ADMIN — HYDROMORPHONE HYDROCHLORIDE 0.4 MG: 2 INJECTION, SOLUTION INTRAMUSCULAR; INTRAVENOUS; SUBCUTANEOUS at 15:47

## 2021-09-14 RX ADMIN — CEFAZOLIN SODIUM 2 G: 1 INJECTION, POWDER, FOR SOLUTION INTRAMUSCULAR; INTRAVENOUS at 21:02

## 2021-09-14 RX ADMIN — WATER 2 G: 1 INJECTION INTRAMUSCULAR; INTRAVENOUS; SUBCUTANEOUS at 12:40

## 2021-09-14 RX ADMIN — Medication 10 MG: at 12:43

## 2021-09-14 RX ADMIN — HYDROMORPHONE HYDROCHLORIDE: 10 INJECTION INTRAMUSCULAR; INTRAVENOUS; SUBCUTANEOUS at 17:38

## 2021-09-14 RX ADMIN — MIDAZOLAM 1 MG: 1 INJECTION INTRAMUSCULAR; INTRAVENOUS at 12:10

## 2021-09-14 RX ADMIN — Medication: at 17:38

## 2021-09-14 RX ADMIN — PROPOFOL 100 MG: 10 INJECTION, EMULSION INTRAVENOUS at 12:15

## 2021-09-14 RX ADMIN — SODIUM CHLORIDE, POTASSIUM CHLORIDE, SODIUM LACTATE AND CALCIUM CHLORIDE 50 ML/HR: 600; 310; 30; 20 INJECTION, SOLUTION INTRAVENOUS at 11:21

## 2021-09-14 RX ADMIN — FENTANYL CITRATE 50 MCG: 50 INJECTION, SOLUTION INTRAMUSCULAR; INTRAVENOUS at 12:10

## 2021-09-14 RX ADMIN — ROCURONIUM BROMIDE 5 MG: 10 SOLUTION INTRAVENOUS at 12:15

## 2021-09-14 RX ADMIN — Medication 10 ML: at 21:03

## 2021-09-14 RX ADMIN — DEXAMETHASONE SODIUM PHOSPHATE 4 MG: 4 INJECTION, SOLUTION INTRAMUSCULAR; INTRAVENOUS at 12:23

## 2021-09-14 RX ADMIN — LIDOCAINE HYDROCHLORIDE 80 MG: 20 INJECTION, SOLUTION EPIDURAL; INFILTRATION; INTRACAUDAL; PERINEURAL at 12:15

## 2021-09-14 RX ADMIN — ROCURONIUM BROMIDE 20 MG: 10 SOLUTION INTRAVENOUS at 12:45

## 2021-09-14 RX ADMIN — FENTANYL CITRATE 50 MCG: 50 INJECTION, SOLUTION INTRAMUSCULAR; INTRAVENOUS at 12:15

## 2021-09-14 NOTE — PROGRESS NOTES
Primary Nurse Estefania Lala RN and Kwame RN performed a dual skin assessment on this patient Impairment noted- see wound doc flow sheet  Jackson score is 18. Patient has a skin tear on the left leg from transfer in OR.

## 2021-09-14 NOTE — ANESTHESIA PREPROCEDURE EVALUATION
Relevant Problems   CARDIOVASCULAR   (+) HTN (hypertension)      RENAL FAILURE   (+) CRI (chronic renal insufficiency)       Anesthetic History     PONV          Review of Systems / Medical History  Patient summary reviewed, nursing notes reviewed and pertinent labs reviewed    Pulmonary            Asthma        Neuro/Psych       CVA  TIA     Cardiovascular    Hypertension          Hyperlipidemia    Exercise tolerance: >4 METS     GI/Hepatic/Renal               Comments: dysphagia Endo/Other        Arthritis     Other Findings              Physical Exam    Airway  Mallampati: I  TM Distance: 4 - 6 cm  Neck ROM: normal range of motion   Mouth opening: Normal     Cardiovascular    Rhythm: regular  Rate: normal         Dental  No notable dental hx       Pulmonary  Breath sounds clear to auscultation               Abdominal         Other Findings            Anesthetic Plan    ASA: 3  Anesthesia type: general          Induction: Intravenous  Anesthetic plan and risks discussed with: Patient

## 2021-09-14 NOTE — BRIEF OP NOTE
Brief Postoperative Note    Patient: Michelle Khoury  YOB: 1935  MRN: [id number]    Date of Procedure: 9/14/2021     Pre-Op Diagnosis: SPONDYLOLISTHESIS;STENOSIS    Post-Op Diagnosis: Same as preoperative diagnosis. Procedure(s):  REVISION LAMINECTOMY L2-L4 WITH A REVISION POSTERIOR FUSION T10-L5 (MAZOR, O-ARM)    Surgeon(s):  MD Jeet Parra MD    Surgical Assistant: Physician Assistant: CAMILO Baptiste    Anesthesia: General     Estimated Blood Loss (mL): 158TY    Complications: None    Specimens: * No specimens in log *     Implants:   Implant Name Type Inv. Item Serial No.  Lot No. LRB No. Used Action   GRAFT BNE 3D 30 CC CORTICAL FIBER - [id number]  GRAFT BNE 3D 30 CC CORTICAL FIBER [id number] [place] SiliconN IND Lifetech NA N/A 1 Implanted   GRAFT BNE 3D 30 CC CORTICAL FIBER - [id number]  GRAFT BNE 3D 30 CC CORTICAL FIBER [id number] [place] SiliconN IND Lifetech NA N/A 1 Implanted   GRAFT BNE 3D 30 CC CORTICAL FIBER - [id number]  GRAFT BNE 3D 30 CC CORTICAL FIBER [id number] [place] SiliconN IND Lifetech NA N/A 1 Implanted   5 cc ProteiOS Growth Factor   [id number]  NA N/A 1 Implanted   10 cc ProteiOS Growth Factor   [id number]  NA N/A 1 Implanted   SCREW SPNL L40MM DIA5. 5MM POST THORACOLUMBOSACRAL CO CHROM - SNA  SCREW SPNL L40MM DIA5. 5MM POST THORACOLUMBOSACRAL CO CHROM NA MEDTRONIC SOFAMOR DANEK_WD NA N/A 8 Implanted   SCREW SPNL L45MM DIA5. 5MM POST THORACOLUMBOSACRAL CO CHROM - SNA  SCREW SPNL L45MM DIA5. 5MM POST THORACOLUMBOSACRAL CO CHROM NA MED[employer]ONIC SOFAMOR DANEK_WD NA N/A 4 Implanted   SCREW SPNL L40MM DIA6. 5MM POST THORACOLUMBOSACRAL CO CHROM - SNA  SCREW SPNL L40MM DIA6. 5MM POST THORACOLUMBOSACRAL CO CHROM NA [employer] SOFAMOR DANEK_WD NA N/A 3 Implanted   DIANN SPNL L500MM DIA5. 5MM THORACOLUMBOSACRAL TI SMOOTH STR - SNA  DIANN SPNL L500MM DIA5. 5MM THORACOLUMBOSACRAL TI SMOOTH STR NA [employer] SOFAMOR DANEK_WD NA N/A 1 Implanted SET SCR SPNL L6MM DIA5. 5MM TI BRK OFF NORMA W/ DETACH [address] - SNA  SET SCR SPNL L6MM DIA5. 5MM TI BRK OFF NORMA W/ DETACH [address] NA MEDTRONIC MISAEL DANEK_WD NA N/A 15 Implanted   GRAFT BNE SUB 20CC 2 4MM GRAN GROWTH FACT ALLGRFT OSTEOAMP - [id number]  GRAFT BNE SUB 20CC 2 4MM GRAN GROWTH FACT ALLGRFT OSTEOAMP [id number] [employer]_ NA N/A 1 Implanted   GRAFT BNE SUB 20CC 2 4MM GRAN GROWTH FACT ALLGRFT OSTEOAMP - [id number]  GRAFT BNE SUB 20CC 2 4MM GRAN GROWTH FACT ALLGRFT OSTEOAMP [id number] [employer] [facility] NA N/A 1 Implanted   GRAFT DURA [id number] ULTRAPURE DURAGN + 5 PER PK - SNA  GRAFT DURA [id number] ULTRAPURE DURAGN + 5 PER PK NA [employer]WD [id number] N/A 1 Implanted   Integra DuraSeal 5 ml    NA INTEGRA [id number] N/A 1 Implanted       Drains: * No LDAs found *    Findings: stenosis    Electronically Signed by CAMILO Long on 9/14/2021 at 4:01 PM

## 2021-09-14 NOTE — ANESTHESIA POSTPROCEDURE EVALUATION
Procedure(s):  REVISION LAMINECTOMY L2-L4 WITH A REVISION POSTERIOR FUSION T10-L5 (MAZOR, O-ARM). general    Anesthesia Post Evaluation        Patient location during evaluation: PACU  Patient participation: complete - patient participated  Level of consciousness: awake and alert  Pain management: adequate  Airway patency: patent  Anesthetic complications: no  Cardiovascular status: acceptable  Respiratory status: acceptable  Hydration status: acceptable  Comments: I have seen and evaluated the patient and is ready for discharge. Cheryl Strong MD    Post anesthesia nausea and vomiting:  none      INITIAL Post-op Vital signs:   Vitals Value Taken Time   BP 95/57 09/14/21 1635   Temp 36.1 °C (97 °F) 09/14/21 1617   Pulse 87 09/14/21 1643   Resp 16 09/14/21 1643   SpO2 100 % 09/14/21 1643   Vitals shown include unvalidated device data.

## 2021-09-14 NOTE — PERIOP NOTES
TRANSFER - OUT REPORT:    Verbal report given to Graeme(name) on Murray Alonzo  being transferred to [facility](unit) for routine post - op       Report consisted of patients Situation, Background, Assessment and   Recommendations(SBAR). Time Pre op antibiotic given:1500  Anesthesia Stop time: `1620  Fenton Present on Transfer to floor:YES  Order for Fenton on Chart:YES  Discharge Prescriptions with Chart:NO    Information from the following report(s) Procedure Summary and Accordion was reviewed with the receiving nurse. Opportunity for questions and clarification was provided. Is the patient on 02? YES       L/Min 2     Is the patient on a monitor? NO    Is the nurse transporting with the patient? NO    Surgical Waiting Area notified of patient's transfer from PACU?  YES      The following personal items collected during your admission accompanied patient upon transfer:   Dental Appliance: Dental Appliances: None  Vision: Visual Aid: None  Hearing Aid:    Jewelry:    Clothing: Clothing:  (belongings sent to [employer])  Other Valuables:    Valuables sent to safe:    2 BAGS OF BELONGINGS RETURNED TO PT IN PACU

## 2021-09-14 NOTE — PERIOP NOTES
After removing tape and monitoring leads from left lower leg, 2-2.5 \" curved skin tear noted. Dr. Harry Brewer notified. Dressing of bacitracin, telfa and opsite applied to area.

## 2021-09-15 LAB
ANION GAP SERPL CALC-SCNC: 6 MMOL/L (ref 5–15)
BUN SERPL-MCNC: 19 MG/DL (ref 6–20)
BUN/CREAT SERPL: 16 (ref 12–20)
CALCIUM SERPL-MCNC: 7.6 MG/DL (ref 8.5–10.1)
CHLORIDE SERPL-SCNC: 101 MMOL/L (ref 97–108)
CO2 SERPL-SCNC: 25 MMOL/L (ref 21–32)
CREAT SERPL-MCNC: 1.17 MG/DL (ref 0.55–1.02)
GLUCOSE SERPL-MCNC: 116 MG/DL (ref 65–100)
HGB BLD-MCNC: 9.1 G/DL (ref 11.5–16)
POTASSIUM SERPL-SCNC: 3.7 MMOL/L (ref 3.5–5.1)
SODIUM SERPL-SCNC: 132 MMOL/L (ref 136–145)

## 2021-09-15 PROCEDURE — 36415 COLL VENOUS BLD VENIPUNCTURE: CPT

## 2021-09-15 PROCEDURE — 65270000029 HC RM PRIVATE

## 2021-09-15 PROCEDURE — 74011250637 HC RX REV CODE- 250/637: Performed by: PHYSICIAN ASSISTANT

## 2021-09-15 PROCEDURE — 85018 HEMOGLOBIN: CPT

## 2021-09-15 PROCEDURE — 80048 BASIC METABOLIC PNL TOTAL CA: CPT

## 2021-09-15 PROCEDURE — 74011250637 HC RX REV CODE- 250/637: Performed by: ORTHOPAEDIC SURGERY

## 2021-09-15 PROCEDURE — 74011250636 HC RX REV CODE- 250/636: Performed by: PHYSICIAN ASSISTANT

## 2021-09-15 PROCEDURE — 94760 N-INVAS EAR/PLS OXIMETRY 1: CPT

## 2021-09-15 PROCEDURE — 74011000250 HC RX REV CODE- 250: Performed by: PHYSICIAN ASSISTANT

## 2021-09-15 RX ORDER — GABAPENTIN 300 MG/1
300 CAPSULE ORAL 2 TIMES DAILY
Status: DISCONTINUED | OUTPATIENT
Start: 2021-09-15 | End: 2021-09-20 | Stop reason: HOSPADM

## 2021-09-15 RX ORDER — CALCIUM CARBONATE 200(500)MG
200 TABLET,CHEWABLE ORAL
Status: DISCONTINUED | OUTPATIENT
Start: 2021-09-15 | End: 2021-09-20 | Stop reason: HOSPADM

## 2021-09-15 RX ORDER — TRAMADOL HYDROCHLORIDE 50 MG/1
50 TABLET ORAL
Status: DISCONTINUED | OUTPATIENT
Start: 2021-09-15 | End: 2021-09-20 | Stop reason: HOSPADM

## 2021-09-15 RX ORDER — CALCIUM CARBONATE 200(500)MG
200 TABLET,CHEWABLE ORAL
Status: DISCONTINUED | OUTPATIENT
Start: 2021-09-15 | End: 2021-09-15

## 2021-09-15 RX ADMIN — PAROXETINE HYDROCHLORIDE 30 MG: 20 TABLET, FILM COATED ORAL at 09:32

## 2021-09-15 RX ADMIN — GABAPENTIN 300 MG: 300 CAPSULE ORAL at 12:11

## 2021-09-15 RX ADMIN — GABAPENTIN 300 MG: 300 CAPSULE ORAL at 21:49

## 2021-09-15 RX ADMIN — SENNOSIDES AND DOCUSATE SODIUM 1 TABLET: 50; 8.6 TABLET ORAL at 09:28

## 2021-09-15 RX ADMIN — ACETAMINOPHEN 1000 MG: 500 TABLET ORAL at 11:16

## 2021-09-15 RX ADMIN — CEFAZOLIN SODIUM 2 G: 1 INJECTION, POWDER, FOR SOLUTION INTRAMUSCULAR; INTRAVENOUS at 04:39

## 2021-09-15 RX ADMIN — Medication 10 ML: at 04:40

## 2021-09-15 RX ADMIN — TRAMADOL HYDROCHLORIDE 50 MG: 50 TABLET ORAL at 21:49

## 2021-09-15 RX ADMIN — CETIRIZINE HYDROCHLORIDE 10 MG: 10 TABLET, FILM COATED ORAL at 01:02

## 2021-09-15 RX ADMIN — MULTIPLE VITAMINS W/ MINERALS TAB 1 TABLET: TAB at 09:33

## 2021-09-15 RX ADMIN — CALCIUM CARBONATE (ANTACID) CHEW TAB 500 MG 200 MG: 500 CHEW TAB at 17:27

## 2021-09-15 RX ADMIN — ATORVASTATIN CALCIUM 10 MG: 10 TABLET, FILM COATED ORAL at 09:27

## 2021-09-15 RX ADMIN — POLYETHYLENE GLYCOL 3350 17 G: 17 POWDER, FOR SOLUTION ORAL at 09:27

## 2021-09-15 RX ADMIN — CETIRIZINE HYDROCHLORIDE 10 MG: 10 TABLET, FILM COATED ORAL at 21:49

## 2021-09-15 RX ADMIN — SODIUM CHLORIDE 125 ML/HR: 9 INJECTION, SOLUTION INTRAVENOUS at 02:25

## 2021-09-15 RX ADMIN — TRAMADOL HYDROCHLORIDE 50 MG: 50 TABLET ORAL at 14:26

## 2021-09-15 RX ADMIN — ACETAMINOPHEN 1000 MG: 500 TABLET ORAL at 05:55

## 2021-09-15 RX ADMIN — SENNOSIDES AND DOCUSATE SODIUM 1 TABLET: 50; 8.6 TABLET ORAL at 17:23

## 2021-09-15 RX ADMIN — ACETAMINOPHEN 1000 MG: 500 TABLET ORAL at 01:02

## 2021-09-15 NOTE — PROGRESS NOTES
Spine Surgery Progress Note    Admit Date: 9/14/2021   LOS: 1 day      Daily Progress Note: 9/15/2021    POD:1 Day Post-Op    S/P: Procedure(s):  REVISION LAMINECTOMY L2-L4 WITH A REVISION POSTERIOR FUSION T10-L5 (JONY O-ARM)    Visit Vitals  /68 (BP 1 Location: Left upper arm, BP Patient Position: Supine)   Pulse 84   Temp 98.7 °F (37.1 °C)   Resp 15   SpO2 99%      Lab Results   Component Value Date/Time    HGB 9.1 (L) 09/15/2021 04:27 AM    INR 0.9 08/31/2021 09:18 AM     Patient doing well overall. No nausea or vomiting. Pain well controlled. Denies nausea, vomiting, fever, chills, chest pain, dyspnea, headache. Voiding status: hummel catheter    Calves soft/NTTP Bilaterally. Moving LE well. Neurocirculatory exam WNL. Motor and sensation intact. Incision OK; no drainage. Dressing clean and dry. Lying flat in bed. Bedrest; continue hummel while on bedrest. Will advance activity tomorrow AM.  Begin PT/OT & ambulating with assistance tomorrow if no HA. D/C hummel tomorrow AM.  Pain control - tylenol, gabapentin; PRN dilaudid, tramadol  Nausea control PRN    Discharge to home with [facility] pending.     CAMILO Rodirguez

## 2021-09-15 NOTE — PROGRESS NOTES
Order acknowledged, chart reviewed. Patient is POD #1 s/p revision laminectomy & fusion, ortho NP indicating therapy on hold at this time as patient is to remain flat in bed. Will follow up with PT evaluation once patient cleared for OOB activity.        Renetta Carrasco, DPT, PT

## 2021-09-15 NOTE — PROGRESS NOTES
Occupational Therapy  09/15/21    Order acknowledged, chart reviewed. Patient is POD #1 s/p revision laminectomy & fusion, ortho NP indicating therapy on hold at this time as patient is to remain flat in bed. Will follow up with OT evaluation once patient cleared for OOB activity.      Thank you,   Samantha Briceño, OTD, OTR/L

## 2021-09-15 NOTE — PROGRESS NOTES
TRANSITION OF CARE  RUR--10%  Disposition--Home with home health: At [facility]. Transport--Family    Patient's primary family contact:  Demetria Jones. Care Management Interventions  PCP Verified by CM: Yes  Transition of Care Consult (CM Consult): [address]: No  Reason Outside [place]: Patient already serviced by other home care/hospice agency  Physical Therapy Consult: Yes  Occupational Therapy Consult: Yes  Speech Therapy Consult: No  Support Systems: Child(yvrose)  Confirm Follow Up Transport: Family  The Plan for Transition of Care is Related to the Following Treatment Goals : home with home health. Discharge Location  Discharge Placement: Home with family assistance    Reason for Admission:  S/P 9/14/21 revision laminectomy L-L4 with a revision posterior fusion T10-L5                    RUR Score:            10%           [facility]     PCP: First and Last name:  Sepideh Gonzalez MD   Name of Practice:    Are you a current patient: Yes    Approximate date of last visit: 2 weeks ago   Can you participate in a virtual visit with your PCP:                     Current Advanced Directive/Advance Care Plan: Full Code      Healthcare Decision Maker:   Click here to complete [place] including selection of the [place] Relationship (ie \"Primary\")                           Transition of Care Plan:                    CM met with patient and daughter Nitza Florian. Patient lives with her . They have a daughter from [place] andd a daughter from [place] both of whom are going to stay with patient during post-op convalescence. They have a 2 story home and only occupy the first floor. There are 3-4 steps into the house. Patient confirmed PCP, health insurance, and prescription coverage.    Previous home health :None ([facility])  Previous IPR/ SNF rehab : None  DME : None    CM received consult for home health services. CM offered patient choice of providers. Patient selected [facility] and signed [place] of Choice form which CM placed on chart. CM sent referral via [employer] to [facility], and referral was accepted. CM updated AVS and informed patient and staff nurse.

## 2021-09-15 NOTE — OP NOTES
[address]   OPERATIVE REPORT    Name:  Rubia Cazares  MR#:  [id number]  :  1935  ACCOUNT #:  [de-identified]  DATE OF SERVICE:  2021      PREOPERATIVE DIAGNOSES:  Status post L4-L5 fusion; lumbar stenosis, L2-L3, L3-L4, and L4-L5, with lumbar degenerative scoliosis. POSTOPERATIVE DIAGNOSES:  Status post L4-L5 fusion; lumbar stenosis, L2-L3, L3-L4, and L4-L5, with lumbar degenerative scoliosis. PROCEDURE PERFORMED:  Exploration of fusion with revision laminectomy L2, L3, and L4; revision fusion T10 to L5 with navigation for posterior segment instrumentation. SURGEON:  Soledad Pulido MD    CO-SURGEON:  Simran Goode MD    ASSISTANT:  Kira Cueva PA-C    ANESTHESIA:  General.    COMPLICATIONS:  One area of dural ectasia was repaired primarily. SPECIMENS REMOVED:  None. IMPLANTS:  Instrumentation includes Medtronic Solera pedicle screws. ESTIMATED BLOOD LOSS:  200 mL. No cell saver. DRAINS:  No drains. INDICATIONS:  This is a pleasant 45-year-old female with chronic back pain, bilateral leg pain, previous lumbar stenosis, lumbar fusion at L4-L5, now with severe junctional stenosis, particularly with severe left-sided radicular symptoms at L2-L3 on the left hand side. Severe stenosis noted at L2-L3 and L3-L4 with progressive kyphoscoliosis. The patient understood the risks and benefits and elected to proceed. PROCEDURE:  The patient was identified, brought to the operative suite, underwent general anesthesia without difficulty. 2 g of Ancef given to the patient preoperatively. Fenton catheter placed. Preoperative neuromonitoring was placed, baselines obtained and these remained stable throughout the surgical procedure. The patient was placed prone on the open Nimisha Serene table with all bony prominences well-padded. Back was prepped and draped sterilely. After prepping and draping, time-out was confirmed. A midline incision was made.   Dissection was carried down with exposure through the thoracodorsal fascia. We exposed the posterior spinal elements, transverse processes includes facet capsules from T10 down to L4-L5. Previous fusion at L4-L5 was identified and fusion mass was noted to be stable. Transverse processes at L3, L2, and L1 were identified as well as the posterior transverse process all the way to T10. After complete exposure of the previous fusion to the T10 level, we then placed a spinous process clamp on L3. Four-ball fiducial array was placed there. Sterile drape was placed over the surgical field followed by Medtronic O2 arm which was brought in. We then did an intraoperative spin from T10 down to L3. Using the 3D image that was obtained, we used S8 navigation system to drill  holes to approximately 30 mm at each level. We then tapped with a 4-0 tap under navigation followed by placement of Medtronic Solera screws 5.5 x 40 from T10 to L1 and 5.5 x 45 at L2 and L3 and 6.5 x 40 at L4 and L5. Neuromonitoring remained stable. Each were tested above acceptable impedances. At that time, after satisfactory placement of all pedicle screws using navigation, the patient reference frame was removed. We removed the remainder of the spinous processes at L4 and L3. We started central laminectomy carrying this out proximally. We removed the residual at the L4 lamina and the L3 lamina were all removed. It was noted that she had dural adhesions on the left hand side which was her collapsed concave side during the decompression. We did wide pedicle-pedicle decompression from L2 down decompressing the lateral recess and the foraminal stenosis at each level. There was noted to be some thinning of the dura right in the midline and slightly to the left with dural ectasia. Jsoeline mater was intact and we did oversew the outer dura with a 6-0 Oxford-Laureano. On Valsalva maneuver, no further oozing was noted. Wounds were irrigated.   We then decorticated the transverse processes from T10 down to the prior fusion mass at L4-L5. We measured and cut rods and bent to the appropriate lordosis and attached them to the pedicle screws. Set screws were placed. Final tightening was performed. Good correction was obtained. Neuromonitoring remained stable. AP and lateral fluoroscopy images demonstrated good fixation. We then irrigated with Irrisept antibiotics as well as pulse irrigation carefully. We did place a DuraGen over the repair as well as DuraSeal.  At that time, we then placed the allograft mixture rich in Bacterin fibers and OsteoAMP fibers into the posterior gutters and posterior decorticated region. #1 Stratafix on the fascial layer, 2-0 Stratafix on the subcutaneous layer, and 3-0 Stratafix on the skin. The physician assistant was present during the entire operative procedure and assisted in all critical elements of the surgery. No surgical assist or resident was available.      Leeanna Rios MD      [id number]  D:  09/14/2021 15:41  T:  09/15/2021 2:06  JOB #:  [id number]

## 2021-09-15 NOTE — PROGRESS NOTES
Orthopedic Spine Progress Note  Post Op day: 1 Day Post-Op    September 15, 2021 8:45 AM   Admit Date: 2021  Procedure: Procedure(s):  REVISION LAMINECTOMY L2-L4 WITH A REVISION POSTERIOR FUSION T10-L5 ([facility], O-ARM)    Subjective:     Mal November has no complaints. Pain well managed. No headache. Flat in bed. Tolerating diet. No N/V. Pain Control:   Pain Assessment  Pain Scale 1: Numeric (0 - 10)  Pain Intensity 1: 5  Pain Onset 1: post opt  Pain Location 1: Back  Pain Orientation 1: Lower  Pain Description 1: Aching  Pain Intervention(s) 1: Encouraged PCA    Objective:          Physical Exam:  General:  Alert and oriented. No acute distress. Heart:  Respirations unlabored. Abdomen:   Extremities: Soft, non-tender. No evidence of cyanosis. Pulses palpable in both upper and lower extremities. Neurologic:  Musculoskeletal:  No new motor deficits. Neurovascular exam within normal limits. Sensation stable. Motor: unchanged C5-T1 and L2-S1. Marce's sign negative in bilateral lower extremities. Calves soft, nontender upon palpation and with passive twitch. Moves both upper and lower extremities. Incision: clean, dry, and intact. No significant erythema or swelling. No active drainage noted. Vital Signs:    Blood pressure 104/68, pulse 84, temperature 98.7 °F (37.1 °C), resp. rate 15, last menstrual period 1986, SpO2 99 %.   Temp (24hrs), Av.8 °F (36.6 °C), Min:97 °F (36.1 °C), Max:98.7 °F (37.1 °C)      LAB:    Recent Labs     09/15/21  0427   HGB 9.1*     Lab Results   Component Value Date/Time    Sodium 132 (L) 09/15/2021 04:27 AM    Potassium 3.7 09/15/2021 04:27 AM    Chloride 101 09/15/2021 04:27 AM    CO2 25 09/15/2021 04:27 AM    Glucose 116 (H) 09/15/2021 04:27 AM    BUN 19 09/15/2021 04:27 AM    Creatinine 1.17 (H) 09/15/2021 04:27 AM    Calcium 7.6 (L) 09/15/2021 04:27 AM       Intake/Output:09/15 0701 - 09/15 1900  In: -   Out: 350 [Urine:350]  1901 - 09/15 0700  In: -   Out: 950 [Urine:700]    PT/OT:   Gait:                    Assessment:   Patient is 1 Day Post-Op s/p Procedure(s):  REVISION LAMINECTOMY L2-L4 WITH A REVISION POSTERIOR FUSION T10-L5 ([facility], O-ARM)    Plan:     1. To remain flat in bed--hold PT/OT  2. Continue established methods of pain control--ok to transition to po pain meds when tolerating po foods  3. VTE Prophylaxes - TEDS &/or SCDs   4.  Discharge pending  5.  6.       Signed By: Brian Torres PA

## 2021-09-16 LAB — HGB BLD-MCNC: 8.3 G/DL (ref 11.5–16)

## 2021-09-16 PROCEDURE — 74011250637 HC RX REV CODE- 250/637: Performed by: PHYSICIAN ASSISTANT

## 2021-09-16 PROCEDURE — 97161 PT EVAL LOW COMPLEX 20 MIN: CPT

## 2021-09-16 PROCEDURE — 85018 HEMOGLOBIN: CPT

## 2021-09-16 PROCEDURE — 97530 THERAPEUTIC ACTIVITIES: CPT

## 2021-09-16 PROCEDURE — 97166 OT EVAL MOD COMPLEX 45 MIN: CPT

## 2021-09-16 PROCEDURE — 74011250637 HC RX REV CODE- 250/637: Performed by: ORTHOPAEDIC SURGERY

## 2021-09-16 PROCEDURE — 65270000029 HC RM PRIVATE

## 2021-09-16 PROCEDURE — 36415 COLL VENOUS BLD VENIPUNCTURE: CPT

## 2021-09-16 PROCEDURE — 97535 SELF CARE MNGMENT TRAINING: CPT

## 2021-09-16 RX ADMIN — Medication 10 ML: at 13:04

## 2021-09-16 RX ADMIN — ATORVASTATIN CALCIUM 10 MG: 10 TABLET, FILM COATED ORAL at 09:50

## 2021-09-16 RX ADMIN — GABAPENTIN 300 MG: 300 CAPSULE ORAL at 09:50

## 2021-09-16 RX ADMIN — CALCIUM CARBONATE (ANTACID) CHEW TAB 500 MG 200 MG: 500 CHEW TAB at 10:21

## 2021-09-16 RX ADMIN — ACETAMINOPHEN 1000 MG: 500 TABLET ORAL at 01:06

## 2021-09-16 RX ADMIN — TRAMADOL HYDROCHLORIDE 50 MG: 50 TABLET ORAL at 04:02

## 2021-09-16 RX ADMIN — ACETAMINOPHEN 1000 MG: 500 TABLET ORAL at 17:47

## 2021-09-16 RX ADMIN — TRAMADOL HYDROCHLORIDE 50 MG: 50 TABLET ORAL at 22:15

## 2021-09-16 RX ADMIN — SENNOSIDES AND DOCUSATE SODIUM 1 TABLET: 50; 8.6 TABLET ORAL at 17:47

## 2021-09-16 RX ADMIN — CETIRIZINE HYDROCHLORIDE 10 MG: 10 TABLET, FILM COATED ORAL at 21:59

## 2021-09-16 RX ADMIN — Medication 10 ML: at 22:00

## 2021-09-16 RX ADMIN — PAROXETINE HYDROCHLORIDE 30 MG: 20 TABLET, FILM COATED ORAL at 09:49

## 2021-09-16 RX ADMIN — ACETAMINOPHEN 1000 MG: 500 TABLET ORAL at 11:12

## 2021-09-16 RX ADMIN — GABAPENTIN 300 MG: 300 CAPSULE ORAL at 21:58

## 2021-09-16 RX ADMIN — ACETAMINOPHEN 1000 MG: 500 TABLET ORAL at 06:00

## 2021-09-16 NOTE — PROGRESS NOTES
Orthopedic Spine Progress Note  Post Op day: 2 Days Post-Op    2021 9:50 AM   Admit Date: 2021  Procedure: Procedure(s):  REVISION LAMINECTOMY L2-L4 WITH A REVISION POSTERIOR FUSION T10-L5 ([facility], O-ARM)    Subjective:     Filipino Danita is complaining mainly of stomach pain this AM. She states she has colitis and her stomach is upset. She denies any headaches and her back pain is well controlled. Tolerating diet. No N/V. Pain Control:   Pain Assessment  Pain Scale 1: Numeric (0 - 10)  Pain Intensity 1: 2  Pain Onset 1: post opt  Pain Location 1: Back  Pain Orientation 1: Posterior  Pain Description 1: Sore  Pain Intervention(s) 1: Rest    Objective:          Physical Exam:  General:  Alert and oriented. No acute distress. Heart:  Respirations unlabored. Abdomen:   Extremities: Soft, non-tender. No evidence of cyanosis. Pulses palpable in both upper and lower extremities. Neurologic:  Musculoskeletal:  No new motor deficits. Neurovascular exam within normal limits. Sensation stable. Motor: unchanged C5-T1 and L2-S1. Marce's sign negative in bilateral lower extremities. Calves soft, nontender upon palpation and with passive twitch. Moves both upper and lower extremities. Incision: clean, dry, and intact. No significant erythema or swelling. No active drainage noted. Vital Signs:    Blood pressure 118/65, pulse 91, temperature 98 °F (36.7 °C), resp. rate 16, last menstrual period 1986, SpO2 95 %.   Temp (24hrs), Av.5 °F (36.9 °C), Min:97.7 °F (36.5 °C), Max:99.3 °F (37.4 °C)      LAB:    Recent Labs     21  0412   HGB 8.3*     Lab Results   Component Value Date/Time    Sodium 132 (L) 09/15/2021 04:27 AM    Potassium 3.7 09/15/2021 04:27 AM    Chloride 101 09/15/2021 04:27 AM    CO2 25 09/15/2021 04:27 AM    Glucose 116 (H) 09/15/2021 04:27 AM    BUN 19 09/15/2021 04:27 AM    Creatinine 1.17 (H) 09/15/2021 04:27 AM    Calcium 7.6 (L) 09/15/2021 04:27 AM Intake/Output:No intake/output data recorded. 09/14 1901 - 09/16 0700  In: -   Out: 5507 [Urine:3525]    PT/OT:   Gait:                    Assessment:   Patient is 2 Days Post-Op s/p Procedure(s):  REVISION LAMINECTOMY L2-L4 WITH A REVISION POSTERIOR FUSION T10-L5 ([facility], O-ARM)    Plan:     1. Progress patient to 30-40 degrees of head elevation and monitor for headaches  2. Continue established methods of pain control  3. VTE Prophylaxes - TEDS &/or SCDs   4. If patient is still feeling well with elevation progression, may start gentle PT this PM  5.  Discharge pending PT clearance        Signed By: Vicenta Rice PA-C

## 2021-09-16 NOTE — PROGRESS NOTES
Problem: Mobility Impaired (Adult and Pediatric)  Goal: *Acute Goals and Plan of Care (Insert Text)  Description: FUNCTIONAL STATUS PRIOR TO ADMISSION: Patient was modified independent using a walker for functional mobility. HOME SUPPORT PRIOR TO ADMISSION: The patient lived with  but did not require assist. Pt dtrs will switch out assisting as patients  cannot assist    Physical Therapy Goals  Initiated 9/16/2021  1. Patient will move from supine to sit and sit to supine , scoot up and down, and roll side to side in bed with modified independence within 7 day(s). 2.  Patient will transfer from bed to chair and chair to bed with modified independence using the least restrictive device within 7 day(s). 3.  Patient will perform sit to stand with modified independence within 7 day(s). 4.  Patient will ambulate with minimal assistance/contact guard assist for 150 feet with the least restrictive device within 7 day(s). 5.  Patient will ascend/descend 5 stairs with 1 handrail(s) with minimal assistance/contact guard assist within 7 day(s). Outcome: Progressing Towards Goal     PHYSICAL THERAPY EVALUATION  Patient: Rana Merlin (23 y.o. female)  Date: 9/16/2021  Primary Diagnosis: Lumbar stenosis with neurogenic claudication [M48.062]  Procedure(s) (LRB):  REVISION LAMINECTOMY L2-L4 WITH A REVISION POSTERIOR FUSION T10-L5 ([facility], O-ARM) (N/A) 2 Days Post-Op   Precautions: falls         ASSESSMENT  Based on the objective data described below, the patient presents with impaired trunk ROM, spinal precautions, B LE pain, balance, weakness and gait dysfunction/intolerance causing limited independence with mobility s/p recent revision lami L2-4 with revision fusion T10-L5 POD #2. Pt required laying flat for POD #1 and this AM, however tolerates sitting up without difficulty at this time Pt PLOF: independent with ADLs and IADLs.  Patient lives with  in one story home, 3 steps to enter, B rails. Pt  cannot assist patient and her daughters will be switching out to assist.  Pt received in supine, min A for log rolling instruction, sits EOB and performs edge of bed activities. Pt tolerates well with slight dizziness and weak feeling, however vitals stable. Reviewed back precautions, sitting limit of 30-45 minutes, positioning in chair, and progress. Pt is not cleared for discharge from Physical Therapy standpoint at this time, recommend HHPT. Will benefit from therapy in acute setting, anticipate will improve tolerance quickly with improved pain control. Current Level of Function Impacting Discharge (mobility/balance): min-mod A for edge of bed and bed mobility, unable to stand or ambulate at this time. Functional Outcome Measure: The patient scored Total: 30/100 on the Barthel Index which is indicative of high impaired ability to care for basic self needs/dependency on others. Other factors to consider for discharge:      Patient will benefit from skilled therapy intervention to address the above noted impairments. PLAN :  Recommendations and Planned Interventions: bed mobility training, transfer training, gait training, therapeutic exercises, neuromuscular re-education, patient and family training/education and therapeutic activities      Frequency/Duration: Patient will be followed by physical therapy:  Twice a day to address goals. Recommendation for discharge: (in order for the patient to meet his/her long term goals)  Physical therapy at least 2 days/week in the home if patient progresses as expected    This discharge recommendation:  Has been made in collaboration with the attending provider and/or case management    IF patient discharges home will need the following DME: patient owns DME required for discharge         SUBJECTIVE:   Patient stated I feel weak.     OBJECTIVE DATA SUMMARY:   HISTORY:    Past Medical History:   Diagnosis Date    Anxiety     Arthritis back pain    fingers    Asthma     MILD - NO INHALERS    Chronic pain     Colitis     Diverticulitis 6/11/2013    Hypercholesterolemia     Hypertension     IBS (irritable bowel syndrome)     Insomnia     Nausea & vomiting     Stenosis     spinal    Stroke ([facility] [address] 1996    tia   PATENT  FORAMEN  OVALE    Swollen L ankle      Past Surgical History:   Procedure Laterality Date    HX CATARACT REMOVAL Bilateral 2009    HX CHOLECYSTECTOMY  04/1997    HX COLONOSCOPY      x3    HX HYSTERECTOMY  1986    HX KNEE ARTHROSCOPY Left 04/1998    HX LUMBAR FUSION  02/2011    L4-L5    HX SHOULDER REPLACEMENT Right 01/2020    HX TONSILLECTOMY  CHILDHOOD    HX WISDOM TEETH EXTRACTION      X3    DC CARDIAC SURG PROCEDURE UNLIST  8/04    repair of foramen ovale       Personal factors and/or comorbidities impacting plan of care:     Home Situation  Home Environment: Private residence  # Steps to Enter: 3  Rails to Enter: Yes  Hand Rails : Right  Wheelchair Ramp: No  One/Two Story Residence: One story  Living Alone: No  Support Systems: Child(yvrose), Spouse/Significant Other  Patient Expects to be Discharged to[de-identified] House  Current DME Used/Available at Home: Walker, rolling, Raised toilet seat, Grab bars, Transfer bench  Tub or Shower Type: Tub/Shower combination    EXAMINATION/PRESENTATION/DECISION MAKING:   Critical Behavior:              Hearing:     Skin:  intac  Edema: none  Range Of Motion:  AROM: Within functional limits           PROM: Within functional limits           Strength:    Strength: Generally decreased, functional                    Tone & Sensation:                                  Coordination:  Coordination: Within functional limits  Vision:      Functional Mobility:  Bed Mobility:  Rolling: Moderate assistance  Supine to Sit: Moderate assistance     Scooting: Minimum assistance; Moderate assistance  Transfers:                             Balance:   Sitting: Intact; High guard  Standing: (unable)  Ambulation/Gait Training:          Functional Measure:  Barthel Index:    Bathin  Bladder: 0  Bowels: 10  Groomin  Dressin  Feeding: 10  Mobility: 0  Stairs: 0  Toilet Use: 0  Transfer (Bed to Chair and Back): 0  Total: 30/100       The Barthel ADL Index: Guidelines  1. The index should be used as a record of what a patient does, not as a record of what a patient could do. 2. The main aim is to establish degree of independence from any help, physical or verbal, however minor and for whatever reason. 3. The need for supervision renders the patient not independent. 4. A patient's performance should be established using the best available evidence. Asking the patient, friends/relatives and nurses are the usual sources, but direct observation and common sense are also important. However direct testing is not needed. 5. Usually the patient's performance over the preceding 24-48 hours is important, but occasionally longer periods will be relevant. 6. Middle categories imply that the patient supplies over 50 per cent of the effort. 7. Use of aids to be independent is allowed. Juan José Wright., Barthel, D.W. ([id number]). Functional evaluation: the Barthel Index. [address] [facility] (14)2. Roman Cortez roberto KEVIN Nelson, Luz Maria Madsen., Luz Rivas., [place], [address] [place] [address] (). Measuring the change indisability after inpatient rehabilitation; comparison of the responsiveness of the Barthel Index and Functional [place] Measure. Journal of Neurology, Neurosurgery, and Psychiatry, 66(4), 545-175. CASSIDY Walsh.SARAH, RAMANDEEP Mares, & Paloma Wallace M.A. (2004.) Assessment of post-stroke quality of life in cost-effectiveness studies: The usefulness of the Barthel Index and the EuroQoL-5D.  Quality of Life Research, 15, 389-02        Physical Therapy Evaluation Charge Determination   History Examination Presentation Decision-Making   HIGH Complexity :3+ comorbidities / personal factors will impact the outcome/ POC  HIGH Complexity : 4+ Standardized tests and measures addressing body structure, function, activity limitation and / or participation in recreation  LOW Complexity : Stable, uncomplicated  Other outcome measures barthel  HIGH       Based on the above components, the patient evaluation is determined to be of the following complexity level: LOW     Pain Rating:  controlled    Activity Tolerance:   Good, Fair and requires rest breaks    After treatment patient left in no apparent distress:   Supine in bed, Call bell within reach and Bed / chair alarm activated    COMMUNICATION/EDUCATION:   The patients plan of care was discussed with: Registered nurse and Case management. Fall prevention education was provided and the patient/caregiver indicated understanding. and Patient/family have participated as able in goal setting and plan of care.     Thank you for this referral.  Kamron Bhakta, PT

## 2021-09-16 NOTE — PROGRESS NOTES
Chart reviewed and order acknowledged. Per  Medical team patient cleared to begin raising of head to 30-40 degrees and depending on onset of any headache or symptoms initiate therapies this afternoon. Will hold for the morning and assess this afternoon for availability and appropriateness.     Thank you  Cassandra Burgess OT

## 2021-09-16 NOTE — PROGRESS NOTES
Problem: Self Care Deficits Care Plan (Adult)  Goal: *Acute Goals and Plan of Care (Insert Text)  Description: FUNCTIONAL STATUS PRIOR TO ADMISSION: Patient was independent and active without use of DME. Patient was independent for basic and instrumental ADLs. HOME SUPPORT: The patient lived with  but did not require assist. Patient's daughter will come down to assist at [place]. Occupational Therapy Goals  Initiated 9/16/2021    1. Patient will perform lower body dressing with supervision/set-up using AE PRN within 4 days. 2.  Patient will perform toileting with supervision/set-up using most appropriate DME within 4 days. 3.  Patient will grooming standing at modified [place] within 4 days. 4.  Patient will verbalize/demonstrate 3/3 back precautions during ADL tasks without cues within 4 days. Outcome: Not Met    OCCUPATIONAL THERAPY EVALUATION  Patient: Isiah Fairchild (21 y.o. female)  Date: 9/16/2021  Primary Diagnosis: Lumbar stenosis with neurogenic claudication [M48.062]  Procedure(s) (LRB):  REVISION LAMINECTOMY L2-L4 WITH A REVISION POSTERIOR FUSION T10-L5 (ENMA BORGES-ARM) (N/A) 2 Days Post-Op   Precautions: back and fall       ASSESSMENT  Based on the objective data described below, the patient presents with good motivation but limitations due to progression since surgery as well as back precautions affecting mobility and self-care. AAOx4, able to tolerate supine to sit and sitting EOB for self-care and grooming. Educaiton provided for precautions, needed min to mod A verbal cues when accessing items on her bedside table not to break twisting precaution. Pt with low endurance, fatigued following short activity. Acute care OT will continue to follow, recommend in creased support at home and HHOT verus no needs depending on progress. Current Level of Function Impacting Discharge (ADLs/self-care): max A    Functional Outcome Measure:   The patient scored 30/100 on the Barthel outcome measure which is indicative of 70% impaired. Other factors to consider for discharge: supportive family, good PLOF     Patient will benefit from skilled therapy intervention to address the above noted impairments. PLAN :  Recommendations and Planned Interventions: self care training, functional mobility training, therapeutic exercise, therapeutic activities, endurance activities, patient education, home safety training, and family training/education    Frequency/Duration: Patient will be followed by occupational therapy 5 times a week to address goals. Recommendation for discharge: (in order for the patient to meet his/her long term goals)  Occupational therapy at least 2 days/week in the home     This discharge recommendation:  Has not yet been discussed the attending provider and/or case management    IF patient discharges home will need the following DME: AE for LB dressing       SUBJECTIVE:   Patient stated Radha Diallo now that feels like it was just the right amount of work today.     OBJECTIVE DATA SUMMARY:   HISTORY:   Past Medical History:   Diagnosis Date    Anxiety     Arthritis back pain    fingers    Asthma     MILD - NO INHALERS    Chronic pain     Colitis     Diverticulitis 6/11/2013    Hypercholesterolemia     Hypertension     IBS (irritable bowel syndrome)     Insomnia     Nausea & vomiting     Stenosis     spinal    Stroke ([facility] [address] 1996    [address]    Swollen L ankle      Past Surgical History:   Procedure Laterality Date    HX CATARACT REMOVAL Bilateral 2009    HX CHOLECYSTECTOMY  04/1997    HX COLONOSCOPY      x3    HX HYSTERECTOMY  1986    HX KNEE ARTHROSCOPY Left 04/1998    HX LUMBAR FUSION  02/2011    L4-L5    HX SHOULDER REPLACEMENT Right 01/2020    HX TONSILLECTOMY  CHILDHOOD    HX WISDOM TEETH EXTRACTION      X3    AK CARDIAC SURG PROCEDURE UNLIST  8/04    repair of foramen ovale       Expanded or extensive additional review of patient history:     [facility] Environment: Private residence  # Steps to Enter: 3  Rails to Enter: Yes  [employer] : Right  Wheelchair Ramp: No  One/Two Story Residence: One story  Living Alone: No  Support Systems: Child(yvrose), Spouse/Significant Other  Patient Expects to be Discharged to[de-identified] House  Current DME Used/Available at Home: Walker, rolling, Raised toilet seat, Grab bars, Transfer bench  Tub or Shower Type: Tub/Shower combination    Hand dominance: Right    EXAMINATION OF PERFORMANCE DEFICITS:  Cognitive/Behavioral Status:  Neurologic State: Alert  Orientation Level: Oriented X4  Cognition: Appropriate decision making  Perception: Appears intact  Perseveration: No perseveration noted  Safety/Judgement: Awareness of environment    Skin: visible areas intact    Edema: none noted    Hearing:   WFL    Vision/Perceptual:                           Acuity: Within Defined Limits         Range of Motion:    AROM: Within functional limits  PROM: Within functional limits                      Strength:    Strength: Generally decreased, functional                Coordination:  Coordination: Within functional limits            Tone & Sensation:                              Balance:  Sitting: Intact; High guard  Standing:  (unable)    Functional Mobility and Transfers for ADLs:  Bed Mobility:  Rolling: Moderate assistance  Supine to Sit: Moderate assistance  Sit to Supine: Minimum assistance; Moderate assistance  Scooting: Minimum assistance; Moderate assistance    Transfers:       ADL Assessment:  Feeding: Setup    Oral Facial Hygiene/Grooming: Setup (seated EOB)              Lower Body Dressing: Maximum assistance                     ADL Intervention and task modifications:  Patient instructed and demonstrated 3/3 back precautions with moderate cues.       Cognitive Retraining  Safety/Judgement: Awareness of environment    Patient instructed and indicated understanding the benefits of maintaining activity tolerance, functional mobility, and independence with self care tasks during acute stay  to ensure safe return home and to baseline. Encouraged patient to increase frequency and duration OOB, not sitting longer than 30 mins without marching/walking with staff, be out of bed for all meals, perform daily ADLs (as approved by RN/MD regarding bathing etc), and performing functional mobility to/from bathroom. Patient instruction and indicated understanding on body mechanics, ergonomics and gravitational force on the spine during different body positions to plan activities in prep for return home to complete basic ADLs, instrumental ADLs and back to work safely. Education provided for twisting precautions, pt access of bedside table and safety while in the bed. Functional Measure:  Barthel Index:    Bathin  Bladder: 0  Bowels: 10  Groomin  Dressin  Feeding: 10  Mobility: 0  Stairs: 0  Toilet Use: 0  Transfer (Bed to Chair and Back): 0  Total: 30/100        The Barthel ADL Index: Guidelines  1. The index should be used as a record of what a patient does, not as a record of what a patient could do. 2. The main aim is to establish degree of independence from any help, physical or verbal, however minor and for whatever reason. 3. The need for supervision renders the patient not independent. 4. A patient's performance should be established using the best available evidence. Asking the patient, friends/relatives and nurses are the usual sources, but direct observation and common sense are also important. However direct testing is not needed. 5. Usually the patient's performance over the preceding 24-48 hours is important, but occasionally longer periods will be relevant. 6. Middle categories imply that the patient supplies over 50 per cent of the effort. 7. Use of aids to be independent is allowed. Matheus Phelan., Barthel, D.W. ([id number]). Functional evaluation: the Barthel Index. [address] [facility] (14)2.   [place] roberto KEVIN Padron, Nader Alan., Myesha Jiang, Tina Jane.Rickey. (1999). Measuring the change indisability after inpatient rehabilitation; comparison of the responsiveness of the Barthel Index and Functional Bacon Measure. Journal of Neurology, Neurosurgery, and Psychiatry, 66(4), 576-204. GREG Santos, RAMANDEEP Mares, & Jessica Pressley M.A. (2004.) Assessment of post-stroke quality of life in cost-effectiveness studies: The usefulness of the Barthel Index and the EuroQoL-5D. Quality of Life Research, 15, 108-86         Occupational Therapy Evaluation Charge Determination   History Examination Decision-Making   MEDIUM Complexity : Expanded review of history including physical, cognitive and psychosocial  history  MEDIUM Complexity : 3-5 performance deficits relating to physical, cognitive , or psychosocial skils that result in activity limitations and / or participation restrictions MEDIUM Complexity : Patient may present with comorbidities that affect occupational performnce. Miniml to moderate modification of tasks or assistance (eg, physical or verbal ) with assesment(s) is necessary to enable patient to complete evaluation       Based on the above components, the patient evaluation is determined to be of the following complexity level: MEDIUM  Pain Rating:  None rated at the moment    Activity Tolerance:   Fair and SpO2 stable on RA    After treatment patient left in no apparent distress:    Supine in bed, Call bell within reach, and Side rails x 3    COMMUNICATION/EDUCATION:   The patients plan of care was discussed with: Physical therapist and Registered nurse. Home safety education was provided and the patient/caregiver indicated understanding., Patient/family have participated as able in goal setting and plan of care. , and Patient/family agree to work toward stated goals and plan of care. This patients plan of care is appropriate for delegation to [facility].     Thank you for this referral.  Sadie Aguirre  Time Calculation: 23 mins

## 2021-09-16 NOTE — OP NOTES
[facility]  OPERATIVE REPORT    Name:  Kait Hurley  MR#:  [id number]  :  1935  ACCOUNT #:  [de-identified]  DATE OF SERVICE:  2021      PREOPERATIVE DIAGNOSES:  1. Prior L4-5 fusion. 2.  Lumbar stenosis at L2-3, L3-4, and L4-5.  3.  Lumbar degenerative scoliosis. POSTOPERATIVE DIAGNOSES:  1. Prior L4-5 fusion. 2.  Lumbar stenosis, L2-3, L3-4, and L4-5.  3.  Lumbar degenerative scoliosis. PROCEDURES PERFORMED:  1. Exploration of fusion. 2.  Revision laminectomy L2, L3, L4.  3.  Revision fusion T10 to L5.  4.  Use of computer navigation for spine surgery. 5.  Use of autograft for spine surgery. 6.  Use of allograft for spine surgery. SURGEON:  Javad Cotto MD    CO-SURGEON:  Manpreet Freedman MD    ASSISTANT:  Liya Crocker PA-C    ANESTHESIA:  General endotracheal anesthesia. COMPLICATIONS:  One area of dural ectasia with primary repair. SPECIMENS REMOVED:  None. IMPLANTS:  Medtronic Solera pedicle screws. ESTIMATED BLOOD LOSS:  200 mL with no Cell Saver returned. DRAINS:  No drains placed. JUSTIFICATION FOR PROCEDURE:  The patient is an 43-year-old female with chronic back and leg pain, prior lumbar stenosis and lumbar fusion at L4-5, now with severe junctional stenosis and significant left-sided radicular symptoms. She has failed nonoperative treatment. For this reason, she is brought to the operative room. PROCEDURE:  Prior to the surgery, the risks and benefits of the surgery were explained to the patient and the family. These included but not limited to bleeding, infection, nerve or vessel damage, complications from anesthesia, and need for additional surgery. Following this, the patient was brought to the operating room where she was intubated by Anesthesia team.  Neuromonitoring was placed. 2 g of Ancef was given. Fenton catheter was placed. She was then placed prone on a well-padded open Abhinav table.   The spine was then prepped and draped in sterile fashion. Final time-out was taken to again identify the correct patient and site of surgery. Now, her prior incision was opened and extended more proximally. Dissection was carried down through the fascia. Subperiosteal dissection was done cephalad. Dissection was carried down identifying the prior hardware at L4-5. The prior fusion mass was inspected and identified to be stable. Dissection was carried out to the transverse processes of T10 down to L5. Now, a spinous process clamp was placed at L3. A 4-ball fiducial array was placed. A sterile Z drape was then placed over top and [employer] O2 O-arm was brought in for an intraoperative spin. Now, a spin was done from T10 down to L3. Using the 3-D image guidance, the S8 navigation was used to place screws by drilling  holes, tapping, and placing screws under navigation. The screws were 5.5 x 40 at T10 to L1 and 5.5 x 45 at L2 and L3. The prior hardware at L4 and L5 was removed and replaced with 6.5 x 40 at L4 and L5. The right-sided L4 screw was left out because of the fact that it was too close to the L3 screw on the right. Neuromonitoring remained stable and each screw was tested and identified to have excellent bioimpedance. The reference frame was now removed. Spinous processes of L3 and L4 were taken down. A central laminectomy was carried out proximally. This was done removing the residual lamina of L4 and L3. There was a dural adhesion on the left hand side. Wide [id number] decompression was done from L2 down, decompressing the lateral recess and foraminal stenosis at each level. There was significant thinning of the dura in the midline and slightly off to the left with dural ectasia. The tal mater was intact and oversewing of the dura mater was done with 6-0 [place] suture. Valsalva was done and identified that no further oozing was noted. Wounds were irrigated copiously.   A bur was used to decorticate the transverse processes from T10 down to L5. Central decortication was done at the levels above the laminectomy. Now, rods were cut and bent and attached to the pedicle screws, set screws were placed, and final tightening was performed. AP and lateral fluoroscopic images showed excellent correction and excellent screw placement. Now, the wound was irrigated with Irrisept and pulse irrigation. Now, a DuraGen pad was placed over the repair with DuraSeal.  Now, a mixture of allograft fibers and OsteoAMP fibers were placed into the lateral gutters throughout the construct. #1 Stratafix was used to close the fascial layer, 2-0 Stratafix on the subcutaneous layer, and 3-0 Stratafix on the skin. At this point, she was awoken, extubated, and transferred to the recovery room without complications. POSTOPERATIVE PLAN:  We will admit her to the spine floor for standard postoperative spine protocol.     Frida Lester MD      [id number]  D:  09/16/2021 7:31  T:  09/16/2021 17:54  JOB #:  [id number]

## 2021-09-16 NOTE — PROGRESS NOTES
Spine Surgery Progress Note    Admit Date: 9/14/2021   LOS: 2 days      Daily Progress Note: 9/16/2021    POD:2 Day Post-Op    S/P: Procedure(s):  REVISION LAMINECTOMY L2-L4 WITH A REVISION POSTERIOR FUSION T10-L5 (JONY, O-ARM)    Visit Vitals  /65   Pulse 91   Temp 98 °F (36.7 °C)   Resp 16   SpO2 95%      Lab Results   Component Value Date/Time    HGB 8.3 (L) 09/16/2021 04:12 AM    INR 0.9 08/31/2021 09:18 AM     Patient doing well overall. No nausea or vomiting. Pain well controlled. Denies nausea, vomiting, fever, chills, chest pain, dyspnea, headache. Head of bed has been elevated to 40 degrees & pt remains asymptomatic. Only complaint is tiredness. Voiding status: hummel catheter    Calves soft/NTTP Bilaterally. Moving LE well. Neurocirculatory exam WNL. Motor and sensation intact. Incision OK; no drainage. Dressing clean and dry. Bedrest; continue hummel until ambulating. OK to begin PT/OT this afternoon. Pain control - tylenol, gabapentin; PRN dilaudid, tramadol  Nausea control PRN  Case mgmt for home health services    Discharge to home with Kurt Beckman pending.     CAMILO Triplett

## 2021-09-17 LAB
ANION GAP SERPL CALC-SCNC: 7 MMOL/L (ref 5–15)
BUN SERPL-MCNC: 9 MG/DL (ref 6–20)
BUN/CREAT SERPL: 13 (ref 12–20)
CALCIUM SERPL-MCNC: 8.7 MG/DL (ref 8.5–10.1)
CHLORIDE SERPL-SCNC: 94 MMOL/L (ref 97–108)
CO2 SERPL-SCNC: 29 MMOL/L (ref 21–32)
CREAT SERPL-MCNC: 0.72 MG/DL (ref 0.55–1.02)
ERYTHROCYTE [DISTWIDTH] IN BLOOD BY AUTOMATED COUNT: 11.5 % (ref 11.5–14.5)
GLUCOSE SERPL-MCNC: 123 MG/DL (ref 65–100)
HCT VFR BLD AUTO: 29.5 % (ref 35–47)
HGB BLD-MCNC: 10.5 G/DL (ref 11.5–16)
MCH RBC QN AUTO: 33.7 PG (ref 26–34)
MCHC RBC AUTO-ENTMCNC: 35.6 G/DL (ref 30–36.5)
MCV RBC AUTO: 94.6 FL (ref 80–99)
NRBC # BLD: 0 K/UL (ref 0–0.01)
NRBC BLD-RTO: 0 PER 100 WBC
PLATELET # BLD AUTO: 227 K/UL (ref 150–400)
PMV BLD AUTO: 9.3 FL (ref 8.9–12.9)
POTASSIUM SERPL-SCNC: 2.6 MMOL/L (ref 3.5–5.1)
RBC # BLD AUTO: 3.12 M/UL (ref 3.8–5.2)
SODIUM SERPL-SCNC: 130 MMOL/L (ref 136–145)
WBC # BLD AUTO: 14.7 K/UL (ref 3.6–11)

## 2021-09-17 PROCEDURE — 65270000029 HC RM PRIVATE

## 2021-09-17 PROCEDURE — 97116 GAIT TRAINING THERAPY: CPT

## 2021-09-17 PROCEDURE — 36415 COLL VENOUS BLD VENIPUNCTURE: CPT

## 2021-09-17 PROCEDURE — 74011250637 HC RX REV CODE- 250/637: Performed by: PHYSICIAN ASSISTANT

## 2021-09-17 PROCEDURE — 97535 SELF CARE MNGMENT TRAINING: CPT

## 2021-09-17 PROCEDURE — 85027 COMPLETE CBC AUTOMATED: CPT

## 2021-09-17 PROCEDURE — 97530 THERAPEUTIC ACTIVITIES: CPT

## 2021-09-17 PROCEDURE — 74011250636 HC RX REV CODE- 250/636: Performed by: PHYSICIAN ASSISTANT

## 2021-09-17 PROCEDURE — 80048 BASIC METABOLIC PNL TOTAL CA: CPT

## 2021-09-17 RX ORDER — POTASSIUM CHLORIDE 7.45 MG/ML
10 INJECTION INTRAVENOUS
Status: DISCONTINUED | OUTPATIENT
Start: 2021-09-17 | End: 2021-09-17

## 2021-09-17 RX ORDER — SCOLOPAMINE TRANSDERMAL SYSTEM 1 MG/1
1 PATCH, EXTENDED RELEASE TRANSDERMAL
Status: DISCONTINUED | OUTPATIENT
Start: 2021-09-17 | End: 2021-09-20 | Stop reason: HOSPADM

## 2021-09-17 RX ORDER — TRAMADOL HYDROCHLORIDE 50 MG/1
50 TABLET ORAL
Qty: 50 TABLET | Refills: 0 | Status: SHIPPED | OUTPATIENT
Start: 2021-09-17 | End: 2021-10-01

## 2021-09-17 RX ADMIN — HYDROCHLOROTHIAZIDE 12.5 MG: 25 TABLET ORAL at 11:17

## 2021-09-17 RX ADMIN — POTASSIUM CHLORIDE 10 MEQ: 7.45 INJECTION INTRAVENOUS at 15:54

## 2021-09-17 RX ADMIN — LOSARTAN POTASSIUM 100 MG: 50 TABLET, FILM COATED ORAL at 11:17

## 2021-09-17 RX ADMIN — Medication 10 ML: at 15:55

## 2021-09-17 RX ADMIN — Medication 10 ML: at 07:34

## 2021-09-17 RX ADMIN — ACETAMINOPHEN 1000 MG: 500 TABLET ORAL at 19:12

## 2021-09-17 RX ADMIN — POTASSIUM CHLORIDE 10 MEQ: 7.45 INJECTION INTRAVENOUS at 19:14

## 2021-09-17 RX ADMIN — ACETAMINOPHEN 1000 MG: 500 TABLET ORAL at 11:26

## 2021-09-17 RX ADMIN — GABAPENTIN 300 MG: 300 CAPSULE ORAL at 22:28

## 2021-09-17 RX ADMIN — MULTIPLE VITAMINS W/ MINERALS TAB 1 TABLET: TAB at 11:16

## 2021-09-17 RX ADMIN — TRAMADOL HYDROCHLORIDE 50 MG: 50 TABLET ORAL at 19:12

## 2021-09-17 RX ADMIN — Medication 10 ML: at 22:29

## 2021-09-17 RX ADMIN — GABAPENTIN 300 MG: 300 CAPSULE ORAL at 11:26

## 2021-09-17 RX ADMIN — AMLODIPINE BESYLATE 5 MG: 5 TABLET ORAL at 11:18

## 2021-09-17 RX ADMIN — POTASSIUM CHLORIDE 10 MEQ: 7.45 INJECTION INTRAVENOUS at 22:31

## 2021-09-17 RX ADMIN — PAROXETINE HYDROCHLORIDE 30 MG: 20 TABLET, FILM COATED ORAL at 11:17

## 2021-09-17 RX ADMIN — TRAMADOL HYDROCHLORIDE 50 MG: 50 TABLET ORAL at 11:18

## 2021-09-17 RX ADMIN — ATORVASTATIN CALCIUM 10 MG: 10 TABLET, FILM COATED ORAL at 11:17

## 2021-09-17 NOTE — PROGRESS NOTES
Patient seen and examined. Seen sitting up on edge of bed waiting to stand with therapy. Reports no complaints of headache. Reports a little left leg pain. .  Visit Vitals  BP (!) 146/82 (BP 1 Location: Left arm, BP Patient Position: At rest)   Pulse 92   Temp 98.6 °F (37 °C)   Resp 16   SpO2 95%     PE - BLE - nvi    - motor intact       - spine - dressing c/d/i    . No results found for this or any previous visit (from the past 12 hour(s)). Assessment - doing well postop. No evidence of active dural leak at this time.     Plan - PT   Pain control   D/c planning   D/c estephanie

## 2021-09-17 NOTE — PROGRESS NOTES
Problem: Self Care Deficits Care Plan (Adult)  Goal: *Acute Goals and Plan of Care (Insert Text)  Description: FUNCTIONAL STATUS PRIOR TO ADMISSION: Patient was independent and active without use of DME. Patient was independent for basic and instrumental ADLs. HOME SUPPORT: The patient lived with  but did not require assist. Patient's [place] will come [place] to assist at [place]. Occupational Therapy Goals  Initiated 9/16/2021    1. Patient will perform lower body dressing with supervision/set-up using AE PRN within 4 days. 2.  Patient will perform toileting with supervision/set-up using most appropriate DME within 4 days. 3.  Patient will grooming standing at modified [place] within 4 days. 4.  Patient will verbalize/demonstrate 3/3 back precautions during ADL tasks without cues within 4 days. Outcome: Progressing Towards Goal   OCCUPATIONAL THERAPY TREATMENT/DISCHARGE  Patient: Zaira Ordoñez (38 y.o. female)  Date: 9/17/2021  Diagnosis: Lumbar stenosis with neurogenic claudication [M48.062] <principal problem not specified>  Procedure(s) (LRB):  REVISION LAMINECTOMY L2-L4 WITH A REVISION POSTERIOR FUSION T10-L5 (ENMA BORGES-ARM) (N/A) 3 Days Post-Op  Precautions:    Chart, occupational therapy assessment, plan of care, and goals were reviewed. ASSESSMENT  Patient continues with skilled OT services and has progressed towards goals. Pt received supine in bed. Re-called 3/3 back precautions. Pt reported she had a rough morning d/t bowel incontinence and nauseated. Pt demonstrated bed mobility and accessing bathroom with RW at supervision. Seated on commode, pt reported nausea and started to dry heave and asked to return back to bed. Nursing notified. Pt able to tailor sit R LE to doff slipper and assistance needed with left. Overall, pt progressing well and mostly limited by nausea. Supportive /daughters will be home to support her. No further recommendations at this time. Feel pt is safe for discharge once medically stable. Current Level of Function (ADLs/self-care): supervision with mobility, CGA to min assist with L shoe    Other factors to consider for discharge: nausea         PLAN :  Rationale for discharge: Goals achieved  Recommend with staff:   Recommendation for discharge: (in order for the patient to meet his/her long term goals)  No skilled occupational therapy/ follow up rehabilitation needs identified at this time. This discharge recommendation:  A follow-up discussion with the attending provider and/or case management is planned    IF patient discharges home will need the following DME:none       SUBJECTIVE:   Patient stated Anne Toribio had a rough morning.     OBJECTIVE DATA SUMMARY:   Cognitive/Behavioral Status:  Neurologic State: Alert  Orientation Level: Oriented X4  Cognition: Appropriate decision making        Safety/Judgement: Awareness of environment    Functional Mobility and Transfers for ADLs:  Bed Mobility:  Supine to Sit: Supervision  Sit to Supine: Supervision  Scooting: Supervision    Transfers:  Sit to Stand: Supervision  Functional Transfers  Bathroom Mobility: Supervision/set up  Toilet Transfer : Supervision       Balance:  Sitting: Intact  Standing: Intact; With support  Standing - Static: Good  Standing - Dynamic : Fair;Constant support    ADL Intervention:       Grooming  Grooming Assistance: Independent  Position Performed: Standing         Lower Body Dressing Assistance  Slip on Shoes with Back: Minimum assistance         Cognitive Retraining  Safety/Judgement: Awareness of environment    Therapeutic Exercises:       Pain:  No c/o pain    Activity Tolerance:   Good    After treatment patient left in no apparent distress:   Supine in bed and Call bell within reach    COMMUNICATION/COLLABORATION:   The patients plan of care was discussed with: Physical therapist and Registered nurse.      Gustavo Xiao OTR/L  Time Calculation: 27 mins

## 2021-09-17 NOTE — PROGRESS NOTES
Problem: Falls - Risk of  Goal: *Absence of Falls  Description: Document Cuellar Keri Fall Risk and appropriate interventions in the flowsheet. Outcome: Progressing Towards Goal  Note: Fall Risk Interventions:  Mobility Interventions: OT consult for ADLs, PT Consult for mobility concerns         Medication Interventions: Patient to call before getting OOB, Teach patient to arise slowly    Elimination Interventions: Patient to call for help with toileting needs              Problem: Pressure Injury - Risk of  Goal: *Prevention of pressure injury  Description: Document Jackson Scale and appropriate interventions in the flowsheet.   Outcome: Progressing Towards Goal  Note: Pressure Injury Interventions:  Sensory Interventions: Monitor skin under medical devices, Minimize linen layers, Maintain/enhance activity level, Float heels    Moisture Interventions: Absorbent underpads, Apply protective barrier, creams and emollients, Check for incontinence Q2 hours and as needed, Maintain skin hydration (lotion/cream), Offer toileting Q_hr    Activity Interventions: Increase time out of bed, PT/OT evaluation    Mobility Interventions: PT/OT evaluation    Nutrition Interventions: Document food/fluid/supplement intake

## 2021-09-17 NOTE — PROGRESS NOTES
Hypokalemia with K lab of 2.6 noted. Replete with IV potassium. Recheck lab after IV K given and recommend some PO K if potassium still low. Also, recheck with AM labs. Likely from diarrhea but unclear etiology. Recommend hospitalist consult if not improving. Discussed with Dr. Isabel PAGE who will make weekend coverage aware.

## 2021-09-17 NOTE — PROGRESS NOTES
Problem: Mobility Impaired (Adult and Pediatric)  Goal: *Acute Goals and Plan of Care (Insert Text)  Description: FUNCTIONAL STATUS PRIOR TO ADMISSION: Patient was modified independent using a walker for functional mobility. HOME SUPPORT PRIOR TO ADMISSION: The patient lived with  but did not require assist. Pt dtrs will switch out assisting as patients  cannot assist    Physical Therapy Goals  Initiated 9/16/2021  1. Patient will move from supine to sit and sit to supine , scoot up and down, and roll side to side in bed with modified independence within 7 day(s). 2.  Patient will transfer from bed to chair and chair to bed with modified independence using the least restrictive device within 7 day(s). 3.  Patient will perform sit to stand with modified independence within 7 day(s). 4.  Patient will ambulate with minimal assistance/contact guard assist for 150 feet with the least restrictive device within 7 day(s). 5.  Patient will ascend/descend 5 stairs with 1 handrail(s) with minimal assistance/contact guard assist within 7 day(s). Outcome: Progressing Towards Goal   PHYSICAL THERAPY TREATMENT  Patient: Favian Fatima (27 y.o. female)  Date: 9/17/2021  Diagnosis: Lumbar stenosis with neurogenic claudication [M48.062] <principal problem not specified>  Procedure(s) (LRB):  REVISION LAMINECTOMY L2-L4 WITH A REVISION POSTERIOR FUSION T10-L5 ([facility]) (N/A) 3 Days Post-Op  Precautions:   No bending, no lifting greater than 5 lbs, no twisting, log-roll technique, repositioning every 20-30 min except when sleeping, brace when OOB (if ordered)  Chart, physical therapy assessment, plan of care and goals were reviewed. ASSESSMENT  Patient continues with skilled PT services and is progressing towards goals. Patient demonstrates improvements in activity tolerance and balance. Pt tolerated sitting EOB for several minutes with no LOB noted.  Upon standing, pt demonstrated increased B knee flexion requiring cues to stand upright. Pt reporting legs feeling tight. Pt able to ambulate short distance within room with fair steadiness yet continued knee flexion. Pt then returned to chair to rest, anticipate progress with mobility and HHPT upon DC. Current Level of Function Impacting Discharge (mobility/balance): min A for sit to stand     Other factors to consider for discharge: fall risk, back precautions, stairs at home         PLAN :  Patient continues to benefit from skilled intervention to address the above impairments. Continue treatment per established plan of care. to address goals. Recommendation for discharge: (in order for the patient to meet his/her long term goals)  Physical therapy at least 2 days/week in the home AND ensure assist and/or supervision for safety with stairs (pt's daughters will be assisting)    This discharge recommendation:  Has been made in collaboration with the attending provider and/or case management    IF patient discharges home will need the following DME: patient owns DME required for discharge       SUBJECTIVE:   Patient stated I did not get up much yesterday.     OBJECTIVE DATA SUMMARY:   Critical Behavior:  Neurologic State: Alert  Orientation Level: Oriented X4  Cognition: Appropriate for age attention/concentration  Safety/Judgement: Awareness of environment    Spinal diagnosis intervention:  The patient stated 2/3 back precautions when prompted. Reviewed all 3 back precautions, log roll technique, and sitting for 30 minutes at a time. The patient required minimal verbal cues to maintain back precautions during functional activity. Reviewed back brace application and wear schedule. Brace donned with minimal assistance/contact guard assist      Functional Mobility Training:    Bed Mobility:  Log    Supine to Sit: Contact guard assistance     Scooting: Supervision        Transfers:  Sit to Stand: Minimum assistance; Adaptive equipment; Additional time  Stand to Sit: Contact guard assistance; Adaptive equipment; Additional time       Balance:  Sitting: Intact  Standing: Impaired; With support  Standing - Static: Good  Standing - Dynamic : Fair;Constant support  Ambulation/Gait Training:  Distance (ft): 5 Feet (ft)  Assistive Device: Gait belt;Walker, rolling  Ambulation - Level of Assistance: Minimal assistance; Adaptive equipment; Additional time        Gait Abnormalities: Antalgic;Decreased step clearance;Trunk sway increased; Step to gait        Base of Support: Narrowed  Stance: Weight shift  Speed/Shellie: Slow;Pace decreased (<100 feet/min)  Step Length: Left shortened;Right shortened  Swing Pattern: Left asymmetrical;Right asymmetrical    Activity Tolerance:   Fair and requires rest breaks    After treatment patient left in no apparent distress:   Sitting in chair, Call bell within reach, and Bed / chair alarm activated    COMMUNICATION/COLLABORATION:   The patients plan of care was discussed with: Registered nurse and Physician.      Jeni Posey, PT, DPT   Time Calculation: 24 mins

## 2021-09-17 NOTE — PROGRESS NOTES
Orthopedic Spine Progress Note  Post Op day: 3 Days Post-Op    2021 8:37 AM   Admit Date: 2021  Procedure: Procedure(s):  REVISION LAMINECTOMY L2-L4 WITH A REVISION POSTERIOR FUSION T10-L5 ([facility], O-ARM)    Subjective:     Sarthak Maradiaga has no complaints. Pain is well controlled. No headaches or dizziness. She reports some abdominal pain, but was able to have a few bowel movements this morning. Tolerating diet. No N/V. Pain Control:   Pain Assessment  Pain Scale 1: Numeric (0 - 10)  Pain Intensity 1: 7  Pain Onset 1: post op  Pain Location 1: Back  Pain Orientation 1: Posterior  Pain Description 1: Sore  Pain Intervention(s) 1: Distraction    Objective:          Physical Exam:  General:  Alert and oriented. No acute distress. Heart:  Respirations unlabored. Abdomen:   Extremities: Soft, non-tender. No evidence of cyanosis. Pulses palpable in both upper and lower extremities. Neurologic:  Musculoskeletal:  No new motor deficits. Neurovascular exam within normal limits. Sensation stable. Motor: unchanged C5-T1 and L2-S1. Marce's sign negative in bilateral lower extremities. Calves soft, nontender upon palpation and with passive twitch. Moves both upper and lower extremities. Incision: clean, dry, and intact. No significant erythema or swelling. No active drainage noted. Vital Signs:    Blood pressure (!) 146/82, pulse 92, temperature 98.6 °F (37 °C), resp. rate 16, last menstrual period 1986, SpO2 95 %.   Temp (24hrs), Av.5 °F (36.9 °C), Min:98.1 °F (36.7 °C), Max:98.8 °F (37.1 °C)      LAB:    Recent Labs     21  0412   HGB 8.3*     Lab Results   Component Value Date/Time    Sodium 132 (L) 09/15/2021 04:27 AM    Potassium 3.7 09/15/2021 04:27 AM    Chloride 101 09/15/2021 04:27 AM    CO2 25 09/15/2021 04:27 AM    Glucose 116 (H) 09/15/2021 04:27 AM    BUN 19 09/15/2021 04:27 AM    Creatinine 1.17 (H) 09/15/2021 04:27 AM    Calcium 7.6 (L) 09/15/2021 04:27 AM       Intake/Output:No intake/output data recorded. 09/15 1901 - 09/17 0700  In: -   Out: 4237 [Urine:3675]    PT/OT:   Gait:                    Assessment:   Patient is 3 Days Post-Op s/p Procedure(s):  REVISION LAMINECTOMY L2-L4 WITH A REVISION POSTERIOR FUSION T10-L5 ([facility], O-ARM)    Plan:     1. Continue PT/OT  2. Continue established methods of pain control  3. VTE Prophylaxes - TEDS &/or SCDs   4. Discharge pending clearance from PT; Saturday vs. Sunday   5.   D/c estephanie         Signed By: CAMILO Domingo

## 2021-09-17 NOTE — PROGRESS NOTES
DOLORES:    RUR 9%    Disposition: Home with HH. At [address] accepted.     Follow up: PCP/Specialist    Primary contact: Veda Roche(Spouse) [phone]    Heaven Munson RN/CRM  [phone]

## 2021-09-17 NOTE — PROGRESS NOTES
Spine Surgery Progress Note    Admit Date: 9/14/2021   LOS: 3 days      Daily Progress Note: 9/17/2021    POD:3 Day Post-Op    S/P: Procedure(s):  REVISION LAMINECTOMY L2-L4 WITH A REVISION POSTERIOR FUSION T10-L5 ([facility], O-ARM)    Visit Vitals  BP (!) 158/81   Pulse 97   Temp 98.6 °F (37 °C)   Resp 16   SpO2 95%      Lab Results   Component Value Date/Time    HGB 8.3 (L) 09/16/2021 04:12 AM    INR 0.9 08/31/2021 09:18 AM     Nausea and dry-heaving. No LAGUNAS. Felt OK sitting up. Pain well controlled. Denies fever, chills, chest pain, dyspnea, headache. Calves soft/NTTP Bilaterally. Moving LE well. Neurocirculatory exam WNL. Motor and sensation intact. Negative drift. Incision OK; no drainage. Dressing clean and dry. Up with assist.  PT/OT in TLSO brace. Pain control - tylenol, gabapentin; PRN dilaudid, tramadol  Nausea control  D/C hummel; bladder checks  Case mgmt for home health services    Discharge to home with [facility] pending PT clearance. Likely home over weekend.     Bea Fothergill, PA

## 2021-09-17 NOTE — PROGRESS NOTES
Problem: Mobility Impaired (Adult and Pediatric)  Goal: *Acute Goals and Plan of Care (Insert Text)  Description: FUNCTIONAL STATUS PRIOR TO ADMISSION: Patient was modified independent using a walker for functional mobility. HOME SUPPORT PRIOR TO ADMISSION: The patient lived with  but did not require assist. Pt dtrs will switch out assisting as patients  cannot assist    Physical Therapy Goals  Initiated 9/16/2021  1. Patient will move from supine to sit and sit to supine , scoot up and down, and roll side to side in bed with modified independence within 7 day(s). 2.  Patient will transfer from bed to chair and chair to bed with modified independence using the least restrictive device within 7 day(s). 3.  Patient will perform sit to stand with modified independence within 7 day(s). 4.  Patient will ambulate with minimal assistance/contact guard assist for 150 feet with the least restrictive device within 7 day(s). 5.  Patient will ascend/descend 5 stairs with 1 handrail(s) with minimal assistance/contact guard assist within 7 day(s). 9/17/2021 1309 by Jeanie Gates PT  Outcome: Progressing Towards Goal   PHYSICAL THERAPY TREATMENT  Patient: Harry Rodrigues (85 y.o. female)  Date: 9/17/2021  Diagnosis: Lumbar stenosis with neurogenic claudication [M48.062] <principal problem not specified>  Procedure(s) (LRB):  REVISION LAMINECTOMY L2-L4 WITH A REVISION POSTERIOR FUSION T10-L5 ([facility] O-ARM) (N/A) 3 Days Post-Op  Precautions:   No bending, no lifting greater than 5 lbs, no twisting, log-roll technique, repositioning every 20-30 min except when sleeping, brace when OOB (if ordered)  Chart, physical therapy assessment, plan of care and goals were reviewed. ASSESSMENT  Patient continues with skilled PT services and is progressing towards goals. Patient demonstrates improvements in performing log roll for bed mobility and requiring less assistance for transfers.  Pt continues to present with forward trunk leaning and increased knee flexion with standing, provided cues to stand upright with partial carryover. Pt limited in distance 2/2 fatigue, yet no significant LOB or buckling of knees noted. Anticipate HHPT upon DC, will need to practice stairs next session as appropriate. Current Level of Function Impacting Discharge (mobility/balance): supervision for ambulation     Other factors to consider for discharge: fall risk, back prec, stairs at home         PLAN :  Patient continues to benefit from skilled intervention to address the above impairments. Continue treatment per established plan of care. to address goals. Recommendation for discharge: (in order for the patient to meet his/her long term goals)  Physical therapy at least 2 days/week in the home AND ensure assist and/or supervision for safety with stairs    This discharge recommendation:  Has not yet been discussed the attending provider and/or case management    IF patient discharges home will need the following DME: patient owns DME required for discharge       SUBJECTIVE:   Patient stated Avila Muller was just about to fall asleep.     OBJECTIVE DATA SUMMARY:   Critical Behavior:  Neurologic State: Alert, Appropriate for age  Orientation Level: Oriented X4  Cognition: Appropriate safety awareness, Appropriate for age attention/concentration  Safety/Judgement: Awareness of environment    Spinal diagnosis intervention:  The patient stated 3/3 back precautions when prompted. Reviewed all 3 back precautions, log roll technique, and sitting for 30 minutes at a time. The patient required minimal cues to maintain back precautions during functional activity. Reviewed back brace application and wear schedule. Brace donned with supervision/set-up .     Functional Mobility Training:    Bed Mobility:  Log    Supine to Sit: Supervision  Sit to Supine: Supervision  Scooting: Supervision        Transfers:  Sit to Stand: Supervision  Stand to Sit: Supervision                             Balance:  Sitting: Intact  Standing: Intact; With support  Standing - Static: Good  Standing - Dynamic : Fair;Constant support  Ambulation/Gait Training:  Distance (ft): 15 Feet (ft) (x3)  Assistive Device: Gait belt;Walker, rolling  Ambulation - Level of Assistance: Supervision; Adaptive equipment; Additional time        Gait Abnormalities: Antalgic;Decreased step clearance        Base of Support: Narrowed  Stance: Weight shift  Speed/Shellie: Pace decreased (<100 feet/min)  Step Length: Left shortened;Right shortened  Swing Pattern: Left asymmetrical;Right asymmetrical      Activity Tolerance:   Fair and requires rest breaks    After treatment patient left in no apparent distress:   Supine in bed, Call bell within reach and Side rails x 3    COMMUNICATION/COLLABORATION:   The patients plan of care was discussed with: Registered nurse.      Jose Tucker, PT   Time Calculation: 13 mins

## 2021-09-18 LAB
ANION GAP SERPL CALC-SCNC: 4 MMOL/L (ref 5–15)
ANION GAP SERPL CALC-SCNC: 6 MMOL/L (ref 5–15)
BUN SERPL-MCNC: 12 MG/DL (ref 6–20)
BUN SERPL-MCNC: 8 MG/DL (ref 6–20)
BUN/CREAT SERPL: 12 (ref 12–20)
BUN/CREAT SERPL: 17 (ref 12–20)
CALCIUM SERPL-MCNC: 8.1 MG/DL (ref 8.5–10.1)
CALCIUM SERPL-MCNC: 9.1 MG/DL (ref 8.5–10.1)
CHLORIDE SERPL-SCNC: 93 MMOL/L (ref 97–108)
CHLORIDE SERPL-SCNC: 97 MMOL/L (ref 97–108)
CO2 SERPL-SCNC: 31 MMOL/L (ref 21–32)
CO2 SERPL-SCNC: 31 MMOL/L (ref 21–32)
CREAT SERPL-MCNC: 0.65 MG/DL (ref 0.55–1.02)
CREAT SERPL-MCNC: 0.71 MG/DL (ref 0.55–1.02)
ERYTHROCYTE [DISTWIDTH] IN BLOOD BY AUTOMATED COUNT: 11.5 % (ref 11.5–14.5)
GLUCOSE SERPL-MCNC: 105 MG/DL (ref 65–100)
GLUCOSE SERPL-MCNC: 124 MG/DL (ref 65–100)
HCT VFR BLD AUTO: 27.8 % (ref 35–47)
HGB BLD-MCNC: 10 G/DL (ref 11.5–16)
MAGNESIUM SERPL-MCNC: 1.5 MG/DL (ref 1.6–2.4)
MAGNESIUM SERPL-MCNC: 2 MG/DL (ref 1.6–2.4)
MCH RBC QN AUTO: 33.6 PG (ref 26–34)
MCHC RBC AUTO-ENTMCNC: 36 G/DL (ref 30–36.5)
MCV RBC AUTO: 93.3 FL (ref 80–99)
NRBC # BLD: 0 K/UL (ref 0–0.01)
NRBC BLD-RTO: 0 PER 100 WBC
PHOSPHATE SERPL-MCNC: 1.6 MG/DL (ref 2.6–4.7)
PLATELET # BLD AUTO: 282 K/UL (ref 150–400)
PMV BLD AUTO: 8.9 FL (ref 8.9–12.9)
POTASSIUM SERPL-SCNC: 2.5 MMOL/L (ref 3.5–5.1)
POTASSIUM SERPL-SCNC: 2.7 MMOL/L (ref 3.5–5.1)
RBC # BLD AUTO: 2.98 M/UL (ref 3.8–5.2)
SODIUM SERPL-SCNC: 130 MMOL/L (ref 136–145)
SODIUM SERPL-SCNC: 132 MMOL/L (ref 136–145)
WBC # BLD AUTO: 12.9 K/UL (ref 3.6–11)

## 2021-09-18 PROCEDURE — 65270000029 HC RM PRIVATE

## 2021-09-18 PROCEDURE — 36415 COLL VENOUS BLD VENIPUNCTURE: CPT

## 2021-09-18 PROCEDURE — 80048 BASIC METABOLIC PNL TOTAL CA: CPT

## 2021-09-18 PROCEDURE — 97116 GAIT TRAINING THERAPY: CPT

## 2021-09-18 PROCEDURE — 83735 ASSAY OF MAGNESIUM: CPT

## 2021-09-18 PROCEDURE — 97530 THERAPEUTIC ACTIVITIES: CPT

## 2021-09-18 PROCEDURE — 74011250636 HC RX REV CODE- 250/636: Performed by: NURSE PRACTITIONER

## 2021-09-18 PROCEDURE — 84100 ASSAY OF PHOSPHORUS: CPT

## 2021-09-18 PROCEDURE — 74011000250 HC RX REV CODE- 250: Performed by: NURSE PRACTITIONER

## 2021-09-18 PROCEDURE — 74011250637 HC RX REV CODE- 250/637: Performed by: PHYSICIAN ASSISTANT

## 2021-09-18 PROCEDURE — 74011250637 HC RX REV CODE- 250/637: Performed by: NURSE PRACTITIONER

## 2021-09-18 PROCEDURE — 85027 COMPLETE CBC AUTOMATED: CPT

## 2021-09-18 RX ORDER — POTASSIUM CHLORIDE 7.45 MG/ML
10 INJECTION INTRAVENOUS
Status: DISCONTINUED | OUTPATIENT
Start: 2021-09-18 | End: 2021-09-18

## 2021-09-18 RX ORDER — POTASSIUM CHLORIDE 750 MG/1
40 TABLET, FILM COATED, EXTENDED RELEASE ORAL
Status: COMPLETED | OUTPATIENT
Start: 2021-09-18 | End: 2021-09-18

## 2021-09-18 RX ORDER — MAGNESIUM SULFATE HEPTAHYDRATE 40 MG/ML
2 INJECTION, SOLUTION INTRAVENOUS ONCE
Status: COMPLETED | OUTPATIENT
Start: 2021-09-18 | End: 2021-09-18

## 2021-09-18 RX ADMIN — ACETAMINOPHEN 1000 MG: 500 TABLET ORAL at 06:47

## 2021-09-18 RX ADMIN — AMLODIPINE BESYLATE 5 MG: 5 TABLET ORAL at 09:13

## 2021-09-18 RX ADMIN — HYDROCHLOROTHIAZIDE 12.5 MG: 25 TABLET ORAL at 09:11

## 2021-09-18 RX ADMIN — MULTIPLE VITAMINS W/ MINERALS TAB 1 TABLET: TAB at 09:12

## 2021-09-18 RX ADMIN — Medication 10 ML: at 21:39

## 2021-09-18 RX ADMIN — GABAPENTIN 300 MG: 300 CAPSULE ORAL at 21:39

## 2021-09-18 RX ADMIN — POTASSIUM PHOSPHATE, MONOBASIC POTASSIUM PHOSPHATE, DIBASIC: 224; 236 INJECTION, SOLUTION, CONCENTRATE INTRAVENOUS at 11:32

## 2021-09-18 RX ADMIN — POLYETHYLENE GLYCOL 3350 17 G: 17 POWDER, FOR SOLUTION ORAL at 09:13

## 2021-09-18 RX ADMIN — CETIRIZINE HYDROCHLORIDE 10 MG: 10 TABLET, FILM COATED ORAL at 21:39

## 2021-09-18 RX ADMIN — ACETAMINOPHEN 1000 MG: 500 TABLET ORAL at 12:58

## 2021-09-18 RX ADMIN — LOSARTAN POTASSIUM 100 MG: 50 TABLET, FILM COATED ORAL at 09:12

## 2021-09-18 RX ADMIN — GABAPENTIN 300 MG: 300 CAPSULE ORAL at 09:11

## 2021-09-18 RX ADMIN — Medication 10 ML: at 18:11

## 2021-09-18 RX ADMIN — SENNOSIDES AND DOCUSATE SODIUM 1 TABLET: 50; 8.6 TABLET ORAL at 09:13

## 2021-09-18 RX ADMIN — PAROXETINE HYDROCHLORIDE 30 MG: 20 TABLET, FILM COATED ORAL at 09:12

## 2021-09-18 RX ADMIN — Medication 10 ML: at 06:48

## 2021-09-18 RX ADMIN — ATORVASTATIN CALCIUM 10 MG: 10 TABLET, FILM COATED ORAL at 09:12

## 2021-09-18 RX ADMIN — MAGNESIUM SULFATE HEPTAHYDRATE 2 G: 40 INJECTION, SOLUTION INTRAVENOUS at 09:19

## 2021-09-18 RX ADMIN — SENNOSIDES AND DOCUSATE SODIUM 1 TABLET: 50; 8.6 TABLET ORAL at 18:11

## 2021-09-18 RX ADMIN — POTASSIUM CHLORIDE 40 MEQ: 750 TABLET, FILM COATED, EXTENDED RELEASE ORAL at 18:10

## 2021-09-18 RX ADMIN — POTASSIUM CHLORIDE 40 MEQ: 750 TABLET, FILM COATED, EXTENDED RELEASE ORAL at 10:48

## 2021-09-18 RX ADMIN — ACETAMINOPHEN 1000 MG: 500 TABLET ORAL at 18:11

## 2021-09-18 RX ADMIN — TRAMADOL HYDROCHLORIDE 50 MG: 50 TABLET ORAL at 09:23

## 2021-09-18 NOTE — PROGRESS NOTES
Progress Note    Admit Date: 9/14/2021   LOS: 4 days      Daily Progress Note: 9/18/2021    POD 4    S/P: Procedure(s):  REVISION LAMINECTOMY L2-L4 WITH A REVISION POSTERIOR FUSION T10-L5 ([facility] O-ARM)    Visit Vitals  /67   Pulse 77   Temp 98.5 °F (36.9 °C)   Resp 16   SpO2 91%      Lab Results   Component Value Date/Time    HGB 10.0 (L) 09/18/2021 06:49 AM    INR 0.9 08/31/2021 09:18 AM     Anxious and tearful this AM. Feels weak and tired. Upset she is still here in the hospital. RN in room. keiko diet. Has been voiding. +BM      Denies fever, chills, chest pain, dyspnea, headache. BLE:  Calves soft/NTTP Bilaterally. Can F/E all toes. Ankles DF/PF  Palpable DP/PTs  SILT in the bilat feet and ankles    Dressing clean and dry on back        [facility] [facility] [facility]. Appreciate their help  Up with assist.  PT/OT in TLSO brace. Pain control - tylenol, gabapentin; PRN dilaudid, tramadol  Nausea control  Case mgmt for home health services    Discharge to home with [facility] pending PT clearance. Likely home over weekend.     Nicolle Russ DO

## 2021-09-18 NOTE — PROGRESS NOTES
Problem: Mobility Impaired (Adult and Pediatric)  Goal: *Acute Goals and Plan of Care (Insert Text)  Description: FUNCTIONAL STATUS PRIOR TO ADMISSION: Patient was modified independent using a walker for functional mobility. HOME SUPPORT PRIOR TO ADMISSION: The patient lived with  but did not require assist. Pt dtrs will switch out assisting as patients  cannot assist    Physical Therapy Goals  Initiated 9/16/2021  1. Patient will move from supine to sit and sit to supine , scoot up and down, and roll side to side in bed with modified independence within 7 day(s). 2.  Patient will transfer from bed to chair and chair to bed with modified independence using the least restrictive device within 7 day(s). 3.  Patient will perform sit to stand with modified independence within 7 day(s). 4.  Patient will ambulate with minimal assistance/contact guard assist for 150 feet with the least restrictive device within 7 day(s). 5.  Patient will ascend/descend 5 stairs with 1 handrail(s) with minimal assistance/contact guard assist within 7 day(s). Outcome: Progressing Towards Goal    PHYSICAL THERAPY TREATMENT  Patient: Holly Matute (89 y.o. female)  Date: 9/18/2021  Diagnosis: Lumbar stenosis with neurogenic claudication [M48.062] <principal problem not specified>  Procedure(s) (LRB):  REVISION LAMINECTOMY L2-L4 WITH A REVISION POSTERIOR FUSION T10-L5 ([facility]) (N/A) 4 Days Post-Op  Precautions:   No bending, no lifting greater than 5 lbs, no twisting, log-roll technique, repositioning every 20-30 min except when sleeping, brace when OOB (if ordered)  Chart, physical therapy assessment, plan of care and goals were reviewed. ASSESSMENT  Patient continues with skilled PT services and is progressing towards goals. Pt received seated in chair and willing to work with therapy. Pt reported wanting to get out of the chair and return to bed. Pt able to tolerate STS to RW to don brace with min A. Pt continued with gait training in hallway with CGA up to 80' with no LOB, pt gait showed improvement to no noticeable weight shift during amb. Upon return to the room pt tolerated bed mobility sit>sup from R side EOB with SBA using good log roll technique. Pt completed session with HOB slightly elevated, call bell within reach and all needs met at the time with family in the room. Pt agreeable to possibly attempt stair training this afternoon once IV meds are completed. Rn notified of session. Current Level of Function Impacting Discharge (mobility/balance): SBA/CGA for all mobility, amb up to [de-identified]' with RW    Other factors to consider for discharge: fall risk, lives alone but will have family with her when she needs them. PLAN :  Patient continues to benefit from skilled intervention to address the above impairments. Continue treatment per established plan of care. to address goals. Recommendation for discharge: (in order for the patient to meet his/her long term goals)  Physical therapy at least 2 days/week in the home     This discharge recommendation:  Has not yet been discussed the attending provider and/or case management    IF patient discharges home will need the following DME: patient owns DME required for discharge       SUBJECTIVE:   Patient stated Savannah Garibay was ready to get in the bed as soon as you came in .    OBJECTIVE DATA SUMMARY:   Critical Behavior:  Neurologic State: Eyes open spontaneously  Orientation Level: Oriented X4  Cognition: Appropriate for age attention/concentration  Safety/Judgement: Awareness of environment    Spinal diagnosis intervention:  The patient stated 3/3 back precautions when prompted. Reviewed all 3 back precautions, log roll technique, and sitting for 30 minutes at a time. The patient required min cues to maintain back precautions during functional activity. Reviewed back brace application and wear schedule.  Brace donned with minimal assistance/contact guard assist .    Functional Mobility Training:    Bed Mobility:  Sit to Supine: Stand-by assistance  Scooting: Stand-by assistance    Transfers:  Sit to Stand: Stand-by assistance  Stand to Sit: Stand-by assistance     Balance:  Sitting: Intact  Standing: Intact; With support    Ambulation/Gait Training:  Distance (ft): 80 Feet (ft)  Assistive Device: Gait belt;Brace/Splint; Walker, rolling  Ambulation - Level of Assistance: Contact guard assistance  Gait Abnormalities: Decreased step clearance  Base of Support: Narrowed  Speed/Shellie: Pace decreased (<100 feet/min); Slow  Step Length: Right shortened;Left shortened  Pain Rating:  No pain reported    Activity Tolerance:   Good and Fair    After treatment patient left in no apparent distress:   Supine in bed, Call bell within reach, and Caregiver / family present    COMMUNICATION/COLLABORATION:   The patients plan of care was discussed with: Registered nurse.      Carl Newton PTA   Time Calculation: 33 mins

## 2021-09-18 NOTE — CONSULTS
[address] [facility] Adult  Hospitalist Group    Hospitalist Consult  Primary Care Provider: James Gibbs MD  Consult requested by: Dr. Ileana Hay    Subjective:     Ramonita Briggs is a 81 y.o. female with a past medical history of hypertension, osteoarthritis and depression who is currently admitted to the hospital s/p revision laminectomy L2-L4 with a revision posterior fusion T10-L5. Hospitalist services was consulted for hypokalemia. Upon examined, patient sitting in bedside chair. States that she is feeling good but still weak. Has been tolerating PO intake with some nausea. Denies any vomiting or diarrhea. Having back pain secondary to surgery, states pain is controlled. Review of Systems:    A comprehensive review of systems was negative except for that written in the History of Present Illness. Past Medical History:   Diagnosis Date    Anxiety     Arthritis back pain    fingers    Asthma     MILD - NO INHALERS    Chronic pain     Colitis     Diverticulitis 6/11/2013    Hypercholesterolemia     Hypertension     IBS (irritable bowel syndrome)     Insomnia     Nausea & vomiting     Stenosis     spinal    Stroke ([facility] [address] 1996    tia   PATENT  FORAMEN  OVALE    Swollen L ankle       Past Surgical History:   Procedure Laterality Date    HX CATARACT REMOVAL Bilateral 2009    HX CHOLECYSTECTOMY  04/1997    HX COLONOSCOPY      x3    HX HYSTERECTOMY  1986    HX KNEE ARTHROSCOPY Left 04/1998    HX LUMBAR FUSION  02/2011    L4-L5    HX SHOULDER REPLACEMENT Right 01/2020    HX TONSILLECTOMY  CHILDHOOD    HX WISDOM TEETH EXTRACTION      X3    IA CARDIAC SURG PROCEDURE UNLIST  8/04    repair of foramen ovale     Prior to Admission medications    Medication Sig Start Date End Date Taking? Authorizing Provider   traMADoL (ULTRAM) 50 mg tablet Take 1 Tablet by mouth every six (6) hours as needed for Pain for up to 14 days.  Max Daily Amount: 200 mg. 9/17/21 10/1/21 Yes Coy CAMILO Holland   gabapentin (NEURONTIN) 300 mg capsule Take 300 mg by mouth as needed for Pain. Yes Provider, Historical   acetaminophen (Tylenol Extra Strength) 500 mg tablet Take  by mouth every six (6) hours as needed for Pain. Yes Provider, Historical   losartan (COZAAR) 100 mg tablet TAKE 1 TABLET DAILY 8/23/21  Yes Michelle Centeno MD   simvastatin (ZOCOR) 20 mg tablet TAKE 1 TABLET DAILY 5/26/21  Yes Michelle Centeno MD   LORazepam (ATIVAN) 1 mg tablet TAKE 1/2 TO 1 TABLET       NIGHTLY AS NEEDED FOR      ANXIETY. MAXIMUM DAILY     AMOUNT: 1 TABLET 4/20/21  Yes Michelle Centeno MD   hydroCHLOROthiazide (HYDRODIURIL) 12.5 mg tablet Take 1 Tab by mouth daily. 3/24/21  Yes Michelle Centeno MD   amLODIPine (NORVASC) 5 mg tablet Take 1 Tab by mouth daily. 3/24/21  Yes Michelle Centeno MD   levocetirizine (XYZAL) 5 mg tablet TAKE 1 TABLET DAILY 2/23/21  Yes Michelle Centeno MD   PARoxetine (PAXIL) 30 mg tablet TAKE 1 TABLET DAILY  Patient taking differently: daily (after breakfast). TAKE 1 TABLET DAILY 10/27/20  Yes Michelle Centeno MD   budesonide (ENTOCORT EC) 3 mg capsule Take 3 mg by mouth daily as needed. 1 capsule by mouth daily 11/21/19  Yes Provider, Historical   meloxicam (MOBIC) 15 mg tablet TAKE 1 TABLET DAILY  Patient taking differently: Take 7.5 mg by mouth daily. 7/9/20   Taran Pulido MD   albuterol (PROVENTIL VENTOLIN) 2.5 mg /3 mL (0.083 %) nebulizer solution 3 mL by Nebulization route three (3) times daily as needed for Wheezing or Shortness of Breath. Patient not taking: Reported on 9/14/2021 1/15/19   Taran Pulido MD   dicyclomine (BENTYL) 10 mg capsule Take 10 mg by mouth as needed. 1 tablet by mouth daily as needed  Patient not taking: Reported on 9/14/2021    Provider, Historical   MULTIVITAMIN W-MINERALS/LUTEIN (CENTRUM SILVER PO) Take 1 Tab by mouth. Takes one po daily.      Provider, Historical     Allergies   Allergen Reactions    Cefdinir Other (comments)     Sick to stomach    Ciprofloxacin Diarrhea and Nausea and Vomiting    Flagyl [Metronidazole] Nausea and Vomiting    Other Medication Other (comments)     BANDAIDS - breaks out skin    Oxycodone-Acetaminophen Other (comments)     dizziness    Phenylephrine Other (comments)     Wire her up    Phenylpropanolamine Other (comments)     Wire her up    Sulfa (Sulfonamide Antibiotics) Nausea Only      Family History   Problem Relation Age of Onset    Other Mother         SUARACHNOID HEMORRHAGE-ANEURYSM    Stroke Mother     Cancer Father         PANCREATIC    Anesth Problems Daughter         N/V    No Known Problems Daughter         SOCIAL HISTORY:  Patient resides at Home. Patient ambulates with walker . Smoking history: Denies   Alcohol history: Denies     Objective:       Physical Exam:   Visit Vitals  BP (!) 149/84   Pulse 80   Temp 98.7 °F (37.1 °C)   Resp 18   LMP 01/01/1986   SpO2 98%     General appearance: alert, cooperative, no distress, appears stated age  Lungs: clear to auscultation bilaterally  Abdomen: soft, non-tender. Bowel sounds normal. No masses,  no organomegaly  Extremities: extremities normal, atraumatic, no cyanosis or edema  Pulses: 2+ and symmetric  Skin: Skin color, texture, turgor normal. No rashes or lesions  Neurologic: Grossly normal    Data Review: All diagnostic labs and studies have been reviewed.       Assessment:       Active Problems:    Lumbar stenosis with neurogenic claudication (9/14/2021)        Plan:     Hypokalemia   - Potassium 2.5  - Replaced   - Mag 1.5- replaced   - Monitor labs     Hypomagnesia   - Mag 1.5   - Mag 2gram IV ordered   - Monitor labs and replace as needed     Hypophosphatemia   - Phos 1.6  - Replaced  - Monitor labs in AM     Hyponatremia   -   - Continue IV fluids     Lumbar stenosis with neurogenic claudication   - s/p revision laminectomy L2-L4 with a revision posterior fusion T10-L5.   - Management per primary team      Thank you for allowing us to participate in patient's care.  We will follow along    Signed By: Lew Whitney NP     Date of Service:  9/18/2021

## 2021-09-19 LAB
ANION GAP SERPL CALC-SCNC: 8 MMOL/L (ref 5–15)
BUN SERPL-MCNC: 11 MG/DL (ref 6–20)
BUN/CREAT SERPL: 16 (ref 12–20)
CALCIUM SERPL-MCNC: 8.5 MG/DL (ref 8.5–10.1)
CHLORIDE SERPL-SCNC: 96 MMOL/L (ref 97–108)
CO2 SERPL-SCNC: 29 MMOL/L (ref 21–32)
CREAT SERPL-MCNC: 0.7 MG/DL (ref 0.55–1.02)
GLUCOSE SERPL-MCNC: 110 MG/DL (ref 65–100)
MAGNESIUM SERPL-MCNC: 2 MG/DL (ref 1.6–2.4)
PHOSPHATE SERPL-MCNC: 2.6 MG/DL (ref 2.6–4.7)
POTASSIUM SERPL-SCNC: 2.9 MMOL/L (ref 3.5–5.1)
SODIUM SERPL-SCNC: 133 MMOL/L (ref 136–145)

## 2021-09-19 PROCEDURE — 83735 ASSAY OF MAGNESIUM: CPT

## 2021-09-19 PROCEDURE — 65270000029 HC RM PRIVATE

## 2021-09-19 PROCEDURE — 80048 BASIC METABOLIC PNL TOTAL CA: CPT

## 2021-09-19 PROCEDURE — 74011250637 HC RX REV CODE- 250/637: Performed by: PHYSICIAN ASSISTANT

## 2021-09-19 PROCEDURE — 97116 GAIT TRAINING THERAPY: CPT | Performed by: PHYSICAL THERAPIST

## 2021-09-19 PROCEDURE — 97530 THERAPEUTIC ACTIVITIES: CPT | Performed by: PHYSICAL THERAPIST

## 2021-09-19 PROCEDURE — 94760 N-INVAS EAR/PLS OXIMETRY 1: CPT

## 2021-09-19 PROCEDURE — 74011250637 HC RX REV CODE- 250/637: Performed by: NURSE PRACTITIONER

## 2021-09-19 PROCEDURE — 36415 COLL VENOUS BLD VENIPUNCTURE: CPT

## 2021-09-19 PROCEDURE — 84100 ASSAY OF PHOSPHORUS: CPT

## 2021-09-19 RX ORDER — POTASSIUM CHLORIDE 750 MG/1
40 TABLET, FILM COATED, EXTENDED RELEASE ORAL 2 TIMES DAILY
Status: COMPLETED | OUTPATIENT
Start: 2021-09-19 | End: 2021-09-20

## 2021-09-19 RX ADMIN — Medication 10 ML: at 05:05

## 2021-09-19 RX ADMIN — POTASSIUM CHLORIDE 40 MEQ: 750 TABLET, FILM COATED, EXTENDED RELEASE ORAL at 06:55

## 2021-09-19 RX ADMIN — ACETAMINOPHEN 1000 MG: 500 TABLET ORAL at 19:02

## 2021-09-19 RX ADMIN — TRAMADOL HYDROCHLORIDE 50 MG: 50 TABLET ORAL at 07:37

## 2021-09-19 RX ADMIN — CETIRIZINE HYDROCHLORIDE 10 MG: 10 TABLET, FILM COATED ORAL at 22:18

## 2021-09-19 RX ADMIN — POTASSIUM CHLORIDE 40 MEQ: 750 TABLET, FILM COATED, EXTENDED RELEASE ORAL at 15:19

## 2021-09-19 RX ADMIN — GABAPENTIN 300 MG: 300 CAPSULE ORAL at 08:29

## 2021-09-19 RX ADMIN — ACETAMINOPHEN 1000 MG: 500 TABLET ORAL at 13:15

## 2021-09-19 RX ADMIN — ACETAMINOPHEN 1000 MG: 500 TABLET ORAL at 05:04

## 2021-09-19 RX ADMIN — Medication 10 ML: at 15:20

## 2021-09-19 RX ADMIN — TRAMADOL HYDROCHLORIDE 50 MG: 50 TABLET ORAL at 22:18

## 2021-09-19 RX ADMIN — GABAPENTIN 300 MG: 300 CAPSULE ORAL at 22:18

## 2021-09-19 RX ADMIN — ATORVASTATIN CALCIUM 10 MG: 10 TABLET, FILM COATED ORAL at 08:29

## 2021-09-19 RX ADMIN — AMLODIPINE BESYLATE 5 MG: 5 TABLET ORAL at 08:28

## 2021-09-19 RX ADMIN — LOSARTAN POTASSIUM 100 MG: 50 TABLET, FILM COATED ORAL at 08:29

## 2021-09-19 RX ADMIN — PAROXETINE HYDROCHLORIDE 30 MG: 20 TABLET, FILM COATED ORAL at 08:28

## 2021-09-19 NOTE — PROGRESS NOTES
Progress Note    Admit Date: 9/14/2021   LOS: 5 days      Daily Progress Note: 9/19/2021    POD 4    S/P: Procedure(s):  REVISION LAMINECTOMY L2-L4 WITH A REVISION POSTERIOR FUSION T10-L5 ([facility])    Visit Vitals  /68   Pulse 74   Temp 98.4 °F (36.9 °C)   Resp 18   SpO2 93%      Lab Results   Component Value Date/Time    HGB 10.0 (L) 09/18/2021 06:49 AM    INR 0.9 08/31/2021 09:18 AM     Pt is upset this AM because she is still here in the hospital. She is having some mild left thigh pain when ambulating but does not radiate below the knee. keiko diet. Voiding. +BM  Spoke to RN- DSG was changed last night    Denies fever, chills, chest pain, dyspnea, headache. BLE:  Calves soft/NTTP Bilaterally  Can F/E all toes  Ankles DF/PF  Palpable DP/PTs  SILT in the bilat feet and ankles   Dressing clean and dry on back      IM consulted- appreciate their help  Labs- K 2.9 this AM  Up with assist  PT/OT in TLSO brace. Pain control - tylenol, gabapentin; PRN dilaudid, tramadol  Nausea control  Case mgmt for home health services    Discharge to home with [facility] pending PT clearance and medical clearance.      Hunter Edgar DO

## 2021-09-19 NOTE — PROGRESS NOTES
[address] [facility] Adult  Hospitalist Group                                                                                          Hospitalist Progress Note  Javy Dawn NP  Answering service: [phone] OR [phone] from in house phone        Date of Service:  2021  NAME:  Isiah Fairchild  :  1935  MRN:  [id number]      Admission Summary:   Isiah Fairchild is a 81 y.o. female with a past medical history of hypertension, osteoarthritis and depression who is currently admitted to the hospital s/p revision laminectomy L2-L4 with a revision posterior fusion T10-L5. Hospitalist services was consulted for hypokalemia. Upon examined, patient sitting in bedside chair. States that she is feeling good but still weak. Has been tolerating PO intake with some nausea. Denies any vomiting or diarrhea. Having back pain secondary to surgery, states pain is controlled. Interval history / Subjective:   Seen and examined patient sitting up in bed. States that she is feeling ok. Had a good night of sleep. Had 3 episodes of diarrhea.       Assessment & Plan:     Hypokalemia   - Potassium 2.9  - Potassium 40 meq x2 doses   - Recheck BMP this afternoon   - Hold stool softeners  - Monitor labs      Hypomagnesia, resolved   - Mag 2.0  - Monitor labs and replace as needed      Hypophosphatemia, resolved  - Phos  2.6  - Replaced  - Monitor labs and replace as needed     Hyponatremia,  improving   -   - Monitor labs     Lumbar stenosis with neurogenic claudication   - s/p revision laminectomy L2-L4 with a revision posterior fusion T10-L5.   - Management per primary team     Code status: Full   DVT prophylaxis: SCDs    Care Plan discussed with: Patient/Family and Nurse  Anticipated Disposition: Per primary team   Anticipated Discharge: Per primary team      Hospital Problems  Date Reviewed: 2021        Codes Class Noted POA    Lumbar stenosis with neurogenic claudication ICD-10-CM: F20.804  ICD-9-CM: 724.03  9/14/2021 Unknown                Review of Systems:   A comprehensive review of systems was negative except for that written in the HPI. Vital Signs:    Last 24hrs VS reviewed since prior progress note. Most recent are:  Visit Vitals  BP (!) 154/63   Pulse 80   Temp 97.9 °F (36.6 °C)   Resp 18   SpO2 98%         Intake/Output Summary (Last 24 hours) at 9/19/2021 1532  Last data filed at 9/19/2021 4113  Gross per 24 hour   Intake 150 ml   Output 2 ml   Net 148 ml        Physical Examination:             Constitutional:  No acute distress, cooperative, pleasant    ENT:  Oral mucosa moist, oropharynx benign. Resp:  CTA bilaterally. No wheezing/rhonchi/rales. No accessory muscle use   CV:  Regular rhythm, normal rate, no murmurs, gallops, rubs    GI:  Soft, non distended, non tender. normoactive bowel sounds, no hepatosplenomegaly     Musculoskeletal:  No edema, warm, 2+ pulses throughout    Neurologic:  Moves all extremities. AAOx3, CN II-XII reviewed     Psych:  Not anxious or agitated       Data Review:    Review and/or order of clinical lab test  Review and/or order of tests in the medicine section of [facility]      Labs:     Recent Labs     09/18/21  0649 09/17/21  1253   WBC 12.9* 14.7*   HGB 10.0* 10.5*   HCT 27.8* 29.5*    227     Recent Labs     09/19/21  0506 09/18/21  1652 09/18/21  0654 09/18/21  0649   * 130*  --  132*   K 2.9* 2.7*  --  2.5*   CL 96* 93*  --  97   CO2 29 31  --  31   BUN 11 12  --  8   CREA 0.70 0.71  --  0.65   * 124*  --  105*   CA 8.5 8.1*  --  9.1   MG 2.0 2.0 1.5*  --    PHOS 2.6  --  1.6*  --      No results for input(s): ALT, AP, TBIL, TBILI, TP, ALB, GLOB, GGT, AML, LPSE in the last 72 hours. No lab exists for component: SGOT, GPT, AMYP, HLPSE  No results for input(s): INR, PTP, APTT, INREXT in the last 72 hours. No results for input(s): FE, TIBC, PSAT, FERR in the last 72 hours.    No results found for: FOL, RBCF   No results for input(s): PH, PCO2, PO2 in the last 72 hours. No results for input(s): CPK, CKNDX, TROIQ in the last 72 hours.     No lab exists for component: CPKMB  Lab Results   Component Value Date/Time    Cholesterol, total 214 (H) 01/27/2021 08:15 AM    HDL Cholesterol 81 01/27/2021 08:15 AM    LDL, calculated 115.4 (H) 01/27/2021 08:15 AM    Triglyceride 88 01/27/2021 08:15 AM    CHOL/HDL Ratio 2.6 01/27/2021 08:15 AM     Lab Results   Component Value Date/Time    Glucose (POC) 103 09/14/2021 11:29 AM    Glucose (POC) 100 01/30/2020 10:07 AM     Lab Results   Component Value Date/Time    Color YELLOW/STRAW 08/31/2021 09:18 AM    Appearance CLEAR 08/31/2021 09:18 AM    Specific gravity 1.009 08/31/2021 09:18 AM    pH (UA) 7.0 08/31/2021 09:18 AM    Protein Negative 08/31/2021 09:18 AM    Glucose Negative 08/31/2021 09:18 AM    Ketone Negative 08/31/2021 09:18 AM    Bilirubin Negative 08/31/2021 09:18 AM    Urobilinogen 0.2 08/31/2021 09:18 AM    Nitrites Negative 08/31/2021 09:18 AM    Leukocyte Esterase Negative 08/31/2021 09:18 AM    Epithelial cells FEW 08/31/2021 09:18 AM    Bacteria Negative 08/31/2021 09:18 AM    WBC 0-4 08/31/2021 09:18 AM    RBC 0-5 08/31/2021 09:18 AM         Medications Reviewed:     Current Facility-Administered Medications   Medication Dose Route Frequency    potassium chloride SR (KLOR-CON 10) tablet 40 mEq  40 mEq Oral BID    scopolamine (TRANSDERM-SCOP) 1 mg over 3 days 1 Patch  1 Patch TransDERmal Q72H    gabapentin (NEURONTIN) capsule 300 mg  300 mg Oral BID    traMADoL (ULTRAM) tablet 50 mg  50 mg Oral Q6H PRN    calcium carbonate (TUMS) chewable tablet 200 mg [elemental]  200 mg Oral TID PRN    albuterol (PROVENTIL VENTOLIN) nebulizer solution 2.5 mg  2.5 mg Nebulization TID PRN    amLODIPine (NORVASC) tablet 5 mg  5 mg Oral DAILY    gabapentin (NEURONTIN) capsule 300 mg  300 mg Oral BID PRN    [Held by provider] hydroCHLOROthiazide (HYDRODIURIL) tablet 12.5 mg  12.5 mg Oral DAILY    cetirizine (ZYRTEC) tablet 10 mg  10 mg Oral QHS    LORazepam (ATIVAN) tablet 1 mg  1 mg Oral QHS PRN    losartan (COZAAR) tablet 100 mg  100 mg Oral DAILY    multivitamin, tx-iron-ca-min (THERA-M w/ IRON) tablet 1 Tablet  1 Tablet Oral DAILY    PARoxetine (PAXIL) tablet 30 mg  30 mg Oral DAILY AFTER BREAKFAST    atorvastatin (LIPITOR) tablet 10 mg  10 mg Oral DAILY    budesonide (ENTOCORT EC) capsule 3 mg  3 mg Oral DAILY PRN    sodium chloride (NS) flush 5-40 mL  5-40 mL IntraVENous Q8H    sodium chloride (NS) flush 5-40 mL  5-40 mL IntraVENous PRN    naloxone (NARCAN) injection 0.4 mg  0.4 mg IntraVENous PRN    [Held by provider] senna-docusate (PERICOLACE) 8.6-50 mg per tablet 1 Tablet  1 Tablet Oral BID    [Held by provider] polyethylene glycol (MIRALAX) packet 17 g  17 g Oral DAILY    bisacodyL (DULCOLAX) suppository 10 mg  10 mg Rectal DAILY PRN    phenol throat spray (CHLORASEPTIC) 1 Spray  1 Spray Oral PRN    benzocaine-menthoL (CEPACOL) lozenge 1 Lozenge  1 Lozenge Oral PRN    acetaminophen (TYLENOL) tablet 1,000 mg  1,000 mg Oral Q6H     ______________________________________________________________________  EXPECTED LENGTH OF STAY: 3d 0h  ACTUAL LENGTH OF STAY:          5                 Robley Mcburney, NP

## 2021-09-19 NOTE — PROGRESS NOTES
Problem: Falls - Risk of  Goal: *Absence of Falls  Description: Document Thomas Espinoza Fall Risk and appropriate interventions in the flowsheet. Outcome: Progressing Towards Goal  Note: Fall Risk Interventions:  Mobility Interventions: OT consult for ADLs, PT Consult for mobility concerns         Medication Interventions: Patient to call before getting OOB    Elimination Interventions: Patient to call for help with toileting needs              Problem: Pressure Injury - Risk of  Goal: *Prevention of pressure injury  Description: Document Jackson Scale and appropriate interventions in the flowsheet.   Outcome: Progressing Towards Goal  Note: Pressure Injury Interventions:  Sensory Interventions: Monitor skin under medical devices, Minimize linen layers, Maintain/enhance activity level, Float heels    Moisture Interventions: Absorbent underpads, Apply protective barrier, creams and emollients, Check for incontinence Q2 hours and as needed, Maintain skin hydration (lotion/cream), Offer toileting Q_hr    Activity Interventions: Increase time out of bed    Mobility Interventions: PT/OT evaluation    Nutrition Interventions: Offer support with meals,snacks and hydration

## 2021-09-19 NOTE — PROGRESS NOTES
Problem: Mobility Impaired (Adult and Pediatric)  Goal: *Acute Goals and Plan of Care (Insert Text)  Description: FUNCTIONAL STATUS PRIOR TO ADMISSION: Patient was modified independent using a walker for functional mobility. HOME SUPPORT PRIOR TO ADMISSION: The patient lived with  but did not require assist. Pt dtrs will switch out assisting as patients  cannot assist    Physical Therapy Goals  Initiated 9/16/2021  1. Patient will move from supine to sit and sit to supine , scoot up and down, and roll side to side in bed with modified independence within 7 day(s). 2.  Patient will transfer from bed to chair and chair to bed with modified independence using the least restrictive device within 7 day(s). 3.  Patient will perform sit to stand with modified independence within 7 day(s). 4.  Patient will ambulate with minimal assistance/contact guard assist for 150 feet with the least restrictive device within 7 day(s). 5.  Patient will ascend/descend 5 stairs with 1 handrail(s) with minimal assistance/contact guard assist within 7 day(s). Outcome: Progressing Towards Goal    PHYSICAL THERAPY TREATMENT  Patient: Jason Torres (72 y.o. female)  Date: 9/19/2021  Diagnosis: Lumbar stenosis with neurogenic claudication [M48.062] <principal problem not specified>  Procedure(s) (LRB):  REVISION LAMINECTOMY L2-L4 WITH A REVISION POSTERIOR FUSION T10-L5 ([facility]) (N/A) 5 Days Post-Op  Precautions:   No bending, no lifting greater than 5 lbs, no twisting, log-roll technique, repositioning every 20-30 min except when sleeping, brace when OOB (if ordered)  Chart, physical therapy assessment, plan of care and goals were reviewed. ASSESSMENT  Patient continues with skilled PT services and is progressing towards goals. Patient requires SBA with transfers and CGA with gait training. She was able to ambulate 150 feet today, as well as ascend and descend 4 stairs.   She required verbal cues for correct foot to use when ascending and descending stairs. Current Level of Function Impacting Discharge (mobility/balance): SBA with transfers CGA with gait x150 feet. Other factors to consider for discharge: Fall risk, leg weakness, lives alone but has family support when needed. PLAN :  Patient continues to benefit from skilled intervention to address the above impairments. Continue treatment per established plan of care. to address goals. Recommendation for discharge: (in order for the patient to meet his/her long term goals)  Physical therapy at least 2 days/week in the home     This discharge recommendation:  Has not yet been discussed the attending provider and/or case management    IF patient discharges home will need the following DME: patient owns DME required for discharge       SUBJECTIVE:   Patient stated I'm disappointed because my potassium levels are not where they should be.     OBJECTIVE DATA SUMMARY:   Critical Behavior:  Neurologic State: Alert  Orientation Level: Oriented X4  Cognition: Appropriate decision making, Appropriate for age attention/concentration, Appropriate safety awareness, Follows commands  Safety/Judgement: Awareness of environment    Spinal diagnosis intervention:  The patient stated 3/3 back precautions when prompted. Reviewed all 3 back precautions, log roll technique, and sitting for 30 minutes at a time. Functional Mobility Training:    Bed Mobility:       Transfers:  Sit to Stand: Stand-by assistance  Stand to Sit: Stand-by assistance         Balance:  Sitting: Intact  Standing: Intact; With support  Ambulation/Gait Training:  Distance (ft): 150 Feet (ft)  Assistive Device: Gait belt;Walker, rolling;Brace/Splint  Ambulation - Level of Assistance: Contact guard assistance        Gait Abnormalities: Decreased step clearance        Base of Support: Narrowed        Step Length: Left shortened;Right shortened      Stairs:  Number of Stairs Trained: 4  Stairs - Level of Assistance: Contact guard assistance (Verbal cues for correct foot to ascend and descend)   Rail Use: Left    She required verbal cues for correct foot to use when ascending and descending stairs. Activity Tolerance:   Good    After treatment patient left in no apparent distress:   Sitting in chair and Call bell within reach    COMMUNICATION/COLLABORATION:   The patients plan of care was discussed with: Registered nurse.      Piyush Ahuja V, PT   Time Calculation: 33 mins

## 2021-09-20 ENCOUNTER — TELEPHONE (OUTPATIENT)
Dept: INTERNAL MEDICINE CLINIC | Age: 86
End: 2021-09-20

## 2021-09-20 VITALS
HEART RATE: 85 BPM | RESPIRATION RATE: 18 BRPM | TEMPERATURE: 98.7 F | SYSTOLIC BLOOD PRESSURE: 139 MMHG | DIASTOLIC BLOOD PRESSURE: 76 MMHG | OXYGEN SATURATION: 96 %

## 2021-09-20 DIAGNOSIS — E87.1 HYPONATREMIA: Primary | ICD-10-CM

## 2021-09-20 DIAGNOSIS — E87.6 HYPOKALEMIA: ICD-10-CM

## 2021-09-20 DIAGNOSIS — E83.42 HYPOMAGNESEMIA: ICD-10-CM

## 2021-09-20 LAB
ANION GAP SERPL CALC-SCNC: 4 MMOL/L (ref 5–15)
ANION GAP SERPL CALC-SCNC: 6 MMOL/L (ref 5–15)
BUN SERPL-MCNC: 10 MG/DL (ref 6–20)
BUN SERPL-MCNC: 12 MG/DL (ref 6–20)
BUN/CREAT SERPL: 12 (ref 12–20)
BUN/CREAT SERPL: 14 (ref 12–20)
CALCIUM SERPL-MCNC: 9.1 MG/DL (ref 8.5–10.1)
CALCIUM SERPL-MCNC: 9.5 MG/DL (ref 8.5–10.1)
CHLORIDE SERPL-SCNC: 95 MMOL/L (ref 97–108)
CHLORIDE SERPL-SCNC: 96 MMOL/L (ref 97–108)
CO2 SERPL-SCNC: 29 MMOL/L (ref 21–32)
CO2 SERPL-SCNC: 31 MMOL/L (ref 21–32)
CREAT SERPL-MCNC: 0.83 MG/DL (ref 0.55–1.02)
CREAT SERPL-MCNC: 0.84 MG/DL (ref 0.55–1.02)
GLUCOSE SERPL-MCNC: 102 MG/DL (ref 65–100)
GLUCOSE SERPL-MCNC: 85 MG/DL (ref 65–100)
POTASSIUM SERPL-SCNC: 3.5 MMOL/L (ref 3.5–5.1)
POTASSIUM SERPL-SCNC: 4 MMOL/L (ref 3.5–5.1)
SODIUM SERPL-SCNC: 130 MMOL/L (ref 136–145)
SODIUM SERPL-SCNC: 131 MMOL/L (ref 136–145)

## 2021-09-20 PROCEDURE — 36415 COLL VENOUS BLD VENIPUNCTURE: CPT

## 2021-09-20 PROCEDURE — 74011250637 HC RX REV CODE- 250/637: Performed by: NURSE PRACTITIONER

## 2021-09-20 PROCEDURE — 97116 GAIT TRAINING THERAPY: CPT

## 2021-09-20 PROCEDURE — 74011250637 HC RX REV CODE- 250/637: Performed by: PHYSICIAN ASSISTANT

## 2021-09-20 PROCEDURE — 80048 BASIC METABOLIC PNL TOTAL CA: CPT

## 2021-09-20 RX ORDER — ONDANSETRON 4 MG/1
4 TABLET, ORALLY DISINTEGRATING ORAL
Qty: 20 TABLET | Refills: 0 | Status: SHIPPED | OUTPATIENT
Start: 2021-09-20 | End: 2022-08-25

## 2021-09-20 RX ADMIN — POTASSIUM CHLORIDE 40 MEQ: 750 TABLET, FILM COATED, EXTENDED RELEASE ORAL at 09:45

## 2021-09-20 RX ADMIN — POTASSIUM CHLORIDE 40 MEQ: 750 TABLET, FILM COATED, EXTENDED RELEASE ORAL at 15:08

## 2021-09-20 RX ADMIN — AMLODIPINE BESYLATE 5 MG: 5 TABLET ORAL at 09:45

## 2021-09-20 RX ADMIN — TRAMADOL HYDROCHLORIDE 50 MG: 50 TABLET ORAL at 12:36

## 2021-09-20 RX ADMIN — PAROXETINE HYDROCHLORIDE 30 MG: 20 TABLET, FILM COATED ORAL at 09:46

## 2021-09-20 RX ADMIN — ATORVASTATIN CALCIUM 10 MG: 10 TABLET, FILM COATED ORAL at 09:45

## 2021-09-20 RX ADMIN — MULTIPLE VITAMINS W/ MINERALS TAB 1 TABLET: TAB at 09:45

## 2021-09-20 RX ADMIN — ACETAMINOPHEN 1000 MG: 500 TABLET ORAL at 12:04

## 2021-09-20 RX ADMIN — GABAPENTIN 300 MG: 300 CAPSULE ORAL at 09:45

## 2021-09-20 RX ADMIN — Medication 10 ML: at 14:00

## 2021-09-20 RX ADMIN — TRAMADOL HYDROCHLORIDE 50 MG: 50 TABLET ORAL at 06:34

## 2021-09-20 RX ADMIN — LOSARTAN POTASSIUM 100 MG: 50 TABLET, FILM COATED ORAL at 09:45

## 2021-09-20 RX ADMIN — ACETAMINOPHEN 1000 MG: 500 TABLET ORAL at 06:34

## 2021-09-20 NOTE — PROGRESS NOTES
[facility] Adult  Hospitalist Group                                                                                          Hospitalist Progress Note  Nate Kearns NP  Answering service: [phone] OR [phone] from in house phone        Date of Service:  2021  NAME:  Tommy Bunch  :  1935  MRN:  [id number]      Admission Summary:   Matias Dueñas a 81 y. o. female with a past medical history of hypertension, osteoarthritis and depression who is currently admitted to the hospital s/p revision laminectomy L2-L4 with a revision posterior fusion T10-L5. Hospitalist services was consulted for hypokalemia. Upon examined, patient sitting in bedside chair. States that she is feeling good but still weak. Has been tolerating PO intake with some nausea. Denies any vomiting or diarrhea.    Having back pain secondary to surgery, states pain is controlled    Interval history / Subjective:    Seen and examined patient sitting up in bed. States that she is sitting up in bed. States that she finally had a good night of sleep. Wants to go home. Had 3 episodes of diarrhea yesterday. No new complaints. No overnight events noted. Assessment & Plan:     Hypokalemia   - Potassium 3.5  - Hold stool softeners  - Monitor labs    - Recheck labs at 12pm.     Hypomagnesia, resolved   - Mag 2.0  - Monitor labs and replace as needed      Hypophosphatemia, resolved  - Phos  2.6  - Replaced  - Monitor labs and replace as needed     Hyponatremia,  improving   -   - Monitor labs     Lumbar stenosis with neurogenic claudication   - s/p revision laminectomy L2-L4 with a revision posterior fusion T10-L5.   - Management per primary team      Code status: Full   DVT prophylaxis: SCDs    Care Plan discussed with: Patient/Family and Nurse  Anticipated Disposition: Per primary team   Anticipated Discharge: Per primary team       1400: Repeat potassium 4.0 today.  Please advise patient to have BMP within 3 days after discharge to monitor potassium levels. Thank you for allowing us to participate in patient's care. We will sign off now. Hospital Problems  Date Reviewed: 9/1/2021        Codes Class Noted POA    Lumbar stenosis with neurogenic claudication ICD-10-CM: Y04.353  ICD-9-CM: 724.03  9/14/2021 Unknown                Review of Systems:   A comprehensive review of systems was negative except for that written in the HPI. Vital Signs:    Last 24hrs VS reviewed since prior progress note. Most recent are:  Visit Vitals  BP (!) 155/78 (BP 1 Location: Left arm, BP Patient Position: At rest)   Pulse 81   Temp 98.4 °F (36.9 °C)   Resp 18   SpO2 96%         Intake/Output Summary (Last 24 hours) at 9/20/2021 0704  Last data filed at 9/19/2021 4948  Gross per 24 hour   Intake --   Output 1 ml   Net -1 ml        Physical Examination:             Constitutional:  No acute distress, cooperative, pleasant    ENT:  Oral mucosa moist, oropharynx benign. Resp:  CTA bilaterally. No wheezing/rhonchi/rales. No accessory muscle use   CV:  Regular rhythm, normal rate, no murmurs, gallops, rubs    GI:  Soft, non distended, non tender. normoactive bowel sounds    Musculoskeletal:  No edema, warm, 2+ pulses throughout    Neurologic:  Moves all extremities.   AAOx3, CN II-XII reviewed     Psych:  Not anxious or agitated       Data Review:    Review and/or order of clinical lab test  Review and/or order of tests in the medicine section of CPT      Labs:     Recent Labs     09/18/21  0649 09/17/21  1253   WBC 12.9* 14.7*   HGB 10.0* 10.5*   HCT 27.8* 29.5*    227     Recent Labs     09/19/21  0506 09/18/21  1652 09/18/21  0654 09/18/21  0649   * 130*  --  132*   K 2.9* 2.7*  --  2.5*   CL 96* 93*  --  97   CO2 29 31  --  31   BUN 11 12  --  8   CREA 0.70 0.71  --  0.65   * 124*  --  105*   CA 8.5 8.1*  --  9.1   MG 2.0 2.0 1.5*  --    PHOS 2.6  --  1.6*  --      No results for input(s): ALT, AP, TBIL, TBILI, TP, ALB, GLOB, GGT, AML, LPSE in the last 72 hours. No lab exists for component: SGOT, GPT, AMYP, HLPSE  No results for input(s): INR, PTP, APTT, INREXT in the last 72 hours. No results for input(s): FE, TIBC, PSAT, FERR in the last 72 hours. No results found for: FOL, RBCF   No results for input(s): PH, PCO2, PO2 in the last 72 hours. No results for input(s): CPK, CKNDX, TROIQ in the last 72 hours.     No lab exists for component: CPKMB  Lab Results   Component Value Date/Time    Cholesterol, total 214 (H) 01/27/2021 08:15 AM    HDL Cholesterol 81 01/27/2021 08:15 AM    LDL, calculated 115.4 (H) 01/27/2021 08:15 AM    Triglyceride 88 01/27/2021 08:15 AM    CHOL/HDL Ratio 2.6 01/27/2021 08:15 AM     Lab Results   Component Value Date/Time    Glucose (POC) 103 09/14/2021 11:29 AM    Glucose (POC) 100 01/30/2020 10:07 AM     Lab Results   Component Value Date/Time    Color YELLOW/STRAW 08/31/2021 09:18 AM    Appearance CLEAR 08/31/2021 09:18 AM    Specific gravity 1.009 08/31/2021 09:18 AM    pH (UA) 7.0 08/31/2021 09:18 AM    Protein Negative 08/31/2021 09:18 AM    Glucose Negative 08/31/2021 09:18 AM    Ketone Negative 08/31/2021 09:18 AM    Bilirubin Negative 08/31/2021 09:18 AM    Urobilinogen 0.2 08/31/2021 09:18 AM    Nitrites Negative 08/31/2021 09:18 AM    Leukocyte Esterase Negative 08/31/2021 09:18 AM    Epithelial cells FEW 08/31/2021 09:18 AM    Bacteria Negative 08/31/2021 09:18 AM    WBC 0-4 08/31/2021 09:18 AM    RBC 0-5 08/31/2021 09:18 AM         Medications Reviewed:     Current Facility-Administered Medications   Medication Dose Route Frequency    potassium chloride SR (KLOR-CON 10) tablet 40 mEq  40 mEq Oral BID    scopolamine (TRANSDERM-SCOP) 1 mg over 3 days 1 Patch  1 Patch TransDERmal Q72H    gabapentin (NEURONTIN) capsule 300 mg  300 mg Oral BID    traMADoL (ULTRAM) tablet 50 mg  50 mg Oral Q6H PRN    calcium carbonate (TUMS) chewable tablet 200 mg [elemental]  200 mg Oral TID PRN  albuterol (PROVENTIL VENTOLIN) nebulizer solution 2.5 mg  2.5 mg Nebulization TID PRN    amLODIPine (NORVASC) tablet 5 mg  5 mg Oral DAILY    gabapentin (NEURONTIN) capsule 300 mg  300 mg Oral BID PRN    [Held by provider] hydroCHLOROthiazide (HYDRODIURIL) tablet 12.5 mg  12.5 mg Oral DAILY    cetirizine (ZYRTEC) tablet 10 mg  10 mg Oral QHS    LORazepam (ATIVAN) tablet 1 mg  1 mg Oral QHS PRN    losartan (COZAAR) tablet 100 mg  100 mg Oral DAILY    multivitamin, tx-iron-ca-min (THERA-M w/ IRON) tablet 1 Tablet  1 Tablet Oral DAILY    PARoxetine (PAXIL) tablet 30 mg  30 mg Oral DAILY AFTER BREAKFAST    atorvastatin (LIPITOR) tablet 10 mg  10 mg Oral DAILY    budesonide (ENTOCORT EC) capsule 3 mg  3 mg Oral DAILY PRN    sodium chloride (NS) flush 5-40 mL  5-40 mL IntraVENous Q8H    sodium chloride (NS) flush 5-40 mL  5-40 mL IntraVENous PRN    naloxone (NARCAN) injection 0.4 mg  0.4 mg IntraVENous PRN    [Held by provider] senna-docusate (PERICOLACE) 8.6-50 mg per tablet 1 Tablet  1 Tablet Oral BID    [Held by provider] polyethylene glycol (MIRALAX) packet 17 g  17 g Oral DAILY    bisacodyL (DULCOLAX) suppository 10 mg  10 mg Rectal DAILY PRN    phenol throat spray (CHLORASEPTIC) 1 Spray  1 Spray Oral PRN    benzocaine-menthoL (CEPACOL) lozenge 1 Lozenge  1 Lozenge Oral PRN    acetaminophen (TYLENOL) tablet 1,000 mg  1,000 mg Oral Q6H     ______________________________________________________________________  EXPECTED LENGTH OF STAY: 3d 0h  ACTUAL LENGTH OF STAY:          6                 Sheryl Mohs, NP

## 2021-09-20 NOTE — PROGRESS NOTES
Orthopedic Spine Progress Note  Post Op day: 6 Days Post-Op    2021 8:38 AM   Admit Date: 2021  Procedure: Procedure(s):  REVISION LAMINECTOMY L2-L4 WITH A REVISION POSTERIOR FUSION T10-L5 (MAZOR, O-ARM)    Subjective:     Rikki Walker has complaints of some left hip aching when she is up and ambulating. States she is feeling better today. Denies headaches. Tolerating diet. No N/V. Pain Control:   Pain Assessment  Pain Scale 1: Numeric (0 - 10)  Pain Intensity 1: 0  Pain Onset 1: back  Pain Location 1: Back  Pain Orientation 1: Posterior  Pain Description 1: Aching  Pain Intervention(s) 1: Medication (see MAR)    Objective:          Physical Exam:  General:  Alert and oriented. No acute distress. Heart:  Respirations unlabored. Abdomen:   Extremities: Soft, non-tender. No evidence of cyanosis. Pulses palpable in both upper and lower extremities. Neurologic:  Musculoskeletal:  No new motor deficits. Neurovascular exam within normal limits. Sensation stable. Motor: unchanged C5-T1 and L2-S1. Marce's sign negative in bilateral lower extremities. Calves soft, nontender upon palpation and with passive twitch. Moves both upper and lower extremities. Incision: clean, dry, and intact. No significant erythema or swelling. No active drainage noted. Vital Signs:    Blood pressure 139/76, pulse 85, temperature 98.7 °F (37.1 °C), resp. rate 18, last menstrual period 1986, SpO2 96 %.   Temp (24hrs), Av.6 °F (37 °C), Min:98.4 °F (36.9 °C), Max:98.7 °F (37.1 °C)      LAB:    Recent Labs     21  0649   HCT 27.8*   HGB 10.0*        Lab Results   Component Value Date/Time    Sodium 131 (L) 2021 06:22 AM    Potassium 3.5 2021 06:22 AM    Chloride 96 (L) 2021 06:22 AM    CO2 31 2021 06:22 AM    Glucose 102 (H) 2021 06:22 AM    BUN 10 2021 06:22 AM    Creatinine 0.84 2021 06:22 AM    Calcium 9.5 2021 06:22 AM Intake/Output:No intake/output data recorded. 09/18 1901 - 09/20 0700  In: 150 [P.O.:150]  Out: 2     PT/OT:   Gait:  Gait  Base of Support: Narrowed  Speed/Shellie: Pace decreased (<100 feet/min), Slow  Step Length: Left shortened, Right shortened  Swing Pattern: Left asymmetrical, Right asymmetrical  Stance: Weight shift  Gait Abnormalities: Decreased step clearance  Ambulation - Level of Assistance: Contact guard assistance  Distance (ft): 150 Feet (ft)  Assistive Device: Gait belt, Walker, rolling, Brace/Splint  Rail Use: Left   Stairs - Level of Assistance: Contact guard assistance (Verbal cues for correct foot to ascend and descend)  Number of Stairs Trained: 4                 Assessment:   Patient is 6 Days Post-Op s/p Procedure(s):  REVISION LAMINECTOMY L2-L4 WITH A REVISION POSTERIOR FUSION T10-L5 ([facility], O-ARM)    Plan:     1. Continue PT/OT  2. Continue established methods of pain control  3. VTE Prophylaxes - TEDS &/or SCDs   4. Hypokalemia-- management per hospitalist team. Appreciate their input. 3.5 this morning  5.   Discharge to home with home health pending PT clearance and clearance from the hospitalist team       Signed By: CAMILO Cage

## 2021-09-20 NOTE — PROGRESS NOTES
Problem: Mobility Impaired (Adult and Pediatric)  Goal: *Acute Goals and Plan of Care (Insert Text)  Description: FUNCTIONAL STATUS PRIOR TO ADMISSION: Patient was modified independent using a walker for functional mobility. HOME SUPPORT PRIOR TO ADMISSION: The patient lived with  but did not require assist. Pt dtrs will switch out assisting as patients  cannot assist    Physical Therapy Goals  Initiated 9/16/2021  1. Patient will move from supine to sit and sit to supine , scoot up and down, and roll side to side in bed with modified independence within 7 day(s). 2.  Patient will transfer from bed to chair and chair to bed with modified independence using the least restrictive device within 7 day(s). 3.  Patient will perform sit to stand with modified independence within 7 day(s). 4.  Patient will ambulate with minimal assistance/contact guard assist for 150 feet with the least restrictive device within 7 day(s). 5.  Patient will ascend/descend 5 stairs with 1 handrail(s) with minimal assistance/contact guard assist within 7 day(s). Outcome: Progressing Towards Goal    PHYSICAL THERAPY TREATMENT   Patient: Heather Gan (31 y.o. female)  Date: 9/20/2021  Diagnosis: Lumbar stenosis with neurogenic claudication [M48.062] <principal problem not specified>  Procedure(s) (LRB):  REVISION LAMINECTOMY L2-L4 WITH A REVISION POSTERIOR FUSION T10-L5 ([facility]MA BORGES-ARM) (N/A) 6 Days Post-Op  Precautions:   No bending, no lifting greater than 5 lbs, no twisting, log-roll technique, repositioning every 20-30 min except when sleeping, brace when OOB (if ordered)  Chart, physical therapy assessment, plan of care and goals were reviewed. ASSESSMENT  Patient continues with skilled PT services and has progressed towards goals. Cleared stairs yesterday, and continued to progress gait distance today with RW. Patient is able to log roll and don quick draw brace without assistance.  Ambulating with a RW out in the hallway. Slow but steady gait pattern. No antalgia noted from L hip. Patient needed cuing on 2 occasions to keep from twisting. Reviewed spinal precautions. Hopeful for discharge home today. Recommend HHPT. Other factors to consider for discharge: daughter will help with transition home         PLAN :  Patient is cleared for discharge by physical therapy at this time. Recommendation for discharge: (in order for the patient to meet his/her long term goals)  Physical therapy at least 2 days/week in the home     This discharge recommendation:  Has been made in collaboration with the attending provider and/or case management    IF patient discharges home will need the following DME: patient owns DME required for discharge       SUBJECTIVE:   Patient stated My left thigh burns when I sit on that high toilet seat.     OBJECTIVE DATA SUMMARY:   Critical Behavior:  Neurologic State: Alert  Orientation Level: Oriented X4  Cognition: Appropriate decision making, Appropriate for age attention/concentration, Appropriate safety awareness, Follows commands  Safety/Judgement: Awareness of environment    Spinal diagnosis intervention:  The patient stated 4/3 back precautions when prompted. Reviewed all 3 back precautions, log roll technique, and sitting for 30 minutes at a time. Functional Mobility Training:    Bed Mobility:  Log    Supine to Sit: Supervision  Sit to Supine: Supervision    Transfers:  Sit to Stand: Stand-by assistance  Stand to Sit: Stand-by assistance  Bed to Chair: Stand-by assistance; Adaptive equipment    Balance:  Sitting: Intact  Standing: Intact; With support    Ambulation/Gait Training:  Distance (ft): 200 Feet (ft)  Assistive Device: Gait belt;Walker, rolling  Ambulation - Level of Assistance: Stand-by assistance  Gait Abnormalities: Decreased step clearance  Base of Support: Narrowed  Speed/Shellie: Slow  Step Length: Right shortened;Left shortened    Pain Rating:  3/10, thigh pain is not limited ambulation at this time, worse with sitting    Activity Tolerance:   Good and SpO2 stable on RA      After treatment patient left in no apparent distress:   Supine in bed, Call bell within reach, and Caregiver / family present    COMMUNICATION/COLLABORATION:   The patients plan of care was discussed with: Registered nurse and Case management.      Yesenia Leblanc, PT, DPT  Geriatric Clinical Specialist     Time Calculation: 18 mins

## 2021-09-20 NOTE — PROGRESS NOTES
Problem: Falls - Risk of  Goal: *Absence of Falls  Description: Document [place] Fall Risk and appropriate interventions in the flowsheet. Outcome: Resolved/Met     Problem: Patient Education: Go to Patient Education Activity  Goal: Patient/Family Education  Outcome: Resolved/Met     Problem: Pressure Injury - Risk of  Goal: *Prevention of pressure injury  Description: Document Jackson Scale and appropriate interventions in the flowsheet.   Outcome: Resolved/Met     Problem: Patient Education: Go to Patient Education Activity  Goal: Patient/Family Education  Outcome: Resolved/Met     Problem: Patient Education: Go to Patient Education Activity  Goal: Patient/Family Education  Outcome: Resolved/Met     Problem: Patient Education: Go to Patient Education Activity  Goal: Patient/Family Education  Outcome: Resolved/Met

## 2021-09-20 NOTE — DISCHARGE INSTRUCTIONS
After Hospital Care Plan:  Discharge Instructions Lumbar Fusion Surgery   Dr. Sherry Leo   Patient Name: Bita Houser    Date of procedure: 9/14/2021  Date of discharge: 9/17/2021    Procedure: Procedure(s):  REVISION LAMINECTOMY L2-L4 WITH A REVISION POSTERIOR FUSION T10-L5 (Caryle Pretzel)  PCP: Elva Pedroza MD    Follow up appointments  -follow up with Dr. Dr. Sherry Leo in 2 weeks. Call [phone] to make an appointment as soon as you get home from the hospital.    [facility]   phone: _______________________  The agency will contact you to arrange dates/times for visits. Please call them if you do not hear from them within 24 hours after you are discharged  Physical therapy 3 times a week for 3 weeks  Nursing-initial assessment and as needed    When to call your Orthopaedic Surgeon:  -Signs of infection-if your incision is red; continues to have drainage; drainage has a foul odor or if you have a persistent fever over 101 degrees for 24 hours  -Nausea or vomiting, severe headache  -Loss of bowel or bladder function, inability to urinate  -Changes in sensation in your arms or legs (numbness, tingling, loss of color)  -Increased weakness-greater than before your surgery  -Severe pain or pain not relieved by medications  -Signs of a blood clot in your leg-calf pain, tenderness, redness, swelling of lower leg    When to call your Primary Care Physician:  -Call your primary care physician when you get home.  You need to have a BMP (lab test) within 3 days after discharge to monitor potassium levels  -Concerns about medical conditions such as diabetes, high blood pressure, asthma, congestive heart failure  -Call if blood sugars are elevated, persistent headache or dizziness, coughing or congestion, constipation or diarrhea, burning with urination, abnormal heart rate    When to call [phone] and go to the nearest emergency room:  -Acute onset of chest pain, shortness of breath, difficulty breathing    Activity  -You are going home a well person, be as active as possible. Your only exercise should be walking. Start with short frequent walks and increase your walking distance each day.  -Limit the amount of time you sit to 20-30 minute intervals. Sitting for prolonged periods of time will be uncomfortable for you following surgery.  -Do NOT lift anything over 5 pounds  -Do NOT do any straining, twisting or bending  -When you are in bed, you may lay on your back or on either side. Do NOT lie on your stomach    Brace  -If you have a back brace, you should wear your brace at all times when you are out of bed. Do not wear the brace while in bed or showering.  -Remember to always wear a cotton t-shirt underneath your brace.  -Do not bend or twist when your brace is off    Diet  -Resume usual diet; drink plenty of fluids; eat foods high in fiber  -It is important to have regular bowel movements. Pain medications may cause constipation. You may want to take a stool softener (such as Senokot-S or Colace) to prevent constipation.   -If constipation occurs, take a laxative (such as Dulcolax tablets, Milk of Magnesia, or a suppository). Laxatives should only be used if the above preventable measures have failed and you still have not had a bowel movement after three days    Driving  -You may not drive or return to work until instructed by your physician. However, you may ride in the car for short periods of time. Incision Care  Your incision has been closed with absorbable sutures and the Zipline skin closure system. This will assist with healing. The [place] is to remain on your incision for 2 weeks. A dry dressing (ABD and tape) will be placed over it and should be changed daily, for at least the first several days after your surgery. If you have no incisional drainage, you may leave the incision open to air if you wish, still leaving the Zipline in place.  Please make sure to wash your hands prior to touching your dressing. You may take brief showers but do not run the water directly onto the wound. After your shower, blot your incision dry with a soft towel and replace the dry dressing. Do not allow the tape to come in contact with the Zipline. Do not rub or apply any lotions or ointments to your incision site. Do not soak or scrub your wound. The Zipline dressing will be removed during your two week follow-up appointment. If you experience drainage leaking from underneath the Zipline or if it peels off before 2 weeks, please contact your orthopedic surgeons office. Steri-strips (white pieces of tape) will fall off on their own. Showering  -You may shower in approximately 4 days after your surgery.    -Leave the dressing on during your shower. Do NOT allow the water to run directly onto your dressing. Once you get out of the shower, gently pat the dressing dry. -Reminder- your brace can be removed while showering. Remember to not bend or twist while your brace is off.    -Do not take a tub bath. Preventing blood clots  -You have been given T.E.D. stockings to wear. Continue to wear these for 7 days after your discharge. Put them on in the morning and take them off at night.    -They are used to increase your circulation and prevent blood clots from forming in your legs  -T. E.D. stockings can be machine washed, temperature not to exceed 160° F (71°C) and machine dried for 15 to 20 minutes, temperature not to exceed 250° F (121°C).     Pain management  -Take pain medication as prescribed; decrease the amount you use as your pain lessens  -DO not wait until you are in extreme pain to take your medication.  -Avoid alcoholic beverages while taking pain medication    Pain Medication Safety  DO:  -Read the Medication Guide   -Take your medicine exactly as prescribed   -Store your medicine away from children and in a safe place   -Flush unused medicine down the toilet -Call your healthcare provider for medical advice about side effects. You may report side effects to FDA at 3-439-FDA-6036.   -Please be aware that many medications contain Tylenol. We do not want you to over medicate so please read the information below as a guide. Do not take more than 4 Grams of Tylenol in a 24 hour period. (There are 1000 milligrams in one Gram)                                                                                                                                                                                                                                                Percocet contains 325 mg of Tylenol per tablet (do not take more than 12 tablets in 24 hours)  Lortab contains 500 mg of Tylenol per tablet (do not take more than 8 tablets in 24 hours)  Norco contains 325 mg of Tylenol per tablet (do not take more than 12 tablets in 24 hours). DO NOT:  -Do not give your medicine to others   -Do not take medicine unless it was prescribed for you   -Do not stop taking your medicine without talking to your healthcare provider   -Do not break, chew, crush, dissolve, or inject your medicine. If you cannot swallow your medicine whole, talk to your healthcare provider.  -Do not drink alcohol while taking this medicine  -Do not take anti-inflammatory medications or aspirin unless instructed by your physician.

## 2021-09-20 NOTE — PROGRESS NOTES
Ms. Janeth Pete is feeling well today. Her lab work has improved. She feels well. Nausea is resolved and she is moving well with PT. No HA. Pain is well-controlled. Incision reinforced with steri-strips. OK to discharge to home with family support and home health. Follow up this week with PCP for repeat lab work. Follow up with Dr. Jose Tsang in 1 week. Care instructions provided. All questions were answered.

## 2021-09-21 ENCOUNTER — PATIENT OUTREACH (OUTPATIENT)
Dept: CASE MANAGEMENT | Age: 86
End: 2021-09-21

## 2021-09-21 DIAGNOSIS — E87.1 HYPONATREMIA: ICD-10-CM

## 2021-09-21 DIAGNOSIS — E83.42 HYPOMAGNESEMIA: Primary | ICD-10-CM

## 2021-09-21 NOTE — PROGRESS NOTES
Care Transitions Initial Call    Call within 2 business days of discharge: Yes     Patient: Beatriz Can Patient : 1935 MRN: [id number]    [facility]       Complaint Diagnosis Description Type Department Provider    21  Fusion of spine, unspecified spinal region Admission (Discharged) Kip Rosenberg MD          Was this an external facility discharge? Yes, [facility] - Discharge     Challenges to be reviewed by the provider   Additional needs identified to be addressed with provider: yes  labs- Needs BMP to f/u on low potassium. Patient will come for labs  or have Home Health nurse draw labs. Method of communication with provider : chart routing    Discussed [phone] related testing which was not done at this time. Advance Care Planning:   Does patient have an Advance Directive:  yes; reviewed and current     Inpatient Readmission Risk score: Unplanned Readmit Risk Score: 9    Was this a readmission? no   Patient stated reason for the admission: low back pain- planned surgery    Patients top risk factors for readmission: medical condition-s/p lumbar fusion [facility] -)   Interventions to address risk factors: Scheduled appointment with PCP-, Scheduled appointment with [address] Culver  and Obtained and reviewed discharge summary and/or continuity of care documents. Scheduled for labs at office -[facility] be able [facility] BMP and Mag. Care Transition Nurse (CTN) contacted the patient by telephone to perform post hospital discharge assessment. Verified name and  with patient as identifiers. Provided introduction to self, and explanation of the CTN role. Patient overwhelmed with appointment dates and other details. Expecting daughter to arrive to stay with she and  and help. Plans to let her take over the details. Says otherwise she feels pretty good. Minimal pain. Did take Tramadol last night to help her sleep. Incision oozing, changing bandage often. Was doing this in hospital also. Expects HH to see her tomorrow. Urged to monitor incision, reinforce as needed. CTN reviewed discharge instructions, medical action plan and red flags with patient who verbalized understanding. Were discharge instructions available to patient? yes. Reviewed appropriate site of care based on symptoms and resources available to patient including: PCP, Specialist, Urgent [address], When to call 911 and [address]. Patient given an opportunity to ask questions and does not have any further questions or concerns at this time. The patient agrees to contact the PCP office for questions related to their healthcare. Medication reconciliation was performed with patient, who verbalizes understanding of administration of home medications. Advised obtaining a 90-day supply of all daily and as-needed medications. Referral to Pharm D needed: no     Home Health/Outpatient orders at discharge: home health care, PT and [address]: At [address] [facility]  Date of initial visit: 9/22/21    Durable Medical Equipment ordered at discharge: None  (had walker at home)  [address]: na  Durable Medical Equipment received: na    Covid Risk Education    Educated patient about risk for severe COVID-19 due to risk factors according to CDC guidelines. CTN reviewed discharge instructions, medical action plan and red flag symptoms with the patient who verbalized understanding. Discussed COVID vaccination status: yes. Education provided on COVID-19 vaccination as appropriate. Discussed exposure protocols and quarantine with CDC Guidelines. Patient was given an opportunity to verbalize any questions and concerns and agrees to contact CTN or health care provider for questions related to their healthcare. Was patient discharged with a pulse oximeter? No.    Discussed follow-up appointments.  If no appointment was previously scheduled, appointment scheduling offered: yes. Is follow up appointment scheduled within 7 days of discharge? yes. [facility] follow up appointment(s):   Future Appointments   Date Time Provider Marty Josue   9/29/2021 10:00 AM Dionicio Ruiz MD [facility]     Non-[facility] follow up appointment(s): Merrill martinez, 9/30    Plan for follow-up call in 7-10 days based on severity of symptoms and risk factors. Plan for next call: symptom management-assess current symptoms, self management-following discharge instructions, follow up appointment-DOLORES with pcp 9/29 and medication management-taking meds as ordered. CTN provided contact information for future needs.     Goals Addressed    None

## 2021-09-22 NOTE — PROGRESS NOTES
Magnesium and BMP ordered. Please fax over to home health as below. Also let Ms. Duling know she doesn't need to come in for labs/appointment.

## 2021-09-28 NOTE — DISCHARGE SUMMARY
Procedure(s):  REVISION LAMINECTOMY L2-L4 WITH A REVISION POSTERIOR FUSION T10-L5 (MAZOR, O-ARM) Operative Report      Date of Surgery: 9/14/2021     Preoperative Diagnosis:  SPONDYLOLISTHESIS;STENOSIS    Postoperative Diagnosis: SPONDYLOLISTHESIS;STENOSIS    Procedure: Procedure(s):  REVISION LAMINECTOMY L2-L4 WITH A REVISION POSTERIOR FUSION T10-L5 (CARLITOS Varela)     Surgeon: Dr. Jennifer La     History and Hospital Course:  Hali Ferraro is a pleasant 81y.o. year old female who has complaints of low back pain. Diagnostic testing found lumbar stenosis. Having failed conservative treatment, the patient elected to undergo operative intervention. She tolerated the procedure well and was admitted post-operatively for antibiotics and pain control. 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 She was doing well. She had some complaints of lower back pain but no leg pain. She was started on a clear liquid diet. She was allowed to dangle on the edge of the bed with physical therapy. PCA was discontinued and PO antibiotics were started. IV antibiotics were continued. On post-op day 2, the patient continued to progress well. She was started on a regular diet. The PCA was discontinued and the patient was started on oral pain medications. She was allowed to started getting out of bed with physical therapy. On post-op day 3, she was noted to have a potassium of 2.6 and she was repleted with IV potassium. Her potassium remained low and the hospitalist team was consulted. She continued progressing with physical and occupational therapy. On post-op day 6,  the patient was deemed ready for discharge. She was discharged to home. She was tolerating a regular diet and pain was well controlled with oral pain medications. The patient was discharged with prescriptions for pain medications.   She was instructed to wear her brace at all times when out of bed. She was instructed to limit her bending, lifting and twisting. The patient will follow-up in 10-14 days for repeat x-rays and a wound check.       Signed By: CAMILO Murillo

## 2021-09-29 ENCOUNTER — OFFICE VISIT (OUTPATIENT)
Dept: INTERNAL MEDICINE CLINIC | Age: 86
End: 2021-09-29
Payer: MEDICARE

## 2021-09-29 VITALS
WEIGHT: 135 LBS | HEIGHT: 60 IN | DIASTOLIC BLOOD PRESSURE: 65 MMHG | HEART RATE: 105 BPM | BODY MASS INDEX: 26.5 KG/M2 | TEMPERATURE: 98.3 F | OXYGEN SATURATION: 98 % | RESPIRATION RATE: 14 BRPM | SYSTOLIC BLOOD PRESSURE: 123 MMHG

## 2021-09-29 DIAGNOSIS — M48.062 SPINAL STENOSIS OF LUMBAR REGION WITH NEUROGENIC CLAUDICATION: ICD-10-CM

## 2021-09-29 DIAGNOSIS — I10 ESSENTIAL HYPERTENSION: ICD-10-CM

## 2021-09-29 DIAGNOSIS — E83.42 HYPOMAGNESEMIA: ICD-10-CM

## 2021-09-29 DIAGNOSIS — E87.1 HYPONATREMIA: Primary | ICD-10-CM

## 2021-09-29 DIAGNOSIS — E87.6 HYPOKALEMIA: ICD-10-CM

## 2021-09-29 LAB
ALBUMIN SERPL-MCNC: 3.2 G/DL (ref 3.5–5)
ALBUMIN/GLOB SERPL: 0.9 {RATIO} (ref 1.1–2.2)
ALP SERPL-CCNC: 158 U/L (ref 45–117)
ALT SERPL-CCNC: 41 U/L (ref 12–78)
ANION GAP SERPL CALC-SCNC: 10 MMOL/L (ref 5–15)
AST SERPL-CCNC: 32 U/L (ref 15–37)
BILIRUB SERPL-MCNC: 0.5 MG/DL (ref 0.2–1)
BUN SERPL-MCNC: 12 MG/DL (ref 6–20)
BUN/CREAT SERPL: 14 (ref 12–20)
CALCIUM SERPL-MCNC: 9.4 MG/DL (ref 8.5–10.1)
CHLORIDE SERPL-SCNC: 100 MMOL/L (ref 97–108)
CO2 SERPL-SCNC: 22 MMOL/L (ref 21–32)
CREAT SERPL-MCNC: 0.87 MG/DL (ref 0.55–1.02)
GLOBULIN SER CALC-MCNC: 3.6 G/DL (ref 2–4)
GLUCOSE SERPL-MCNC: 134 MG/DL (ref 65–100)
MAGNESIUM SERPL-MCNC: 1.9 MG/DL (ref 1.6–2.4)
POTASSIUM SERPL-SCNC: 4 MMOL/L (ref 3.5–5.1)
PROT SERPL-MCNC: 6.8 G/DL (ref 6.4–8.2)
SODIUM SERPL-SCNC: 132 MMOL/L (ref 136–145)

## 2021-09-29 PROCEDURE — G8427 DOCREV CUR MEDS BY ELIG CLIN: HCPCS | Performed by: INTERNAL MEDICINE

## 2021-09-29 PROCEDURE — G8752 SYS BP LESS 140: HCPCS | Performed by: INTERNAL MEDICINE

## 2021-09-29 PROCEDURE — 1111F DSCHRG MED/CURRENT MED MERGE: CPT | Performed by: INTERNAL MEDICINE

## 2021-09-29 PROCEDURE — 99214 OFFICE O/P EST MOD 30 MIN: CPT | Performed by: INTERNAL MEDICINE

## 2021-09-29 PROCEDURE — G8419 CALC BMI OUT NRM PARAM NOF/U: HCPCS | Performed by: INTERNAL MEDICINE

## 2021-09-29 PROCEDURE — G0463 HOSPITAL OUTPT CLINIC VISIT: HCPCS | Performed by: INTERNAL MEDICINE

## 2021-09-29 PROCEDURE — G8754 DIAS BP LESS 90: HCPCS | Performed by: INTERNAL MEDICINE

## 2021-09-29 PROCEDURE — 1090F PRES/ABSN URINE INCON ASSESS: CPT | Performed by: INTERNAL MEDICINE

## 2021-09-29 PROCEDURE — G8399 PT W/DXA RESULTS DOCUMENT: HCPCS | Performed by: INTERNAL MEDICINE

## 2021-09-29 PROCEDURE — G8536 NO DOC ELDER MAL SCRN: HCPCS | Performed by: INTERNAL MEDICINE

## 2021-09-29 PROCEDURE — 1101F PT FALLS ASSESS-DOCD LE1/YR: CPT | Performed by: INTERNAL MEDICINE

## 2021-09-29 PROCEDURE — G8510 SCR DEP NEG, NO PLAN REQD: HCPCS | Performed by: INTERNAL MEDICINE

## 2021-09-29 NOTE — PROGRESS NOTES
HISTORY OF PRESENT ILLNESS  Melba Sharp is a 81 y.o. female. HPI  Recent hospitalization for REVISION LAMINECTOMY L2-L4 WITH A REVISION POSTERIOR FUSION T10-L5. D/dilan day 3 post surgery. Mld complication of hyponatremia, hypokalemia and hypomagnesemia      Left leg aching and swelling of left leg. Back is a little sore but better than prior to surgery. Using bone stimulator 2 hours a day for 3 months. PT has started in her home 2 times per week. Walking with walker. On antibiotic. Taking Tramadol in am and midday ans was talking all night in her sleep. Taking tylenol at night. Taking Gabapentin bid. Stopped her hctz in hospital.      Current Outpatient Medications   Medication Instructions    acetaminophen (Tylenol Extra Strength) 500 mg tablet Oral, EVERY 6 HOURS AS NEEDED    albuterol (PROVENTIL VENTOLIN) 2.5 mg, Nebulization, 3 TIMES DAILY AS NEEDED    amLODIPine (NORVASC) 5 mg, Oral, DAILY    budesonide (ENTOCORT EC) 3 mg, Oral, DAILY AS NEEDED, 1 capsule by mouth daily    dicyclomine (BENTYL) 10 mg, Oral, AS NEEDED, 1 tablet by mouth daily as needed    gabapentin (NEURONTIN) 300 mg, Oral, AS NEEDED    levocetirizine (XYZAL) 5 mg tablet TAKE 1 TABLET DAILY    LORazepam (ATIVAN) 1 mg tablet TAKE 1/2 TO 1 TABLET       NIGHTLY AS NEEDED FOR      ANXIETY. MAXIMUM DAILY     AMOUNT: 1 TABLET    losartan (COZAAR) 100 mg tablet TAKE 1 TABLET DAILY    MULTIVITAMIN W-MINERALS/LUTEIN (CENTRUM SILVER PO) 1 Tablet, Oral, Takes one po daily.     ondansetron (ZOFRAN ODT) 4 mg, Oral, EVERY 8 HOURS AS NEEDED    PARoxetine (PAXIL) 30 mg tablet TAKE 1 TABLET DAILY    simvastatin (ZOCOR) 20 mg tablet TAKE 1 TABLET DAILY    traMADoL (ULTRAM) 50 mg, Oral, EVERY 6 HOURS AS NEEDED       Visit Vitals  /65 (BP 1 Location: Left arm, BP Patient Position: Sitting, BP Cuff Size: Adult)   Pulse (!) 105   Temp 98.3 °F (36.8 °C) (Temporal)   Resp 14   Ht 5' (1.524 m)   Wt 135 lb (61.2 kg)   SpO2 98%   BMI 26.37 kg/m²       ROS  See above  Physical Exam  Vitals reviewed. Constitutional:       Appearance: Normal appearance. HENT:      Head: Normocephalic and atraumatic. Neck:      Vascular: No carotid bruit. Cardiovascular:      Rate and Rhythm: Normal rate and regular rhythm. Pulses: Normal pulses. Heart sounds: Normal heart sounds. Pulmonary:      Effort: Pulmonary effort is normal.      Breath sounds: Normal breath sounds. Musculoskeletal:      Cervical back: Neck supple. Comments: Mild left lower ext edema. Negative Homans, no calf tenderness. Wearing back brace  Good upright gait with walker. Lymphadenopathy:      Cervical: No cervical adenopathy. Neurological:      Mental Status: She is alert. ASSESSMENT and PLAN  Lumbar spinal stenosis - improved post revision laminectomy L2-4 with revision posterior fusion T10-L5. Continue to work with PT, f/u Dr. Hilario Palomo  htn - well controlled off HCTZ. Continue Losartan and Amlodipine. Hyponatremia, hypokalemia and hypomag - should have improved post stopping HCTZ. Repeat labs. Orders Placed This Encounter    MAGNESIUM    METABOLIC PANEL, COMPREHENSIVE     Follow-up and Dispositions    · Return in about 2 months (around 11/29/2021).

## 2021-09-29 NOTE — PROGRESS NOTES
Reviewed record in preparation for visit and have obtained necessary documentation. Identified pt with two pt identifiers(name and ). Chief Complaint   Patient presents with   [facility] Follow Up     Blood pressure 123/65, pulse (!) 105, temperature 98.3 °F (36.8 °C), temperature source Temporal, resp. rate 14, height 5' (1.524 m), weight 135 lb (61.2 kg), last menstrual period 1986, SpO2 98 %. Health Maintenance Due   Topic Date Due    Annual Well Visit  2021    Yearly Flu Vaccine (1) 2021       Ms. Anjum Patterson has a reminder for a \"due or due soon\" health maintenance. I have asked that she discuss this further with her primary care provider for follow-up on this health maintenance. Coordination of Care Questionnaire:  :     1) Have you been to an emergency room, urgent care clinic since your last visit? yes   Hospitalized since your last visit? yes             2) Have you seen or consulted any other health care providers outside of [address] [place] [address] since your last visit? no  (Include any pap smears or colon screenings in this section.)    3) In the event something were to happen to you and you were unable to speak on your behalf, do you have an Advance Directive/ Living Will in place stating your wishes?  YES

## 2021-10-05 ENCOUNTER — PATIENT OUTREACH (OUTPATIENT)
Dept: CASE MANAGEMENT | Age: 86
End: 2021-10-05

## 2021-10-05 NOTE — PROGRESS NOTES
Called and spoke to patient. Goals      Understands red flags post discharge. 9/21/21 [facility] 9/14-9/20 Lumbar fusion   Reviewed discharge instructions with patient   Reviewed meds-education provided on new meds.  Reviewed red flags: fever, nausea,vomiting,diarrhea, increased pain despite pain med, odor to incision drainage, area around incision appears swollen and red/angry.  DOLORES with pcp 9/29.  Scheduled to go for labs 9/22-CTN to check if [facility] can draw the labs.  Linnea Marin- 9/30.  Given info on Teja Regan as resource.  Given CTN contact info if questions/concerns.  CTN to check back in about a week, sooner [email or web address]  10/5/21  Reports she is coming along. Making progress, getting stronger. Pain comes and goes. By evening is sore and leg aches. Saw Linnea Marin last week. Will see him again 10/26. Labs improving, were checked 9/29. Better. Continue current POC. Call if concerns. CTN to check back in about a week. [email or web address]

## 2021-10-07 ENCOUNTER — TELEPHONE (OUTPATIENT)
Dept: INTERNAL MEDICINE CLINIC | Age: 86
End: 2021-10-07

## 2021-10-07 DIAGNOSIS — R30.0 DYSURIA: Primary | ICD-10-CM

## 2021-10-08 DIAGNOSIS — R30.0 DYSURIA: ICD-10-CM

## 2021-10-08 LAB
BILIRUB UR QL STRIP: NEGATIVE
GLUCOSE UR-MCNC: NEGATIVE MG/DL
KETONES P FAST UR STRIP-MCNC: NEGATIVE MG/DL
PH UR STRIP: 7 [PH] (ref 4.6–8)
PROT UR QL STRIP: NEGATIVE
SP GR UR STRIP: 1.02 (ref 1–1.03)
UA UROBILINOGEN AMB POC: NORMAL (ref 0.2–1)
URINALYSIS CLARITY POC: CLEAR
URINALYSIS COLOR POC: YELLOW
URINE BLOOD POC: NEGATIVE
URINE LEUKOCYTES POC: NORMAL
URINE NITRITES POC: NEGATIVE

## 2021-10-08 RX ORDER — NITROFURANTOIN 25; 75 MG/1; MG/1
100 CAPSULE ORAL 2 TIMES DAILY
Qty: 14 CAPSULE | Refills: 0 | Status: SHIPPED | OUTPATIENT
Start: 2021-10-08 | End: 2021-10-15

## 2021-10-12 LAB
BACTERIA SPEC CULT: NORMAL
CC UR VC: NORMAL
SERVICE CMNT-IMP: NORMAL

## 2021-10-14 ENCOUNTER — PATIENT OUTREACH (OUTPATIENT)
Dept: CASE MANAGEMENT | Age: 86
End: 2021-10-14

## 2021-10-14 NOTE — PROGRESS NOTES
Called and spoke to patient. Goals      Understands red flags post discharge. 9/21/21 [facility] 9/14-9/20 Lumbar fusion   Reviewed discharge instructions with patient   Reviewed meds-education provided on new meds.  Reviewed red flags: fever, nausea,vomiting,diarrhea, increased pain despite pain med, odor to incision drainage, area around incision appears swollen and red/angry.  DOLORES with pcp 9/29.  Scheduled to go for labs 9/22-CTN to check if [facility] can draw the labs.  Feng Hartley- 9/30.  Given info on [employer] as resource.  Given CTN contact info if questions/concerns.  CTN to check back in about a week, sooner [email or web address]  10/5/21  Reports she is coming along. Making progress, getting stronger. Pain comes and goes. By evening is sore and leg aches. Saw Feng Hartley last week. Will see him again 10/26. Labs improving, were checked 9/29. Better. Continue current POC. Call if concerns. CTN to check back in about a week. mbt  10/14/21  Reports she is doing better. Getting stronger, continues to work with PT. Discomfort only when gets tired. Sees  10/26. No red flags noted. mbt

## 2021-10-21 RX ORDER — TRIMETHOPRIM 100 MG/1
100 TABLET ORAL 2 TIMES DAILY
Qty: 6 TABLET | Refills: 0 | Status: SHIPPED | OUTPATIENT
Start: 2021-10-21 | End: 2021-10-24

## 2021-10-22 ENCOUNTER — PATIENT OUTREACH (OUTPATIENT)
Dept: CASE MANAGEMENT | Age: 86
End: 2021-10-22

## 2021-10-22 NOTE — PROGRESS NOTES
Patient has graduated from the Transitions of Care Coordination  program on 10/22/21. Patient/family has the ability to self-manage at this time Care management goals have been completed. Patient was not referred to the [facility] team for further management. Goals Addressed                 This Visit's Progress     COMPLETED: Understands red flags post discharge. 9/21/21 [facility] 9/14-9/20 Lumbar fusion   Reviewed discharge instructions with patient   Reviewed meds-education provided on new meds.  Reviewed red flags: fever, nausea,vomiting,diarrhea, increased pain despite pain med, odor to incision drainage, area around incision appears swollen and red/angry.  DOLORES with pcp 9/29.  Scheduled to go for labs 9/22-CTN to check if [facility] can draw the labs.  Lindbergh Roll- 9/30.  Given info on [employer] as resource.  Given CTN contact info if questions/concerns.  CTN to check back in about a week, sooner [email or web address]  10/5/21  Reports she is coming along. Making progress, getting stronger. Pain comes and goes. By evening is sore and leg aches. Saw Yared Maza last week. Will see him again 10/26. Labs improving, were checked 9/29. Better. Continue current POC. Call if concerns. CTN to check back in about a week. mbt  10/14/21  Reports she is doing better. Getting stronger, continues to work with PT. Discomfort only when gets tired. Sees  10/26. No red flags noted. mbt  10/22/21  Doing well. Went to [place] this am, rode in cart. Says feels good in am, but gets tired in afternoon and is bent over. Has appt Tuesday to see Yared Maza, will discuss with him then. Next week is last week of PT. No red flags noted. Advised to continue current POC and discuss with Yared Maza her concerns re afternoons. mbt            Patient has Care Transition Nurse's contact information for any further questions, concerns, or needs.   Patients upcoming visits:    Future Appointments   Date Time Provider Marty Josue 11/29/2021  9:40 AM Shubham Corrigan MD [facility]

## 2021-11-05 ENCOUNTER — TELEPHONE (OUTPATIENT)
Dept: INTERNAL MEDICINE CLINIC | Age: 86
End: 2021-11-05

## 2021-11-05 RX ORDER — NITROFURANTOIN 25; 75 MG/1; MG/1
100 CAPSULE ORAL 2 TIMES DAILY
Qty: 14 CAPSULE | Refills: 0 | Status: SHIPPED | OUTPATIENT
Start: 2021-11-05 | End: 2021-11-07 | Stop reason: ALTCHOICE

## 2021-11-07 ENCOUNTER — TELEPHONE (OUTPATIENT)
Dept: INTERNAL MEDICINE CLINIC | Age: 86
End: 2021-11-07

## 2021-11-07 RX ORDER — CIPROFLOXACIN 250 MG/1
250 TABLET, FILM COATED ORAL 2 TIMES DAILY
Qty: 10 TABLET | Refills: 0 | Status: SHIPPED | OUTPATIENT
Start: 2021-11-07 | End: 2021-11-12

## 2021-11-08 ENCOUNTER — TELEPHONE (OUTPATIENT)
Dept: INTERNAL MEDICINE CLINIC | Age: 86
End: 2021-11-08

## 2021-11-08 NOTE — TELEPHONE ENCOUNTER
Urine culture + for pseudomonas but susceptible to Cipro. Rx sent to pharmacy . Pt notified via [employer].

## 2021-11-14 RX ORDER — PAROXETINE 30 MG/1
TABLET, FILM COATED ORAL
Qty: 90 TABLET | Refills: 3 | Status: SHIPPED | OUTPATIENT
Start: 2021-11-14

## 2021-11-18 DIAGNOSIS — F41.9 ANXIETY: ICD-10-CM

## 2021-11-18 RX ORDER — LORAZEPAM 1 MG/1
TABLET ORAL
Qty: 90 TABLET | Refills: 1 | Status: SHIPPED | OUTPATIENT
Start: 2021-11-18 | End: 2022-05-26

## 2021-12-07 ENCOUNTER — OFFICE VISIT (OUTPATIENT)
Dept: ORTHOPEDIC SURGERY | Age: 86
End: 2021-12-07
Payer: MEDICARE

## 2021-12-07 VITALS — HEIGHT: 60 IN | WEIGHT: 137 LBS | BODY MASS INDEX: 26.9 KG/M2

## 2021-12-07 DIAGNOSIS — Z98.1 S/P LUMBAR SPINAL FUSION: Primary | ICD-10-CM

## 2021-12-07 PROCEDURE — 99024 POSTOP FOLLOW-UP VISIT: CPT | Performed by: ORTHOPAEDIC SURGERY

## 2021-12-14 NOTE — PROGRESS NOTES
Subjective:     Sebastien Alexander is a 81 y.o. female who presents for follow up of hypertension. Off HCTZ for the last 3 months. Diet and Lifestyle: generally follows a low sodium diet  Home BP Monitoring: better controlled at home. Cardiovascular ROS: taking medications as instructed, no medication side effects noted, no TIA's, no chest pain on exertion, no dyspnea on exertion, noting swelling of ankles, no orthostatic dizziness or lightheadedness, no palpitations. By the evening ankles are swelling. New concerns:   S/p lumbar fusion in September. Doing well ryann in am.  Walking with cane to help her stay up straight. Pain is better. Will start PT after Xmas. Remains tender right inner leg. UTI symptoms never resolved. TPL prescribed Nitrofurantoin. .  Did not take the Cipro (has nausea on medications). Most nights has frequency and dysuria. Up 5-6 times at night to urinate. Urine culture in November showed pseudomonas. Mild dry cough 1-2 years. Current Outpatient Medications   Medication Sig Dispense Refill    LORazepam (ATIVAN) 1 mg tablet TAKE 1/2 TO 1 TABLET       NIGHTLY AS NEEDED FOR      ANXIETY. MAXIMUM DAILY     AMOUNT: 1 TABLET 90 Tablet 1    PARoxetine (PAXIL) 30 mg tablet TAKE 1 TABLET DAILY 90 Tablet 3    ondansetron (ZOFRAN ODT) 4 mg disintegrating tablet Take 1 Tablet by mouth every eight (8) hours as needed for Nausea or Vomiting. 20 Tablet 0    gabapentin (NEURONTIN) 300 mg capsule Take 300 mg by mouth as needed for Pain.  acetaminophen (Tylenol Extra Strength) 500 mg tablet Take  by mouth every six (6) hours as needed for Pain.  losartan (COZAAR) 100 mg tablet TAKE 1 TABLET DAILY 90 Tablet 3    simvastatin (ZOCOR) 20 mg tablet TAKE 1 TABLET DAILY 90 Tablet 3    amLODIPine (NORVASC) 5 mg tablet Take 1 Tab by mouth daily.  90 Tab 3    levocetirizine (XYZAL) 5 mg tablet TAKE 1 TABLET DAILY 90 Tab 3    budesonide (ENTOCORT EC) 3 mg capsule Take 3 mg by mouth daily as needed. 1 capsule by mouth daily      albuterol (PROVENTIL VENTOLIN) 2.5 mg /3 mL (0.083 %) nebulizer solution 3 mL by Nebulization route three (3) times daily as needed for Wheezing or Shortness of Breath. 50 Each 0    dicyclomine (BENTYL) 10 mg capsule Take 10 mg by mouth as needed. 1 tablet by mouth daily as needed      MULTIVITAMIN W-MINERALS/LUTEIN (CENTRUM SILVER PO) Take 1 Tab by mouth. Takes one po daily. Lab Results   Component Value Date/Time    GFR est non-AA >60 09/29/2021 10:45 AM    GFRNA, POC 53 (L) 09/29/2020 10:40 AM    GFR est AA >60 09/29/2021 10:45 AM    GFRAA, POC >60 09/29/2020 10:40 AM    Creatinine 0.87 09/29/2021 10:45 AM    Creatinine (POC) 1.0 09/29/2020 10:40 AM    BUN 12 09/29/2021 10:45 AM    Sodium 132 (L) 09/29/2021 10:45 AM    Potassium 4.0 09/29/2021 10:45 AM    Chloride 100 09/29/2021 10:45 AM    CO2 22 09/29/2021 10:45 AM    Magnesium 1.9 09/29/2021 10:45 AM    Phosphorus 2.6 09/19/2021 05:06 AM        Review of Systems, additional:  Pertinent items are noted in HPI. Objective:     Visit Vitals  /65   Pulse 100   Temp 98.4 °F (36.9 °C) (Temporal)   Resp 14   Ht 5' (1.524 m)   Wt 140 lb (63.5 kg)   LMP 01/01/1986   SpO2 97%   BMI 27.34 kg/m²     Appearance: alert, well appearing, and in no distress and oriented to person, place, and time. General exam:   . NECK: supple, no lad, no bruit, no tm  LUNGS: cta bilat  CV rrr, no m/g/r  ABD: soft, nt, nd, nabs  EXT: no c/c/e    Assessment/Plan:     hypertension well controlled. current treatment plan is effective, no change in therapy. Lumbar spinal stenosis - s/p laminectomy and fusion 9/2021    Hyponatremia/hypokalemia/hypomagnesium - better after stopping hctz. Repeat albs    Hyperlipidemia - controlled in past.  Continue statin. Repeat labs    Recurrent UTI- check urine culture.       Orders Placed This Encounter    CULTURE, URINE    METABOLIC PANEL, COMPREHENSIVE    LIPID PANEL    gabapentin (NEURONTIN) 300 mg capsule

## 2021-12-15 ENCOUNTER — OFFICE VISIT (OUTPATIENT)
Dept: INTERNAL MEDICINE CLINIC | Age: 86
End: 2021-12-15
Payer: MEDICARE

## 2021-12-15 VITALS
HEIGHT: 60 IN | WEIGHT: 140 LBS | BODY MASS INDEX: 27.48 KG/M2 | OXYGEN SATURATION: 97 % | DIASTOLIC BLOOD PRESSURE: 65 MMHG | SYSTOLIC BLOOD PRESSURE: 136 MMHG | HEART RATE: 100 BPM | RESPIRATION RATE: 14 BRPM | TEMPERATURE: 98.4 F

## 2021-12-15 DIAGNOSIS — R30.0 DYSURIA: ICD-10-CM

## 2021-12-15 DIAGNOSIS — E87.6 HYPOKALEMIA: ICD-10-CM

## 2021-12-15 DIAGNOSIS — I10 ESSENTIAL HYPERTENSION: Primary | ICD-10-CM

## 2021-12-15 DIAGNOSIS — E87.1 HYPONATREMIA: ICD-10-CM

## 2021-12-15 DIAGNOSIS — E78.00 PURE HYPERCHOLESTEROLEMIA: ICD-10-CM

## 2021-12-15 DIAGNOSIS — E83.42 HYPOMAGNESEMIA: ICD-10-CM

## 2021-12-15 DIAGNOSIS — M48.062 SPINAL STENOSIS OF LUMBAR REGION WITH NEUROGENIC CLAUDICATION: ICD-10-CM

## 2021-12-15 PROCEDURE — G8754 DIAS BP LESS 90: HCPCS | Performed by: INTERNAL MEDICINE

## 2021-12-15 PROCEDURE — 99214 OFFICE O/P EST MOD 30 MIN: CPT | Performed by: INTERNAL MEDICINE

## 2021-12-15 PROCEDURE — G8752 SYS BP LESS 140: HCPCS | Performed by: INTERNAL MEDICINE

## 2021-12-15 PROCEDURE — G8419 CALC BMI OUT NRM PARAM NOF/U: HCPCS | Performed by: INTERNAL MEDICINE

## 2021-12-15 PROCEDURE — 1090F PRES/ABSN URINE INCON ASSESS: CPT | Performed by: INTERNAL MEDICINE

## 2021-12-15 PROCEDURE — 1101F PT FALLS ASSESS-DOCD LE1/YR: CPT | Performed by: INTERNAL MEDICINE

## 2021-12-15 PROCEDURE — G0463 HOSPITAL OUTPT CLINIC VISIT: HCPCS | Performed by: INTERNAL MEDICINE

## 2021-12-15 PROCEDURE — G8510 SCR DEP NEG, NO PLAN REQD: HCPCS | Performed by: INTERNAL MEDICINE

## 2021-12-15 PROCEDURE — G8427 DOCREV CUR MEDS BY ELIG CLIN: HCPCS | Performed by: INTERNAL MEDICINE

## 2021-12-15 PROCEDURE — G8399 PT W/DXA RESULTS DOCUMENT: HCPCS | Performed by: INTERNAL MEDICINE

## 2021-12-15 PROCEDURE — G8536 NO DOC ELDER MAL SCRN: HCPCS | Performed by: INTERNAL MEDICINE

## 2021-12-15 RX ORDER — GABAPENTIN 300 MG/1
300 CAPSULE ORAL
Qty: 90 CAPSULE | Refills: 1 | Status: SHIPPED | OUTPATIENT
Start: 2021-12-15 | End: 2022-05-02

## 2021-12-15 NOTE — PROGRESS NOTES
Reviewed record in preparation for visit and have obtained necessary documentation. Identified pt with two pt identifiers(name and ). No chief complaint on file. Blood pressure (!) 155/90, pulse 100, temperature 98.4 °F (36.9 °C), temperature source Temporal, resp. rate 14, height 5' (1.524 m), weight 140 lb (63.5 kg), last menstrual period 1986, SpO2 97 %. Health Maintenance Due   Topic Date Due    Annual Well Visit  2021    COVID-19 Vaccine (3 - Booster for Thomas Peter series) 2021    Yearly Flu Vaccine (1) 2021       Ms. Storm Acevedo has a reminder for a \"due or due soon\" health maintenance. I have asked that she discuss this further with her primary care provider for follow-up on this health maintenance. Coordination of Care Questionnaire:  :     1) Have you been to an emergency room, urgent care clinic since your last visit? no   Hospitalized since your last visit? no             2) Have you seen or consulted any other health care providers outside of [address] [place] [address] since your last visit? no  (Include any pap smears or colon screenings in this section.)    3) In the event something were to happen to you and you were unable to speak on your behalf, do you have an Advance Directive/ Living Will in place stating your wishes?  YES

## 2021-12-23 ENCOUNTER — TELEPHONE (OUTPATIENT)
Dept: INTERNAL MEDICINE CLINIC | Age: 86
End: 2021-12-23

## 2022-02-09 DIAGNOSIS — Z98.1 S/P LUMBAR SPINAL FUSION: ICD-10-CM

## 2022-03-01 ENCOUNTER — OFFICE VISIT (OUTPATIENT)
Dept: ORTHOPEDIC SURGERY | Age: 87
End: 2022-03-01
Payer: MEDICARE

## 2022-03-01 VITALS — BODY MASS INDEX: 27.09 KG/M2 | HEIGHT: 60 IN | WEIGHT: 138 LBS

## 2022-03-01 DIAGNOSIS — M51.36 LUMBAR DEGENERATIVE DISC DISEASE: ICD-10-CM

## 2022-03-01 DIAGNOSIS — G89.29 CHRONIC BILATERAL LOW BACK PAIN WITHOUT SCIATICA: ICD-10-CM

## 2022-03-01 DIAGNOSIS — M54.50 CHRONIC BILATERAL LOW BACK PAIN WITHOUT SCIATICA: ICD-10-CM

## 2022-03-01 DIAGNOSIS — Z98.1 S/P LUMBAR FUSION: ICD-10-CM

## 2022-03-01 DIAGNOSIS — Z98.1 S/P LUMBAR SPINAL FUSION: Primary | ICD-10-CM

## 2022-03-01 PROCEDURE — G8419 CALC BMI OUT NRM PARAM NOF/U: HCPCS | Performed by: ORTHOPAEDIC SURGERY

## 2022-03-01 PROCEDURE — G8756 NO BP MEASURE DOC: HCPCS | Performed by: ORTHOPAEDIC SURGERY

## 2022-03-01 PROCEDURE — 1090F PRES/ABSN URINE INCON ASSESS: CPT | Performed by: ORTHOPAEDIC SURGERY

## 2022-03-01 PROCEDURE — 1101F PT FALLS ASSESS-DOCD LE1/YR: CPT | Performed by: ORTHOPAEDIC SURGERY

## 2022-03-01 PROCEDURE — G8399 PT W/DXA RESULTS DOCUMENT: HCPCS | Performed by: ORTHOPAEDIC SURGERY

## 2022-03-01 PROCEDURE — G8536 NO DOC ELDER MAL SCRN: HCPCS | Performed by: ORTHOPAEDIC SURGERY

## 2022-03-01 PROCEDURE — G8432 DEP SCR NOT DOC, RNG: HCPCS | Performed by: ORTHOPAEDIC SURGERY

## 2022-03-01 PROCEDURE — G8427 DOCREV CUR MEDS BY ELIG CLIN: HCPCS | Performed by: ORTHOPAEDIC SURGERY

## 2022-03-01 PROCEDURE — 99214 OFFICE O/P EST MOD 30 MIN: CPT | Performed by: ORTHOPAEDIC SURGERY

## 2022-03-01 RX ORDER — GABAPENTIN 300 MG/1
600 CAPSULE ORAL
Qty: 60 CAPSULE | Refills: 0 | Status: SHIPPED | OUTPATIENT
Start: 2022-03-01 | End: 2022-03-01 | Stop reason: CLARIF

## 2022-03-01 RX ORDER — GABAPENTIN 300 MG/1
600 CAPSULE ORAL
Qty: 30 CAPSULE | Refills: 0 | Status: SHIPPED | OUTPATIENT
Start: 2022-03-01 | End: 2022-03-17 | Stop reason: SDUPTHER

## 2022-03-01 NOTE — PROGRESS NOTES
Errol Sanchez (: 1935) is a 81 y.o. female, patient, here for evaluation of the following chief complaint(s):  LOW BACK PAIN       ASSESSMENT/PLAN:    Below is the assessment and plan developed based on review of pertinent history, physical exam, labs, studies, and medications. 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 I have asked her to get some bilateral L5-S1 epidural steroid injections. And possibly a left SI joint junction. I have also increased her oral Neurontin to 2 tablets at night. I have renewed her physical therapy prescription as well. I will see her back in 3 months. 1. S/P lumbar spinal fusion  -     XR SPINE LUMB MIN 4 V; Future      No follow-ups on file. SUBJECTIVE/OBJECTIVE:  Errol Sanchez (: 1935) is a 81 y.o. female. No flowsheet data found. She is here for her 6-month visit. She had some return of some of her left posterior buttock and left leg symptoms over the last month or so. She has been working with physical therapy. The Neurontin does help. The pain is only at night when she is lying down. She denies any bowel bladder difficulties. She is just finished a round of physical therapy  Imaging:          XR Results (most recent):  Results from Appointment encounter on 22    XR SPINE LUMB MIN 4 V    Narrative  AP and lateral of the lumbar spine and flexion-extension views of the thoracolumbar spine were reviewed. She has a stable T10-L5 fusion. No acute changes are noted.        MRI Results (most recent):  Results from Orders Only encounter on 10/23/17    MRI BRAIN W WO CONT    Narrative  EXAM:  MRI BRAIN W WO CONT  INDICATION:  Schwannoma,  TECHNIQUE: Sagittal T1, axial T2 and FLAIR images followed by thin 2 mm axial  T1-weighted images of the temporal bone and posterior fossa, then intravenous  infusion 13 mL ProHance with repeat and axial and coronal T1-weighted images. Axial diffusion weighted images and axial T2 cisternogram images were obtained. Postgadolinium whole head T1.  COMPARISON: MRI of the head 4/12/17  FINDINGS:  The cerebellopontine angle, internal auditory canals and temporal bones are  normal.  A mass on the right at C1-2 again demonstrated. Craniocervical junction is  otherwise unremarkable. Flow voids are present in vertebral basilar and carotid artery systems. The other structures at the cranial base are otherwise unremarkable. The ventricular size and configuration are within normal limits. Multiple foci of T2 hyperintensity again noted in the cerebral white matter  without associated restricted diffusion or abnormal enhancement. Nonspecific  pattern possibly related to chronic small vessel ischemic change. Focal volume loss in the posterior medial right temporal lobe is chronic and  consistent with an old injury/infarction. No evidence of intracranial hemorrhage, other mass, acute infarct or abnormal  extra-axial fluid collections. Impression  IMPRESSION:  1. Right C1-2 mass again noted. Please see MRI cervical spine same date for  details. 2. No change in chronic nonspecific white matter foci of T2 hyperintensity. 3. 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          Allergies   Allergen Reactions    Cefdinir Other (comments)     Sick to stomach    Ciprofloxacin Diarrhea and Nausea and Vomiting    Flagyl [Metronidazole] Nausea and Vomiting    Other Medication Other (comments)     BANDAIDS - breaks out skin    Oxycodone-Acetaminophen Other (comments)     dizziness    Phenylephrine Other (comments)     Wire her up    Phenylpropanolamine Other (comments)     Wire her up    Sulfa (Sulfonamide Antibiotics) Nausea Only       Current Outpatient Medications   Medication Sig    gabapentin (NEURONTIN) 300 mg capsule Take 1 Capsule by mouth nightly. Max Daily Amount: 300 mg.  LORazepam (ATIVAN) 1 mg tablet TAKE 1/2 TO 1 TABLET       NIGHTLY AS NEEDED FOR      ANXIETY. MAXIMUM DAILY     AMOUNT: 1 TABLET    PARoxetine (PAXIL) 30 mg tablet TAKE 1 TABLET DAILY    ondansetron (ZOFRAN ODT) 4 mg disintegrating tablet Take 1 Tablet by mouth every eight (8) hours as needed for Nausea or Vomiting.  acetaminophen (Tylenol Extra Strength) 500 mg tablet Take  by mouth every six (6) hours as needed for Pain.  losartan (COZAAR) 100 mg tablet TAKE 1 TABLET DAILY    simvastatin (ZOCOR) 20 mg tablet TAKE 1 TABLET DAILY    amLODIPine (NORVASC) 5 mg tablet Take 1 Tab by mouth daily.  levocetirizine (XYZAL) 5 mg tablet TAKE 1 TABLET DAILY    budesonide (ENTOCORT EC) 3 mg capsule Take 3 mg by mouth daily as needed. 1 capsule by mouth daily    albuterol (PROVENTIL VENTOLIN) 2.5 mg /3 mL (0.083 %) nebulizer solution 3 mL by Nebulization route three (3) times daily as needed for Wheezing or Shortness of Breath.  dicyclomine (BENTYL) 10 mg capsule Take 10 mg by mouth as needed. 1 tablet by mouth daily as needed    MULTIVITAMIN W-MINERALS/LUTEIN (CENTRUM SILVER PO) Take 1 Tab by mouth. Takes one po daily. No current facility-administered medications for this visit.        Past Medical History:   Diagnosis Date    Anxiety     Arthritis back pain    fingers    Asthma     MILD - NO INHALERS    Chronic pain     Colitis     Diverticulitis 6/11/2013    Hypercholesterolemia     Hypertension     IBS (irritable bowel syndrome)     Insomnia     Nausea & vomiting     Stenosis     spinal    Stroke ([facility] [address] 1996    tia   PATENT  FORAMEN  OVALE    Swollen L ankle         Past Surgical History:   Procedure Laterality Date    HX CATARACT REMOVAL Bilateral 2009    HX CHOLECYSTECTOMY  04/1997    HX COLONOSCOPY      x3    HX HYSTERECTOMY  1986    HX KNEE ARTHROSCOPY Left 04/1998    HX LUMBAR FUSION  02/2011    L4-L5    HX SHOULDER REPLACEMENT Right 01/2020    HX TONSILLECTOMY  CHILDHOOD    HX WISDOM TEETH EXTRACTION      X3    VT CARDIAC SURG PROCEDURE UNLIST  8/04    repair of foramen ovale       Family History   Problem Relation Age of Onset    Other Mother         SUARACHNOID HEMORRHAGE-ANEURYSM    Stroke Mother     Cancer Father         PANCREATIC    Anesth Problems Daughter         N/V    No Known Problems Daughter         Social History     Tobacco Use    Smoking status: Never Smoker    Smokeless tobacco: Never Used   Vaping Use    Vaping Use: Never used   Substance Use Topics    Alcohol use: Yes     Alcohol/week: 7.0 standard drinks     Types: 3 Glasses of wine, 4 Cans of beer per week    Drug use: No        Review of Systems   Musculoskeletal: Positive for back pain and gait problem. All other systems reviewed and are negative. Vitals:  Ht 5' (1.524 m)   Wt 138 lb (62.6 kg)   LMP 01/01/1986   BMI 26.95 kg/m²    Body mass index is 26.95 kg/m². Ortho Exam       Integumentary  Assessment of Surgical Incision - healing and consistent with normal anticipated wound healing. Neurologic  Sensory  Light Touch - Intact - Globally. Overall Assessment of Muscle Strength and Tone reveals  Lower Extremities - Right Iliopsoas - 5/5. Left Iliopsoas - 5/5. Right Tibialis Anterior - 5/5. Left Tibialis Anterior - 5/5. Right Gastroc-Soleus - 5/5. Left Gastroc-Soleus - 5/5. Right EHL - 5/5. Left EHL - 5/5. General Assessment of Reflexes  Right Ankle - Clonus is not present. Left Ankle - Clonus is not present. Reflexes (Dermatomes)  2/2 Normal - Left Achilles (L5-S2), Left Knee (L2-4), Right Achilles (L5-S2) and Right Knee (L2-4). Musculoskeletal  Global Assessment  Examination of related systems reveals - well-developed, well-nourished, in no acute distress, alert and oriented x 3 and normal coordination.   Spine/Ribs/Pelvis  Lumbosacral Spine - Examination of the lumbosacral spine reveals - no known fractures or deformities. Inspection and Palpation - Tenderness - moderate. Assessment of pain reveals the following findings - The pain is characterized as - moderate. Location - pain refers to lower back bilaterally. Some tenderness on palpation of left posterior buttock and hip region. An electronic signature was used to authenticate this note.   -- Vicenta Titus MD

## 2022-03-01 NOTE — LETTER
3/1/2022    Patient: Lizandro Christianson   YOB: 1935   Date of Visit: 3/1/2022     Ramon Bennett MD  [address] [place] [address]  Via In Basket    Dear Ramon Bennett MD,      Thank you for referring Ms. Hayden Smith to [facility] for evaluation. My notes for this consultation are attached. If you have questions, please do not hesitate to call me. I look forward to following your patient along with you.       Sincerely,    Tresa Calabrese MD

## 2022-03-01 NOTE — LETTER
CONTROLLED SUBSTANCE MEDICATION AGREEMENT  Patient Name: Js Holman  Patient YOB: 1935   [id number]      I understand, that controlled substance medications may be used to help better manage my symptoms and to improve my ability to function at home, work and in social settings. However, I also understand that these medications do have risks, which have been discussed with me, including possible development of physical or psychological dependence. I understand that successful treatment requires mutual trust and honesty between me and my provider. I understand and agree that following this Medication Agreement is necessary in continuing my provider-patient relationship and the success of my treatment plan. Explanation from my Provider: Benefits and Goals of Controlled Substance Medications: There are two potential goals for your treatment: (1) decreased pain and suffering (2) improved daily life functions. There are many possible treatments for your chronic condition(s). Alternatives such as physical therapy, yoga, massage, home daily exercise, meditation, relaxation techniques, injections, chiropractic manipulations, surgery, cognitive therapy, hypnosis and many medications that are not habit-forming may be used. Use of controlled substance medications may be helpful, but they are unlikely to resolve all symptoms or restore all function. Explanation from my Provider: Risks of Controlled Substance Medications:   Opioid pain medications: These medications can lead to problems such as addiction/dependence, sedation, lightheadedness/dizziness, memory issues, falls, constipation, nausea, or vomiting. They may also impair the ability to drive or operate machinery. Additionally, these medications may lower testosterone levels, leading to loss of bone strength, stamina and sex drive.   They may cause problems with breathing, sleep apnea and reduced coughing, which is especially dangerous for patients with lung disease. Overdose or dangerous interactions with alcohol and other medications may occur, leading to death. Hyperalgesia may develop, which means patients receiving opioids for the treatment of pain may become more sensitive to certain painful stimuli, and in some cases, experience pain from ordinarily non-painful stimuli. Women between the ages of 14-53 who could become pregnant should carefully weigh the risks and benefits of opioids with their physicians, as these medications increase the risk of pregnancy complications, including miscarriage,  delivery and stillbirth. It is also possible for babies to be born addicted to opioids. Opioid dependence withdrawal symptoms may include; feelings of uneasiness, increased pain, irritability, belly pain, diarrhea, sweats and goose-flesh. Testosterone replacement therapy:  Potential side effects include increased risk of stroke and heart attack, blood clots, increased blood pressure, increased cholesterol, enlarged prostate, sleep apnea, irritability/aggression and other mood disorders, and decreased fertility. Mic Gracia (1935)             Page 1 of 4    Initials:_______    Benzodiazepines and non-benzodiazepine sleep medications: These medications can lead to problems such as addiction/dependence, sedation, fatigue, lightheadedness, dizziness, incoordination, falls, depression, hallucinations, and impaired judgment, memory and concentration. The ability to drive and operate machinery may also be affected. Abnormal sleep-related behaviors have been reported, including sleepwalking, driving, making telephone calls, eating, or having sex while not fully awake. These medications can suppress breathing and worsen sleep apnea, particularly when combined with alcohol or other sedating medications, potentially leading to death.  Dependence withdrawal symptoms may include tremors, anxiety, hallucinations and seizures. Stimulants:  Common adverse effects include addiction/dependence, increased blood pressure and heart rate, decreased appetite, nausea, involuntary weight loss, insomnia,  irritability, and headaches. These risks may increase when these medications are combined with other stimulants, such as caffeine pills or energy drinks, certain weight loss supplements and oral decongestants. Dependence withdrawal symptoms may include depressed mood, loss of interest, suicidal thoughts, anxiety, fatigue, appetite changes and agitation. I agree and understand that I and my prescriber have the following rights and responsibilities regarding my treatment plan:   1. MY RIGHTS:  To be informed of my treatment and medication plan. To be an active participant in my health and wellbeing. 2. MY RESPONSIBILITY AND UNDERSTANDING FOR USE OF MEDICATIONS   I will take medications at the dose and frequency as directed. For my safety, I will not increase or change how I take my medications without the recommendation of my healthcare provider.  I will actively participate in any program recommended by my provider which may improve function, including social, physical, psychological programs.  I will not take my medications with alcohol or other drugs not prescribed to me. I understand that drinking alcohol with my medications increases the chances of side effects, including reduced breathing rate and could lead to personal injury when operating machinery.  I understand that if I have a history of substance use disorders, including alcohol or other illicit drugs, that I may be at increased risk of addiction to my medications.  I agree to notify my provider immediately if I should become pregnant so that my treatment plan can be adjusted.    I agree and understand that I shall only receive controlled substance medications from the prescriber that signed this agreement unless there is written agreement among other prescribers of controlled substances outlining the responsibility of the medications being prescribed.  I understand that the if the controlled medication is not helping to achieve goals, the dosage may be tapered and no longer prescribed. 3. MY RESPONSIBILITY FOR COMMUNICATION / PRESCRIPTION RENEWALS   I agree that all controlled substance medications that I take will be prescribed only by my provider. If another healthcare provider prescribes me medication in an emergency, I will notify my provider within seventy-two (72) hours. Katheryn Álvarez (1935)             Page 2 of 4    Initials:_______  Rohit Stager I will arrange for refills at the prescribed interval ONLY during regular office hours. I will not ask for refills earlier than agreed, after-hours, on holidays or weekends. Refills may take up to 72 hours for processing and prescriptions to reach the pharmacy.  I will inform my other health care providers that I am taking these medications and of the existence of this [id number]. In the event of an emergency, I will provide the same information to the emergency department prescribers.  I will keep my provider updated on the pharmacy I am using for controlled medication prescription filling. 4. MY RESPONSIBILITY FOR PROTECTING MEDICATIONS   I will protect my prescriptions and medications. I understand that lost or misplaced prescriptions will not be replaced.  I will keep medications only for my own use and will not share them with others. I will keep all medications away from children.  I agree that if my medications are adjusted or discontinued, I will properly dispose of any remaining medications. I understand that I will be required to dispose of any remaining controlled medications as, directed by my prescriber, prior to being provided with any prescriptions for other controlled medications.   Medication drop box locations can be found at: [employer]  5. MY RESPONSIBILITY WITH ILLEGAL DRUGS    I will not use illegal or street drugs or another person's prescription medications not prescribed to me.  If there are identified addiction type symptoms, then referral to a program may be provided by my provider and I agree to follow through with this recommendation. 6. MY RESPONSIBILITY FOR COOPERATION WITH INVESTIGATIONS   I understand that my provider will comply with any applicable law and may discuss my use and/or possible misuse/abuse of controlled substances and alcohol, as appropriate, with any health care provider involved in my care, pharmacist, or legal authority.  I authorize my provider and pharmacy to cooperate fully with law enforcement agencies (as permitted by law) in the investigation of any possible misuse, sale, or other diversion of my controlled substances.  I agree to waive any applicable privilege or right of privacy or confidentiality with respect to these authorizations. 7. PROVIDERS RIGHT TO MONITOR FOR SAFETY: PRESCRIPTION MONITORING / DRUG TESTING   I consent to drug/toxicology screening and will submit to a drug screen upon my providers request to assure I am only taking the prescribed drugs for my safety monitoring. I understand that a drug screen is a laboratory test in which a sample of my urine, blood or saliva is checked to see what drugs I have been taking. This may entail an observed urine specimen, which means that a nurse or other health care provider may watch me provide urine, and I will cooperate if I am asked to provide an observed specimen. Prachi Martínez (1935)             Page 3 of 4    Initials:_______  Uday I understand that my provider will check a copy of my State Prescription Monitoring Program () Report in order to safely prescribe medications.      Pill Counts: I consent to pill counts when requested. I may be asked to bring all my prescribed controlled substance medications, in their original bottles, to all of my scheduled appointments. In addition, my provider may ask me to come to the practice at any time for a random pill count. 8. TERMINATION OF THIS AGREEMENT   For my safety, my prescriber has the right to stop prescribing controlled substance medications and may end this agreement.  Conditions that may result in termination of this agreement:  a. I do not show any improvement in pain, or my activity has not improved. b. I develop rapid tolerance or loss of improvement, as described in my treatment plan.  c. I develop significant side effects from the medication. d. My behavior is not consistent with the responsibilities outlined above, thereby causing safety concerns to continue prescribing controlled substance medications. e. I fail to follow the terms of this agreement. f. Other:____________________________     UNDERSTANDING THIS MEDICATION AGREEMENT:    I have read the above and have had all my questions answered. For chronic disease management, I know that my symptoms can be managed with many types of treatments. A chronic medication trial may be part of my treatment, but I must be an active participant in my care. Medication therapy is only one part of my symptom management plan. In some cases, there may be limited scientific evidence to support the chronic use of certain medications to improve symptoms and daily function. Furthermore, in certain circumstances, there may be scientific information that suggests that the use of chronic controlled substances may worsen my symptoms and increase my risk of unintentional death directly related to this medication therapy. I know that if my provider feels my risk from controlled medications is greater than my benefit, I will have my controlled substance medication(s) compassionately lowered or removed altogether. I further agree to allow this office to contact my HIPAA contact if there are concerns about my safety and use of the controlled medications. I have agreed to use the prescribed controlled substance medications to me as instructed by my provider and as stated in this Medication Agreement. My initial on each page and my signature below shows that I have read each page and I have had the opportunity to ask questions with answers provided by my provider.       Patient Name (Printed): _____________________________________    Patient Signature:  ______________________   Date: _____________      Prescriber Name (Printed): ___________________________________    Prescriber Signature: _____________________  Date: _____________     Ihsan Kelly (1935)             Page 4 of 4

## 2022-03-07 ENCOUNTER — TELEPHONE (OUTPATIENT)
Dept: ORTHOPEDIC SURGERY | Age: 87
End: 2022-03-07

## 2022-03-07 NOTE — TELEPHONE ENCOUNTER
Called to report that she does not want to wait 2 months for Dr. Gilmar Cannon and requested order faxed to the hospital which has occurred.

## 2022-03-08 RX ORDER — LEVOCETIRIZINE DIHYDROCHLORIDE 5 MG/1
TABLET, FILM COATED ORAL
Qty: 90 TABLET | Refills: 3 | Status: SHIPPED | OUTPATIENT
Start: 2022-03-08

## 2022-03-10 DIAGNOSIS — M54.50 CHRONIC BILATERAL LOW BACK PAIN WITHOUT SCIATICA: ICD-10-CM

## 2022-03-10 DIAGNOSIS — G89.29 CHRONIC BILATERAL LOW BACK PAIN WITHOUT SCIATICA: ICD-10-CM

## 2022-03-10 DIAGNOSIS — Z98.1 S/P LUMBAR FUSION: ICD-10-CM

## 2022-03-10 DIAGNOSIS — Z98.1 S/P LUMBAR SPINAL FUSION: Primary | ICD-10-CM

## 2022-03-10 DIAGNOSIS — M51.36 LUMBAR DEGENERATIVE DISC DISEASE: ICD-10-CM

## 2022-03-11 ENCOUNTER — TELEPHONE (OUTPATIENT)
Dept: ORTHOPEDIC SURGERY | Age: 87
End: 2022-03-11

## 2022-03-11 NOTE — TELEPHONE ENCOUNTER
The BEN triggered an ABN. .. Meaning Medicare will not cover the BEN until we find a diagnosis code that will work. Its likely the Si joint injection. The codes given are     Z98.1 (ICD-10-CM) - S/P lumbar spinal fusion   Z98.1 (ICD-10-CM) - S/P lumbar fusion   M54.50,G89.29 (ICD-10-CM) - Chronic bilateral low back pain without sciatica   M51.36 (ICD-10-CM) - Lumbar degenerative disc disease     What IDC 10 code would you like for the SI joint?

## 2022-03-15 DIAGNOSIS — Z98.1 S/P LUMBAR FUSION: ICD-10-CM

## 2022-03-15 DIAGNOSIS — M46.1 INFLAMMATION OF SACROILIAC JOINT (HCC): Primary | ICD-10-CM

## 2022-03-15 DIAGNOSIS — Z98.1 S/P LUMBAR SPINAL FUSION: ICD-10-CM

## 2022-03-15 DIAGNOSIS — M51.36 LUMBAR DEGENERATIVE DISC DISEASE: ICD-10-CM

## 2022-03-15 DIAGNOSIS — G89.29 CHRONIC BILATERAL LOW BACK PAIN WITHOUT SCIATICA: ICD-10-CM

## 2022-03-15 DIAGNOSIS — M54.50 CHRONIC BILATERAL LOW BACK PAIN WITHOUT SCIATICA: ICD-10-CM

## 2022-03-18 PROBLEM — Z98.42 HISTORY OF YAG LASER CAPSULOTOMY OF LENS OF LEFT EYE: Status: ACTIVE | Noted: 2017-10-02

## 2022-03-18 PROBLEM — M48.062 LUMBAR STENOSIS WITH NEUROGENIC CLAUDICATION: Status: ACTIVE | Noted: 2021-09-14

## 2022-03-19 PROBLEM — Z78.9 ADVANCE DIRECTIVE IN CHART: Status: ACTIVE | Noted: 2019-03-26

## 2022-03-19 PROBLEM — M12.819 ROTATOR CUFF ARTHROPATHY: Status: ACTIVE | Noted: 2020-01-30

## 2022-03-19 PROBLEM — Z96.1 PSEUDOPHAKIA OF BOTH EYES: Status: ACTIVE | Noted: 2017-10-02

## 2022-03-19 PROBLEM — H26.491 POSTERIOR CAPSULAR OPACIFICATION VISUALLY SIGNIFICANT OF RIGHT EYE: Status: ACTIVE | Noted: 2017-10-02

## 2022-03-19 PROBLEM — H43.812 POSTERIOR VITREOUS DETACHMENT OF LEFT EYE: Status: ACTIVE | Noted: 2017-10-02

## 2022-03-19 PROBLEM — Z78.9 ADVANCE DIRECTIVE PLACED IN CHART THIS ADMISSION: Status: ACTIVE | Noted: 2017-03-24

## 2022-03-21 ENCOUNTER — HOSPITAL ENCOUNTER (OUTPATIENT)
Dept: INTERVENTIONAL RADIOLOGY/VASCULAR | Age: 87
Discharge: HOME | End: 2022-03-21
Attending: STUDENT IN AN ORGANIZED HEALTH CARE EDUCATION/TRAINING PROGRAM | Admitting: STUDENT IN AN ORGANIZED HEALTH CARE EDUCATION/TRAINING PROGRAM
Payer: MEDICARE

## 2022-03-21 VITALS
HEART RATE: 91 BPM | OXYGEN SATURATION: 97 % | SYSTOLIC BLOOD PRESSURE: 143 MMHG | DIASTOLIC BLOOD PRESSURE: 87 MMHG | RESPIRATION RATE: 18 BRPM

## 2022-03-21 DIAGNOSIS — M54.50 CHRONIC BILATERAL LOW BACK PAIN WITHOUT SCIATICA: ICD-10-CM

## 2022-03-21 DIAGNOSIS — G89.29 CHRONIC BILATERAL LOW BACK PAIN WITHOUT SCIATICA: ICD-10-CM

## 2022-03-21 DIAGNOSIS — M51.36 LUMBAR DEGENERATIVE DISC DISEASE: ICD-10-CM

## 2022-03-21 DIAGNOSIS — Z98.1 S/P LUMBAR FUSION: ICD-10-CM

## 2022-03-21 DIAGNOSIS — Z98.1 S/P LUMBAR SPINAL FUSION: ICD-10-CM

## 2022-03-21 PROCEDURE — 64483 NJX AA&/STRD TFRM EPI L/S 1: CPT

## 2022-03-21 PROCEDURE — 74011000250 HC RX REV CODE- 250: Performed by: STUDENT IN AN ORGANIZED HEALTH CARE EDUCATION/TRAINING PROGRAM

## 2022-03-21 PROCEDURE — 74011000636 HC RX REV CODE- 636: Performed by: STUDENT IN AN ORGANIZED HEALTH CARE EDUCATION/TRAINING PROGRAM

## 2022-03-21 PROCEDURE — 74011250636 HC RX REV CODE- 250/636: Performed by: STUDENT IN AN ORGANIZED HEALTH CARE EDUCATION/TRAINING PROGRAM

## 2022-03-21 RX ORDER — LIDOCAINE HYDROCHLORIDE 10 MG/ML
10 INJECTION, SOLUTION EPIDURAL; INFILTRATION; INTRACAUDAL; PERINEURAL
Status: DISCONTINUED | OUTPATIENT
Start: 2022-03-21 | End: 2022-03-21 | Stop reason: HOSPADM

## 2022-03-21 RX ORDER — DEXAMETHASONE SODIUM PHOSPHATE 10 MG/ML
10 INJECTION INTRAMUSCULAR; INTRAVENOUS
Status: COMPLETED | OUTPATIENT
Start: 2022-03-21 | End: 2022-03-21

## 2022-03-21 RX ORDER — LIDOCAINE HYDROCHLORIDE 10 MG/ML
10 INJECTION, SOLUTION EPIDURAL; INFILTRATION; INTRACAUDAL; PERINEURAL
Status: COMPLETED | OUTPATIENT
Start: 2022-03-21 | End: 2022-03-21

## 2022-03-21 RX ORDER — DEXAMETHASONE SODIUM PHOSPHATE 10 MG/ML
10 INJECTION INTRAMUSCULAR; INTRAVENOUS
Status: DISCONTINUED | OUTPATIENT
Start: 2022-03-21 | End: 2022-03-21 | Stop reason: HOSPADM

## 2022-03-21 RX ADMIN — IOHEXOL 5 ML: 180 INJECTION INTRAVENOUS at 14:28

## 2022-03-21 RX ADMIN — LIDOCAINE HYDROCHLORIDE 10 ML: 10 INJECTION, SOLUTION EPIDURAL; INFILTRATION; INTRACAUDAL; PERINEURAL at 14:27

## 2022-03-21 RX ADMIN — DEXAMETHASONE SODIUM PHOSPHATE 15 MG: 10 INJECTION, SOLUTION INTRAMUSCULAR; INTRAVENOUS at 14:28

## 2022-03-21 NOTE — PROCEDURES
Interventional Radiology  Procedure Note        3/21/2022 2:39 PM    Patient: Dez Colon     Informed consent obtained    Diagnosis: Back pain    Procedure(s): Bilateral L5-S1 transforaminal BEN    Specimens removed:  none    Complications: None    Primary Physician: Nisha Guy MD    Recommendations: N/A    Discharge Disposition: Stable; discharge to home    Full dictated report to follow    Nisha Guy MD  Interventional Radiology  [facility] Radiology, [facility] [place].  2:39 PM, 3/21/2022

## 2022-03-21 NOTE — H&P
Radiology History and Physical    Patient: Melita Watkins 81 y.o. female       Chief Complaint: No chief complaint on file. History of Present Illness: 81year old woman with back pain radiating to the legs, greater on the left. History:    Past Medical History:   Diagnosis Date    Anxiety     Arthritis back pain    fingers    Asthma     MILD - NO INHALERS    Chronic pain     Colitis     Diverticulitis 6/11/2013    Hypercholesterolemia     Hypertension     IBS (irritable bowel syndrome)     Insomnia     Nausea & vomiting     Stenosis     spinal    Stroke ([facility] [address] 1996    tia   PATENT  FORAMEN  OVALE    Swollen L ankle      Family History   Problem Relation Age of Onset    Other Mother         SUARACHNOID HEMORRHAGE-ANEURYSM    Stroke Mother     Cancer Father         PANCREATIC    Anesth Problems Daughter         N/V    No Known Problems Daughter      Social History     Socioeconomic History    Marital status:      Spouse name: Not on file    Number of children: Not on file    Years of education: Not on file    Highest education level: Not on file   Occupational History    Not on file   Tobacco Use    Smoking status: Never Smoker    Smokeless tobacco: Never Used   Vaping Use    Vaping Use: Never used   Substance and Sexual Activity    Alcohol use: Yes     Alcohol/week: 7.0 standard drinks     Types: 3 Glasses of wine, 4 Cans of beer per week    Drug use: No    Sexual activity: Yes     Partners: Male   Other Topics Concern    Not on file   Social History Narrative    Not on file     Social Determinants of Health     Financial Resource Strain:     Difficulty of Paying Living Expenses: Not on file   Food Insecurity:     Worried About Running Out of Food in the Last Year: Not on file    Domingo of Food in the Last Year: Not on file   Transportation Needs:     Lack of Transportation (Medical): Not on file    Lack of Transportation (Non-Medical):  Not on file   Physical Activity:     Days of Exercise per Week: Not on file    Minutes of Exercise per Session: Not on file   Stress:     Feeling of Stress : Not on file   Social Connections:     Frequency of Communication with Friends and Family: Not on file    Frequency of Social Gatherings with Friends and Family: Not on file    Attends Alevism Services: Not on file    Active Member of Clubs or Organizations: Not on file    Attends Club or Organization Meetings: Not on file    Marital Status: Not on file   Intimate Partner Violence:     Fear of Current or Ex-Partner: Not on file    Emotionally Abused: Not on file    Physically Abused: Not on file    Sexually Abused: Not on file   Housing Stability:     Unable to Pay for Housing in the Last Year: Not on file    Number of [place] in the Last Year: Not on file    Unstable Housing in the Last Year: Not on file       Allergies: Allergies   Allergen Reactions    Cefdinir Other (comments)     Sick to stomach    Ciprofloxacin Diarrhea and Nausea and Vomiting    Flagyl [Metronidazole] Nausea and Vomiting    Other Medication Other (comments)     BANDAIDS - breaks out skin    Oxycodone-Acetaminophen Other (comments)     dizziness    Phenylephrine Other (comments)     Wire her up    Phenylpropanolamine Other (comments)     Wire her up    Sulfa (Sulfonamide Antibiotics) Nausea Only       Current Medications:  Current Facility-Administered Medications   Medication Dose Route Frequency    lidocaine (PF) (XYLOCAINE) 10 mg/mL (1 %) injection 10 mL  10 mL SubCUTAneous RAD ONCE    dexamethasone (PF) (DECADRON) 10 mg/mL injection 10 mg  10 mg IntraVENous RAD ONCE    iohexoL (OMNIPAQUE) 180 mg iodine/mL injection 20 mL  20 mL Intrathecal RAD ONCE        Physical Exam:  Blood pressure (!) 143/87, pulse 91, resp. rate 18, last menstrual period 01/01/1986, SpO2 97 %.   GENERAL: alert, cooperative, no distress, appears stated age,   LUNG: Nonlabored respiration on room air  HEART: regular rate and rhythm    Alerts:    Hospital Problems  Date Reviewed: 3/1/2022    None          Laboratory:    No results for input(s): HGB, HCT, WBC, PLT, INR, BUN, CREA, K, CRCLT, HGBEXT, HCTEXT, PLTEXT, INREXT in the last 72 hours. No lab exists for component: PTT, PT      Plan of Care/Planned Procedure:  Risks, benefits, and alternatives reviewed with patient and she agrees to proceed with the procedure.      L5-S1 transforaminal BEN, bilateral    Matt Tomas MD  Interventional Radiology  [facility]  2:11 PM, 3/21/2022

## 2022-03-21 NOTE — DISCHARGE INSTRUCTIONS
YOU SHOULD NOT DRIVE FOR 24 HOURS, DO NOT OPERATE HEAVY MACHINERY, DO NOT MAKE ANY LEGAL DECISIONS OR SIGN LEGAL DOCUMENTS FOR 24 HOURS. DO NOT DRINK ALCOHOL, TAKE ANY MEDICATIONS UNLESS PRESCRIBED BY YOUR DOCTOR. IF YOU ARE A CAREGIVER, SOMEONE SHOULD TAKE THAT ROLE FOR 24 HOURS. Side effects of sedation medications and other medications used today have been reviewed  Those side effects can include but are not limited to: dizziness, drowsiness, poor balance, fatigue, sleepiness. Take precautions at home to prevent falls, such as assistance with walking or stairs if allowed and /or when needed or position changes. Allergic or adverse reactions could include nausea, itching, hives, dizziness, or anything else out of the ordinary. Should you experience any of these significant changes, please call [phone] between the hours of 7:30 am and 3:30 pm or [phone] after hours. After hours, ask the  to page the X-ray Technologist, and describe the problem to the technologist. If you are experiencing chest pain, shortness of breath, altered mental state, unusual bleeding or any other emergent symptom you should call 911 immediately.

## 2022-03-21 NOTE — PROGRESS NOTES
Patient given copy of discharge instructions and verbalized understanding. Patient wheeled to exit via wheelchair. Patient in NAD. No bleeding noted at puncture site.

## 2022-03-22 ENCOUNTER — TELEPHONE (OUTPATIENT)
Dept: INTERNAL MEDICINE CLINIC | Age: 87
End: 2022-03-22

## 2022-03-22 DIAGNOSIS — R35.0 URINARY FREQUENCY: Primary | ICD-10-CM

## 2022-03-22 NOTE — TELEPHONE ENCOUNTER
Lab sign with permission from the provider   Called patient   2 pt identifiers   Spoke to the patient   Pt advised to go to the lab to leave a urine sample and when the results come back the provider will review them and do a plan of care for the patient  Pt shown understanding and had no further questions or concerns

## 2022-03-22 NOTE — TELEPHONE ENCOUNTER
Patient states she thinks she has a UTI and is requesting antibiotic. Patient would like an order to go to lab and give some urine to check.

## 2022-03-23 ENCOUNTER — TELEPHONE (OUTPATIENT)
Dept: INTERNAL MEDICINE CLINIC | Age: 87
End: 2022-03-23

## 2022-03-23 DIAGNOSIS — R30.0 DYSURIA: Primary | ICD-10-CM

## 2022-03-26 LAB
APPEARANCE UR: ABNORMAL
BACTERIA #/AREA URNS HPF: ABNORMAL /[HPF]
BACTERIA UR CULT: ABNORMAL
BILIRUB UR QL STRIP: NEGATIVE
CASTS URNS QL MICRO: ABNORMAL /LPF
COLOR UR: YELLOW
EPI CELLS #/AREA URNS HPF: ABNORMAL /HPF (ref 0–10)
GLUCOSE UR QL STRIP: NEGATIVE
HGB UR QL STRIP: NEGATIVE
KETONES UR QL STRIP: NEGATIVE
LEUKOCYTE ESTERASE UR QL STRIP: ABNORMAL
MICRO URNS: ABNORMAL
NITRITE UR QL STRIP: POSITIVE
PH UR STRIP: 6 [PH] (ref 5–7.5)
PROT UR QL STRIP: NEGATIVE
RBC #/AREA URNS HPF: ABNORMAL /HPF (ref 0–2)
SP GR UR STRIP: 1.01 (ref 1–1.03)
UROBILINOGEN UR STRIP-MCNC: 0.2 MG/DL (ref 0.2–1)
WBC #/AREA URNS HPF: >30 /HPF (ref 0–5)

## 2022-03-29 RX ORDER — AMLODIPINE BESYLATE 5 MG/1
TABLET ORAL
Qty: 90 TABLET | Refills: 3 | Status: SHIPPED | OUTPATIENT
Start: 2022-03-29

## 2022-05-02 ENCOUNTER — OFFICE VISIT (OUTPATIENT)
Dept: INTERNAL MEDICINE CLINIC | Age: 87
End: 2022-05-02
Payer: MEDICARE

## 2022-05-02 VITALS
WEIGHT: 139 LBS | HEART RATE: 94 BPM | BODY MASS INDEX: 27.29 KG/M2 | SYSTOLIC BLOOD PRESSURE: 100 MMHG | HEIGHT: 60 IN | DIASTOLIC BLOOD PRESSURE: 55 MMHG | TEMPERATURE: 97.8 F | OXYGEN SATURATION: 98 % | RESPIRATION RATE: 20 BRPM

## 2022-05-02 DIAGNOSIS — N39.0 RECURRENT UTI: ICD-10-CM

## 2022-05-02 DIAGNOSIS — M48.062 SPINAL STENOSIS OF LUMBAR REGION WITH NEUROGENIC CLAUDICATION: ICD-10-CM

## 2022-05-02 DIAGNOSIS — I10 ESSENTIAL HYPERTENSION: Primary | ICD-10-CM

## 2022-05-02 DIAGNOSIS — Z71.89 ADVANCE DIRECTIVE DISCUSSED WITH PATIENT: ICD-10-CM

## 2022-05-02 DIAGNOSIS — I77.810 DILATATION OF THORACIC AORTA (HCC): ICD-10-CM

## 2022-05-02 DIAGNOSIS — Z00.00 MEDICARE ANNUAL WELLNESS VISIT, SUBSEQUENT: ICD-10-CM

## 2022-05-02 DIAGNOSIS — E55.9 VITAMIN D DEFICIENCY: ICD-10-CM

## 2022-05-02 DIAGNOSIS — E78.00 PURE HYPERCHOLESTEROLEMIA: ICD-10-CM

## 2022-05-02 PROCEDURE — G8754 DIAS BP LESS 90: HCPCS | Performed by: INTERNAL MEDICINE

## 2022-05-02 PROCEDURE — G8419 CALC BMI OUT NRM PARAM NOF/U: HCPCS | Performed by: INTERNAL MEDICINE

## 2022-05-02 PROCEDURE — G0439 PPPS, SUBSEQ VISIT: HCPCS | Performed by: INTERNAL MEDICINE

## 2022-05-02 PROCEDURE — G8432 DEP SCR NOT DOC, RNG: HCPCS | Performed by: INTERNAL MEDICINE

## 2022-05-02 PROCEDURE — 1090F PRES/ABSN URINE INCON ASSESS: CPT | Performed by: INTERNAL MEDICINE

## 2022-05-02 PROCEDURE — 99214 OFFICE O/P EST MOD 30 MIN: CPT | Performed by: INTERNAL MEDICINE

## 2022-05-02 PROCEDURE — G8427 DOCREV CUR MEDS BY ELIG CLIN: HCPCS | Performed by: INTERNAL MEDICINE

## 2022-05-02 PROCEDURE — G8752 SYS BP LESS 140: HCPCS | Performed by: INTERNAL MEDICINE

## 2022-05-02 PROCEDURE — G0463 HOSPITAL OUTPT CLINIC VISIT: HCPCS | Performed by: INTERNAL MEDICINE

## 2022-05-02 PROCEDURE — G8399 PT W/DXA RESULTS DOCUMENT: HCPCS | Performed by: INTERNAL MEDICINE

## 2022-05-02 PROCEDURE — G8536 NO DOC ELDER MAL SCRN: HCPCS | Performed by: INTERNAL MEDICINE

## 2022-05-02 PROCEDURE — 1101F PT FALLS ASSESS-DOCD LE1/YR: CPT | Performed by: INTERNAL MEDICINE

## 2022-05-02 RX ORDER — ESTRADIOL 0.1 MG/G
CREAM VAGINAL
COMMUNITY
Start: 2022-04-01

## 2022-05-02 NOTE — PROGRESS NOTES
Subjective:     Danis Fox is a 81 y.o. female who presents for follow up of hypertension, hyperlipidemia. .    Diet and Lifestyle: generally follows a low sodium diet  Home BP Monitoring: occ checking BP at home with normal values  - 120-130/60-70s    Cardiovascular ROS: taking medications as instructed, no medication side effects noted, no TIA's, no chest pain on exertion, no dyspnea on exertion, no swelling of ankles, no orthostatic dizziness or lightheadedness, no palpitations. New concerns:   Recent lumbar fusion T10-L5 - completed PT. Back feels fine in am but weaker by noon. Recurrent UTI - saw urologist, placed her on Cipro. Intermittent burning. To have [address] on Wednesday to look at kidneys. Intermittent bacteria in kidneys since September. . Placed on topical estrogen. Worsening pain left shoulder. Current Outpatient Medications   Medication Sig Dispense Refill    estradioL (ESTRACE) 0.01 % (0.1 mg/gram) vaginal cream APPLY 1 A SMALL AMOUNT INTO VAGINA THREE TIMES A WEEK , PEA-SIZED AMOUNT FINGERTIP APPLICATION      meloxicam (MOBIC) 7.5 mg tablet Take 1 Tablet by mouth daily. 90 Tablet 1    amLODIPine (NORVASC) 5 mg tablet TAKE 1 TABLET DAILY 90 Tablet 3    gabapentin (Neurontin) 300 mg capsule Take 2 Capsules by mouth nightly. Max Daily Amount: 600 mg. 60 Capsule 5    levocetirizine (XYZAL) 5 mg tablet TAKE 1 TABLET DAILY 90 Tablet 3    LORazepam (ATIVAN) 1 mg tablet TAKE 1/2 TO 1 TABLET       NIGHTLY AS NEEDED FOR      ANXIETY. MAXIMUM DAILY     AMOUNT: 1 TABLET 90 Tablet 1    PARoxetine (PAXIL) 30 mg tablet TAKE 1 TABLET DAILY 90 Tablet 3    ondansetron (ZOFRAN ODT) 4 mg disintegrating tablet Take 1 Tablet by mouth every eight (8) hours as needed for Nausea or Vomiting. 20 Tablet 0    acetaminophen (Tylenol Extra Strength) 500 mg tablet Take  by mouth every six (6) hours as needed for Pain.       losartan (COZAAR) 100 mg tablet TAKE 1 TABLET DAILY 90 Tablet 3    simvastatin (ZOCOR) 20 mg tablet TAKE 1 TABLET DAILY 90 Tablet 3    budesonide (ENTOCORT EC) 3 mg capsule Take 3 mg by mouth daily as needed. 1 capsule by mouth daily      dicyclomine (BENTYL) 10 mg capsule Take 10 mg by mouth as needed. 1 tablet by mouth daily as needed      MULTIVITAMIN W-MINERALS/LUTEIN (CENTRUM SILVER PO) Take 1 Tab by mouth. Takes one po daily. Lab Results   Component Value Date/Time    Hemoglobin A1c 5.2 08/31/2021 09:18 AM    Hemoglobin A1c 5.6 01/10/2020 02:26 PM    Glucose 87 12/15/2021 11:20 AM    Glucose (POC) 103 09/14/2021 11:29 AM    LDL, calculated 104.4 (H) 12/15/2021 11:20 AM    Creatinine (POC) 1.0 09/29/2020 10:40 AM    Creatinine 0.89 12/15/2021 11:20 AM      Lab Results   Component Value Date/Time    Cholesterol, total 205 (H) 12/15/2021 11:20 AM    HDL Cholesterol 81 12/15/2021 11:20 AM    LDL, calculated 104.4 (H) 12/15/2021 11:20 AM    Triglyceride 98 12/15/2021 11:20 AM    CHOL/HDL Ratio 2.5 12/15/2021 11:20 AM     Lab Results   Component Value Date/Time    ALT (SGPT) 22 12/15/2021 11:20 AM    Alk.  phosphatase 115 12/15/2021 11:20 AM    Bilirubin, direct 0.2 03/30/2020 08:46 AM    Bilirubin, total 0.7 12/15/2021 11:20 AM    Albumin 4.1 12/15/2021 11:20 AM    Protein, total 6.8 12/15/2021 11:20 AM    INR 0.9 08/31/2021 09:18 AM    Prothrombin time 9.8 08/31/2021 09:18 AM    PLATELET 058 25/38/9864 06:49 AM     Lab Results   Component Value Date/Time    GFR est non-AA >60 12/15/2021 11:20 AM    GFRNA, POC 53 (L) 09/29/2020 10:40 AM    GFR est AA >60 12/15/2021 11:20 AM    GFRAA, POC >60 09/29/2020 10:40 AM    Creatinine 0.89 12/15/2021 11:20 AM    Creatinine (POC) 1.0 09/29/2020 10:40 AM    BUN 19 12/15/2021 11:20 AM    Sodium 137 12/15/2021 11:20 AM    Potassium 4.0 12/15/2021 11:20 AM    Chloride 104 12/15/2021 11:20 AM    CO2 26 12/15/2021 11:20 AM    Magnesium 1.9 09/29/2021 10:45 AM    Phosphorus 2.6 09/19/2021 05:06 AM     Lab Results   Component Value Date/Time    TSH 3.890 09/17/2013 07:20 AM         Review of Systems, additional:  Pertinent items are noted in HPI. Objective:     Visit Vitals  BP (!) 100/55   Pulse 94   Temp 97.8 °F (36.6 °C) (Temporal)   Resp 20   Ht 5' (1.524 m)   Wt 139 lb (63 kg)   LMP 01/01/1986   SpO2 98%   BMI 27.15 kg/m²     Appearance: alert, well appearing, and in no distress and oriented to person, place, and time. General exam:   NECK: supple, no lad, no bruit, no tm  LUNGS: cta bilat  CV rrr, no m/g/r  ABD: soft, nt, nd, nabs  EXT: no c/c/e    Assessment/Plan:        Diagnoses and all orders for this visit:    1. Essential hypertension - well controlled, cont same meds  -     VITAMIN D, 25 HYDROXY; Future  -     LIPID PANEL; Future  -     METABOLIC PANEL, COMPREHENSIVE; Future    2. Dilatation of thoracic aorta (HCC)   Controlled, cont same    3. Recurrent UTI - last culture grew multidrug resistant E. Coli   Ongoing w/u with [facility]. 4. Pure hypercholesterolemia - controlled in past.  Continue same meds  -     VITAMIN D, 25 HYDROXY; Future  -     LIPID PANEL; Future  -     METABOLIC PANEL, COMPREHENSIVE; Future    5. Medicare annual wellness visit, subsequent    6. Spinal stenosis of lumbar region with neurogenic claudication - s/p recent extensive lumbar fusion. Slowly healing. 7. Vitamin D deficiency - repeat labs. -     VITAMIN D, 25 HYDROXY; Future    Follow-up and Dispositions    · Return in about 6 months (around 11/2/2022). This is the Subsequent Medicare Annual Wellness Exam, performed 12 months or more after the Initial AWV or the last Subsequent AWV    I have reviewed the patient's medical history in detail and updated the computerized patient record. Assessment/Plan   Education and counseling provided:  Are appropriate based on today's review and evaluation    1. Essential hypertension  2. Dilatation of thoracic aorta ([address]  3. Recurrent UTI  4.  Pure hypercholesterolemia       Depression Risk Factor Screening     3 most recent PHQ Screens 12/15/2021   Little interest or pleasure in doing things Not at all   Feeling down, depressed, irritable, or hopeless Not at all   Total Score PHQ 2 0       Alcohol & Drug Abuse Risk Screen    Do you average more than 1 drink per night or more than 7 drinks a week:  No    On any one occasion in the past three months have you have had more than 3 drinks containing alcohol:  No         Opioid Risk: (Low risk score <55, High risk score ?55)  Opioid risk score: 8      Click here to complete the Controlled Substance Monitoring SmartForm    Last PDMP Jared as Reviewed:  Review User Review Instant Review Result              Functional Ability and Level of Safety    Hearing: Hearing is good. Activities of Daily Living: The home contains: handrails and grab bars  Patient does total self care  Recently got someone to help with housekeeping. Hates to cook - tends to go out for dinner. Ambulation: with no difficulty     Fall Risk:  Fall Risk Assessment, last 12 mths 12/15/2021   Able to walk? Yes   Fall in past 12 months? 0   Do you feel unsteady? 0   Are you worried about falling 0   Number of falls in past 12 months -   Fall with injury? -      Abuse Screen:  Patient is not abused       Cognitive Screening    Has your family/caregiver stated any concerns about your memory: since TIA 20 years ago, can't remember numbers but things come back to her.        Cognitive Screening: Normal -      Health Maintenance Due     Health Maintenance Due   Topic Date Due    Medicare Yearly Exam  05/02/2023    COVID-19 Vaccine (3 - Booster for Thomas Peter series) Up to date       Patient Care Team   Patient Care Team:  Karrie Scales MD as PCP - General (Internal Medicine)  Karrie Scales MD as PCP - [facility] Empaneled Provider  Glenis Martines MD (Gastroenterology)  Flo Castañeda MD (Ophthalmology)  Judith Richter MD (Otolaryngology)    History     Patient Active Problem List Diagnosis Code    HTN (hypertension) I10    Hyperlipemia E78.5    Rectal bleeding K62.5    Environmental allergies Z91.09    Hyponatremia E87.1    Hypokalemia E87.6    Sleep disturbance G47.9    Diverticulitis K57.92    Colitis K52.9    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    Anxiety F41.9    CRI (chronic renal insufficiency) N18.9    Advance directive placed in chart this admission Z78.9    Advance directive in chart Z78.9    History of YAG laser capsulotomy of lens of left eye Z98.42    Posterior capsular opacification visually significant of right eye H26.491    Posterior vitreous detachment of left eye H43.812    Pseudophakia of both eyes Z96.1    Rotator cuff arthropathy M12.819    Lumbar stenosis with neurogenic claudication M48.062    Dilatation of thoracic aorta (HCC) I77.810     Past Medical History:   Diagnosis Date    Anxiety     Arthritis back pain    fingers    Asthma     MILD - NO INHALERS    Chronic pain     Colitis     Diverticulitis 6/11/2013    Hypercholesterolemia     Hypertension     IBS (irritable bowel syndrome)     Insomnia     Nausea & vomiting     Stenosis     spinal    Stroke ([facility] [address] 1996    tia   PATENT  FORAMEN  OVALE    Swollen L ankle       Past Surgical History:   Procedure Laterality Date    HX CATARACT REMOVAL Bilateral 2009    HX CHOLECYSTECTOMY  04/1997    HX COLONOSCOPY      x3    HX HYSTERECTOMY  1986    HX KNEE ARTHROSCOPY Left 04/1998    HX LUMBAR FUSION  02/2011    L4-L5    HX SHOULDER REPLACEMENT Right 01/2020    HX TONSILLECTOMY  CHILDHOOD    HX WISDOM TEETH EXTRACTION      X3    IR INJ FORAMIN EPID LUMB ANES/STER SNGL  3/21/2022    MT CARDIAC SURG PROCEDURE UNLIST  8/04    repair of foramen ovale     Current Outpatient Medications   Medication Sig Dispense Refill    estradioL (ESTRACE) 0.01 % (0.1 mg/gram) vaginal cream APPLY 1 A SMALL AMOUNT INTO VAGINA THREE TIMES A WEEK , PEA-SIZED AMOUNT FINGERTIP APPLICATION      meloxicam (MOBIC) 7.5 mg tablet Take 1 Tablet by mouth daily. 90 Tablet 1    amLODIPine (NORVASC) 5 mg tablet TAKE 1 TABLET DAILY 90 Tablet 3    gabapentin (Neurontin) 300 mg capsule Take 2 Capsules by mouth nightly. Max Daily Amount: 600 mg. 60 Capsule 5    levocetirizine (XYZAL) 5 mg tablet TAKE 1 TABLET DAILY 90 Tablet 3    LORazepam (ATIVAN) 1 mg tablet TAKE 1/2 TO 1 TABLET       NIGHTLY AS NEEDED FOR      ANXIETY. MAXIMUM DAILY     AMOUNT: 1 TABLET 90 Tablet 1    PARoxetine (PAXIL) 30 mg tablet TAKE 1 TABLET DAILY 90 Tablet 3    ondansetron (ZOFRAN ODT) 4 mg disintegrating tablet Take 1 Tablet by mouth every eight (8) hours as needed for Nausea or Vomiting. 20 Tablet 0    acetaminophen (Tylenol Extra Strength) 500 mg tablet Take  by mouth every six (6) hours as needed for Pain.  losartan (COZAAR) 100 mg tablet TAKE 1 TABLET DAILY 90 Tablet 3    simvastatin (ZOCOR) 20 mg tablet TAKE 1 TABLET DAILY 90 Tablet 3    budesonide (ENTOCORT EC) 3 mg capsule Take 3 mg by mouth daily as needed. 1 capsule by mouth daily      dicyclomine (BENTYL) 10 mg capsule Take 10 mg by mouth as needed. 1 tablet by mouth daily as needed      MULTIVITAMIN W-MINERALS/LUTEIN (CENTRUM SILVER PO) Take 1 Tab by mouth. Takes one po daily.         Allergies   Allergen Reactions    Adhesive Other (comments)    Antihistamines - Alkylamine Other (comments)    Cefdinir Other (comments)     Sick to stomach    Ciprofloxacin Diarrhea and Nausea and Vomiting    Flagyl [Metronidazole] Nausea and Vomiting    Other Medication Other (comments)     BANDAIDS - breaks out skin    Oxycodone-Acetaminophen Other (comments)     dizziness    Phenylephrine Other (comments)     Wire her up    Phenylpropanolamine Other (comments)     Wire her up    Sulfa (Sulfonamide Antibiotics) Nausea Only       Family History   Problem Relation Age of Onset    Other Mother         Otila Scrape    Stroke Mother     Cancer Father         PANCREATIC    Anesth Problems Daughter         N/V    No Known Problems Daughter      Social History     Tobacco Use    Smoking status: Never Smoker    Smokeless tobacco: Never Used   Substance Use Topics    Alcohol use:  Yes     Alcohol/week: 7.0 standard drinks     Types: 3 Glasses of wine, 4 Cans of beer per week         Hafsa Langston MD

## 2022-05-02 NOTE — PATIENT INSTRUCTIONS
Medicare Wellness Visit, Female     The best way to live healthy is to have a lifestyle where you eat a well-balanced diet, exercise regularly, limit alcohol use, and quit all forms of tobacco/nicotine, if applicable. Regular preventive services are another way to keep healthy. Preventive services (vaccines, screening tests, monitoring & exams) can help personalize your care plan, which helps you manage your own care. Screening tests can find health problems at the earliest stages, when they are easiest to treat. [facility] follows the current, evidence-based guidelines published by the [facility] ([facility]) when recommending preventive services for our patients. Because we follow these guidelines, sometimes recommendations change over time as research supports it. (For example, mammograms used to be recommended annually. Even though Medicare will still pay for an annual mammogram, the newer guidelines recommend a mammogram every two years for women of average risk). Of course, you and your doctor may decide to screen more often for some diseases, based on your risk and your co-morbidities (chronic disease you are already diagnosed with). Preventive services for you include:  - Medicare offers their members a free annual wellness visit, which is time for you and your primary care provider to discuss and plan for your preventive service needs. Take advantage of this benefit every year!  -All adults over the age of 72 should receive the recommended pneumonia vaccines. Current USPSTF guidelines recommend a series of two vaccines for the best pneumonia protection.   -All adults should have a flu vaccine yearly and a tetanus vaccine every 10 years.   -All adults age 48 and older should receive the shingles vaccines (series of two vaccines).       -All adults age 38-68 who are overweight should have a diabetes screening test once every three years.   -All adults born between [birth year] and 1964 should be screened once for Hepatitis C.  -Other screening tests and preventive services for persons with diabetes include: an eye exam to screen for diabetic retinopathy, a kidney function test, a foot exam, and stricter control over your cholesterol.   -Cardiovascular screening for adults with routine risk involves an electrocardiogram (ECG) at intervals determined by your doctor.   -Colorectal cancer screenings should be done for adults age 54-65 with no increased risk factors for colorectal cancer. There are a number of acceptable methods of screening for this type of cancer. Each test has its own benefits and drawbacks. Discuss with your doctor what is most appropriate for you during your annual wellness visit. The different tests include: colonoscopy (considered the best screening method), a fecal occult blood test, a fecal DNA test, and sigmoidoscopy.    -A bone mass density test is recommended when a woman turns 65 to screen for osteoporosis. This test is only recommended one time, as a screening. Some providers will use this same test as a disease monitoring tool if you already have osteoporosis. -Breast cancer screenings are recommended every other year for women of normal risk, age 54-69.  -Cervical cancer screenings for women over age 72 are only recommended with certain risk factors.      Here is a list of your current Health Maintenance items (your personalized list of preventive services) with a due date:      Health Maintenance Due   Topic Date Due    Medicare Yearly Exam  05/02/2023    COVID-19 Vaccine (3 - Booster for Thomas Peter series) Up to date

## 2022-05-02 NOTE — PROGRESS NOTES
1. \"Have you been to the ER, urgent care clinic since your last visit? Hospitalized since your last visit? \" No    2. \"Have you seen or consulted any other health care providers outside of the [address] [place] [address] since your last visit? \" yes, [facility]    3. For patients aged 39-70: Has the patient had a colonoscopy / FIT/ Cologuard? No      If the patient is female:    4. For patients aged 41-77: Has the patient had a mammogram within the past 2 years? No      5. For patients aged 21-65: Has the patient had a pap smear? No more    Health Maintenance Due   Topic Date Due    Medicare Yearly Exam  06/26/2021    COVID-19 Vaccine (3 - Booster for Pfizer series) 07/27/2021     Patient here today for routine follow up, BP check.

## 2022-05-06 NOTE — PROGRESS NOTES
Bay Car (: 1935) is a 81 y.o. female, patient, here for evaluation of the following chief complaint(s):  Surgical Follow-up       ASSESSMENT/PLAN:    Below is the assessment and plan developed based on review of pertinent history, physical exam, labs, studies, and medications. She is here for her 3-month visit. She is making nice progress. She has some mild lower back pain. Minimal leg symptoms. She does get some chronic fatigue in her lower back with activities. Otherwise doing fairly well. We will see her back in 3 more months. She will start outpatient physical therapy. 1. S/P lumbar spinal fusion  -     XR SPINE LUMB 2 OR 3 V; Future  -     REFERRAL TO PHYSICAL THERAPY; Future      Return in about 3 months (around 3/7/2022). SUBJECTIVE/OBJECTIVE:  Bay Car (: 1935) is a 81 y.o. female. No flowsheet data found. She is 3 months out from her surgery. She is doing fairly well. She is taking gabapentin only. Imaging:          XR Results (most recent):  Results from Appointment encounter on 21    XR SPINE LUMB 2 OR 3 V    Narrative  Review of the x-rays today demonstrate a T10-L5 revision fusion. No acute changes noted. No acute hardware difficulties. MRI Results (most recent):  Results from Orders Only encounter on 10/23/17    MRI BRAIN W WO CONT    Narrative  EXAM:  MRI BRAIN W WO CONT  INDICATION:  Schwannoma,  TECHNIQUE: Sagittal T1, axial T2 and FLAIR images followed by thin 2 mm axial  T1-weighted images of the temporal bone and posterior fossa, then intravenous  infusion 13 mL ProHance with repeat and axial and coronal T1-weighted images. Axial diffusion weighted images and axial T2 cisternogram images were obtained. Postgadolinium whole head T1.  COMPARISON: MRI of the head 17  FINDINGS:  The cerebellopontine angle, internal auditory canals and temporal bones are  normal.  A mass on the right at C1-2 again demonstrated. Craniocervical junction is  otherwise unremarkable. Flow voids are present in vertebral basilar and carotid artery systems. The other structures at the cranial base are otherwise unremarkable. The ventricular size and configuration are within normal limits. Multiple foci of T2 hyperintensity again noted in the cerebral white matter  without associated restricted diffusion or abnormal enhancement. Nonspecific  pattern possibly related to chronic small vessel ischemic change. Focal volume loss in the posterior medial right temporal lobe is chronic and  consistent with an old injury/infarction. No evidence of intracranial hemorrhage, other mass, acute infarct or abnormal  extra-axial fluid collections. Impression  IMPRESSION:  1. Right C1-2 mass again noted. Please see MRI cervical spine same date for  details. 2. No change in chronic nonspecific white matter foci of T2 hyperintensity. 3. 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 Allergies   Allergen Reactions    Cefdinir Other (comments)     Sick to stomach    Ciprofloxacin Diarrhea and Nausea and Vomiting    Flagyl [Metronidazole] Nausea and Vomiting    Other Medication Other (comments)     BANDAIDS - breaks out skin    Oxycodone-Acetaminophen Other (comments)     dizziness    Phenylephrine Other (comments)     Wire her up    Phenylpropanolamine Other (comments)     Wire her up    Sulfa (Sulfonamide Antibiotics) Nausea Only       Current Outpatient Medications   Medication Sig    LORazepam (ATIVAN) 1 mg tablet TAKE 1/2 TO 1 TABLET       NIGHTLY AS NEEDED FOR      ANXIETY. MAXIMUM DAILY     AMOUNT: 1 TABLET    PARoxetine (PAXIL) 30 mg tablet TAKE 1 TABLET DAILY    gabapentin (NEURONTIN) 300 mg capsule Take 300 mg by mouth as needed for Pain.     losartan (COZAAR) 100 mg tablet TAKE 1 TABLET DAILY    simvastatin (ZOCOR) 20 mg tablet TAKE 1 TABLET DAILY    amLODIPine (NORVASC) 5 mg tablet Take 1 Tab by mouth daily.  levocetirizine (XYZAL) 5 mg tablet TAKE 1 TABLET DAILY    budesonide (ENTOCORT EC) 3 mg capsule Take 3 mg by mouth daily as needed. 1 capsule by mouth daily    albuterol (PROVENTIL VENTOLIN) 2.5 mg /3 mL (0.083 %) nebulizer solution 3 mL by Nebulization route three (3) times daily as needed for Wheezing or Shortness of Breath.  dicyclomine (BENTYL) 10 mg capsule Take 10 mg by mouth as needed. 1 tablet by mouth daily as needed    MULTIVITAMIN W-MINERALS/LUTEIN (CENTRUM SILVER PO) Take 1 Tab by mouth. Takes one po daily.  ondansetron (ZOFRAN ODT) 4 mg disintegrating tablet Take 1 Tablet by mouth every eight (8) hours as needed for Nausea or Vomiting. (Patient not taking: Reported on 12/7/2021)    acetaminophen (Tylenol Extra Strength) 500 mg tablet Take  by mouth every six (6) hours as needed for Pain. (Patient not taking: Reported on 12/7/2021)     No current facility-administered medications for this visit.        Past Medical History:   Diagnosis Date    Anxiety     Arthritis back pain    fingers    Asthma     MILD - NO INHALERS    Chronic pain     Colitis     Diverticulitis 6/11/2013    Hypercholesterolemia     Hypertension     IBS (irritable bowel syndrome)     Insomnia     Nausea & vomiting     Stenosis     spinal    Stroke ([facility] [address] 1996    tia   PATENT  FORAMEN  OVALE    Swollen L ankle         Past Surgical History:   Procedure Laterality Date    HX CATARACT REMOVAL Bilateral 2009    HX CHOLECYSTECTOMY  04/1997    HX COLONOSCOPY      x3    HX HYSTERECTOMY  1986    HX KNEE ARTHROSCOPY Left 04/1998    HX LUMBAR FUSION  02/2011    L4-L5    HX SHOULDER REPLACEMENT Right 01/2020    HX TONSILLECTOMY  CHILDHOOD    HX WISDOM TEETH EXTRACTION      X3    [place] CARDIAC SURG PROCEDURE UNLIST  8/04    repair of foramen ovale       Family History   Problem Relation Age of Onset    Other Mother         [address]    Stroke Mother     Cancer Father PANCREATIC    Anesth Problems Daughter         N/V    No Known Problems Daughter         Social History     Tobacco Use    Smoking status: Never Smoker    Smokeless tobacco: Never Used   Vaping Use    Vaping Use: Never used   Substance Use Topics    Alcohol use: Yes     Alcohol/week: 7.0 standard drinks     Types: 3 Glasses of wine, 4 Cans of beer per week    Drug use: No        Review of Systems       Vitals:  Ht 5' (1.524 m)   Wt 137 lb (62.1 kg)   LMP 01/01/1986   BMI 26.76 kg/m²    Body mass index is 26.76 kg/m². Ortho Exam       Integumentary  Assessment of Surgical Incision - healing and consistent with normal anticipated wound healing. Neurologic  Sensory  Light Touch - Intact - Globally. Overall Assessment of Muscle Strength and Tone reveals  Lower Extremities - Right Iliopsoas - 5/5. Left Iliopsoas - 5/5. Right Tibialis Anterior - 5/5. Left Tibialis Anterior - 5/5. Right Gastroc-Soleus - 5/5. Left Gastroc-Soleus - 5/5. Right EHL - 5/5. Left EHL - 5/5. General Assessment of Reflexes  Right Ankle - Clonus is not present. Left Ankle - Clonus is not present. Reflexes (Dermatomes)  2/2 Normal - Left Achilles (L5-S2), Left Knee (L2-4), Right Achilles (L5-S2) and Right Knee (L2-4). Musculoskeletal  Global Assessment  Examination of related systems reveals - well-developed, well-nourished, in no acute distress, alert and oriented x 3 and normal coordination. Spine/Ribs/Pelvis  Lumbosacral Spine - Examination of the lumbosacral spine reveals - no known fractures or deformities. Inspection and Palpation - Tenderness - moderate. Assessment of pain reveals the following findings - The pain is characterized as - moderate. Location - pain refers to lower back bilaterally. An electronic signature was used to authenticate this note.   -- Nickie Weiner MD patient refused

## 2022-05-15 ENCOUNTER — APPOINTMENT (OUTPATIENT)
Dept: GENERAL RADIOLOGY | Age: 87
End: 2022-05-15
Attending: STUDENT IN AN ORGANIZED HEALTH CARE EDUCATION/TRAINING PROGRAM
Payer: MEDICARE

## 2022-05-15 ENCOUNTER — HOSPITAL ENCOUNTER (EMERGENCY)
Age: 87
Discharge: HOME | End: 2022-05-15
Attending: STUDENT IN AN ORGANIZED HEALTH CARE EDUCATION/TRAINING PROGRAM | Admitting: STUDENT IN AN ORGANIZED HEALTH CARE EDUCATION/TRAINING PROGRAM
Payer: MEDICARE

## 2022-05-15 VITALS
OXYGEN SATURATION: 94 % | HEART RATE: 78 BPM | BODY MASS INDEX: 27.4 KG/M2 | SYSTOLIC BLOOD PRESSURE: 131 MMHG | TEMPERATURE: 98.2 F | RESPIRATION RATE: 16 BRPM | DIASTOLIC BLOOD PRESSURE: 79 MMHG | HEIGHT: 60 IN | WEIGHT: 139.55 LBS

## 2022-05-15 DIAGNOSIS — S60.221A CONTUSION OF RIGHT HAND, INITIAL ENCOUNTER: Primary | ICD-10-CM

## 2022-05-15 PROCEDURE — 73130 X-RAY EXAM OF HAND: CPT

## 2022-05-15 PROCEDURE — 99283 EMERGENCY DEPT VISIT LOW MDM: CPT

## 2022-05-15 PROCEDURE — 75810000275 HC EMERGENCY DEPT VISIT NO LEVEL OF CARE

## 2022-05-15 NOTE — ED TRIAGE NOTES
Pt presents with bruising and swelling to right hand, bruising to chest, and bruising to left upper arm. States her and her  were at a  yesterday and her  tripped and fell into her and caused her to fall over a chair. Reports pain and limited ROM to right hand. Denies shortness of breath.

## 2022-05-19 NOTE — ED PROVIDER NOTES
Patient is an 21-year-old female present emergency department for right hand pain. Patient complaining of right hand bruising, swelling after they were at a  yesterday when her  tripped over an individual in front of him causing him to fall on top of her when she fell striking a chair. She says it was a plastic chair that her chest and left shoulder struck but is concerned about the swelling and pain to the right hand. Patient denies LOC denies any upper or lower extremity weakness numbness or tingling.            Past Medical History:   Diagnosis Date    Anxiety     Arthritis back pain    fingers    Asthma     MILD - NO INHALERS    Chronic pain     Colitis     Diverticulitis 2013    Hypercholesterolemia     Hypertension     IBS (irritable bowel syndrome)     Insomnia     Nausea & vomiting     Stenosis     spinal    Stroke ([facility] [address]     tia   PATENT  FORAMEN  OVALE    Swollen L ankle        Past Surgical History:   Procedure Laterality Date    HX CATARACT REMOVAL Bilateral     HX CHOLECYSTECTOMY  1997    HX COLONOSCOPY      x3    HX HYSTERECTOMY  1986    HX KNEE ARTHROSCOPY Left 1998    HX LUMBAR FUSION  2011    L4-L5    HX SHOULDER REPLACEMENT Right 2020    HX TONSILLECTOMY  CHILDHOOD    HX WISDOM TEETH EXTRACTION      X3    IR INJ FORAMIN EPID LUMB ANES/STER SNGL  3/21/2022    [place] CARDIAC SURG PROCEDURE UNLIST      repair of foramen ovale         Family History:   Problem Relation Age of Onset    Other Mother         SUARACHNOID HEMORRHAGE-ANEURYSM    Stroke Mother     Cancer Father         PANCREATIC    Anesth Problems Daughter         N/V    No Known Problems Daughter        Social History     Socioeconomic History    Marital status:      Spouse name: Not on file    Number of children: Not on file    Years of education: Not on file    Highest education level: Not on file   Occupational History    Not on file   Tobacco Use    Smoking status: Never Smoker    Smokeless tobacco: Never Used   Vaping Use    Vaping Use: Never used   Substance and Sexual Activity    Alcohol use: Yes     Alcohol/week: 7.0 standard drinks     Types: 3 Glasses of wine, 4 Cans of beer per week    Drug use: No    Sexual activity: Yes     Partners: Male   Other Topics Concern    Not on file   Social History Narrative    Not on file     Social Determinants of Health     Financial Resource Strain:     Difficulty of Paying Living Expenses: Not on file   Food Insecurity:     Worried About Running Out of Food in the Last Year: Not on file    Domingo of Food in the Last Year: Not on file   Transportation Needs:     Lack of Transportation (Medical): Not on file    Lack of Transportation (Non-Medical): Not on file   Physical Activity:     Days of Exercise per Week: Not on file    Minutes of Exercise per Session: Not on file   Stress:     Feeling of Stress : Not on file   Social Connections:     Frequency of Communication with Friends and Family: Not on file    Frequency of Social Gatherings with Friends and Family: Not on file    Attends [employer] Services: Not on file    Active Member of [address] [place] [address] or Organizations: Not on file    Attends Club or Organization Meetings: Not on file    Marital Status: Not on file   Intimate Partner Violence:     Fear of Current or Ex-Partner: Not on file    Emotionally Abused: Not on file    Physically Abused: Not on file    Sexually Abused: Not on file   Housing Stability:     Unable to Pay for Housing in the Last Year: Not on file    Number of [place] in the Last Year: Not on file    Unstable Housing in the Last Year: Not on file         ALLERGIES: Adhesive, Antihistamines - alkylamine, Cefdinir, Ciprofloxacin, Flagyl [metronidazole], Other medication, Oxycodone-acetaminophen, Phenylephrine, Phenylpropanolamine, and Sulfa (sulfonamide antibiotics)    Review of Systems   Cardiovascular: Positive for chest pain. Musculoskeletal: Positive for arthralgias and joint swelling. All other systems reviewed and are negative. Vitals:    05/15/22 1132 05/15/22 1217   BP: (!) 154/84 131/79   Pulse: 79 78   Resp: 16 16   Temp: 98.2 °F (36.8 °C)    SpO2: 95% 94%   Weight: 63.3 kg (139 lb 8.8 oz)    Height: 5' (1.524 m)             Physical Exam  Vitals and nursing note reviewed. Constitutional:       Appearance: Normal appearance. HENT:      Head: Normocephalic and atraumatic. Eyes:      Extraocular Movements: Extraocular movements intact. Pupils: Pupils are equal, round, and reactive to light. Cardiovascular:      Rate and Rhythm: Normal rate and regular rhythm. Pulses: Normal pulses. Heart sounds: Normal heart sounds. Pulmonary:      Effort: Pulmonary effort is normal.      Breath sounds: Normal breath sounds. Musculoskeletal:         General: Normal range of motion. Hands:       Cervical back: Normal range of motion and neck supple. Comments: Swelling, tenderness decreased range of motion. Skin:     General: Skin is warm and dry. Neurological:      General: No focal deficit present. Mental Status: She is alert and oriented to person, place, and time. Psychiatric:         Mood and Affect: Mood normal.         Behavior: Behavior normal.          MDM  Number of Diagnoses or Management Options  Contusion of right hand, initial encounter  Diagnosis management comments: Patient is a 81-year-old female present emergency department after sustaining a fall yesterday when her  fell on top of her at a .  Patient is got significant swelling over the dorsum of the right third and fourth MCP joint. Evaluate with x-ray.          Procedures

## 2022-05-24 RX ORDER — SIMVASTATIN 20 MG/1
TABLET, FILM COATED ORAL
Qty: 90 TABLET | Refills: 3 | Status: SHIPPED | OUTPATIENT
Start: 2022-05-24

## 2022-05-26 DIAGNOSIS — F41.9 ANXIETY: ICD-10-CM

## 2022-05-26 RX ORDER — LORAZEPAM 1 MG/1
TABLET ORAL
Qty: 90 TABLET | Refills: 1 | Status: SHIPPED | OUTPATIENT
Start: 2022-05-26

## 2022-06-03 PROBLEM — N18.30 CHRONIC RENAL DISEASE, STAGE III (HCC): Status: ACTIVE | Noted: 2022-06-03

## 2022-06-03 NOTE — PROGRESS NOTES
HPI: Yayo Swan (: 1935) is a 81 y.o. female, patient, here for evaluation of the following chief complaint(s):    Hand Pain (Fall on 22 when her  tripped into her at a . [facility] to [facility] ER where x-rays were taken. )  The patient is seen today to evaluate her right hand. Patient and her  were attending a  when he fell forcing her forward also falling injuring her right hand on 2022. She has been able to straighten her fingers but her middle finger ulnarly angulates. When she makes a fist she can watch the tendon across the metacarpal head drift ulnarly into the third webspace. She then has a volar ulnar flexed finger. She denied any prior problems or significant pain or swelling in that specific digit and joint. Vitals:  Ht 5' (1.524 m)   Wt 135 lb (61.2 kg)   LMP 1986   BMI 26.37 kg/m²    Body mass index is 26.37 kg/m². Allergies   Allergen Reactions    Adhesive Other (comments)    Antihistamines - Alkylamine Other (comments)    Cefdinir Other (comments)     Sick to stomach    Ciprofloxacin Diarrhea and Nausea and Vomiting    Flagyl [Metronidazole] Nausea and Vomiting    Other Medication Other (comments)     BANDAIDS - breaks out skin    Oxycodone-Acetaminophen Other (comments)     dizziness    Phenylephrine Other (comments)     Wire her up    Phenylpropanolamine Other (comments)     Wire her up    Sulfa (Sulfonamide Antibiotics) Nausea Only       Current Outpatient Medications   Medication Sig    LORazepam (ATIVAN) 1 mg tablet TAKE 1/2 TO 1 TABLET       NIGHTLY AS NEEDED FOR      ANXIETY. MAXIMUM DAILY     AMOUNT: 1 TABLET    simvastatin (ZOCOR) 20 mg tablet TAKE 1 TABLET DAILY    estradioL (ESTRACE) 0.01 % (0.1 mg/gram) vaginal cream APPLY 1 A SMALL AMOUNT INTO VAGINA THREE TIMES A WEEK , PEA-SIZED AMOUNT FINGERTIP APPLICATION    meloxicam (MOBIC) 7.5 mg tablet Take 1 Tablet by mouth daily.  (Patient taking differently: Take 7.5 mg by mouth daily. 0.5 tab)    amLODIPine (NORVASC) 5 mg tablet TAKE 1 TABLET DAILY    gabapentin (Neurontin) 300 mg capsule Take 2 Capsules by mouth nightly. Max Daily Amount: 600 mg.  levocetirizine (XYZAL) 5 mg tablet TAKE 1 TABLET DAILY    PARoxetine (PAXIL) 30 mg tablet TAKE 1 TABLET DAILY    ondansetron (ZOFRAN ODT) 4 mg disintegrating tablet Take 1 Tablet by mouth every eight (8) hours as needed for Nausea or Vomiting.  acetaminophen (Tylenol Extra Strength) 500 mg tablet Take  by mouth every six (6) hours as needed for Pain.  losartan (COZAAR) 100 mg tablet TAKE 1 TABLET DAILY    budesonide (ENTOCORT EC) 3 mg capsule Take 3 mg by mouth daily as needed. 1 capsule by mouth daily    dicyclomine (BENTYL) 10 mg capsule Take 10 mg by mouth as needed. 1 tablet by mouth daily as needed    MULTIVITAMIN W-MINERALS/LUTEIN (CENTRUM SILVER PO) Take 1 Tab by mouth. Takes one po daily. No current facility-administered medications for this visit.        Past Medical History:   Diagnosis Date    Anxiety     Arthritis back pain    fingers    Asthma     MILD - NO INHALERS    Chronic pain     Colitis     Diverticulitis 6/11/2013    Hypercholesterolemia     Hypertension     IBS (irritable bowel syndrome)     Insomnia     Nausea & vomiting     Stenosis     spinal    Stroke ([facility] [address] 1996    tia   PATENT  FORAMEN  OVALE    Swollen L ankle         Past Surgical History:   Procedure Laterality Date    HX CATARACT REMOVAL Bilateral 2009    HX CHOLECYSTECTOMY  04/1997    HX COLONOSCOPY      x3    HX HYSTERECTOMY  1986    HX KNEE ARTHROSCOPY Left 04/1998    HX LUMBAR FUSION  02/2011    L4-L5    HX SHOULDER REPLACEMENT Right 01/2020    HX TONSILLECTOMY  CHILDHOOD    HX WISDOM TEETH EXTRACTION      X3    IR INJ FORAMIN EPID LUMB ANES/STER SNGL  3/21/2022    [place] CARDIAC SURG PROCEDURE UNLIST  8/04    repair of foramen ovale       Family History   Problem Relation Age of Onset    Other Mother SUARACHNOID HEMORRHAGE-ANEURYSM    Stroke Mother     Cancer Father         PANCREATIC    Anesth Problems Daughter         N/V    No Known Problems Daughter         Social History     Tobacco Use    Smoking status: Never Smoker    Smokeless tobacco: Never Used   Vaping Use    Vaping Use: Never used   Substance Use Topics    Alcohol use: Yes     Alcohol/week: 7.0 standard drinks     Types: 3 Glasses of wine, 4 Cans of beer per week    Drug use: No        Review of Systems   All other systems reviewed and are negative. ROS     Positive for: Musculoskeletal    Last edited by Chely Stein on 6/7/2022  3:12 PM. (History)             Physical Exam    Patient has clinical subluxation of the EDC tendon to the right middle finger at the MP joint. The extensor tendon is ulnarly subluxated. This is most accentuated in flexion and then extension it is locked and has to be translocated centrally. No other digital EDC tendon subluxation. She really had no pain with grind testing of the MP joint. Otherwise intact sensation throughout. Imaging:    XR Results (maximum last 4): Results from Hospital Encounter encounter on 05/15/22    XR HAND RT MIN 3 V    Narrative  EXAM: XR HAND RT MIN 3 V    INDICATION: eval for hand fx s/p fall. COMPARISON: None. FINDINGS: Three views of the right hand. The bones are mildly osteopenic. No  acute fracture or dislocation. Severe osteoarthritis is present in the first  interphalangeal joint, second DIP, third DIP, and fourth PIP joints. Severe  osteoarthritis is present in the first [facility] and STT joints. Mild osteoarthritis  is present in the second and third MCP joints. There is mild soft tissue  swelling over the dorsum of the MCP joints. There is chronic deformity of the  ulnar styloid. Impression  Mild soft tissue swelling over the dorsum of the MCP joints. .      ASSESSMENT/PLAN:  Below is the assessment and plan developed based on review of pertinent history, physical exam, labs, studies, and medications. Patient examination was consistent with osteoarthritis involving her hands but more notably ulnar subluxation of the EDC tendon to the middle finger. This is reducible but merely falls ulnarly once pressure is released. She clinically has disrupted the radial sagittal band fibers on 5/14/2022 since the time of the fall. I reviewed treatment options and answered her questions. There is a component of third MP joint arthritis. 1 option would be to realign the extensor tendon as second option would be to include an MP arthroplasty. She really feels that she was not having pain in the joint prior to the injury and does not want to consider the joint replacement arthroplasty. I reviewed risks that include but not limited to stiffness, pain, nerve or tendon damage, to need malalignment. She has had bilateral carpal tunnel releases. Arrangements can made for her to undergo a right middle finger EDC tendon realignment on an outpatient basis at her convenience. 1. Primary osteoarthritis of both hands  2. Bilateral hand pain  3. 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 An electronic signature was used to authenticate this note.   -- Mallory Arteaga MD

## 2022-06-07 ENCOUNTER — OFFICE VISIT (OUTPATIENT)
Dept: ORTHOPEDIC SURGERY | Age: 87
End: 2022-06-07

## 2022-06-07 ENCOUNTER — OFFICE VISIT (OUTPATIENT)
Dept: ORTHOPEDIC SURGERY | Age: 87
End: 2022-06-07
Payer: MEDICARE

## 2022-06-07 VITALS — WEIGHT: 135 LBS | BODY MASS INDEX: 26.5 KG/M2 | HEIGHT: 60 IN

## 2022-06-07 VITALS — BODY MASS INDEX: 26.5 KG/M2 | WEIGHT: 135 LBS | HEIGHT: 60 IN

## 2022-06-07 DIAGNOSIS — M19.041 PRIMARY OSTEOARTHRITIS OF BOTH HANDS: Primary | ICD-10-CM

## 2022-06-07 DIAGNOSIS — M79.642 BILATERAL HAND PAIN: ICD-10-CM

## 2022-06-07 DIAGNOSIS — M66.241 NONTRAUMATIC SUBLUXATION OF EXTENSOR TENDON AT MCP JOINT OF HAND, RIGHT: ICD-10-CM

## 2022-06-07 DIAGNOSIS — M19.042 PRIMARY OSTEOARTHRITIS OF BOTH HANDS: Primary | ICD-10-CM

## 2022-06-07 DIAGNOSIS — M79.641 BILATERAL HAND PAIN: ICD-10-CM

## 2022-06-07 DIAGNOSIS — Z98.1 S/P LUMBAR SPINAL FUSION: Primary | ICD-10-CM

## 2022-06-07 PROCEDURE — 99213 OFFICE O/P EST LOW 20 MIN: CPT | Performed by: ORTHOPAEDIC SURGERY

## 2022-06-07 PROCEDURE — G8756 NO BP MEASURE DOC: HCPCS | Performed by: ORTHOPAEDIC SURGERY

## 2022-06-07 PROCEDURE — G8417 CALC BMI ABV UP PARAM F/U: HCPCS | Performed by: ORTHOPAEDIC SURGERY

## 2022-06-07 PROCEDURE — G8427 DOCREV CUR MEDS BY ELIG CLIN: HCPCS | Performed by: ORTHOPAEDIC SURGERY

## 2022-06-07 PROCEDURE — 1090F PRES/ABSN URINE INCON ASSESS: CPT | Performed by: ORTHOPAEDIC SURGERY

## 2022-06-07 PROCEDURE — 1101F PT FALLS ASSESS-DOCD LE1/YR: CPT | Performed by: ORTHOPAEDIC SURGERY

## 2022-06-07 PROCEDURE — G8536 NO DOC ELDER MAL SCRN: HCPCS | Performed by: ORTHOPAEDIC SURGERY

## 2022-06-07 PROCEDURE — G8432 DEP SCR NOT DOC, RNG: HCPCS | Performed by: ORTHOPAEDIC SURGERY

## 2022-06-07 PROCEDURE — 1123F ACP DISCUSS/DSCN MKR DOCD: CPT | Performed by: ORTHOPAEDIC SURGERY

## 2022-06-07 PROCEDURE — G8399 PT W/DXA RESULTS DOCUMENT: HCPCS | Performed by: ORTHOPAEDIC SURGERY

## 2022-06-07 PROCEDURE — G8419 CALC BMI OUT NRM PARAM NOF/U: HCPCS | Performed by: ORTHOPAEDIC SURGERY

## 2022-06-07 NOTE — PATIENT INSTRUCTIONS
Spondylolysis and Spondylolisthesis: Exercises  Introduction  Here are some examples of exercises for you to try. The exercises may be suggested for a condition or for rehabilitation. Start each exercise slowly. Ease off the exercises if you start to have pain. You will be told when to start these exercises and which ones will work best for you. How to do the exercises  Single knee-to-chest    1. Lie on your back with your knees bent and your feet flat on the floor. You can put a small pillow under your head and neck if it is more comfortable. 2. Bring one knee to your chest, keeping the other foot flat on the floor. 3. Keep your lower back pressed to the floor. Hold for 15 to 30 seconds. 4. Relax, and lower the knee to the starting position. 5. Repeat with the other leg. Repeat 2 to 4 times with each leg. 6. To get more stretch, put your other leg flat on the floor while pulling your knee to your chest.  Double knee-to-chest    1. Lie on your back with your knees bent and your feet flat on the floor. You can put a small pillow under your head and neck if it is more comfortable. 2. Bring both knees to your chest.  3. Keep your lower back pressed to the floor. Hold for 15 to 30 seconds. 4. Relax, and lower your knees to the starting position. 5. Repeat 2 to 4 times. Alternate arm and leg (bird dog) exercise    Do this exercise slowly. Try to keep your body straight at all times. 1. Start on the floor, on your hands and knees. 2. Tighten your belly muscles by pulling your belly button in toward your spine. Be sure you continue to breathe normally and do not hold your breath. 3. Raise one arm off the floor, and hold it straight out in front of you. Be careful not to let your shoulder drop down, because that will twist your trunk. 4. Hold for about 6 seconds, then lower your arm and switch to your other arm. 5. Repeat 8 to 12 times on each arm.   6. When you can do this exercise with ease and no pain, repeat steps 1 through 5. But this time do it with one leg raised off the floor, holding your leg straight out behind you. Be careful not to let your hip drop down, because that will twist your trunk. 7. When holding your leg straight out becomes easier, try raising your opposite arm at the same time, and repeat steps 1 through 5. Bridging    1. Lie on your back with both knees bent. Your knees should be bent about 90 degrees. 2. Then push your feet into the floor, squeeze your buttocks, and lift your hips off the floor until your shoulders, hips, and knees are all in a straight line. 3. Hold for about 6 seconds as you continue to breathe normally, and then slowly lower your hips back down to the floor and rest for up to 10 seconds. 4. Repeat 8 to 12 times. Curl-ups    1. Lie on the floor on your back with your knees bent at a 90-degree angle. Your feet should be flat on the floor, about 12 inches from your buttocks. 2. Cross your arms over your chest. If this bothers your neck, try putting your hands behind your neck (not your head), with your elbows spread apart. 3. Slowly tighten your belly muscles and raise your shoulder blades off the floor. 4. Keep your head in line with your body, and do not press your chin to your chest.  5. Hold this position for 1 or 2 seconds, then slowly lower yourself back down to the floor. 6. Repeat 8 to 12 times. Plank    Do this exercise slowly. Try to keep your body straight at all times, and do not let one hip drop lower than the other. 1. Lie on your stomach, resting your upper body on your forearms. 2. Tighten your belly muscles by pulling your belly button in toward your spine. 3. Keeping your knees on the floor, press down with your forearms to lift your upper body off the floor. 4. Hold for about 6 seconds, then lower your body to the floor. Rest for up to 10 seconds. 5. Repeat 8 to 12 times.   6. Over time, work up to holding for 15 to 30 seconds each time.  7. If this exercise is easy to do with your knees on the floor, try doing this exercise with your knees and legs straight, supported by your toes on the floor. Follow-up care is a key part of your treatment and safety. Be sure to make and go to all appointments, and call your doctor if you are having problems. It's also a good idea to know your test results and keep a list of the medicines you take. Where can you learn more? Go to http://www.thomas.com/  Enter M245 in the search box to learn more about \"Spondylolysis and Spondylolisthesis: Exercises. \"  Current as of: July 1, 2021               Content Version: 13.2  © 2006-2022 [employer]. Care instructions adapted under license by [employer] (which disclaims liability or warranty for this information). If you have questions about a medical condition or this instruction, always ask your healthcare professional. [address] any warranty or liability for your use of this information.

## 2022-06-07 NOTE — PROGRESS NOTES
Blanca Webber (: 1935) is a 81 y.o. female, patient, here for evaluation of the following chief complaint(s):  No chief complaint on file. ASSESSMENT/PLAN:    Below is the assessment and plan developed based on review of pertinent history, physical exam, labs, studies, and medications. Her main complaints at this time are related to fatigue after a long day. She can stand up straight at the beginning today but she started to lose her posture at the end of a long day of walking and standing. I am going to send her to [facility] rehab for gait training. She will see us back in another 3 months. 1. S/P lumbar spinal fusion  -     XR SPINE ENTIRE T-L , SKULL TO SACRUM 2 OR 3 VWS SCOLIOSIS; Future  -     REFERRAL TO PHYSICAL THERAPY; Future      No follow-ups on file. SUBJECTIVE/OBJECTIVE:  Blanca Webber (: 1935) is a 81 y.o. female. No flowsheet data found. She is here for her 9-month visit. She had some return of some of her left posterior buttock and left leg symptoms over the last month or so. She has been working with physical therapy. The Neurontin does help. The pain is only at night when she is lying down. She denies any bowel bladder difficulties. She is just finished a round of physical therapy      Imaging:          XR Results (most recent):  Results from Appointment encounter on 22    XR SPINE ENTIRE T-L , SKULL TO SACRUM 2 OR 3 VWS SCOLIOSIS    Narrative  AP and lateral of the thoracolumbar spine demonstrates a stable T10-L5 fusion       MRI Results (most recent):  Results from Orders Only encounter on 10/23/17    MRI BRAIN W WO CONT    Narrative  EXAM:  MRI BRAIN W WO CONT  INDICATION:  Schwannoma,  TECHNIQUE: Sagittal T1, axial T2 and FLAIR images followed by thin 2 mm axial  T1-weighted images of the temporal bone and posterior fossa, then intravenous  infusion 13 mL ProHance with repeat and axial and coronal T1-weighted images.   Axial diffusion weighted images and axial T2 cisternogram images were obtained. Postgadolinium whole head T1.  COMPARISON: MRI of the head 4/12/17  FINDINGS:  The cerebellopontine angle, internal auditory canals and temporal bones are  normal.  A mass on the right at C1-2 again demonstrated. Craniocervical junction is  otherwise unremarkable. Flow voids are present in vertebral basilar and carotid artery systems. The other structures at the cranial base are otherwise unremarkable. The ventricular size and configuration are within normal limits. Multiple foci of T2 hyperintensity again noted in the cerebral white matter  without associated restricted diffusion or abnormal enhancement. Nonspecific  pattern possibly related to chronic small vessel ischemic change. Focal volume loss in the posterior medial right temporal lobe is chronic and  consistent with an old injury/infarction. No evidence of intracranial hemorrhage, other mass, acute infarct or abnormal  extra-axial fluid collections. Impression  IMPRESSION:  1. Right C1-2 mass again noted. Please see MRI cervical spine same date for  details. 2. No change in chronic nonspecific white matter foci of T2 hyperintensity. 3. 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          Allergies   Allergen Reactions    Adhesive Other (comments)    Antihistamines - Alkylamine Other (comments)    Cefdinir Other (comments)     Sick to stomach    Ciprofloxacin Diarrhea and Nausea and Vomiting    Flagyl [Metronidazole] Nausea and Vomiting    Other Medication Other (comments)     BANDAIDS - breaks out skin    Oxycodone-Acetaminophen Other (comments)     dizziness    Phenylephrine Other (comments)     Wire her up    Phenylpropanolamine Other (comments)     Wire her up    Sulfa (Sulfonamide Antibiotics) Nausea Only       Current Outpatient Medications   Medication Sig    LORazepam (ATIVAN) 1 mg tablet TAKE 1/2 TO 1 TABLET       NIGHTLY AS NEEDED FOR      ANXIETY. MAXIMUM DAILY     AMOUNT: 1 TABLET    simvastatin (ZOCOR) 20 mg tablet TAKE 1 TABLET DAILY    estradioL (ESTRACE) 0.01 % (0.1 mg/gram) vaginal cream APPLY 1 A SMALL AMOUNT INTO VAGINA THREE TIMES A WEEK , PEA-SIZED AMOUNT FINGERTIP APPLICATION    meloxicam (MOBIC) 7.5 mg tablet Take 1 Tablet by mouth daily. (Patient taking differently: Take 7.5 mg by mouth daily. 0.5 tab)    amLODIPine (NORVASC) 5 mg tablet TAKE 1 TABLET DAILY    gabapentin (Neurontin) 300 mg capsule Take 2 Capsules by mouth nightly. Max Daily Amount: 600 mg.  levocetirizine (XYZAL) 5 mg tablet TAKE 1 TABLET DAILY    PARoxetine (PAXIL) 30 mg tablet TAKE 1 TABLET DAILY    ondansetron (ZOFRAN ODT) 4 mg disintegrating tablet Take 1 Tablet by mouth every eight (8) hours as needed for Nausea or Vomiting.  acetaminophen (Tylenol Extra Strength) 500 mg tablet Take  by mouth every six (6) hours as needed for Pain.  losartan (COZAAR) 100 mg tablet TAKE 1 TABLET DAILY    budesonide (ENTOCORT EC) 3 mg capsule Take 3 mg by mouth daily as needed. 1 capsule by mouth daily    dicyclomine (BENTYL) 10 mg capsule Take 10 mg by mouth as needed. 1 tablet by mouth daily as needed    MULTIVITAMIN W-MINERALS/LUTEIN (CENTRUM SILVER PO) Take 1 Tab by mouth. Takes one po daily. No current facility-administered medications for this visit.        Past Medical History:   Diagnosis Date    Anxiety     Arthritis back pain    fingers    Asthma     MILD - NO INHALERS    Chronic pain     Colitis     Diverticulitis 6/11/2013    Hypercholesterolemia     Hypertension     IBS (irritable bowel syndrome)     Insomnia     Nausea & vomiting     Stenosis     spinal    Stroke ([facility] [address] 1996    tia   PATENT  FORAMEN  OVALE    Swollen L ankle         Past Surgical History:   Procedure Laterality Date    HX CATARACT REMOVAL Bilateral 2009    HX CHOLECYSTECTOMY  04/1997    HX COLONOSCOPY      x3    HX HYSTERECTOMY  1986    HX KNEE ARTHROSCOPY Left 04/1998    HX LUMBAR FUSION  02/2011    L4-L5    HX SHOULDER REPLACEMENT Right 01/2020    HX TONSILLECTOMY  CHILDHOOD    HX WISDOM TEETH EXTRACTION      X3    IR INJ FORAMIN EPID LUMB ANES/STER SNGL  3/21/2022    AK CARDIAC SURG PROCEDURE UNLIST  8/04    repair of foramen ovale       Family History   Problem Relation Age of Onset    Other Mother         SUARACHNOID HEMORRHAGE-ANEURYSM    Stroke Mother     Cancer Father         PANCREATIC    Anesth Problems Daughter         N/V    No Known Problems Daughter         Social History     Tobacco Use    Smoking status: Never Smoker    Smokeless tobacco: Never Used   Vaping Use    Vaping Use: Never used   Substance Use Topics    Alcohol use: Yes     Alcohol/week: 7.0 standard drinks     Types: 3 Glasses of wine, 4 Cans of beer per week    Drug use: No        Review of Systems   Musculoskeletal: Positive for back pain and gait problem. All other systems reviewed and are negative. Vitals:  Ht 5' (1.524 m)   Wt 135 lb (61.2 kg)   LMP 01/01/1986   BMI 26.37 kg/m²    Body mass index is 26.37 kg/m². Ortho Exam       Integumentary  Assessment of Surgical Incision - healing and consistent with normal anticipated wound healing. Neurologic  Sensory  Light Touch - Intact - Globally. Overall Assessment of Muscle Strength and Tone reveals  Lower Extremities - Right Iliopsoas - 5/5. Left Iliopsoas - 5/5. Right Tibialis Anterior - 5/5. Left Tibialis Anterior - 5/5. Right Gastroc-Soleus - 5/5. Left Gastroc-Soleus - 5/5. Right EHL - 5/5. Left EHL - 5/5. General Assessment of Reflexes  Right Ankle - Clonus is not present. Left Ankle - Clonus is not present. Reflexes (Dermatomes)  2/2 Normal - Left Achilles (L5-S2), Left Knee (L2-4), Right Achilles (L5-S2) and Right Knee (L2-4).     Musculoskeletal  Global Assessment  Examination of related systems reveals - well-developed, well-nourished, in no acute distress, alert and oriented x 3 and normal coordination. Spine/Ribs/Pelvis  Lumbosacral Spine - Examination of the lumbosacral spine reveals - no known fractures or deformities. Inspection and Palpation - Tenderness - moderate. Assessment of pain reveals the following findings - The pain is characterized as - moderate. Location - pain refers to lower back bilaterally. Some tenderness on palpation of left posterior buttock and hip region. An electronic signature was used to authenticate this note.   -- Colin Kulkarni MD

## 2022-06-07 NOTE — LETTER
6/7/2022    Patient: Mara Lipscomb   YOB: 1935   Date of Visit: 6/7/2022     Jessica Ramsay MD  [address] [place] [address]  Via In Basket    Dear Jessica Ramsay MD,      Thank you for referring Ms. Joel Gaming to [facility] for evaluation. My notes for this consultation are attached. If you have questions, please do not hesitate to call me. I look forward to following your patient along with you.       Sincerely,    Jimmy Cheatham MD

## 2022-06-07 NOTE — LETTER
6/7/2022    Patient: Dinora Lindsey   YOB: 1935   Date of Visit: 6/7/2022     Kevin Queen MD  [address] [place] [address]  Via In Basket    Dear Kevin Queen MD,      Thank you for referring Ms. Elie Sellers to [facility] for evaluation. My notes for this consultation are attached. If you have questions, please do not hesitate to call me. I look forward to following your patient along with you.       Sincerely,    Pola Moody MD

## 2022-06-10 DIAGNOSIS — M66.241 NONTRAUMATIC SUBLUXATION OF EXTENSOR TENDON AT MCP JOINT OF HAND, RIGHT: Primary | ICD-10-CM

## 2022-06-10 RX ORDER — TRAMADOL HYDROCHLORIDE 50 MG/1
50 TABLET ORAL
Qty: 15 TABLET | Refills: 0 | Status: SHIPPED | OUTPATIENT
Start: 2022-06-10 | End: 2022-06-24

## 2022-06-21 ENCOUNTER — OFFICE VISIT (OUTPATIENT)
Dept: ORTHOPEDIC SURGERY | Age: 87
End: 2022-06-21
Payer: MEDICARE

## 2022-06-21 DIAGNOSIS — M66.241 NONTRAUMATIC SUBLUXATION OF EXTENSOR TENDON AT MCP JOINT OF HAND, RIGHT: Primary | ICD-10-CM

## 2022-06-21 DIAGNOSIS — M79.642 BILATERAL HAND PAIN: ICD-10-CM

## 2022-06-21 DIAGNOSIS — M19.042 PRIMARY OSTEOARTHRITIS OF BOTH HANDS: ICD-10-CM

## 2022-06-21 DIAGNOSIS — M79.641 BILATERAL HAND PAIN: ICD-10-CM

## 2022-06-21 DIAGNOSIS — M19.041 PRIMARY OSTEOARTHRITIS OF BOTH HANDS: ICD-10-CM

## 2022-06-21 PROCEDURE — 99024 POSTOP FOLLOW-UP VISIT: CPT | Performed by: ORTHOPAEDIC SURGERY

## 2022-06-21 NOTE — LETTER
6/21/2022    Patient: Juana Carlin   YOB: 1935   Date of Visit: 6/21/2022     Radha Fleming MD  [address] [place] [address]  Via In Basket    Dear Radha Fleming MD,      Thank you for referring Ms. Vishnu العراقي to [facility] for evaluation. My notes for this consultation are attached. If you have questions, please do not hesitate to call me. I look forward to following your patient along with you.       Sincerely,    Jayshree Erickson MD

## 2022-06-21 NOTE — PROGRESS NOTES
HPI: Kelsey Childress (: 1935) is a 81 y.o. female, patient, here for evaluation of the following chief complaint(s):    Surgical Follow-up (Right 3rd finger extensor tendon realignment on 22.)  The patient is seen today to evaluate her right hand. Patient and her  were attending a  when he fell forcing her forward also falling injuring her right hand on 2022. She has been able to straighten her fingers but her middle finger ulnarly angulates. When she makes a fist she can watch the tendon across the metacarpal head drift ulnarly into the third webspace. She then has a volar ulnar flexed finger. She denied any prior problems or significant pain or swelling in that specific digit and joint. She underwent realignment of the [facility] tendon right middle finger at MP joint on 2022. Vitals:  LMP 1986    There is no height or weight on file to calculate BMI. Allergies   Allergen Reactions    Adhesive Other (comments)    Antihistamines - Alkylamine Other (comments)    Cefdinir Other (comments)     Sick to stomach    Ciprofloxacin Diarrhea and Nausea and Vomiting    Flagyl [Metronidazole] Nausea and Vomiting    Other Medication Other (comments)     BANDAIDS - breaks out skin    Oxycodone-Acetaminophen Other (comments)     dizziness    Phenylephrine Other (comments)     Wire her up    Phenylpropanolamine Other (comments)     Wire her up    Sulfa (Sulfonamide Antibiotics) Nausea Only       Current Outpatient Medications   Medication Sig    traMADoL (ULTRAM) 50 mg tablet Take 1 Tablet by mouth every six (6) hours as needed for Pain for up to 14 days. Max Daily Amount: 200 mg.  LORazepam (ATIVAN) 1 mg tablet TAKE 1/2 TO 1 TABLET       NIGHTLY AS NEEDED FOR      ANXIETY.  MAXIMUM DAILY     AMOUNT: 1 TABLET    simvastatin (ZOCOR) 20 mg tablet TAKE 1 TABLET DAILY    estradioL (ESTRACE) 0.01 % (0.1 mg/gram) vaginal cream APPLY 1 A SMALL AMOUNT INTO VAGINA THREE TIMES A WEEK , PEA-SIZED AMOUNT FINGERTIP APPLICATION    meloxicam (MOBIC) 7.5 mg tablet Take 1 Tablet by mouth daily. (Patient taking differently: Take 7.5 mg by mouth daily. 0.5 tab)    amLODIPine (NORVASC) 5 mg tablet TAKE 1 TABLET DAILY    gabapentin (Neurontin) 300 mg capsule Take 2 Capsules by mouth nightly. Max Daily Amount: 600 mg.  levocetirizine (XYZAL) 5 mg tablet TAKE 1 TABLET DAILY    PARoxetine (PAXIL) 30 mg tablet TAKE 1 TABLET DAILY    ondansetron (ZOFRAN ODT) 4 mg disintegrating tablet Take 1 Tablet by mouth every eight (8) hours as needed for Nausea or Vomiting.  acetaminophen (Tylenol Extra Strength) 500 mg tablet Take  by mouth every six (6) hours as needed for Pain.  losartan (COZAAR) 100 mg tablet TAKE 1 TABLET DAILY    budesonide (ENTOCORT EC) 3 mg capsule Take 3 mg by mouth daily as needed. 1 capsule by mouth daily    dicyclomine (BENTYL) 10 mg capsule Take 10 mg by mouth as needed. 1 tablet by mouth daily as needed    MULTIVITAMIN W-MINERALS/LUTEIN (CENTRUM SILVER PO) Take 1 Tab by mouth. Takes one po daily. No current facility-administered medications for this visit.        Past Medical History:   Diagnosis Date    Anxiety     Arthritis back pain    fingers    Asthma     MILD - NO INHALERS    Chronic pain     Colitis     Diverticulitis 6/11/2013    Hypercholesterolemia     Hypertension     IBS (irritable bowel syndrome)     Insomnia     Nausea & vomiting     Stenosis     spinal    Stroke ([facility] [address] 1996    tia   PATENT  FORAMEN  OVALE    Swollen L ankle         Past Surgical History:   Procedure Laterality Date    HX CATARACT REMOVAL Bilateral 2009    HX CHOLECYSTECTOMY  04/1997    HX COLONOSCOPY      x3    HX HYSTERECTOMY  1986    HX KNEE ARTHROSCOPY Left 04/1998    HX LUMBAR FUSION  02/2011    L4-L5    HX SHOULDER REPLACEMENT Right 01/2020    HX TONSILLECTOMY  CHILDHOOD    HX WISDOM TEETH EXTRACTION      X3    IR INJ FORAMIN EPID LUMB ANES/STER SNGL 3/21/2022    [place] CARDIAC SURG PROCEDURE UNLIST  8/04    repair of foramen ovale       Family History   Problem Relation Age of Onset    Other Mother         SUARACHNOID HEMORRHAGE-ANEURYSM    Stroke Mother     Cancer Father         PANCREATIC    Anesth Problems Daughter         N/V    No Known Problems Daughter         Social History     Tobacco Use    Smoking status: Never Smoker    Smokeless tobacco: Never Used   Vaping Use    Vaping Use: Never used   Substance Use Topics    Alcohol use: Yes     Alcohol/week: 7.0 standard drinks     Types: 3 Glasses of wine, 4 Cans of beer per week    Drug use: No        Review of Systems   All other systems reviewed and are negative. ROS     Positive for: Musculoskeletal    Last edited by Kellie Hernandez on 6/21/2022  2:22 PM. (History)             Physical Exam    Patient had clinical subluxation of the EDC tendon to the right middle finger at the MP joint. The extensor tendon was ulnarly subluxated. The surgical wound is healing well and thus far the [facility] tendon appears to be nicely aligned. No redness or sign of infection. Imaging:    XR Results (maximum last 4): Results from Hospital Encounter encounter on 05/15/22    XR HAND RT MIN 3 V    Narrative  EXAM: XR HAND RT MIN 3 V    INDICATION: eval for hand fx s/p fall. COMPARISON: None. FINDINGS: Three views of the right hand. The bones are mildly osteopenic. No  acute fracture or dislocation. Severe osteoarthritis is present in the first  interphalangeal joint, second DIP, third DIP, and fourth PIP joints. Severe  osteoarthritis is present in the first [facility] and STT joints. Mild osteoarthritis  is present in the second and third MCP joints. There is mild soft tissue  swelling over the dorsum of the MCP joints. There is chronic deformity of the  ulnar styloid. Impression  Mild soft tissue swelling over the dorsum of the MCP joints. .      ASSESSMENT/PLAN:  Below is the assessment and plan developed based on review of pertinent history, physical exam, labs, studies, and medications. Patient examination was consistent with osteoarthritis involving her hands but more notably ulnar subluxation of the EDC tendon to the middle finger. This is reducible but merely falls ulnarly once pressure is released. She clinically has disrupted the radial sagittal band fibers on 5/14/2022 since the time of the fall. I reviewed treatment options and answered her questions. There is a component of third MP joint arthritis. 1 option would be to realign the extensor tendon as second option would be to include an MP arthroplasty. She really feels that she was not having pain in the joint prior to the injury and does not want to consider the joint replacement arthroplasty. She underwent right middle finger EDC realignment at the MP joint on 6/13/2022. Recommended car taping to the index and night splinting with daytime motion recovery. She deferred the need for OT but is aware of yoke splinting. Follow-up in 3 to 4 weeks    1. 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 Primary osteoarthritis of both hands  3. Bilateral hand pain      No follow-ups on file. An electronic signature was used to authenticate this note.   -- Nando Toribio MD

## 2022-06-28 DIAGNOSIS — Z98.1 S/P LUMBAR SPINAL FUSION: ICD-10-CM

## 2022-07-12 ENCOUNTER — OFFICE VISIT (OUTPATIENT)
Dept: ORTHOPEDIC SURGERY | Age: 87
End: 2022-07-12
Payer: MEDICARE

## 2022-07-12 DIAGNOSIS — M66.241 NONTRAUMATIC SUBLUXATION OF EXTENSOR TENDON AT MCP JOINT OF HAND, RIGHT: Primary | ICD-10-CM

## 2022-07-12 DIAGNOSIS — M19.042 PRIMARY OSTEOARTHRITIS OF BOTH HANDS: ICD-10-CM

## 2022-07-12 DIAGNOSIS — M79.641 BILATERAL HAND PAIN: ICD-10-CM

## 2022-07-12 DIAGNOSIS — M19.041 PRIMARY OSTEOARTHRITIS OF BOTH HANDS: ICD-10-CM

## 2022-07-12 DIAGNOSIS — M79.642 BILATERAL HAND PAIN: ICD-10-CM

## 2022-07-12 PROCEDURE — 99024 POSTOP FOLLOW-UP VISIT: CPT | Performed by: ORTHOPAEDIC SURGERY

## 2022-07-12 NOTE — LETTER
7/12/2022    Patient: Luci Dec   YOB: 1935   Date of Visit: 7/12/2022     Devorah Carlson MD  [address] [place] [address]  Via In Basket    Dear Devorah Carlson MD,      Thank you for referring Ms. Colette Mcbride to [facility] for evaluation. My notes for this consultation are attached. If you have questions, please do not hesitate to call me. I look forward to following your patient along with you.       Sincerely,    Noe Tirado MD

## 2022-08-09 ENCOUNTER — TRANSCRIBE ORDER (OUTPATIENT)
Dept: SCHEDULING | Age: 87
End: 2022-08-09

## 2022-08-09 DIAGNOSIS — Z12.31 VISIT FOR SCREENING MAMMOGRAM: Primary | ICD-10-CM

## 2022-08-18 ENCOUNTER — HOSPITAL ENCOUNTER (OUTPATIENT)
Dept: MAMMOGRAPHY | Age: 87
Discharge: HOME | End: 2022-08-18
Attending: INTERNAL MEDICINE
Payer: MEDICARE

## 2022-08-18 ENCOUNTER — OFFICE VISIT (OUTPATIENT)
Dept: ORTHOPEDIC SURGERY | Age: 87
End: 2022-08-18
Payer: MEDICARE

## 2022-08-18 DIAGNOSIS — M75.102 ROTATOR CUFF TEAR ARTHROPATHY, LEFT: ICD-10-CM

## 2022-08-18 DIAGNOSIS — M12.812 ROTATOR CUFF TEAR ARTHROPATHY, LEFT: ICD-10-CM

## 2022-08-18 DIAGNOSIS — M25.512 ACUTE PAIN OF LEFT SHOULDER: Primary | ICD-10-CM

## 2022-08-18 DIAGNOSIS — Z12.31 VISIT FOR SCREENING MAMMOGRAM: ICD-10-CM

## 2022-08-18 PROCEDURE — 99214 OFFICE O/P EST MOD 30 MIN: CPT | Performed by: ORTHOPAEDIC SURGERY

## 2022-08-18 PROCEDURE — G8427 DOCREV CUR MEDS BY ELIG CLIN: HCPCS | Performed by: ORTHOPAEDIC SURGERY

## 2022-08-18 PROCEDURE — G8417 CALC BMI ABV UP PARAM F/U: HCPCS | Performed by: ORTHOPAEDIC SURGERY

## 2022-08-18 PROCEDURE — 77067 SCR MAMMO BI INCL CAD: CPT

## 2022-08-18 PROCEDURE — G8536 NO DOC ELDER MAL SCRN: HCPCS | Performed by: ORTHOPAEDIC SURGERY

## 2022-08-18 PROCEDURE — 1123F ACP DISCUSS/DSCN MKR DOCD: CPT | Performed by: ORTHOPAEDIC SURGERY

## 2022-08-18 PROCEDURE — G8432 DEP SCR NOT DOC, RNG: HCPCS | Performed by: ORTHOPAEDIC SURGERY

## 2022-08-18 PROCEDURE — 1090F PRES/ABSN URINE INCON ASSESS: CPT | Performed by: ORTHOPAEDIC SURGERY

## 2022-08-18 PROCEDURE — 1101F PT FALLS ASSESS-DOCD LE1/YR: CPT | Performed by: ORTHOPAEDIC SURGERY

## 2022-08-22 ENCOUNTER — OFFICE VISIT (OUTPATIENT)
Dept: INTERNAL MEDICINE CLINIC | Age: 87
End: 2022-08-22
Payer: MEDICARE

## 2022-08-22 VITALS
RESPIRATION RATE: 18 BRPM | OXYGEN SATURATION: 96 % | WEIGHT: 129.4 LBS | BODY MASS INDEX: 25.4 KG/M2 | DIASTOLIC BLOOD PRESSURE: 74 MMHG | HEIGHT: 60 IN | TEMPERATURE: 97.5 F | HEART RATE: 109 BPM | SYSTOLIC BLOOD PRESSURE: 115 MMHG

## 2022-08-22 DIAGNOSIS — M19.012 OSTEOARTHRITIS OF LEFT SHOULDER, UNSPECIFIED OSTEOARTHRITIS TYPE: Primary | ICD-10-CM

## 2022-08-22 DIAGNOSIS — Z01.818 PREOP EXAMINATION: ICD-10-CM

## 2022-08-22 PROCEDURE — 1101F PT FALLS ASSESS-DOCD LE1/YR: CPT | Performed by: INTERNAL MEDICINE

## 2022-08-22 PROCEDURE — G8536 NO DOC ELDER MAL SCRN: HCPCS | Performed by: INTERNAL MEDICINE

## 2022-08-22 PROCEDURE — G8510 SCR DEP NEG, NO PLAN REQD: HCPCS | Performed by: INTERNAL MEDICINE

## 2022-08-22 PROCEDURE — G0463 HOSPITAL OUTPT CLINIC VISIT: HCPCS | Performed by: INTERNAL MEDICINE

## 2022-08-22 PROCEDURE — G8417 CALC BMI ABV UP PARAM F/U: HCPCS | Performed by: INTERNAL MEDICINE

## 2022-08-22 PROCEDURE — 1090F PRES/ABSN URINE INCON ASSESS: CPT | Performed by: INTERNAL MEDICINE

## 2022-08-22 PROCEDURE — G8427 DOCREV CUR MEDS BY ELIG CLIN: HCPCS | Performed by: INTERNAL MEDICINE

## 2022-08-22 PROCEDURE — 99213 OFFICE O/P EST LOW 20 MIN: CPT | Performed by: INTERNAL MEDICINE

## 2022-08-22 RX ORDER — MELATONIN
DAILY
COMMUNITY

## 2022-08-22 NOTE — PROGRESS NOTES
Reviewed record in preparation for visit and have obtained necessary documentation. Identified pt with two pt identifiers(name and ). Chief Complaint   Patient presents with    Pre-op Exam     Left shoulder reverse replacement      Blood pressure 115/74, pulse (!) 109, temperature 97.5 °F (36.4 °C), temperature source Temporal, resp. rate 18, height 5' (1.524 m), weight 129 lb 6.4 oz (58.7 kg), last menstrual period 1986, SpO2 96 %. Health Maintenance Due   Topic Date Due    COVID-19 Vaccine (3 - Booster for Pfizer series) 2021       Ms. Dang Palomino has a reminder for a \"due or due soon\" health maintenance. I have asked that she discuss this further with her primary care provider for follow-up on this health maintenance. Coordination of Care Questionnaire:  :     1) Have you been to an emergency room, urgent care clinic since your last visit? Yes, for right hand . Patient had to get surgery after fall , patient also has upcoming shoulder surgery   Hospitalized since your last visit? no             2) Have you seen or consulted any other health care providers outside of [address] [place] [address] since your last visit? yes  Urology     3) In the event something were to happen to you and you were unable to speak on your behalf, do you have an Advance Directive/ Living Will in place stating your wishes?  YES

## 2022-08-22 NOTE — PROGRESS NOTES
HPI: Deepak Roto (: 1935) is a 81 y.o. female, patient, here for evaluation of the following chief complaint(s):    No chief complaint on file. The patient is seen today to evaluate her right hand. Patient and her  were attending a  when he fell forcing her forward also falling injuring her right hand on 2022. She has been able to straighten her fingers but her middle finger ulnarly angulates. When she makes a fist she can watch the tendon across the metacarpal head drift ulnarly into the third webspace. She then has a volar ulnar flexed finger. She denied any prior problems or significant pain or swelling in that specific digit and joint. She underwent realignment of the [facility] tendon right middle finger at MP joint on 2022. Vitals:  LMP 1986    There is no height or weight on file to calculate BMI. Allergies   Allergen Reactions    Adhesive Other (comments)    Antihistamines - Alkylamine Other (comments)    Cefdinir Other (comments)     Sick to stomach    Ciprofloxacin Diarrhea and Nausea and Vomiting    Flagyl [Metronidazole] Nausea and Vomiting    Other Medication Other (comments)     BANDAIDS - breaks out skin    Oxycodone-Acetaminophen Other (comments)     dizziness    Phenylephrine Other (comments)     Wire her up    Phenylpropanolamine Other (comments)     Wire her up    Sulfa (Sulfonamide Antibiotics) Nausea Only       Current Outpatient Medications   Medication Sig    cholecalciferol (Vitamin D3) (1000 Units /25 mcg) tablet Take  by mouth daily. LORazepam (ATIVAN) 1 mg tablet TAKE 1/2 TO 1 TABLET       NIGHTLY AS NEEDED FOR      ANXIETY. MAXIMUM DAILY     AMOUNT: 1 TABLET    simvastatin (ZOCOR) 20 mg tablet TAKE 1 TABLET DAILY    estradioL (ESTRACE) 0.01 % (0.1 mg/gram) vaginal cream APPLY 1 A SMALL AMOUNT INTO VAGINA THREE TIMES A WEEK , PEA-SIZED AMOUNT FINGERTIP APPLICATION    meloxicam (MOBIC) 7.5 mg tablet Take 1 Tablet by mouth daily.  (Patient taking differently: Take 7.5 mg by mouth daily. 0.5 tab)    amLODIPine (NORVASC) 5 mg tablet TAKE 1 TABLET DAILY    gabapentin (Neurontin) 300 mg capsule Take 2 Capsules by mouth nightly. Max Daily Amount: 600 mg.    levocetirizine (XYZAL) 5 mg tablet TAKE 1 TABLET DAILY    PARoxetine (PAXIL) 30 mg tablet TAKE 1 TABLET DAILY    ondansetron (ZOFRAN ODT) 4 mg disintegrating tablet Take 1 Tablet by mouth every eight (8) hours as needed for Nausea or Vomiting. (Patient not taking: Reported on 8/22/2022)    acetaminophen (TYLENOL) 500 mg tablet Take  by mouth every six (6) hours as needed for Pain.    losartan (COZAAR) 100 mg tablet TAKE 1 TABLET DAILY    budesonide (ENTOCORT EC) 3 mg capsule Take 3 mg by mouth daily as needed. 1 capsule by mouth daily    dicyclomine (BENTYL) 10 mg capsule Take 10 mg by mouth as needed. 1 tablet by mouth daily as needed (Patient not taking: Reported on 8/22/2022)    MULTIVITAMIN W-MINERALS/LUTEIN (CENTRUM SILVER PO) Take 1 Tab by mouth. Takes one po daily. No current facility-administered medications for this visit.        Past Medical History:   Diagnosis Date    Anxiety     Arthritis back pain    fingers    Asthma     MILD - NO INHALERS    Chronic pain     Colitis     Diverticulitis 6/11/2013    Hypercholesterolemia     Hypertension     IBS (irritable bowel syndrome)     Insomnia     Nausea & vomiting     Stenosis     spinal    Stroke ([facility] [address] 1996    tia   PATENT  FORAMEN  OVALE    Swollen L ankle         Past Surgical History:   Procedure Laterality Date    HX CATARACT REMOVAL Bilateral 2009    HX CHOLECYSTECTOMY  04/1997    HX COLONOSCOPY      x3    HX HYSTERECTOMY  1986    HX KNEE ARTHROSCOPY Left 04/1998    HX LUMBAR FUSION  02/2011    L4-L5    HX SHOULDER REPLACEMENT Right 01/2020    HX TONSILLECTOMY  CHILDHOOD    HX WISDOM TEETH EXTRACTION      X3    IR INJ FORAMIN EPID LUMB ANES/STER SNGL  3/21/2022    DE CARDIAC SURG PROCEDURE UNLIST  8/04    repair of foramen ovale Family History   Problem Relation Age of Onset    Other Mother         SUARACHNOID HEMORRHAGE-ANEURYSM    Stroke Mother     Cancer Father         PANCREATIC    Anesth Problems Daughter         N/V    No Known Problems Daughter         Social History     Tobacco Use    Smoking status: Never    Smokeless tobacco: Never   Vaping Use    Vaping Use: Never used   Substance Use Topics    Alcohol use: Yes     Alcohol/week: 7.0 standard drinks     Types: 3 Glasses of wine, 4 Cans of beer per week    Drug use: No        Review of Systems   All other systems reviewed and are negative. Physical Exam    Patient had clinical subluxation of the EDC tendon to the right middle finger at the MP joint. The extensor tendon was ulnarly subluxated. The surgical wound is healed well and thus far the [address] tendon appears to be nicely aligned. No redness or sign of infection. There may be some subtle evidence of actual clicking of the right middle finger and she does have some puffy swelling perhaps some advancement of her arthritis. Imaging:    XR Results (maximum last 4): Results from Hospital Encounter encounter on 05/15/22    XR HAND RT MIN 3 V    Narrative  EXAM: XR HAND RT MIN 3 V    INDICATION: eval for hand fx s/p fall. COMPARISON: None. FINDINGS: Three views of the right hand. The bones are mildly osteopenic. No  acute fracture or dislocation. Severe osteoarthritis is present in the first  interphalangeal joint, second DIP, third DIP, and fourth PIP joints. Severe  osteoarthritis is present in the first [address] and STT joints. Mild osteoarthritis  is present in the second and third MCP joints. There is mild soft tissue  swelling over the dorsum of the MCP joints. There is chronic deformity of the  ulnar styloid. Impression  Mild soft tissue swelling over the dorsum of the MCP joints. .      ASSESSMENT/PLAN:  Below is the assessment and plan developed based on review of pertinent history, physical exam, labs, studies, and medications. Patient examination was consistent with osteoarthritis involving her hands but more notably ulnar subluxation of the EDC tendon to the middle finger. This is reducible but merely falls ulnarly once pressure is released. She clinically has disrupted the radial sagittal band fibers on 5/14/2022 since the time of the fall. I reviewed treatment options and answered her questions. There is a component of third MP joint arthritis. 1 option would be to realign the extensor tendon as second option would be to include an MP arthroplasty. She really feels that she was not having pain in the joint prior to the injury and does not want to consider the joint replacement arthroplasty. She underwent right middle finger EDC realignment at the MP joint on 6/13/2022. She has been car taping to the index and thus far it still appears that there is no ulnar subluxation of the EDC tendon. She does have some puffing like swelling and I feel there is more of a response to the arthritis in the joint. She may have a component of mild middle finger stenosing tenosynovitis as well. She was offered but deferred an injection would like to give this about 6 weeks of time. She did return to have it reassessed on 8/23/2022. There is a tiny click but deferred an injection. She has an upcoming left reverse total shoulder replacement on 9/1/2022. She states that if it does worsen in the fall she would return for an injection and perhaps later for a release if needed. Continue symptomatic care. 1. Primary osteoarthritis of both hands  2. 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 Bilateral hand pain  4. Trigger middle finger of right hand      Return if symptoms worsen or fail to improve. An electronic signature was used to authenticate this note.   -- Apple Monteiro MD

## 2022-08-23 ENCOUNTER — OFFICE VISIT (OUTPATIENT)
Dept: ORTHOPEDIC SURGERY | Age: 87
End: 2022-08-23
Payer: MEDICARE

## 2022-08-23 DIAGNOSIS — M19.042 PRIMARY OSTEOARTHRITIS OF BOTH HANDS: Primary | ICD-10-CM

## 2022-08-23 DIAGNOSIS — M66.241 NONTRAUMATIC SUBLUXATION OF EXTENSOR TENDON AT MCP JOINT OF HAND, RIGHT: ICD-10-CM

## 2022-08-23 DIAGNOSIS — M65.331 TRIGGER MIDDLE FINGER OF RIGHT HAND: ICD-10-CM

## 2022-08-23 DIAGNOSIS — M19.041 PRIMARY OSTEOARTHRITIS OF BOTH HANDS: Primary | ICD-10-CM

## 2022-08-23 DIAGNOSIS — M79.642 BILATERAL HAND PAIN: ICD-10-CM

## 2022-08-23 DIAGNOSIS — M79.641 BILATERAL HAND PAIN: ICD-10-CM

## 2022-08-23 PROCEDURE — 99024 POSTOP FOLLOW-UP VISIT: CPT | Performed by: ORTHOPAEDIC SURGERY

## 2022-08-23 NOTE — PROGRESS NOTES
Here for surgical preop. Left shoulder replacement with Dr. Jorge Luis Tariq on 9/1 at [facility].

## 2022-08-23 NOTE — LETTER
8/23/2022    Patient: Izaiah Hager   YOB: 1935   Date of Visit: 8/23/2022     Zaida Fothergill, MD  [address] [place] [address]  Via In Basket    Dear Zaida Fothergill, MD,      Thank you for referring Ms. René Reyes to [facility] for evaluation. My notes for this consultation are attached. If you have questions, please do not hesitate to call me. I look forward to following your patient along with you.       Sincerely,    Brodie Hernandez MD

## 2022-08-25 ENCOUNTER — HOSPITAL ENCOUNTER (OUTPATIENT)
Dept: CT IMAGING | Age: 87
Discharge: HOME | End: 2022-08-25
Attending: ORTHOPAEDIC SURGERY
Payer: MEDICARE

## 2022-08-25 ENCOUNTER — HOSPITAL ENCOUNTER (OUTPATIENT)
Dept: PREADMISSION TESTING | Age: 87
Discharge: HOME | End: 2022-08-25
Payer: MEDICARE

## 2022-08-25 VITALS
HEIGHT: 58 IN | WEIGHT: 134.48 LBS | SYSTOLIC BLOOD PRESSURE: 122 MMHG | BODY MASS INDEX: 28.23 KG/M2 | DIASTOLIC BLOOD PRESSURE: 69 MMHG | HEART RATE: 88 BPM | TEMPERATURE: 97.7 F

## 2022-08-25 DIAGNOSIS — M25.512 ACUTE PAIN OF LEFT SHOULDER: ICD-10-CM

## 2022-08-25 LAB
ABO + RH BLD: NORMAL
ANION GAP SERPL CALC-SCNC: 6 MMOL/L (ref 5–15)
APPEARANCE UR: CLEAR
BACTERIA URNS QL MICRO: ABNORMAL /HPF
BILIRUB UR QL: NEGATIVE
BLOOD GROUP ANTIBODIES SERPL: NORMAL
BUN SERPL-MCNC: 15 MG/DL (ref 6–20)
BUN/CREAT SERPL: 14 (ref 12–20)
CALCIUM SERPL-MCNC: 9.4 MG/DL (ref 8.5–10.1)
CHLORIDE SERPL-SCNC: 102 MMOL/L (ref 97–108)
CO2 SERPL-SCNC: 30 MMOL/L (ref 21–32)
COLOR UR: ABNORMAL
CREAT SERPL-MCNC: 1.07 MG/DL (ref 0.55–1.02)
EPITH CASTS URNS QL MICRO: ABNORMAL /LPF
ERYTHROCYTE [DISTWIDTH] IN BLOOD BY AUTOMATED COUNT: 14 % (ref 11.5–14.5)
EST. AVERAGE GLUCOSE BLD GHB EST-MCNC: 111 MG/DL
GLUCOSE SERPL-MCNC: 101 MG/DL (ref 65–100)
GLUCOSE UR STRIP.AUTO-MCNC: NEGATIVE MG/DL
HBA1C MFR BLD: 5.5 % (ref 4–5.6)
HCT VFR BLD AUTO: 37.5 % (ref 35–47)
HGB BLD-MCNC: 12.7 G/DL (ref 11.5–16)
HGB UR QL STRIP: NEGATIVE
INR PPP: 1 (ref 0.9–1.1)
KETONES UR QL STRIP.AUTO: NEGATIVE MG/DL
LEUKOCYTE ESTERASE UR QL STRIP.AUTO: ABNORMAL
MCH RBC QN AUTO: 31.8 PG (ref 26–34)
MCHC RBC AUTO-ENTMCNC: 33.9 G/DL (ref 30–36.5)
MCV RBC AUTO: 93.8 FL (ref 80–99)
NITRITE UR QL STRIP.AUTO: NEGATIVE
NRBC # BLD: 0 K/UL (ref 0–0.01)
NRBC BLD-RTO: 0 PER 100 WBC
PH UR STRIP: 5.5 [PH] (ref 5–8)
PLATELET # BLD AUTO: 299 K/UL (ref 150–400)
PMV BLD AUTO: 9.2 FL (ref 8.9–12.9)
POTASSIUM SERPL-SCNC: 4.3 MMOL/L (ref 3.5–5.1)
PROT UR STRIP-MCNC: NEGATIVE MG/DL
PROTHROMBIN TIME: 10.1 SEC (ref 9–11.1)
RBC # BLD AUTO: 4 M/UL (ref 3.8–5.2)
RBC #/AREA URNS HPF: ABNORMAL /HPF (ref 0–5)
SODIUM SERPL-SCNC: 138 MMOL/L (ref 136–145)
SP GR UR REFRACTOMETRY: 1 (ref 1–1.03)
SPECIMEN EXP DATE BLD: NORMAL
UA: UC IF INDICATED,UAUC: ABNORMAL
UROBILINOGEN UR QL STRIP.AUTO: 0.2 EU/DL (ref 0.2–1)
WBC # BLD AUTO: 7.5 K/UL (ref 3.6–11)
WBC URNS QL MICRO: ABNORMAL /HPF (ref 0–4)

## 2022-08-25 PROCEDURE — 85610 PROTHROMBIN TIME: CPT

## 2022-08-25 PROCEDURE — 83036 HEMOGLOBIN GLYCOSYLATED A1C: CPT

## 2022-08-25 PROCEDURE — 73200 CT UPPER EXTREMITY W/O DYE: CPT

## 2022-08-25 PROCEDURE — 86900 BLOOD TYPING SEROLOGIC ABO: CPT

## 2022-08-25 PROCEDURE — 80048 BASIC METABOLIC PNL TOTAL CA: CPT

## 2022-08-25 PROCEDURE — 85027 COMPLETE CBC AUTOMATED: CPT

## 2022-08-25 PROCEDURE — 81001 URINALYSIS AUTO W/SCOPE: CPT

## 2022-08-25 PROCEDURE — 93005 ELECTROCARDIOGRAM TRACING: CPT

## 2022-08-25 PROCEDURE — 36415 COLL VENOUS BLD VENIPUNCTURE: CPT

## 2022-08-25 NOTE — PERIOP NOTES
[facility] INSTRUCTIONS  ORTHOPAEDIC    Surgery Date:   9/1/22    Your surgeon's office or [facility] staff will call you between 4 PM- 8 PM the day before surgery with your arrival time. If your surgery is on a Monday, you will receive a call the preceding Friday. Please report to [facility] Patient Access/Admitting on the 1st floor. Bring your insurance card, photo identification, and any copayment (if applicable). If you are going home the same day of your surgery, you must have a responsible adult to drive you home. You need to have a responsible adult to stay with you the first 24 hours after surgery and you should not drive a car for 24 hours following your surgery. Do NOT eat any solid foods after midnight the night before surgery including candy, mints or gum. You may drink clear liquids from midnight until 1 hour prior to arrival time. You may drink up to 12 ounces at one time every 4 hours. Do NOT drink alcohol or smoke 24 hours before surgery. STOP smoking for 14 days prior as it helps with breathing and healing after surgery. If your arrival time is 3pm or later, you may eat a light breakfast before 8am (toast, bagel-no butter, black coffee, plain tea, fruit juice-no pulp) Please note special instructions, if applicable, below for medications. If you are being admitted to the hospital,please leave personal belongings/luggage in your car until you have an assigned hospital room number. Please wear comfortable clothes. Wear your glasses instead of contacts. We ask that all money, jewelry and valuables be left at home. Wear no make up, particularly mascara, the day of surgery. All body piercings, rings, and jewelry need to be removed and left at home. Please remove any nail polish or artificial nails from your fingernails. Please wear your hair loose or down. Please no pony-tails, buns, or any metal hair accessories.  If you shower the morning of surgery, please do not apply any lotions or powders afterwards. You may wear deodorant. Do not shave any body area within 24 hours of your surgery. Please follow all instructions to avoid any potential surgical cancellation. Should your physical condition change, (i.e. fever, cold, flu, etc.) please notify your surgeon as soon as possible. It is important to be on time. If a situation occurs where you may be delayed, please call:  [phone] / [phone] on the day of surgery. The Preadmission Testing staff can be reached at [phone]. Special instructions: NONE    Current Outpatient Medications   Medication Sig    cholecalciferol (VITAMIN D3) (1000 Units /25 mcg) tablet Take  by mouth daily. LORazepam (ATIVAN) 1 mg tablet TAKE 1/2 TO 1 TABLET       NIGHTLY AS NEEDED FOR      ANXIETY. MAXIMUM DAILY     AMOUNT: 1 TABLET    simvastatin (ZOCOR) 20 mg tablet TAKE 1 TABLET DAILY    estradioL (ESTRACE) 0.01 % (0.1 mg/gram) vaginal cream APPLY 1 A SMALL AMOUNT INTO VAGINA THREE TIMES A WEEK , PEA-SIZED AMOUNT FINGERTIP APPLICATION    meloxicam (MOBIC) 7.5 mg tablet Take 1 Tablet by mouth daily. (Patient taking differently: Take 7.5 mg by mouth daily. 0.5 tab)    amLODIPine (NORVASC) 5 mg tablet TAKE 1 TABLET DAILY    gabapentin (Neurontin) 300 mg capsule Take 2 Capsules by mouth nightly. Max Daily Amount: 600 mg. (Patient taking differently: Take 300 mg by mouth nightly.)    levocetirizine (XYZAL) 5 mg tablet TAKE 1 TABLET DAILY    PARoxetine (PAXIL) 30 mg tablet TAKE 1 TABLET DAILY    acetaminophen (TYLENOL) 500 mg tablet Take  by mouth every six (6) hours as needed for Pain.    losartan (COZAAR) 100 mg tablet TAKE 1 TABLET DAILY    budesonide (ENTOCORT EC) 3 mg capsule Take 9 mg by mouth daily as needed. 1 capsule by mouth daily    dicyclomine (BENTYL) 10 mg capsule Take 10 mg by mouth as needed. 1 tablet by mouth daily as needed    MULTIVITAMIN W-MINERALS/LUTEIN (CENTRUM SILVER PO) Take 1 Tab by mouth.  Takes one po daily. No current facility-administered medications for this encounter. YOU MUST ONLY TAKE THESE MEDICATIONS THE MORNING OF SURGERY WITH A SIP OF WATER: PAXIL, AMLODIPINE    MEDICATIONS TO TAKE THE MORNING OF SURGERY ONLY IF NEEDED: NONE    HOLD these prescription medications BEFORE Surgery: STOP VITAMINS TODAY; STOP MOBIC ON 8/29/22    Ask your surgeon/prescribing physician about when/if to STOP taking these medications: NONE  Stop any non-steroidal anti-inflammatory drugs (i.e. Ibuprofen, Naproxen, Advil, Aleve) 3 days before surgery. You may take Tylenol. STOP all vitamins and herbal supplements 1 week prior to  surgery. If you are currently taking Plavix, Coumadin, or any other blood-thinning/anticoagulant medication contact your prescribing physician for instructions. Preventing Infections Before and After - Your Surgery    IMPORTANT INSTRUCTIONS    You play an important role in your health and preparation for surgery. To reduce the germs on your skin you will need to shower with CHG soap (Chorhexidine gluconate 4%) two times before surgery. CHG soap (Hibiclens, Hex-A-Clens or store brand)  CHG soap will be provided at your Preadmission Testing (PAT) appointment. If you do not have a PAT appointment before surgery, you may arrange to  CHG soap from our office or purchase CHG soap at a pharmacy, grocery or department store. You need to purchase TWO 4 ounce bottles to use for your 2 showers. Steps to follow:  Abhinav Pilsner your hair with your normal shampoo and your body with regular soap and rinse well to remove shampoo and soap from your skin. Wet a clean washcloth and turn off the shower. Put CHG soap on washcloth and apply to your entire body from the neck down. Do not use on your head, face or private parts(genitals). Do not use CHG soap on open sores, wounds or areas of skin irritation. Wash you body gently for 5 minutes. Do not wash your skin too hard.  This soap does not create lather. Pay special attention to your underarms and from your belly button to your feet. Turn the shower back on and rinse well to get CHG soap off your body. Pat your skin dry with a clean, dry towel. Do not apply lotions or moisturizer. Put on clean clothes and sleep on fresh bed sheets and do not allow pets to sleep with you. Shower with CHG soap 2 times before your surgery  The evening before your surgery  The morning of your surgery      Tips to help prevent infections after your surgery:  Protect your surgical wound from germs:  Hand washing is the most important thing you and your caregivers can do to prevent infections. Keep your bandage clean and dry! Do not touch your surgical wound. Use clean, freshly washed towels and washcloths every time you shower; do not share bath linens with others. Until your surgical wound is healed, wear clothing and sleep on bed linens each day that are clean and freshly washed. Do not allow pets to sleep in your bed with you or touch your surgical wound. Do not smoke - smoking delays wound healing. This may be a good time to stop smoking. If you have diabetes, it is important for you to manage your blood sugar levels properly before your surgery as well as after your surgery. Poorly managed blood sugar levels slow down wound healing and prevent you from healing completely. Prevention of Infection  Testing for Staphylococcus aureus on your skin before surgery    Staphylococcus aureus (staph) is a common bacteria that is found on the body. It normally does not cause infection on healthy skin. Before surgery, you will be tested to see if you have staph by swabbing the inside of your nose. When you have an incision with surgery, the goal is to protect that incision from infection. Removal of the staph bacteria before surgery can decrease the risk of a surgical site infection. If your nose swab is positive for staph you will be called.  Your treatment will include 2 steps:  Prescription for Mupirocin ointment to be used in each nostril twice a day for 5 days. Showering with Chlorhexidine (CHG) liquid soap for 5 days prior to surgery. How to use Mupirocin ointment in your nose   the prescription from your pharmacy. You will receive a large tube of ointment which will be big enough for all of your treatments. You will apply this ointment to each nostril 2 times a day for 5 days. Wash your hands with  gel or soap and water for 20 seconds before using ointment. Place a pea-sized amount of ointment on a cotton Q-tip. Apply ointment just inside of each nostril with the Q-tip. Do not push Q-tip or ointment deep inside you nose. Press your nostrils together and massage for a few seconds. Wash your hands with  gel or soap and water after you are finished. Do not get ointment near your eyes. If it gets into your eyes, rinse them with cool water. If you need to use nasal spray, clean the tip of the bottle with alcohol before use and do not use both at the same time. If you are scheduled for COVID testing during the 5 days, do NOT apply morning dose until after the COVID test has been performed. How to use Chlorhexidine (CHG) 4% liquid soap  Purchase an 8 ounce bottle of CHG liquid soap (Chlorhexidine 4%, Hibiclens, Hex-A-Clens or store brand) at a pharmacy or grocery store. Wash your hair with your normal shampoo and your body with regular soap and rinse well to remove shampoo and soap from your skin. Wet a clean washcloth and turn off the shower. Put CHG soap on washcloth and apply to your entire body from the neck down. Do not use on your head, face or private parts(genitals). Do not use CHG soap on open sores, wounds or areas of skin irritation. Wash your body gently for 5 minutes. Do not wash your skin too hard. This soap does not create lather. Pay special attention to your underarms and from your belly button to your feet.   Turn the shower back on and rinse well to get CHG soap off your body. Pat your skin dry with a clean, dry towel. Do not apply lotions or moisturizer. Put on clean clothes and sleep on fresh bed sheets the night before surgery. Do not allow pets to sleep with you. Eating and Drinking Before Surgery    You may eat a regular dinner at the usual time on the day before your surgery. Do NOT eat any solid foods after midnight unless your arrival time at the hospital is 3pm or later. You may drink clear liquids only from 12 midnight until 1 hours prior to your arrival time at the hospital on the day of your surgery. Do NOT drink alcohol. Clear liquids include:  Water  Fruit juices without pulp( i.e. apple juice)  Carbonated beverages  Black coffee (no cream/milk)  Tea (no cream/milk)  Gatorade  You may drink up to 12-16 ounces at one time every 4 hours between the hours of midnight and 1 hour before your arrival time at the hospital. Example- if your arrival time at the hospital is 6am, you may drink 12-16 ounces of clear liquids no later than 5am.  If your arrival time at the hospital is 3pm or later, you may eat a light breakfast before 8am.  A light breakfast includes: Toast or bagel (no butter)  Black coffee (no cream/milk)  Tea (no cream/milk)  Fruit juices without pulp ( i.e. apple juice)  Do NOT eat meat, eggs, vegetables or fruit  If you have any questions, please contact your surgeon's office. Patient Information Regarding COVID Restrictions    Day of Procedure    Please park in the parking deck or any designated visitor parking lot. Enter the facility through the Main Entrance of the hospital.  On the day of surgery, please provide the cell phone number of the person who will be waiting for you to the Patient Access representative at the time of registration. Please wear a mask on the day of your procedure. We are now allowing two designated visitors per stay.  Pediatric patients may have 2 designated visitors. These two people may come in with you on the day of your procedure. The designated visitor must also wear a mask. Once your procedure and the immediate recovery period is completed, a nurse in the recovery area will contact your designated visitor to inform them of your room number or to otherwise review other pertinent information regarding your care. Social distancing practices are to be adhered to in waiting areas and the cafeteria. The patient was contacted in person. She verbalized understanding of all instructions does not  need reinforcement.

## 2022-08-26 LAB
ATRIAL RATE: 77 BPM
BACTERIA SPEC CULT: NORMAL
BACTERIA SPEC CULT: NORMAL
CALCULATED P AXIS, ECG09: 19 DEGREES
CALCULATED R AXIS, ECG10: 19 DEGREES
CALCULATED T AXIS, ECG11: 32 DEGREES
DIAGNOSIS, 93000: NORMAL
P-R INTERVAL, ECG05: 178 MS
Q-T INTERVAL, ECG07: 378 MS
QRS DURATION, ECG06: 76 MS
QTC CALCULATION (BEZET), ECG08: 427 MS
SERVICE CMNT-IMP: NORMAL
VENTRICULAR RATE, ECG03: 77 BPM

## 2022-08-29 NOTE — PERIOP NOTES
PAT Nurse Practitioner   Pre-Operative Chart Review/Assessment:-ORTHOPEDIC                Patient Name:  Terrence Olson                                                           Age:   81 y.o.    :  1935     Today's Date:  2022     Date of PAT:   2022      Date of Surgery:    2022      Procedure(s):  Left Reverse Total Shoulder Arthroplasty     Surgeon:   Dr. Ghulam Shea                       PLAN:      1)  Medical Clearance:  Dr. Paola Michaels      2)  Cardiac Clearance:  EKG and METs reviewed. No further cardiac evaluation requested. PAT EKG showed normal sinus rhythm. 3)  Diabetic Treatment Consult:  Not indicated. A1c-5.5      4)  Sleep Apnea evaluation:   Not indicated.  JOSHUA Score 2.       5) Treatment for MRSA/Staph Aureus:  Neg      6) Additional Concerns:  PONV, HTN, Asthma, anxiety, hx of TIA(PFO s/p repair)              Vital Signs:         Vitals:    22 1444   BP: 122/69   Pulse: 88   Temp: 97.7 °F (36.5 °C)   Weight: 61 kg (134 lb 7.7 oz)   Height: 4' 10\" (1.473 m)   LMP: 1986          Body mass index is 28.11 kg/m².         ____________________________________________  PAST MEDICAL HISTORY  Past Medical History:   Diagnosis Date    Anxiety     Arthritis back pain    fingers    Asthma     MILD - NO INHALERS    Chronic pain     Colitis     Diverticulitis 2013    Hypercholesterolemia     Hypertension     IBS (irritable bowel syndrome)     Ill-defined condition     H/O UTI POST ISLAS CATH    Insomnia     Nausea & vomiting     Stenosis     spinal    Stroke ([address]     tia   PATENT  FORAMEN  OVALE    Swollen L ankle       ____________________________________________  PAST SURGICAL HISTORY  Past Surgical History:   Procedure Laterality Date    HX CATARACT REMOVAL Bilateral     HX CHOLECYSTECTOMY  1997    HX COLONOSCOPY      x3    HX HYSTERECTOMY      HX KNEE ARTHROSCOPY Left 1998    HX LUMBAR FUSION  2011    L4-L5    HX ORTHOPAEDIC Right     REPAIRED TENDONS ON RIGHT HAND AFTER FALL    HX SHOULDER REPLACEMENT Right 01/2020    HX TONSILLECTOMY  CHILDHOOD    HX WISDOM TEETH EXTRACTION      X3    IR INJ FORAMIN EPID LUMB ANES/STER SNGL  03/21/2022    AL CARDIAC SURG PROCEDURE UNLIST  08/2004    repair of foramen ovale      ____________________________________________  HOME MEDICATIONS  Current Outpatient Medications   Medication Sig    cholecalciferol (VITAMIN D3) (1000 Units /25 mcg) tablet Take  by mouth daily. LORazepam (ATIVAN) 1 mg tablet TAKE 1/2 TO 1 TABLET       NIGHTLY AS NEEDED FOR      ANXIETY. MAXIMUM DAILY     AMOUNT: 1 TABLET    simvastatin (ZOCOR) 20 mg tablet TAKE 1 TABLET DAILY    estradioL (ESTRACE) 0.01 % (0.1 mg/gram) vaginal cream APPLY 1 A SMALL AMOUNT INTO VAGINA THREE TIMES A WEEK , PEA-SIZED AMOUNT FINGERTIP APPLICATION    meloxicam (MOBIC) 7.5 mg tablet Take 1 Tablet by mouth daily. (Patient taking differently: Take 7.5 mg by mouth daily. 0.5 tab)    amLODIPine (NORVASC) 5 mg tablet TAKE 1 TABLET DAILY    gabapentin (Neurontin) 300 mg capsule Take 2 Capsules by mouth nightly. Max Daily Amount: 600 mg. (Patient taking differently: Take 300 mg by mouth nightly.)    levocetirizine (XYZAL) 5 mg tablet TAKE 1 TABLET DAILY    PARoxetine (PAXIL) 30 mg tablet TAKE 1 TABLET DAILY    acetaminophen (TYLENOL) 500 mg tablet Take  by mouth every six (6) hours as needed for Pain.    losartan (COZAAR) 100 mg tablet TAKE 1 TABLET DAILY    budesonide (ENTOCORT EC) 3 mg capsule Take 9 mg by mouth daily as needed. 1 capsule by mouth daily    dicyclomine (BENTYL) 10 mg capsule Take 10 mg by mouth as needed. 1 tablet by mouth daily as needed    MULTIVITAMIN W-MINERALS/LUTEIN (CENTRUM SILVER PO) Take 1 Tab by mouth. Takes one po daily.       No current facility-administered medications for this encounter.      ____________________________________________  ALLERGIES  Allergies   Allergen Reactions    Adhesive Other (comments)    Antihistamines - Alkylamine Other (comments)    Cefdinir Other (comments)     Sick to stomach    Ciprofloxacin Diarrhea and Nausea and Vomiting    Flagyl [Metronidazole] Nausea and Vomiting    Other Medication Other (comments)     BANDAIDS - breaks out skin    Oxycodone-Acetaminophen Other (comments)     dizziness    Phenylephrine Other (comments)     Wire her up    Phenylpropanolamine Other (comments)     Wire her up    Sulfa (Sulfonamide Antibiotics) Nausea Only      ____________________________________________  SOCIAL HISTORY  Social History     Tobacco Use    Smoking status: Never    Smokeless tobacco: Never   Substance Use Topics    Alcohol use:  Yes     Alcohol/week: 10.0 standard drinks     Types: 3 Glasses of wine, 7 Cans of beer per week      ____________________________________________   Internal Administration   First Dose COVID-19, PFIZER PURPLE top, DILUTE for use, (age 15 y+), IM, 30mcg/0.3mL  01/30/2021   Second Dose COVID-19, PFIZER PURPLE top, DILUTE for use, (age 15 y+), IM, 30mcg/0.3mL  02/27/2021      Last COVID Lab SARS-CoV-2 ( )   Date Value   09/09/2021 Not detected   09/19/2020 Not Detected                    Labs:     Hospital Outpatient Visit on 08/25/2022   Component Date Value Ref Range Status    Sodium 08/25/2022 138  136 - 145 mmol/L Final    Potassium 08/25/2022 4.3  3.5 - 5.1 mmol/L Final    Chloride 08/25/2022 102  97 - 108 mmol/L Final    CO2 08/25/2022 30  21 - 32 mmol/L Final    Anion gap 08/25/2022 6  5 - 15 mmol/L Final    Glucose 08/25/2022 101 (A) 65 - 100 mg/dL Final    BUN 08/25/2022 15  6 - 20 MG/DL Final    Creatinine 08/25/2022 1.07 (A) 0.55 - 1.02 MG/DL Final    BUN/Creatinine ratio 08/25/2022 14  12 - 20   Final    GFR est AA 08/25/2022 59 (A) >60 ml/min/1.73m2 Final    GFR est non-AA 08/25/2022 49 (A) >60 ml/min/1.73m2 Final    Estimated GFR is calculated using the IDMS-traceable Modification of Diet in Renal Disease (MDRD) Study equation, reported for both  Americans (GFRAA) and non- Americans (GFRNA), and normalized to 1.73m2 body surface area. The physician must decide which value applies to the patient. Calcium 08/25/2022 9.4  8.5 - 10.1 MG/DL Final    WBC 08/25/2022 7.5  3.6 - 11.0 K/uL Final    RBC 08/25/2022 4.00  3.80 - 5.20 M/uL Final    HGB 08/25/2022 12.7  11.5 - 16.0 g/dL Final    HCT 08/25/2022 37.5  35.0 - 47.0 % Final    MCV 08/25/2022 93.8  80.0 - 99.0 FL Final    MCH 08/25/2022 31.8  26.0 - 34.0 PG Final    MCHC 08/25/2022 33.9  30.0 - 36.5 g/dL Final    RDW 08/25/2022 14.0  11.5 - 14.5 % Final    PLATELET 55/49/2273 403  150 - 400 K/uL Final    MPV 08/25/2022 9.2  8.9 - 12.9 FL Final    NRBC 08/25/2022 0.0  0  WBC Final    ABSOLUTE NRBC 08/25/2022 0.00  0.00 - 0.01 K/uL Final    Crossmatch Expiration 08/25/2022 09/04/2022,2359   Final    ABO/Rh(D) 08/25/2022 B POSITIVE   Final    Antibody screen 08/25/2022 NEG   Final    INR 08/25/2022 1.0  0.9 - 1.1   Final    A single therapeutic range for Vit K antagonists may not be optimal for all indications - see June, 2008 issue of Chest, American College of Chest [employer] Evidence-Based Clinical Practice Guidelines, 8th Edition.     Prothrombin time 08/25/2022 10.1  9.0 - 11.1 sec Final    Color 08/25/2022 YELLOW/STRAW    Final    Color Reference Range: Straw, Yellow or Dark Yellow    Appearance 08/25/2022 CLEAR  CLEAR   Final    Specific gravity 08/25/2022 1.005  1.003 - 1.030   Final    pH (UA) 08/25/2022 5.5  5.0 - 8.0   Final    Protein 08/25/2022 Negative  NEG mg/dL Final    Glucose 08/25/2022 Negative  NEG mg/dL Final    Ketone 08/25/2022 Negative  NEG mg/dL Final    Bilirubin 08/25/2022 Negative  NEG   Final    Blood 08/25/2022 Negative  NEG   Final    Urobilinogen 08/25/2022 0.2  0.2 - 1.0 EU/dL Final    Nitrites 08/25/2022 Negative  NEG   Final    Leukocyte Esterase 08/25/2022 SMALL (A) NEG   Final    WBC 08/25/2022 5-10  0 - 4 /hpf Final    RBC 08/25/2022 0-5  0 - 5 /hpf Final    Epithelial cells 08/25/2022 FEW  FEW /lpf Final    Epithelial cell category consists of squamous cells and /or transitional urothelial cells. Renal tubular cells, if present, are separately identified as such. Bacteria 08/25/2022 2+ (A) NEG /hpf Final    UA:UC IF INDICATED 08/25/2022 CULTURE NOT INDICATED BY UA RESULT  CNI   Final    Ventricular Rate 08/25/2022 77  BPM Final    Atrial Rate 08/25/2022 77  BPM Final    P-R Interval 08/25/2022 178  ms Final    QRS Duration 08/25/2022 76  ms Final    Q-T Interval 08/25/2022 378  ms Final    QTC Calculation (Bezet) 08/25/2022 427  ms Final    Calculated P Axis 08/25/2022 19  degrees Final    Calculated R Axis 08/25/2022 19  degrees Final    Calculated T Axis 08/25/2022 32  degrees Final    Diagnosis 08/25/2022    Final                    Value:Normal sinus rhythm  Normal ECG  When compared with ECG of 31-AUG-2021 08:43,  No significant change was found  Confirmed by Murray Hanna MD. ([id number]) on 8/26/2022 6:25:59 PM      Hemoglobin A1c 08/25/2022 5.5  4.0 - 5.6 % Final    Comment: NEW METHOD  PLEASE NOTE NEW REFERENCE RANGE  (NOTE)  HbA1C Interpretive Ranges  <5.7              Normal  5.7 - 6.4         Consider Prediabetes  >6.5              Consider Diabetes      Est. average glucose 08/25/2022 111  mg/dL Final    Special Requests: 08/25/2022 NO SPECIAL REQUESTS    Final    Culture result: 08/25/2022 MRSA NOT PRESENT    Final    Culture result: 08/25/2022     Final                    Value:Screening of patient nares for MRSA is for surveillance purposes and, if positive, to facilitate isolation considerations in high risk settings. It is not intended for automatic decolonization interventions per se as regimens are not sufficiently effective to warrant routine use. Skin:     Denies open wounds, cuts, sores, rashes or other areas of concern in PAT assessment.           Marisel Tirado NP

## 2022-08-31 ENCOUNTER — ANESTHESIA EVENT (OUTPATIENT)
Dept: SURGERY | Age: 87
DRG: 483 | End: 2022-08-31
Payer: MEDICARE

## 2022-09-01 ENCOUNTER — ANESTHESIA (OUTPATIENT)
Dept: SURGERY | Age: 87
DRG: 483 | End: 2022-09-01
Payer: MEDICARE

## 2022-09-01 ENCOUNTER — HOSPITAL ENCOUNTER (INPATIENT)
Age: 87
LOS: 1 days | Discharge: HOME | DRG: 483 | End: 2022-09-02
Attending: ORTHOPAEDIC SURGERY | Admitting: ORTHOPAEDIC SURGERY
Payer: MEDICARE

## 2022-09-01 DIAGNOSIS — M12.812 LEFT ROTATOR CUFF TEAR ARTHROPATHY: Primary | ICD-10-CM

## 2022-09-01 DIAGNOSIS — M75.102 LEFT ROTATOR CUFF TEAR ARTHROPATHY: Primary | ICD-10-CM

## 2022-09-01 LAB
GLUCOSE BLD STRIP.AUTO-MCNC: 91 MG/DL (ref 65–117)
SERVICE CMNT-IMP: NORMAL

## 2022-09-01 PROCEDURE — 74011250636 HC RX REV CODE- 250/636: Performed by: NURSE ANESTHETIST, CERTIFIED REGISTERED

## 2022-09-01 PROCEDURE — 77030008477 HC STYL SATN SLP COVD -A: Performed by: STUDENT IN AN ORGANIZED HEALTH CARE EDUCATION/TRAINING PROGRAM

## 2022-09-01 PROCEDURE — 74011250636 HC RX REV CODE- 250/636: Performed by: ORTHOPAEDIC SURGERY

## 2022-09-01 PROCEDURE — 65270000029 HC RM PRIVATE

## 2022-09-01 PROCEDURE — 77030002912 HC SUT ETHBND J&J -A: Performed by: ORTHOPAEDIC SURGERY

## 2022-09-01 PROCEDURE — 77030036560 HC SHLDR ARM PAD ABDUCTN S2SG -B: Performed by: ORTHOPAEDIC SURGERY

## 2022-09-01 PROCEDURE — 77030008684 HC TU ET CUF COVD -B: Performed by: STUDENT IN AN ORGANIZED HEALTH CARE EDUCATION/TRAINING PROGRAM

## 2022-09-01 PROCEDURE — 77030036638 HC ACC KT GPS KNE V2 EXAC -D: Performed by: ORTHOPAEDIC SURGERY

## 2022-09-01 PROCEDURE — 74011000250 HC RX REV CODE- 250: Performed by: ORTHOPAEDIC SURGERY

## 2022-09-01 PROCEDURE — 77030029099 HC BN WAX SSPC -A: Performed by: ORTHOPAEDIC SURGERY

## 2022-09-01 PROCEDURE — 77030038692 HC WND DEB SYS IRMX -B: Performed by: ORTHOPAEDIC SURGERY

## 2022-09-01 PROCEDURE — C1776 JOINT DEVICE (IMPLANTABLE): HCPCS | Performed by: ORTHOPAEDIC SURGERY

## 2022-09-01 PROCEDURE — 74011250636 HC RX REV CODE- 250/636: Performed by: STUDENT IN AN ORGANIZED HEALTH CARE EDUCATION/TRAINING PROGRAM

## 2022-09-01 PROCEDURE — 23472 RECONSTRUCT SHOULDER JOINT: CPT | Performed by: PHYSICIAN ASSISTANT

## 2022-09-01 PROCEDURE — 77030006835 HC BLD SAW SAG STRY -B: Performed by: ORTHOPAEDIC SURGERY

## 2022-09-01 PROCEDURE — 23430 REPAIR BICEPS TENDON: CPT | Performed by: ORTHOPAEDIC SURGERY

## 2022-09-01 PROCEDURE — 94760 N-INVAS EAR/PLS OXIMETRY 1: CPT

## 2022-09-01 PROCEDURE — 77030031139 HC SUT VCRL2 J&J -A: Performed by: ORTHOPAEDIC SURGERY

## 2022-09-01 PROCEDURE — 77030002922 HC SUT FBRWRE ARTH -B: Performed by: ORTHOPAEDIC SURGERY

## 2022-09-01 PROCEDURE — 76210000006 HC OR PH I REC 0.5 TO 1 HR: Performed by: ORTHOPAEDIC SURGERY

## 2022-09-01 PROCEDURE — 74011000250 HC RX REV CODE- 250: Performed by: NURSE ANESTHETIST, CERTIFIED REGISTERED

## 2022-09-01 PROCEDURE — 2709999900 HC NON-CHARGEABLE SUPPLY: Performed by: ORTHOPAEDIC SURGERY

## 2022-09-01 PROCEDURE — 3E0T3BZ INTRODUCTION OF ANESTHETIC AGENT INTO PERIPHERAL NERVES AND PLEXI, PERCUTANEOUS APPROACH: ICD-10-PCS | Performed by: ORTHOPAEDIC SURGERY

## 2022-09-01 PROCEDURE — 23472 RECONSTRUCT SHOULDER JOINT: CPT | Performed by: ORTHOPAEDIC SURGERY

## 2022-09-01 PROCEDURE — 77030013708 HC HNDPC SUC IRR PULS STRY –B: Performed by: ORTHOPAEDIC SURGERY

## 2022-09-01 PROCEDURE — 77030040361 HC SLV COMPR DVT MDII -B: Performed by: ORTHOPAEDIC SURGERY

## 2022-09-01 PROCEDURE — 23430 REPAIR BICEPS TENDON: CPT | Performed by: PHYSICIAN ASSISTANT

## 2022-09-01 PROCEDURE — 0PR807Z REPLACEMENT OF LEFT GLENOID CAVITY WITH AUTOLOGOUS TISSUE SUBSTITUTE, OPEN APPROACH: ICD-10-PCS | Performed by: ORTHOPAEDIC SURGERY

## 2022-09-01 PROCEDURE — 77030006822 HC BLD SAW SAG BRSM -B: Performed by: ORTHOPAEDIC SURGERY

## 2022-09-01 PROCEDURE — 76060000038 HC ANESTHESIA 3.5 TO 4 HR: Performed by: ORTHOPAEDIC SURGERY

## 2022-09-01 PROCEDURE — 76010000174 HC OR TIME 3.5 TO 4 HR INTENSV-TIER 1: Performed by: ORTHOPAEDIC SURGERY

## 2022-09-01 PROCEDURE — 82962 GLUCOSE BLOOD TEST: CPT

## 2022-09-01 PROCEDURE — 74011250637 HC RX REV CODE- 250/637: Performed by: ORTHOPAEDIC SURGERY

## 2022-09-01 PROCEDURE — 77030002933 HC SUT MCRYL J&J -A: Performed by: ORTHOPAEDIC SURGERY

## 2022-09-01 PROCEDURE — 0RRK00Z REPLACEMENT OF LEFT SHOULDER JOINT WITH REVERSE BALL AND SOCKET SYNTHETIC SUBSTITUTE, OPEN APPROACH: ICD-10-PCS | Performed by: ORTHOPAEDIC SURGERY

## 2022-09-01 DEVICE — IMPLANTABLE DEVICE
Type: IMPLANTABLE DEVICE | Site: SHOULDER | Status: FUNCTIONAL
Brand: EQUINOXE

## 2022-09-01 DEVICE — SHOULDER S3 TOT ADV REVRS -- IMPL CAPPED S3: Type: IMPLANTABLE DEVICE | Status: FUNCTIONAL

## 2022-09-01 DEVICE — GLENOID PLATE
Type: IMPLANTABLE DEVICE | Site: SHOULDER | Status: FUNCTIONAL
Brand: EQUINOXE

## 2022-09-01 DEVICE — GLENOSPHERE
Type: IMPLANTABLE DEVICE | Site: SHOULDER | Status: FUNCTIONAL
Brand: EQUINOXE

## 2022-09-01 RX ORDER — PAROXETINE HYDROCHLORIDE 20 MG/1
30 TABLET, FILM COATED ORAL DAILY
Status: DISCONTINUED | OUTPATIENT
Start: 2022-09-02 | End: 2022-09-02 | Stop reason: HOSPADM

## 2022-09-01 RX ORDER — SODIUM CHLORIDE 9 MG/ML
INJECTION, SOLUTION INTRAVENOUS
Status: DISCONTINUED | OUTPATIENT
Start: 2022-09-01 | End: 2022-09-01 | Stop reason: HOSPADM

## 2022-09-01 RX ORDER — PROPOFOL 10 MG/ML
INJECTION, EMULSION INTRAVENOUS AS NEEDED
Status: DISCONTINUED | OUTPATIENT
Start: 2022-09-01 | End: 2022-09-01 | Stop reason: HOSPADM

## 2022-09-01 RX ORDER — NORETHINDRONE AND ETHINYL ESTRADIOL 0.5-0.035
KIT ORAL AS NEEDED
Status: DISCONTINUED | OUTPATIENT
Start: 2022-09-01 | End: 2022-09-01 | Stop reason: HOSPADM

## 2022-09-01 RX ORDER — FACIAL-BODY WIPES
10 EACH TOPICAL DAILY PRN
Status: DISCONTINUED | OUTPATIENT
Start: 2022-09-03 | End: 2022-09-02 | Stop reason: HOSPADM

## 2022-09-01 RX ORDER — BUDESONIDE 3 MG/1
9 CAPSULE, COATED PELLETS ORAL DAILY
Status: DISCONTINUED | OUTPATIENT
Start: 2022-09-02 | End: 2022-09-02 | Stop reason: HOSPADM

## 2022-09-01 RX ORDER — HYDROMORPHONE HYDROCHLORIDE 1 MG/ML
0.5 INJECTION, SOLUTION INTRAMUSCULAR; INTRAVENOUS; SUBCUTANEOUS
Status: DISCONTINUED | OUTPATIENT
Start: 2022-09-01 | End: 2022-09-02 | Stop reason: HOSPADM

## 2022-09-01 RX ORDER — SODIUM CHLORIDE, SODIUM LACTATE, POTASSIUM CHLORIDE, CALCIUM CHLORIDE 600; 310; 30; 20 MG/100ML; MG/100ML; MG/100ML; MG/100ML
INJECTION, SOLUTION INTRAVENOUS
Status: DISCONTINUED | OUTPATIENT
Start: 2022-09-01 | End: 2022-09-01 | Stop reason: HOSPADM

## 2022-09-01 RX ORDER — CETIRIZINE HCL 10 MG
10 TABLET ORAL DAILY
Status: DISCONTINUED | OUTPATIENT
Start: 2022-09-02 | End: 2022-09-02 | Stop reason: HOSPADM

## 2022-09-01 RX ORDER — NORETHINDRONE AND ETHINYL ESTRADIOL 0.5-0.035
KIT ORAL AS NEEDED
Status: DISCONTINUED | OUTPATIENT
Start: 2022-09-01 | End: 2022-09-01

## 2022-09-01 RX ORDER — OXYCODONE HYDROCHLORIDE 5 MG/1
10 TABLET ORAL
Qty: 40 TABLET | Refills: 0 | Status: SHIPPED | OUTPATIENT
Start: 2022-09-01 | End: 2022-09-04

## 2022-09-01 RX ORDER — FENTANYL CITRATE 50 UG/ML
INJECTION, SOLUTION INTRAMUSCULAR; INTRAVENOUS AS NEEDED
Status: DISCONTINUED | OUTPATIENT
Start: 2022-09-01 | End: 2022-09-01 | Stop reason: HOSPADM

## 2022-09-01 RX ORDER — ATORVASTATIN CALCIUM 10 MG/1
20 TABLET, FILM COATED ORAL DAILY
Status: DISCONTINUED | OUTPATIENT
Start: 2022-09-02 | End: 2022-09-02 | Stop reason: HOSPADM

## 2022-09-01 RX ORDER — ONDANSETRON 2 MG/ML
4 INJECTION INTRAMUSCULAR; INTRAVENOUS AS NEEDED
Status: DISCONTINUED | OUTPATIENT
Start: 2022-09-01 | End: 2022-09-01 | Stop reason: HOSPADM

## 2022-09-01 RX ORDER — OXYCODONE HYDROCHLORIDE 5 MG/1
10 TABLET ORAL
Status: DISCONTINUED | OUTPATIENT
Start: 2022-09-01 | End: 2022-09-02 | Stop reason: HOSPADM

## 2022-09-01 RX ORDER — PHENYLEPHRINE HCL IN 0.9% NACL 0.4MG/10ML
SYRINGE (ML) INTRAVENOUS AS NEEDED
Status: DISCONTINUED | OUTPATIENT
Start: 2022-09-01 | End: 2022-09-01 | Stop reason: HOSPADM

## 2022-09-01 RX ORDER — SUCCINYLCHOLINE CHLORIDE 20 MG/ML
INJECTION INTRAMUSCULAR; INTRAVENOUS AS NEEDED
Status: DISCONTINUED | OUTPATIENT
Start: 2022-09-01 | End: 2022-09-01 | Stop reason: HOSPADM

## 2022-09-01 RX ORDER — GENTAMICIN SULFATE 40 MG/ML
INJECTION, SOLUTION INTRAMUSCULAR; INTRAVENOUS AS NEEDED
Status: DISCONTINUED | OUTPATIENT
Start: 2022-09-01 | End: 2022-09-01 | Stop reason: HOSPADM

## 2022-09-01 RX ORDER — SODIUM CHLORIDE 9 MG/ML
100 INJECTION, SOLUTION INTRAVENOUS CONTINUOUS
Status: DISPENSED | OUTPATIENT
Start: 2022-09-01 | End: 2022-09-02

## 2022-09-01 RX ORDER — ASPIRIN 325 MG
325 TABLET, DELAYED RELEASE (ENTERIC COATED) ORAL 2 TIMES DAILY
Status: DISCONTINUED | OUTPATIENT
Start: 2022-09-01 | End: 2022-09-02 | Stop reason: HOSPADM

## 2022-09-01 RX ORDER — BACITRACIN ZINC 500 UNIT/G
OINTMENT (GRAM) TOPICAL AS NEEDED
Status: DISCONTINUED | OUTPATIENT
Start: 2022-09-01 | End: 2022-09-01 | Stop reason: HOSPADM

## 2022-09-01 RX ORDER — NEOSTIGMINE METHYLSULFATE 1 MG/ML
INJECTION, SOLUTION INTRAVENOUS AS NEEDED
Status: DISCONTINUED | OUTPATIENT
Start: 2022-09-01 | End: 2022-09-01 | Stop reason: HOSPADM

## 2022-09-01 RX ORDER — FENTANYL CITRATE 50 UG/ML
50 INJECTION, SOLUTION INTRAMUSCULAR; INTRAVENOUS ONCE
Status: COMPLETED | OUTPATIENT
Start: 2022-09-01 | End: 2022-09-01

## 2022-09-01 RX ORDER — ACETAMINOPHEN 325 MG/1
650 TABLET ORAL
Status: DISCONTINUED | OUTPATIENT
Start: 2022-09-01 | End: 2022-09-02 | Stop reason: HOSPADM

## 2022-09-01 RX ORDER — OXYCODONE HYDROCHLORIDE 5 MG/1
5 TABLET ORAL
Status: DISCONTINUED | OUTPATIENT
Start: 2022-09-01 | End: 2022-09-02 | Stop reason: HOSPADM

## 2022-09-01 RX ORDER — ROPIVACAINE HYDROCHLORIDE 5 MG/ML
INJECTION, SOLUTION EPIDURAL; INFILTRATION; PERINEURAL
Status: COMPLETED | OUTPATIENT
Start: 2022-09-01 | End: 2022-09-01

## 2022-09-01 RX ORDER — SODIUM CHLORIDE 0.9 % (FLUSH) 0.9 %
5-40 SYRINGE (ML) INJECTION EVERY 8 HOURS
Status: DISCONTINUED | OUTPATIENT
Start: 2022-09-01 | End: 2022-09-02 | Stop reason: HOSPADM

## 2022-09-01 RX ORDER — ROCURONIUM BROMIDE 10 MG/ML
INJECTION, SOLUTION INTRAVENOUS AS NEEDED
Status: DISCONTINUED | OUTPATIENT
Start: 2022-09-01 | End: 2022-09-01 | Stop reason: HOSPADM

## 2022-09-01 RX ORDER — ONDANSETRON 2 MG/ML
INJECTION INTRAMUSCULAR; INTRAVENOUS AS NEEDED
Status: DISCONTINUED | OUTPATIENT
Start: 2022-09-01 | End: 2022-09-01 | Stop reason: HOSPADM

## 2022-09-01 RX ORDER — HYDROXYZINE HYDROCHLORIDE 10 MG/1
10 TABLET, FILM COATED ORAL
Status: DISCONTINUED | OUTPATIENT
Start: 2022-09-01 | End: 2022-09-02 | Stop reason: HOSPADM

## 2022-09-01 RX ORDER — DEXAMETHASONE SODIUM PHOSPHATE 4 MG/ML
INJECTION, SOLUTION INTRA-ARTICULAR; INTRALESIONAL; INTRAMUSCULAR; INTRAVENOUS; SOFT TISSUE AS NEEDED
Status: DISCONTINUED | OUTPATIENT
Start: 2022-09-01 | End: 2022-09-01 | Stop reason: HOSPADM

## 2022-09-01 RX ORDER — LOSARTAN POTASSIUM 50 MG/1
100 TABLET ORAL DAILY
Status: DISCONTINUED | OUTPATIENT
Start: 2022-09-02 | End: 2022-09-02 | Stop reason: HOSPADM

## 2022-09-01 RX ORDER — FAMOTIDINE 20 MG/1
20 TABLET, FILM COATED ORAL DAILY
Status: DISCONTINUED | OUTPATIENT
Start: 2022-09-01 | End: 2022-09-02 | Stop reason: HOSPADM

## 2022-09-01 RX ORDER — NALOXONE HYDROCHLORIDE 0.4 MG/ML
0.4 INJECTION, SOLUTION INTRAMUSCULAR; INTRAVENOUS; SUBCUTANEOUS AS NEEDED
Status: DISCONTINUED | OUTPATIENT
Start: 2022-09-01 | End: 2022-09-02 | Stop reason: HOSPADM

## 2022-09-01 RX ORDER — FENTANYL CITRATE 50 UG/ML
25 INJECTION, SOLUTION INTRAMUSCULAR; INTRAVENOUS
Status: DISCONTINUED | OUTPATIENT
Start: 2022-09-01 | End: 2022-09-01 | Stop reason: HOSPADM

## 2022-09-01 RX ORDER — POLYETHYLENE GLYCOL 3350 17 G/17G
17 POWDER, FOR SOLUTION ORAL DAILY
Status: DISCONTINUED | OUTPATIENT
Start: 2022-09-02 | End: 2022-09-02 | Stop reason: HOSPADM

## 2022-09-01 RX ORDER — PROPOFOL 10 MG/ML
25 INJECTION, EMULSION INTRAVENOUS
Status: DISCONTINUED | OUTPATIENT
Start: 2022-09-01 | End: 2022-09-01

## 2022-09-01 RX ORDER — MIDAZOLAM HYDROCHLORIDE 1 MG/ML
INJECTION, SOLUTION INTRAMUSCULAR; INTRAVENOUS AS NEEDED
Status: DISCONTINUED | OUTPATIENT
Start: 2022-09-01 | End: 2022-09-01 | Stop reason: HOSPADM

## 2022-09-01 RX ORDER — ONDANSETRON 2 MG/ML
4 INJECTION INTRAMUSCULAR; INTRAVENOUS
Status: DISCONTINUED | OUTPATIENT
Start: 2022-09-01 | End: 2022-09-02 | Stop reason: HOSPADM

## 2022-09-01 RX ORDER — ROPIVACAINE HYDROCHLORIDE 5 MG/ML
30 INJECTION, SOLUTION EPIDURAL; INFILTRATION; PERINEURAL ONCE
Status: DISCONTINUED | OUTPATIENT
Start: 2022-09-01 | End: 2022-09-01 | Stop reason: HOSPADM

## 2022-09-01 RX ORDER — SODIUM CHLORIDE 0.9 % (FLUSH) 0.9 %
5-40 SYRINGE (ML) INJECTION AS NEEDED
Status: DISCONTINUED | OUTPATIENT
Start: 2022-09-01 | End: 2022-09-02 | Stop reason: HOSPADM

## 2022-09-01 RX ORDER — LIDOCAINE HYDROCHLORIDE 20 MG/ML
INJECTION, SOLUTION EPIDURAL; INFILTRATION; INTRACAUDAL; PERINEURAL AS NEEDED
Status: DISCONTINUED | OUTPATIENT
Start: 2022-09-01 | End: 2022-09-01 | Stop reason: HOSPADM

## 2022-09-01 RX ORDER — GLYCOPYRROLATE 0.2 MG/ML
INJECTION INTRAMUSCULAR; INTRAVENOUS AS NEEDED
Status: DISCONTINUED | OUTPATIENT
Start: 2022-09-01 | End: 2022-09-01 | Stop reason: HOSPADM

## 2022-09-01 RX ORDER — HYDROMORPHONE HYDROCHLORIDE 1 MG/ML
0.2 INJECTION, SOLUTION INTRAMUSCULAR; INTRAVENOUS; SUBCUTANEOUS
Status: DISCONTINUED | OUTPATIENT
Start: 2022-09-01 | End: 2022-09-01 | Stop reason: HOSPADM

## 2022-09-01 RX ORDER — SODIUM CHLORIDE, SODIUM LACTATE, POTASSIUM CHLORIDE, CALCIUM CHLORIDE 600; 310; 30; 20 MG/100ML; MG/100ML; MG/100ML; MG/100ML
25 INJECTION, SOLUTION INTRAVENOUS CONTINUOUS
Status: DISCONTINUED | OUTPATIENT
Start: 2022-09-01 | End: 2022-09-01 | Stop reason: HOSPADM

## 2022-09-01 RX ORDER — MELATONIN
1000 DAILY
Status: DISCONTINUED | OUTPATIENT
Start: 2022-09-02 | End: 2022-09-02 | Stop reason: HOSPADM

## 2022-09-01 RX ORDER — AMLODIPINE BESYLATE 5 MG/1
5 TABLET ORAL DAILY
Status: DISCONTINUED | OUTPATIENT
Start: 2022-09-02 | End: 2022-09-02 | Stop reason: HOSPADM

## 2022-09-01 RX ORDER — DICYCLOMINE HYDROCHLORIDE 10 MG/1
10 CAPSULE ORAL
Status: DISCONTINUED | OUTPATIENT
Start: 2022-09-01 | End: 2022-09-02 | Stop reason: HOSPADM

## 2022-09-01 RX ORDER — GABAPENTIN 300 MG/1
300 CAPSULE ORAL
Status: DISCONTINUED | OUTPATIENT
Start: 2022-09-01 | End: 2022-09-02 | Stop reason: HOSPADM

## 2022-09-01 RX ORDER — AMOXICILLIN 250 MG
1 CAPSULE ORAL 2 TIMES DAILY
Status: DISCONTINUED | OUTPATIENT
Start: 2022-09-01 | End: 2022-09-02 | Stop reason: HOSPADM

## 2022-09-01 RX ADMIN — EPHEDRINE SULFATE 10 MG: 50 INJECTION INTRAVENOUS at 11:25

## 2022-09-01 RX ADMIN — SODIUM CHLORIDE, POTASSIUM CHLORIDE, SODIUM LACTATE AND CALCIUM CHLORIDE: 600; 310; 30; 20 INJECTION, SOLUTION INTRAVENOUS at 07:44

## 2022-09-01 RX ADMIN — ONDANSETRON HYDROCHLORIDE 4 MG: 2 INJECTION, SOLUTION INTRAMUSCULAR; INTRAVENOUS at 11:20

## 2022-09-01 RX ADMIN — FAMOTIDINE 20 MG: 20 TABLET ORAL at 17:29

## 2022-09-01 RX ADMIN — CEFAZOLIN 2 G: 1 INJECTION, POWDER, FOR SOLUTION INTRAMUSCULAR; INTRAVENOUS at 15:41

## 2022-09-01 RX ADMIN — ROCURONIUM BROMIDE 5 MG: 10 SOLUTION INTRAVENOUS at 08:05

## 2022-09-01 RX ADMIN — PHENYLEPHRINE HYDROCHLORIDE 20 MCG/MIN: 10 INJECTION INTRAVENOUS at 08:24

## 2022-09-01 RX ADMIN — ROCURONIUM BROMIDE 20 MG: 10 SOLUTION INTRAVENOUS at 08:23

## 2022-09-01 RX ADMIN — FENTANYL CITRATE 50 MCG: 50 INJECTION, SOLUTION INTRAMUSCULAR; INTRAVENOUS at 08:05

## 2022-09-01 RX ADMIN — SODIUM CHLORIDE 100 ML/HR: 9 INJECTION, SOLUTION INTRAVENOUS at 12:30

## 2022-09-01 RX ADMIN — DOCUSATE SODIUM 50MG AND SENNOSIDES 8.6MG 1 TABLET: 8.6; 5 TABLET, FILM COATED ORAL at 17:29

## 2022-09-01 RX ADMIN — DEXAMETHASONE SODIUM PHOSPHATE 4 MG: 4 INJECTION, SOLUTION INTRAMUSCULAR; INTRAVENOUS at 08:25

## 2022-09-01 RX ADMIN — SODIUM CHLORIDE, POTASSIUM CHLORIDE, SODIUM LACTATE AND CALCIUM CHLORIDE 25 ML/HR: 600; 310; 30; 20 INJECTION, SOLUTION INTRAVENOUS at 06:58

## 2022-09-01 RX ADMIN — SODIUM CHLORIDE: 900 INJECTION, SOLUTION INTRAVENOUS at 11:02

## 2022-09-01 RX ADMIN — GLYCOPYRROLATE 0.2 MG: 0.2 INJECTION, SOLUTION INTRAMUSCULAR; INTRAVENOUS at 11:16

## 2022-09-01 RX ADMIN — FENTANYL CITRATE 25 MCG: 50 INJECTION, SOLUTION INTRAMUSCULAR; INTRAVENOUS at 07:27

## 2022-09-01 RX ADMIN — ASPIRIN 325 MG: 325 TABLET, COATED ORAL at 17:29

## 2022-09-01 RX ADMIN — OXYCODONE 10 MG: 5 TABLET ORAL at 22:03

## 2022-09-01 RX ADMIN — GABAPENTIN 300 MG: 300 CAPSULE ORAL at 22:03

## 2022-09-01 RX ADMIN — Medication 80 MCG: at 08:19

## 2022-09-01 RX ADMIN — NEOSTIGMINE METHYLSULFATE 1.5 MG: 1 INJECTION, SOLUTION INTRAVENOUS at 11:16

## 2022-09-01 RX ADMIN — CEFAZOLIN 2 G: 1 INJECTION, POWDER, FOR SOLUTION INTRAMUSCULAR; INTRAVENOUS at 23:54

## 2022-09-01 RX ADMIN — EPHEDRINE SULFATE 10 MG: 50 INJECTION INTRAVENOUS at 08:32

## 2022-09-01 RX ADMIN — ROPIVACAINE HYDROCHLORIDE 25 ML: 5 INJECTION, SOLUTION EPIDURAL; INFILTRATION; PERINEURAL at 07:30

## 2022-09-01 RX ADMIN — HYDROXYZINE HYDROCHLORIDE 10 MG: 10 TABLET ORAL at 23:57

## 2022-09-01 RX ADMIN — PROPOFOL 30 MG: 10 INJECTION, EMULSION INTRAVENOUS at 11:29

## 2022-09-01 RX ADMIN — SODIUM CHLORIDE 100 ML/HR: 9 INJECTION, SOLUTION INTRAVENOUS at 23:35

## 2022-09-01 RX ADMIN — EPHEDRINE SULFATE 5 MG: 50 INJECTION INTRAVENOUS at 08:21

## 2022-09-01 RX ADMIN — PROPOFOL 130 MG: 10 INJECTION, EMULSION INTRAVENOUS at 08:05

## 2022-09-01 RX ADMIN — MIDAZOLAM 1 MG: 1 INJECTION INTRAMUSCULAR; INTRAVENOUS at 07:58

## 2022-09-01 RX ADMIN — SUCCINYLCHOLINE CHLORIDE 120 MG: 20 INJECTION, SOLUTION INTRAMUSCULAR; INTRAVENOUS at 08:05

## 2022-09-01 RX ADMIN — LIDOCAINE HYDROCHLORIDE 60 MG: 20 INJECTION, SOLUTION EPIDURAL; INFILTRATION; INTRACAUDAL; PERINEURAL at 08:05

## 2022-09-01 RX ADMIN — SODIUM CHLORIDE, PRESERVATIVE FREE 10 ML: 5 INJECTION INTRAVENOUS at 14:00

## 2022-09-01 RX ADMIN — WATER 2 G: 1 INJECTION INTRAMUSCULAR; INTRAVENOUS; SUBCUTANEOUS at 08:25

## 2022-09-01 NOTE — ANESTHESIA PREPROCEDURE EVALUATION
Relevant Problems   CARDIOVASCULAR   (+) HTN (hypertension)      RENAL FAILURE   (+) CRI (chronic renal insufficiency)       Anesthetic History     PONV          Review of Systems / Medical History  Patient summary reviewed, nursing notes reviewed and pertinent labs reviewed    Pulmonary            Asthma : well controlled       Neuro/Psych       CVA  TIA     Cardiovascular    Hypertension          Hyperlipidemia    Exercise tolerance: >4 METS  Comments: EKG (08/2022): NSR   GI/Hepatic/Renal               Comments: dysphagia Endo/Other        Arthritis  Pertinent negatives: No diabetes and morbid obesity   Other Findings            Physical Exam    Airway  Mallampati: I  TM Distance: 4 - 6 cm  Neck ROM: normal range of motion   Mouth opening: Normal     Cardiovascular    Rhythm: regular  Rate: normal         Dental  No notable dental hx       Pulmonary  Breath sounds clear to auscultation               Abdominal         Other Findings            Anesthetic Plan    ASA: 3  Anesthesia type: general      Post-op pain plan if not by surgeon: peripheral nerve block single and regional    Induction: Intravenous  Anesthetic plan and risks discussed with: Patient

## 2022-09-01 NOTE — H&P
[facility] PRE-OP HISTORY AND PHYSICAL    Subjective:     Patient is a 81 y.o. female presented with a history of left shoulder pain. Onset of symptoms was gradual with gradually worsening course since that time. She is being admitted for surgical management of this condition.          Patient Active Problem List    Diagnosis Date Noted    Chronic renal disease, stage III 06/03/2022    Dilatation of thoracic aorta ([address] 05/02/2022    Lumbar stenosis with neurogenic claudication 09/14/2021    Rotator cuff arthropathy 01/30/2020    Advance directive in chart 03/26/2019    History of YAG laser capsulotomy of lens of left eye 10/02/2017    Posterior capsular opacification visually significant of right eye 10/02/2017    Posterior vitreous detachment of left eye 10/02/2017    Pseudophakia of both eyes 10/02/2017    Advance directive placed in chart this admission 03/24/2017    CRI (chronic renal insufficiency) 09/06/2016    Primary osteoarthritis involving multiple joints 09/02/2015    Anxiety 09/02/2015    Colitis 03/24/2014    Sleep disturbance 06/27/2013    Diverticulitis 06/27/2013    Hyponatremia 06/11/2013    Hypokalemia 06/11/2013    Environmental allergies 11/06/2012    Rectal bleeding 10/15/2012    HTN (hypertension) 08/10/2012    Hyperlipemia 08/10/2012     Past Medical History:   Diagnosis Date    Anxiety     Arthritis back pain    fingers    Asthma     MILD - NO INHALERS    Chronic pain     Colitis     Diverticulitis 06/11/2013    Hypercholesterolemia     Hypertension     IBS (irritable bowel syndrome)     Ill-defined condition     H/O UTI POST ISLAS CATH    Insomnia     Nausea & vomiting     Stenosis     spinal    Stroke ([address] 1996    tia   PATENT  FORAMEN  OVALE    Swollen L ankle       Past Surgical History:   Procedure Laterality Date    HX CATARACT REMOVAL Bilateral 2009    HX CHOLECYSTECTOMY  04/1997    HX COLONOSCOPY      x3    HX HYSTERECTOMY  1986    HX KNEE ARTHROSCOPY Left 04/1998    HX LUMBAR FUSION 02/2011    L4-L5    HX ORTHOPAEDIC Right     REPAIRED TENDONS ON RIGHT HAND AFTER FALL    HX SHOULDER REPLACEMENT Right 01/2020    HX TONSILLECTOMY  CHILDHOOD    HX WISDOM TEETH EXTRACTION      X3    IR INJ FORAMIN EPID LUMB ANES/STER SNGL  03/21/2022    [place] CARDIAC SURG PROCEDURE UNLIST  08/2004    repair of foramen ovale      Prior to Admission medications    Medication Sig Start Date End Date Taking? Authorizing Provider   cholecalciferol (VITAMIN D3) (1000 Units /25 mcg) tablet Take  by mouth daily. Yes Provider, Historical   LORazepam (ATIVAN) 1 mg tablet TAKE 1/2 TO 1 TABLET       NIGHTLY AS NEEDED FOR      ANXIETY. MAXIMUM DAILY     AMOUNT: 1 TABLET 5/26/22  Yes Michelle Centeno MD   simvastatin (ZOCOR) 20 mg tablet TAKE 1 TABLET DAILY 5/24/22  Yes Michelle Centeno MD   amLODIPine (NORVASC) 5 mg tablet TAKE 1 TABLET DAILY 3/29/22  Yes Michelle Centeno MD   gabapentin (Neurontin) 300 mg capsule Take 2 Capsules by mouth nightly. Max Daily Amount: 600 mg. Patient taking differently: Take 300 mg by mouth nightly. 3/17/22  Yes Michelle Centeno MD   levocetirizine (XYZAL) 5 mg tablet TAKE 1 TABLET DAILY 3/8/22  Yes Michelle Centeno MD   PARoxetine (PAXIL) 30 mg tablet TAKE 1 TABLET DAILY 11/14/21  Yes Michelle Centeno MD   acetaminophen (TYLENOL) 500 mg tablet Take  by mouth every six (6) hours as needed for Pain. Yes Provider, Historical   losartan (COZAAR) 100 mg tablet TAKE 1 TABLET DAILY 8/23/21  Yes Michelle Centeno MD   budesonide (ENTOCORT EC) 3 mg capsule Take 9 mg by mouth daily as needed. 1 capsule by mouth daily 11/21/19  Yes Provider, Historical   dicyclomine (BENTYL) 10 mg capsule Take 10 mg by mouth as needed. 1 tablet by mouth daily as needed   Yes Provider, Historical   MULTIVITAMIN W-MINERALS/LUTEIN (CENTRUM SILVER PO) Take 1 Tab by mouth. Takes one po daily.     Yes Provider, Historical   estradioL (ESTRACE) 0.01 % (0.1 mg/gram) vaginal cream APPLY 1 A SMALL AMOUNT INTO VAGINA THREE TIMES A WEEK , PEA-SIZED AMOUNT FINGERTIP APPLICATION 8/5/93   Provider, Historical   meloxicam (MOBIC) 7.5 mg tablet Take 1 Tablet by mouth daily. Patient taking differently: Take 7.5 mg by mouth daily. 0.5 tab 4/20/22   Hilda Phillips MD     Allergies   Allergen Reactions    Adhesive Other (comments)    Antihistamines - Alkylamine Other (comments)    Cefdinir Other (comments)     Sick to stomach    Ciprofloxacin Diarrhea and Nausea and Vomiting    Flagyl [Metronidazole] Nausea and Vomiting    Other Medication Other (comments)     BANDAIDS - breaks out skin    Oxycodone-Acetaminophen Other (comments)     dizziness    Phenylephrine Other (comments)     Wire her up    Phenylpropanolamine Other (comments)     Wire her up    Sulfa (Sulfonamide Antibiotics) Nausea Only      Social History     Tobacco Use    Smoking status: Never    Smokeless tobacco: Never   Substance Use Topics    Alcohol use: Yes     Alcohol/week: 10.0 standard drinks     Types: 3 Glasses of wine, 7 Cans of beer per week      Family History   Problem Relation Age of Onset    Other Mother         Bernida Huger    Stroke Mother     Cancer Father         PANCREATIC    Anesth Problems Daughter         N/V    No Known Problems Daughter       Review of Systems  A comprehensive review of systems was negative except for that written in the HPI. Objective:     Patient Vitals for the past 8 hrs:   BP Temp Pulse Resp SpO2 Height Weight   09/01/22 0609 (!) 151/76 98.4 °F (36.9 °C) 88 19 97 % 4' 10\" (1.473 m) 134 lb 7.7 oz (61 kg)     Visit Vitals  BP (!) 151/76 (BP 1 Location: Right upper arm, BP Patient Position: At rest)   Pulse 88   Temp 98.4 °F (36.9 °C)   Resp 19   Ht 4' 10\" (1.473 m)   Wt 134 lb 7.7 oz (61 kg)   LMP 01/01/1986   SpO2 97%   BMI 28.11 kg/m²     General:  Alert, cooperative, no distress, appears stated age. Head:  Normocephalic, without obvious abnormality, atraumatic. Eyes:  Conjunctivae/corneas clear.  PERRL, EOMs intact. Fundi benign. Ears:  Normal TMs and external ear canals both ears. Nose: Nares normal. Septum midline. Mucosa normal. No drainage or sinus tenderness. Throat: Lips, mucosa, and tongue normal. Teeth and gums normal.   Neck: Supple, symmetrical, trachea midline, no adenopathy, thyroid: no enlargement/tenderness/nodules, no carotid bruit and no JVD. Back:   Symmetric, no curvature. ROM normal. No CVA tenderness. Lungs:   Clear to auscultation bilaterally. Chest wall:  No tenderness or deformity. Heart:  Regular rate and rhythm, S1, S2 normal, no murmur, click, rub or gallop. Abdomen:   Soft, non-tender. Bowel sounds normal. No masses,  No organomegaly. Extremities: Left shoulder: pain with limited ROM. NVI   Pulses: 2+ and symmetric all extremities. Skin: Skin color, texture, turgor normal. No rashes or lesions. Lymph nodes: Cervical, supraclavicular, and axillary nodes normal.   Neurologic: CNII-XII intact. Normal strength, sensation and reflexes throughout. Assessment:     Active Problems:    * No active hospital problems. *      Plan:     The various methods of treatment have been discussed with the patient and family. After consideration of risks, benefits and other options for treatment, the patient has consented to surgical intervention. Risks of infection, DVT, PE, MI, CVA and unforeseen events described to the patient. Additionally discussed the possibility of not being able to resolve all preop pain. Patient understands metal and plastic will be implanted in the body and understands the potential for revision surgery. Patient wishes to proceed with elective surgery.

## 2022-09-01 NOTE — PERIOP NOTES
Attempted to update the patient's friend Moris Jordan) twice, but there was no response and no message left.

## 2022-09-01 NOTE — ANESTHESIA POSTPROCEDURE EVALUATION
Procedure(s):  LEFT REVERSE TOTAL SHOULDER ARTHROPLASTY. general    Anesthesia Post Evaluation      Multimodal analgesia: multimodal analgesia used between 6 hours prior to anesthesia start to PACU discharge  Patient location during evaluation: PACU  Patient participation: complete - patient participated  Level of consciousness: awake and alert  Pain management: adequate  Airway patency: patent  Anesthetic complications: no  Cardiovascular status: acceptable, hemodynamically stable and blood pressure returned to baseline  Respiratory status: acceptable and room air  Hydration status: euvolemic  Post anesthesia nausea and vomiting:  none  Final Post Anesthesia Temperature Assessment:  Normothermia (36.0-37.5 degrees C)      INITIAL Post-op Vital signs:   Vitals Value Taken Time   /68 09/01/22 1230   Temp 36.2 °C (97.2 °F) 09/01/22 1215   Pulse 78 09/01/22 1231   Resp 15 09/01/22 1231   SpO2 93 % 09/01/22 1231   Vitals shown include unvalidated device data.

## 2022-09-01 NOTE — DISCHARGE INSTRUCTIONS
Postoperative Instructions    Medications/Diet    Eat only light, non-greasy foods today  Take pain medicine with food  While taking pain medicines, you may not operate a vehicle, heavy machinery, or appliances  While taking pain medicines, you may not drink alcoholic beverages  While taking pain medicines, you may not make critical decisions or sign legal papers  If you have any reactions to your medicines, stop taking them and call my office immediately  If you are not allergic, take one 325mg aspirin twice a day to help prevent blood clots  Please keep in mind that constipation is a very common side effect of taking narcotic pain medication. We recommend that patients take precautions to prevent constipation:  Drink plenty of water (6-8 glasses of 8 oz. a day)  Avoid alcohol, caffeine, and dairy products  Eat plenty of fiber (fruits, vegetables and whole grains)  Take an over the counter stool softener (colace or dulcolax)          Activity/Exercise    You may move the arm as directed by physical therapist  You may take arm out of sling and move elbow as necessary. Must wear sling while out of the house and while sleeping  You may change dressing daily. If no drainage, you may leave dressing off. You may shower on post operative day #7  Please keep ice applied to the shoulder for the first 72 hours or as long as pain or swelling persist. Do not apply ice directly to skin, or allow water to leak on your dressing    Emergency/Follow-Up    Please notify my office at [phone] if you develop any fever (101° or above), unexpected warmth, redness or swelling in your shoulder  Please call if your fingers/toes become cold, purple, numb, or there is excessive bleeding  Please call the office within 24 hours at [phone] ([phone]) to schedule a follow up appointment next week  Please call the office before 3pm on Friday if you do not have enough pain medicine for the weekend.  Narcotic pain medication cannot be called into your pharmacy and the prescription must be picked up at our office

## 2022-09-01 NOTE — OP NOTES
[address] [facility] [place]  OPERATIVE REPORT    Name:  Easton Brownlee  MR#:  [id number]  :  1935  ACCOUNT #:  [de-identified]  DATE OF SERVICE:  2022    PREOPERATIVE DIAGNOSIS:  Rotator cuff arthropathy of the left shoulder with [place] IV changes. POSTOPERATIVE DIAGNOSIS:  Rotator cuff arthropathy of the left shoulder with [place] IV changes. PROCEDURE PERFORMED:  Reverse total shoulder arthroplasty with bone grafting of the glenoid, left shoulder. SURGEON:  Neris Gaitan MD    ASSISTANTS:  Ebony.  Yolette Castaneda. [place], DO.    ANESTHESIA:  General with interscalene block. COMPLICATIONS:  Negative. PREOPERATIVE MEDICINE:  2 g Ancef. SPECIMENS REMOVED:  Bone discarded. IMPLANTS:  Exactech Press-Fit size 9 stem with a +0 baseplate and a +0 poly. The glenoid was a extended small metaglene with all four holes filled with 4.5 self-tapping screw, three with excellent purchase and one with good purchase. The glenosphere was a 36 mm glenosphere. ESTIMATED BLOOD LOSS:  Less than 200 mL. IV FLUIDS:  Crystalloids were given. INDICATIONS/HISTORY:  The patient is an 81-year-old, who presented to my office with years of shoulder pain. This is thee patient I took care of about quite some time ago for her right shoulder. She had rotator cuff arthropathy. The right shoulder did very well in regards to the recovery. She came to the office wanting to proceed with a reverse total shoulder of the left as well. We had a long conversation with the patient. We certainly talked about her age of 81. While this is her chronological age, physiologically she is younger. However, we did have a conversation about complications that can occur in this age category. I refreshed her memory in regards to the risks and benefits of the surgery.   We talked about the fracture dislocation, infection, numbness and then tingling, lack of full improvement of her range of motion, DVT, PE, MI, CVA, and other unforeseen events could occur in the perioperative fashion. The patient understood this. The patient understood metal and plastic will be implanted to the body. Lastly, we described the postoperative course. She was familiar with this. She received preoperative medical clearance. She wished to proceed, signed consent, and was taken to surgery. PROCEDURE:  The patient was taken back to surgery and placed supine on the operative table, administered general anesthetic through endotracheal intubation. After adequate anesthetic, the patient was placed in a beach chair position with all bony prominences protected. Left upper extremity was sterilely prepped and draped in the typical orthopedic fashion. The patient received 2 g of Ancef. Time-out was performed. All parties agreed the right shoulder was the correct shoulder. Following the time-out, an anterior incision was made with a 10-blade. Sharp dissection was taken down to the skin and dermis, and blunt dissection was taken down to the cephalic vein. The knife and the handle were passed off the table after the cephalic vein was identified. The deltopectoral interval was exploited. The adhesions in the subdeltoid region were taken down. A pseudocapsule had formed from chronic rupture of the rotator cuff. This was removed. A portion of the pec was taken down for exposure purposes. The subscap was released from the scarred clavipectoral fascia along the short head of the biceps. The circumflex vasculature was tied off with 2-0 Vicryl. The rotator cuff interval was identified and two stay stitches of Ethibond were placed in the subscap. An arthrotomy was made in standard fashion. Inferior dissection was performed along the proximal humerus and the neck. The shoulder was externally rotated. The osteophytes were removed. The shoulder was able to be dislocated out of the joint.   It was tight because of the superior migration, but once we got the capsule release, we were able to bring the humerus back down and finally dislocated into the wound. A neck shaft angle was created. The head was cut in standard fashion. The IM canal of the humerus was entered with a reamer and dilated up to 10. However, the broach of 9 fit the patient better. A manhole cover was placed over the broach. The shoulder was posteriorly dislocated. We did do a preoperative CT scan and recognized that this patient had a type B2 deformity and almost a type C deformity. Therefore, we had prepared for potential bone grafting in this situation. The outrigger for the navigation system was attached to the coracoid. We mapped out all bony landmarks. The anterior capsule of the subscap was taken down. The posterior glenoid retractor was deployed and an anterior retractor was deployed. We then released the capsule along the anterior-inferior glenoid and all the way to the posterior inferior glenoid. Full vision of the glenoid was identified. A rongeur was used to take down some of the osteophytes anteriorly. The center of the vault was identified and a  hole was made. We then reamed according to our preoperative templating. After we were satisfied with the ream, we then moved for the grafting of the posterior-superior aspect of the glenoid as this is an area where the humeral head had eroded into the glenoid. Bone wax was packed into this location, so that we could get an imprint of this defect. This was then mapped over on the proximal humerus head and we used a bone saw to shape the defect. We were able to then seat this into this location. A 2-mm guide pin was then advanced to microfracture of the glenoid and then once the graft was seated in the right location, it was pinned superiorly. A bur was then used to bur down any of the rough edges to match our original ream site.   At that time, we were able to locate the center ream and our center ream was created. We are also able to place our metaglene in location and then removed the pin. The graft stayed in place during this process. The inferior screw was drilled and filled followed by the superior screw, which also had penetration into our graft. The posterior inferior was drilled and filled as well. This also had capture into our graft. We filled all with three 4.5 cortical screws, three with great bites and one with good. The wound was irrigated  with Irrisept followed by pulse irrigation. We irrigated this all through again and placed 80 mg of gentamicin in the inferior capsule. The 36 mm standard head was then placed over the metaglene and the set screw was advanced. Shoulder was brought back into the wound. Due to the superior migration of the humeral head, it was not unusual to have to take more neck, so a saw blade was used to remove about three more millimeters of the neck. We rebroached in calcar plane. Happy with the reduction with a +0. We then opened the implant. We irrigated the proximal humerus with Irrisept and pulse irrigation and then dried. The size 9 was impacted. We did experience a small calcar break in the anterior-inferior quadrant. This propagated about 7 mm off the face and did not go in the metadiaphyseal aspect of the proximal humerus. The implant was completely stable. A +0 baseplate was attached followed by the +0 poly. The shoulder was reduced once again and had full range of motion and was the appropriate sizing. The wound was irrigated with Irrisept and pulse irrigation. The subscap was repaired using #2 FiberWire figure-of-eight interrupted fashion. The pec was repaired using #2 FiberWire. A #1 Vicryl was used to close the deltopectoral interval.  2-0 Vicryl was placed in the subcutaneous tissue and running 4-0 Monocryl was placed in the epidermis. Steri-Strips were applied over the incision. A sterile dry dressing was placed over the shoulder.   The patient was placed in an immobilizer, awakened from general anesthetic in stable condition, and transferred to the recovery room. CAMILO Francois, was my first assist during this surgery. She was integral with positioning the patient. She also helped with retractor and application of the implant. She also helped with closure of the wound, application of the immobilizer, and transportation to the recovery room.       Chadd Graves MD      [id number]  D:  09/01/2022 11:26  T:  09/01/2022 12:12  JOB #:  [id number]

## 2022-09-01 NOTE — PERIOP NOTES
Deann Carlin was updated by XANDER Lake RN--informed of surgery start time and patient's well-being.

## 2022-09-01 NOTE — PROGRESS NOTES
Occupational Therapy  09/01/22    Orders received, chart review completed. Note patient POD #0 sp reverse L TSA, not indicated as a fast track at this time (not d/c order for tomorrow). OT will follow up tomorrow for evaluation. Recommend OOB to chair three times a day for meals, self-completion of ADLs as able and medically stable.      Thank you,   James Edwards, OTD, OTR/L

## 2022-09-01 NOTE — PERIOP NOTES
Irrisept used for irrigation during the procedure  Ref # [id number]  Lot # [id number]  Exp.  Date 5/31/2024

## 2022-09-01 NOTE — ANESTHESIA PROCEDURE NOTES
Peripheral Block    Start time: 9/1/2022 7:25 AM  End time: 9/1/2022 7:35 AM  Performed by: Percy Majano MD  Authorized by: Percy Majano MD       Pre-procedure: Indications: at surgeon's request and post-op pain management    Preanesthetic Checklist: patient identified, risks and benefits discussed, site marked, timeout performed, anesthesia consent given and patient being monitored    Timeout Time: 07:24 EDT      Block Type:   Block Type: Interscalene  Laterality:  Left  Monitoring:  Standard ASA monitoring, continuous pulse ox, frequent vital sign checks, heart rate, responsive to questions and oxygen  Injection Technique:  Single shot  Procedures: ultrasound guided    Patient Position: supine  Prep: chlorhexidine    Location:  Interscalene  Needle Type:  Stimuplex  Needle Gauge:  22 G  Needle Localization:  Ultrasound guidance and nerve stimulator  Motor Response comment:   Motor Response: minimal motor response >0.4 mA    Medication Injected:  Ropivacaine (PF) (NAROPIN)(0.5%) 5 mg/mL injection - Peripheral Nerve Block   25 mL - 9/1/2022 7:30:00 AM  Med Admin Time: 9/1/2022 7:30 AM    Assessment:  Number of attempts:  1  Injection Assessment:  Incremental injection every 5 mL, local visualized surrounding nerve on ultrasound, negative aspiration for blood, no intravascular symptoms, no paresthesia and ultrasound image on chart  Patient tolerance:  Patient tolerated the procedure well with no immediate complications  Under ultrasound guidance, a 22 gauge needle was inserted and placed in close proximity to the nerve. Ultrasound was also used to visualize the spread of the anesthetic in close proximity to the nerve being blocked. The nerve appeared anatomicaly normal.  A permanent ultrasound image was saved in the patient's record.

## 2022-09-01 NOTE — BRIEF OP NOTE
Brief Postoperative Note    Patient: Sebastien Alexander  YOB: 1935  MRN: [id number]    Date of Procedure: 9/1/2022     Pre-Op Diagnosis: ROATOR CUFF ARTHROPATHY LEFT SHOULDER    Post-Op Diagnosis: Same as preoperative diagnosis. Procedure(s):  LEFT REVERSE TOTAL SHOULDER ARTHROPLASTY    Surgeon(s): MD Tuan Cabrales DO    Surgical Assistant: Physician Assistant: Shaun Jackson PA-C    Anesthesia: General with interscalene block    Estimated Blood Loss (mL): 883     Complications: None    Specimens: bone discarded    Implants:   Implant Name Type Inv.  Item Serial No.  Lot No. LRB No. Used Action   Equinoxe small reverse shoulder small reverse glenoid baseplate 10 mm extended cage peg Joint Component  [id number] [employer] NA Left 1 Implanted   KIT BNE SCR L30MM DIA4.5MM NANCY SHLDR JANIE COMPR NORMA CAP REV - [id number]  KIT BNE SCR L30MM DIA4.5MM NANCY SHLDR JANIE COMPR NORMA CAP REV [id number] [employer]_WD NA Left 1 Implanted   KIT BNE SCR L30MM DIA4.5MM NANCY SHLDR JANIE COMPR NORMA CAP REV - [id number]  KIT BNE SCR L30MM DIA4.5MM NANCY SHLDR JANIE COMPR NORMA CAP REV [id number] [employer]_WD NA Left 1 Implanted   KIT BNE SCR L30MM DIA4.5MM NANCY SHLDR JANIE COMPR NORMA CAP REV - [id number]  KIT BNE SCR L30MM DIA4.5MM NANCY SHLDR JANIE COMPR NORMA CAP REV [id number] [employer]_WD NA Left 1 Implanted   EQUNIOXE SMALL REVERSE SHOULDER SMALL REVERSE GLENOSPHERE   [id number] [employer]_WD NA Left 1 Implanted   KIT BNE SCR L30MM DIA4.5MM NANCY SHLDR JANIE COMPR NORMA CAP REV - [id number]  KIT BNE SCR L30MM DIA4.5MM NANCY SHLDR JANIE COMPR NORMA CAP REV [id number] [employer]_WD NA Left 1 Implanted   SCREW BNE GLENOSPHERE SHLDR ДМИТРИЙ NORMA REV EQUINOXE - [id number]  SCREW BNE GLENOSPHERE SHLDR ДМИТРИЙ NORMA REV EQUINOXE [id number] [employer]_WD NA Left 1 Implanted   SCR TORQUE DEFINING KIT -- EQUINOXE - [id number]  SCR TORQUE DEFINING KIT -- EQUINOXE [id number] [facility] NA Left 1 Implanted   STEM HUM DIA9MM SHLDR ДМИТРИЙ PRESSFIT EQUINOXE - [id number]  STEM HUM DIA9MM SHLDR ДМИТРИЙ PRESSFIT EQUINOXE [id number] [employer]_WD NA Left 1 Implanted   EQUINOXE REVERSE 36MM HUMERAL LINER +0 - [id number]  EQUINOXE REVERSE 36MM HUMERAL LINER +0 [id number] [employer]_WD NA Left 1 Implanted   TRAY HUM ADPT +0MM OFFSET STD POLY FOR RVS SHLDR SYS - [id number]  TRAY HUM ADPT +0MM OFFSET STD POLY FOR RVS SHLDR SYS [id number] [employer]_WD NA Left 1 Implanted         Findings: see note    Electronically Signed by Bob Santiago MD on 9/1/2022 at 11:27 AM

## 2022-09-01 NOTE — PERIOP NOTES
TRANSFER - OUT REPORT:    Verbal report given to Shaun Brannon RN on Luke Krause  being transferred to [address] [facility] for routine post - op       Report consisted of patients Situation, Background, Assessment and   Recommendations(SBAR). Time Pre op antibiotic given:8:25  Anesthesia Stop time: 11:46    Information from the following report(s) SBAR, Kardex, Procedure Summary, Intake/Output, and Cardiac Rhythm NSR  was reviewed with the receiving nurse. Opportunity for questions and clarification was provided. Is the patient on 02? YES       L/Min 2    Is the patient on a monitor? NO    Is the nurse transporting with the patient? NO    Surgical Waiting Area notified of patient's transfer from PACU? YES      The following personal items collected during your admission accompanied patient upon transfer:   Dental Appliance: Dental Appliances: None  Vision: Visual Aid: Glasses  Hearing Aid:    Jewelry: Jewelry: None  Clothing: Clothing: At bedside (Clothing/belonging bag returned in PACU)  Other Valuables: Other Valuables:  Other (comment), At bedside (Multicolor bag returned in PACU at bedside with clothing bag.)  Valuables sent to safe:

## 2022-09-01 NOTE — PROGRESS NOTES
Pepcid dose adjustment  Current regimen:  20 mg po bid    Est CrCl: ~31 ml/min    Plan: Change to 20 mg daily for CrCl < 50 ml/min

## 2022-09-02 VITALS
HEIGHT: 58 IN | BODY MASS INDEX: 28.23 KG/M2 | HEART RATE: 83 BPM | OXYGEN SATURATION: 93 % | TEMPERATURE: 98 F | RESPIRATION RATE: 16 BRPM | WEIGHT: 134.48 LBS | DIASTOLIC BLOOD PRESSURE: 54 MMHG | SYSTOLIC BLOOD PRESSURE: 100 MMHG

## 2022-09-02 PROCEDURE — 97165 OT EVAL LOW COMPLEX 30 MIN: CPT

## 2022-09-02 PROCEDURE — 97535 SELF CARE MNGMENT TRAINING: CPT

## 2022-09-02 PROCEDURE — 74011250637 HC RX REV CODE- 250/637: Performed by: ORTHOPAEDIC SURGERY

## 2022-09-02 RX ADMIN — BUDESONIDE 9 MG: 3 CAPSULE, GELATIN COATED ORAL at 08:39

## 2022-09-02 RX ADMIN — Medication 1000 UNITS: at 08:31

## 2022-09-02 RX ADMIN — FAMOTIDINE 20 MG: 20 TABLET ORAL at 08:31

## 2022-09-02 RX ADMIN — ASPIRIN 325 MG: 325 TABLET, COATED ORAL at 08:31

## 2022-09-02 RX ADMIN — CETIRIZINE HYDROCHLORIDE 10 MG: 10 TABLET ORAL at 08:31

## 2022-09-02 RX ADMIN — OXYCODONE 5 MG: 5 TABLET ORAL at 12:59

## 2022-09-02 RX ADMIN — POLYETHYLENE GLYCOL 3350 17 G: 17 POWDER, FOR SOLUTION ORAL at 08:30

## 2022-09-02 RX ADMIN — OXYCODONE 10 MG: 5 TABLET ORAL at 10:01

## 2022-09-02 RX ADMIN — DOCUSATE SODIUM 50MG AND SENNOSIDES 8.6MG 1 TABLET: 8.6; 5 TABLET, FILM COATED ORAL at 08:31

## 2022-09-02 RX ADMIN — LOSARTAN POTASSIUM 100 MG: 50 TABLET, FILM COATED ORAL at 08:31

## 2022-09-02 RX ADMIN — OXYCODONE 10 MG: 5 TABLET ORAL at 06:05

## 2022-09-02 RX ADMIN — PAROXETINE HYDROCHLORIDE 30 MG: 20 TABLET, FILM COATED ORAL at 08:31

## 2022-09-02 RX ADMIN — ATORVASTATIN CALCIUM 20 MG: 10 TABLET, FILM COATED ORAL at 08:31

## 2022-09-02 NOTE — ROUTINE PROCESS
Bedside shift change report given to Debbi AguirreRN (oncoming nurse) by Raffaele Ha RN(offgoing nurse). Report given with SBAR.

## 2022-09-02 NOTE — PROGRESS NOTES
POD 1 Day Post-Op    Procedure:  Procedure(s):  LEFT REVERSE TOTAL SHOULDER ARTHROPLASTY    Subjective:     Patient doing well. Pain controlled    Objective:     Blood pressure (!) 154/77, pulse 82, temperature 97.3 °F (36.3 °C), resp. rate 15, height 4' 10\" (1.473 m), weight 134 lb 7.7 oz (61 kg), last menstrual period 1986, SpO2 95 %. Temp (24hrs), Av.4 °F (36.3 °C), Min:97 °F (36.1 °C), Max:97.9 °F (36.6 °C)      Physical Exam:  Examination of the left shoulder reveals that the incision is clean, dry and intact. Sensation is intact to light touch.  mild swelling. Moving all digits distally    Labs:   Lab Results   Component Value Date/Time    HGB 12.7 2022 03:50 PM         Assessment:     Active Problems:    Left rotator cuff tear arthropathy (2022)        Plan/Recommendations/Medical Decision Making:       Doing well.   Discharge today

## 2022-09-02 NOTE — PROGRESS NOTES
Problem: Self Care Deficits Care Plan (Adult)  Goal: *Acute Goals and Plan of Care (Insert Text)  Description: FUNCTIONAL STATUS PRIOR TO ADMISSION: Patient was independent and active without use of DME. Prior TSA    HOME SUPPORT PRIOR TO ADMISSION: The patient lived with  but did not require assist.    Occupational Therapy Goals  Initiated 9/2/2022     1. Patient will don/doff arm sling at supervision/set-up level within 4 days. 2.  Patient will perform LUE shoulder exercises per physician order with supervision/set-up within 4 days. 3.  Patient will perform upper body ADLs with supervision/set-up  within 4 days. 4. Patient will perform lower body dressing with minimal assistance/contact guard assist within 4 days. 5.  Patient will perform toilet transfers with minimal assistance/contact guard assist  within 4 days. 6.  Patient will verbalize/demonstrate shoulder precautions during ADLs with 100% accuracy within 4 days. Outcome: Progressing Towards Goal   OCCUPATIONAL THERAPY EVALUATION  Patient: Bernard Iverson (89 y.o. female)  Date: 9/2/2022  Primary Diagnosis: Left rotator cuff tear arthropathy [M75.102, M12.812]  Procedure(s) (LRB):  LEFT REVERSE TOTAL SHOULDER ARTHROPLASTY (Left) 1 Day Post-Op   Precautions: L TSA       ASSESSMENT  Based on the objective data described below, the patient presents with impaired strength and ROM s/p L reverse TSA. Discussed ADL/environmental modifications to maximize ADL independence/safety at home, patient verbalized understanding and demo'd good carryover. Reviewed sling and tarik dressing techniques with patient and daughter. Patient left seated EOB with family present, all needs in reach, NAD. Patient is cleared for discharge from OT perspective when medically stable. Current Level of Function Impacting Discharge (ADLs/self-care): up to min A ADL    Functional Outcome Measure: The patient scored 90/100 on the Barthel index outcome measure. Other factors to consider for discharge: below baseline, good family support     Patient will benefit from skilled therapy intervention to address the above noted impairments. PLAN :  Recommendations and Planned Interventions: self care training, functional mobility training, therapeutic exercise, balance training, therapeutic activities, endurance activities, patient education, home safety training, and family training/education    Frequency/Duration: Patient will be followed by occupational therapy 5 times a week to address goals. Recommendation for discharge: (in order for the patient to meet his/her long term goals)  Occupational therapy at least 2 days/week in the home     This discharge recommendation:  Has been made in collaboration with the attending provider and/or case management    IF patient discharges home will need the following DME: patient owns DME required for discharge       SUBJECTIVE:   Patient stated This should be easier since I can use my R hand now.     OBJECTIVE DATA SUMMARY:   HISTORY:   Past Medical History:   Diagnosis Date    Anxiety     Arthritis back pain    fingers    Asthma     MILD - NO INHALERS    Chronic pain     Colitis     Diverticulitis 06/11/2013    Hypercholesterolemia     Hypertension     IBS (irritable bowel syndrome)     Ill-defined condition     H/O UTI POST ISLAS CATH    Insomnia     Nausea & vomiting     Stenosis     spinal    Stroke ([facility] [address] 1996    tia   PATENT  FORAMEN  OVALE    Swollen L ankle      Past Surgical History:   Procedure Laterality Date    HX CATARACT REMOVAL Bilateral 2009    HX CHOLECYSTECTOMY  04/1997    HX COLONOSCOPY      x3    HX HYSTERECTOMY  1986    HX KNEE ARTHROSCOPY Left 04/1998    HX LUMBAR FUSION  02/2011    L4-L5    HX ORTHOPAEDIC Right     REPAIRED TENDONS ON RIGHT HAND AFTER FALL    HX SHOULDER REPLACEMENT Right 01/2020    HX TONSILLECTOMY  CHILDHOOD    HX WISDOM TEETH EXTRACTION      X3    IR INJ FORAMIN EPID LUMB ANES/STER SNGL  03/21/2022    [place] CARDIAC SURG PROCEDURE UNLIST  08/2004    repair of foramen ovale       Expanded or extensive additional review of patient history:     Home Situation  Home Environment: Private residence  # Steps to Enter: 3  Rails to Enter: Yes  Hand Rails : Bilateral  One/Two Story Residence: Two story, live on 1st floor  Living Alone: No  Support Systems: Spouse/Significant Other  Patient Expects to be Discharged to[de-identified] Home  Current DME Used/Available at Home: Cane, straight, Commode, bedside, Walker, rolling, Grab bars, Shower chair  Tub or Shower Type: Shower    Hand dominance: Right    EXAMINATION OF PERFORMANCE DEFICITS:  Cognitive/Behavioral Status:  Neurologic State: Alert  Orientation Level: Oriented X4  Cognition: Appropriate decision making; Follows commands  Perception: Appears intact  Perseveration: No perseveration noted  Safety/Judgement: Awareness of environment; Fall prevention    Skin: Surgical dressing C/D/I    Edema: mild BUE hands    Hearing: Auditory  Auditory Impairment: None    Vision/Perceptual:                           Acuity: Within Defined Limits         Range of Motion:  AROM: Generally decreased, functional  PROM: Generally decreased, functional                      Strength:  Strength: Generally decreased, functional                Coordination:  Coordination: Generally decreased, functional  Fine Motor Skills-Upper: Right Intact; Left Intact    Gross Motor Skills-Upper: Left Impaired;Right Intact    Tone & Sensation:  Tone: Normal  Sensation: Intact                      Balance:  Sitting: Intact  Standing: Intact    Functional Mobility and Transfers for ADLs:  Bed Mobility:  Supine to Sit: Supervision  Sit to Supine: Supervision    Transfers:  Sit to Stand: Supervision  Stand to Sit: Supervision    ADL Assessment:  Feeding: Supervision    Oral Facial Hygiene/Grooming: Supervision    Bathing: Supervision         Upper Body Dressing: Supervision    Lower Body Dressing: Supervision    Toileting: Supervision                ADL Intervention and task modifications:  Patient instructed and demonstrated shoulder precautions during ADLs with verbal cues. Feeding  Feeding Assistance: Set-up              Patient instructed and indicated understanding propping surgical arm on surface, can back away from arm in order to access axilla to wash, dry, and deodorize. Patient instructed and indicated understanding dress Right first/undress last. Patient instructed and indicated understanding of compensatory strategies to don sling. Upper Body Dressing Assistance  Pullover Shirt: Minimum assistance; Compensatory technique training  Cues: Physical assistance;Verbal cues provided    Lower Body Dressing Assistance  Underpants: Minimum assistance  Socks: Minimum assistance  Leg Crossed Method Used: Yes  Position Performed: Seated edge of bed         Cognitive Retraining  Safety/Judgement: Awareness of environment; Fall prevention    Therapeutic Exercise:    EXERCISE   Sets   Reps   Active Active Assist   Passive   Comments   Shoulder Flexion   []                          []                          []                             Shoulder external rotation   (to neutral)   []                          []                          []                             Elbow flexion/extension   []                          []                          []                             Wrist flexion/extension   []                          []                          []                             Finger flexion/extension   []                          []                          []                             Pendulum      []     []     []          Functional Measure:    Barthel Index:  Bathin  Bladder: 10  Bowels: 10  Groomin  Dressin  Feeding: 10  Mobility: 15  Stairs: 10  Toilet Use: 10  Transfer (Bed to Chair and Back): 15  Total: 90/100      The Barthel ADL Index: Guidelines  1.  The index should be used as a record of what a patient does, not as a record of what a patient could do. 2. The main aim is to establish degree of independence from any help, physical or verbal, however minor and for whatever reason. 3. The need for supervision renders the patient not independent. 4. A patient's performance should be established using the best available evidence. Asking the patient, friends/relatives and nurses are the usual sources, but direct observation and common sense are also important. However direct testing is not needed. 5. Usually the patient's performance over the preceding 24-48 hours is important, but occasionally longer periods will be relevant. 6. Middle categories imply that the patient supplies over 50 per cent of the effort. 7. Use of aids to be independent is allowed. Score Interpretation (from [facility])    Independent   60-79 Minimally independent   40-59 Partially dependent   20-39 Very dependent   <20 Totally dependent     -Grant Marques., BarthelDIDIW. (1965). Functional evaluation: the Barthel Index. [facility] ([address] [facility] [address] (1997). The Barthel activities of daily living index: self-reporting versus actual performance in the old (> or = 75 years). Journal of [address] [place] [address] 45(7), [facility], ..M.F, Royal Ansari., Oscar Shane. (1999). Measuring the change in disability after inpatient rehabilitation; comparison of the responsiveness of the Barthel Index and Functional Dodge Measure. Journal of Neurology, Neurosurgery, and Psychiatry, 66(4), 180-873. Trudy Sung N.J.A, RAMANDEEP Mares, & Lester Perez MERIK. (2004) Assessment of post-stroke quality of life in cost-effectiveness studies: The usefulness of the Barthel Index and the EuroQoL-5D.  Quality of Life Research, 15, 007-17       Occupational Therapy Evaluation Charge Determination   History Examination Decision-Making   MEDIUM Complexity : Expanded review of history including physical, cognitive and psychosocial  history  LOW Complexity : 1-3 performance deficits relating to physical, cognitive , or psychosocial skils that result in activity limitations and / or participation restrictions  LOW Complexity : No comorbidities that affect functional and no verbal or physical assistance needed to complete eval tasks       Based on the above components, the patient evaluation is determined to be of the following complexity level: LOW   Pain Rating:  Mild with movement    Activity Tolerance:   Fair and requires rest breaks    After treatment patient left in no apparent distress:    Call bell within reach, Caregiver / family present, and seated EOB    COMMUNICATION/EDUCATION:   The patients plan of care was discussed with: Physical therapist and Registered nurse. Home safety education was provided and the patient/caregiver indicated understanding., Patient/family have participated as able in goal setting and plan of care. , and Patient/family agree to work toward stated goals and plan of care. This patients plan of care is appropriate for delegation to [facility].     Thank you for this referral.  Eduard Hammond OT  Time Calculation: 45 mins

## 2022-09-06 ENCOUNTER — PATIENT OUTREACH (OUTPATIENT)
Dept: CASE MANAGEMENT | Age: 87
End: 2022-09-06

## 2022-09-06 NOTE — PROGRESS NOTES
Care Transitions Initial Call    Call within 2 business days of discharge: Yes     Patient: Bay Car Patient : 1935 MRN: [id number]    Last Discharge [address]       Date Complaint Diagnosis Description Type Department Provider    22  Left rotator cuff tear arthropathy Admission (Discharged) Sharif Faust MD            Was this an external facility discharge? No     Challenges to be reviewed by the provider   Additional needs identified to be addressed with provider:   [place] PT twice per week  Pain controlled with APAP 1000 mg BID-TID  Pt wanted to know when she can resume Mobic. Currently not taking Oxycodone       Method of communication with provider : none    Discussed COVID-19 related testing which was not done at this time. Advance Care Planning:   Does patient have an Advance Directive: yes, reviewed and current. Inpatient Readmission Risk score: Unplanned Readmit Risk Score: 6.8    Was this a readmission? no   Patient stated reason for the admission: planned surgery    Patients top risk factors for readmission:  rotator cuff tear    Interventions to address risk factors: Scheduled appointment with Specialist- and Obtained and reviewed discharge summary and/or continuity of care documents    Care Transition Nurse (CTN) contacted the patient by telephone to perform post hospital discharge assessment. Verified name and  with patient as identifiers. Provided introduction to self, and explanation of the CTN role. Reports doing well. Denies shoulder pain, chest pain, worsening sob, fever, evidence of infection, bleeding. Poor appetite, but she is eating. Reviewed the importance of protein consumption to promote wound healing. Daughter performs dressing changes. No bladder, bowel concerns. CTN reviewed discharge instructions, medical action plan and red flags with patient who verbalized understanding. Were discharge instructions available to patient? yes.  Reviewed appropriate site of care based on symptoms and resources available to patient including: PCP, Specialist, and Home Health. Patient given an opportunity to ask questions and does not have any further questions or concerns at this time. The patient agrees to contact the PCP office for questions related to their healthcare. Medication reconciliation was performed with patient, who verbalizes understanding of administration of home medications. Referral to Pharm D needed: no     Home Health/Outpatient orders at discharge: home health care, PT, and none  At [address] [place] [address] ordered at discharge: None    Was patient discharged with a pulse oximeter? no    Discussed follow-up appointments. If no appointment was previously scheduled, appointment scheduling offered: no. Is follow up appointment scheduled within 7 days of discharge? yes. [address] follow up appointment(s):   Future Appointments   Date Time Provider Marty Josue   9/8/2022  2:40 PM [facility]   9/13/2022  8:20 AM Rohini Joe MD [facility]   11/2/2022 10:00 AM Seth Cruz MD [facility] follow up appointment(s): NA    Plan for follow-up call in 7-10 days based on severity of symptoms and risk factors. Plan for next call: self management-pain, follow up appointment-surgeon, medication management-mobic, and community resources- PT  CTN provided contact information for future needs. Goals Addressed                   This Visit's Progress     Prevent complications post hospitalization.           09/06/22  No falls  Appetite will improve  Will monitor for signs of infection  Will participate in [place] PT to improve strength to L shoulder  Will attend follow up appt with Surgeon scheduled 9/8  Pt will remain out of the hospital or ER for remainder of DOLORES period

## 2022-09-07 NOTE — DISCHARGE SUMMARY
Discharge Summary     Patient ID:  Katarina Zuleta  [id number]  29 y.o.  1935    Admit Date: 9/1/2022    Discharge Date:       Admission Diagnoses: Left rotator cuff tear arthropathy [M75.102, M12.812]    Surgeon: Marleny Barclay MD    Last Procedure: Procedure(s):  LEFT REVERSE TOTAL SHOULDER ARTHROPLASTY      Hospital Course: Normal hospital course for this procedure. Significant Diagnostic Studies: none    Discharge Diagnosis: Active Problems:    Left rotator cuff tear arthropathy (9/1/2022)         Condition on Discharge: good    Disposition:Home    Followup Care:  Discharge Condition: good  See surgical instructions  Regular Diet  Keep wound clean and dry    Follow-up Information       Follow up With Specialties Details Why Contact Info    Nelli Lackey MD Internal Medicine Physician   [address]  [phone]              NM med list:   Discharge Medication List as of 9/2/2022 11:58 AM        START taking these medications    Details   oxyCODONE IR (Roxicodone) 5 mg immediate release tablet Take 2 Tablets by mouth every six (6) hours as needed for Pain for up to 3 days. Max Daily Amount: 40 mg., Normal, Disp-40 Tablet, R-0           CONTINUE these medications which have NOT CHANGED    Details   cholecalciferol (VITAMIN D3) (1000 Units /25 mcg) tablet Take  by mouth daily. , Historical Med      LORazepam (ATIVAN) 1 mg tablet TAKE 1/2 TO 1 TABLET       NIGHTLY AS NEEDED FOR      ANXIETY. MAXIMUM DAILY     AMOUNT: 1 TABLET, Normal, Disp-90 Tablet, R-1      simvastatin (ZOCOR) 20 mg tablet TAKE 1 TABLET DAILY, Normal, Disp-90 Tablet, R-3      estradioL (ESTRACE) 0.01 % (0.1 mg/gram) vaginal cream APPLY 1 A SMALL AMOUNT INTO VAGINA THREE TIMES A WEEK , PEA-SIZED AMOUNT FINGERTIP APPLICATION, Historical Med      amLODIPine (NORVASC) 5 mg tablet TAKE 1 TABLET DAILY, Normal, Disp-90 Tablet, R-3      gabapentin (Neurontin) 300 mg capsule Take 2 Capsules by mouth nightly.  Max Daily Amount: 600 mg., Normal, Disp-60 Capsule, R-5      levocetirizine (XYZAL) 5 mg tablet TAKE 1 TABLET DAILY, Normal, Disp-90 Tablet, R-3      PARoxetine (PAXIL) 30 mg tablet TAKE 1 TABLET DAILY, Normal, Disp-90 Tablet, R-3      acetaminophen (TYLENOL) 500 mg tablet Take  by mouth every six (6) hours as needed for Pain., Historical Med      losartan (COZAAR) 100 mg tablet TAKE 1 TABLET DAILY, Normal, Disp-90 Tablet, R-3      budesonide (ENTOCORT EC) 3 mg capsule Take 9 mg by mouth daily as needed. 1 capsule by mouth daily, Historical Med      dicyclomine (BENTYL) 10 mg capsule Take 10 mg by mouth as needed. 1 tablet by mouth daily as needed, Historical Med      MULTIVITAMIN W-MINERALS/LUTEIN (CENTRUM SILVER PO) Take 1 Tab by mouth. Takes one po daily. , Historical Med           STOP taking these medications       meloxicam (MOBIC) 7.5 mg tablet Comments:   Reason for Stopping:                  Home Going Instructions:   Patient to follow up in one - two weeks. Notify my office if develop fever, chills, redness, unbearble pain or temp.>102. Patient to follow discharge instructions. Patient to call [phone] to set up follow up appointment.         Signed:  William Underwood MD  9/7/2022  7:38 AM  .

## 2022-09-08 ENCOUNTER — OFFICE VISIT (OUTPATIENT)
Dept: ORTHOPEDIC SURGERY | Age: 87
End: 2022-09-08
Payer: MEDICARE

## 2022-09-08 DIAGNOSIS — Z96.612 STATUS POST REVERSE TOTAL SHOULDER REPLACEMENT, LEFT: ICD-10-CM

## 2022-09-08 DIAGNOSIS — Z98.890 HISTORY OF REVERSE TOTAL REPLACEMENT OF LEFT SHOULDER JOINT: Primary | ICD-10-CM

## 2022-09-08 PROCEDURE — 99024 POSTOP FOLLOW-UP VISIT: CPT | Performed by: ORTHOPAEDIC SURGERY

## 2022-09-08 NOTE — PROGRESS NOTES
Augustine Ruiz (: 1935) is a 81 y.o. female, patient, here for evaluation of the following chief complaint(s):  Shoulder Pain (Post op left total shoulder )       HPI:    She is 1 week postop from left reverse total shoulder arthroplasty. Reports some discomfort with the sling, some bruising ecchymosis down the arm. Otherwise overall she is doing well. Denies any numbness or tingling. She denies shortness of breath chest pain. Allergies   Allergen Reactions    Adhesive Other (comments)    Antihistamines - Alkylamine Other (comments)    Cefdinir Other (comments)     Sick to stomach    Ciprofloxacin Diarrhea and Nausea and Vomiting    Flagyl [Metronidazole] Nausea and Vomiting    Other Medication Other (comments)     BANDAIDS - breaks out skin    Oxycodone-Acetaminophen Other (comments)     dizziness    Phenylephrine Other (comments)     Wire her up    Phenylpropanolamine Other (comments)     Wire her up    Sulfa (Sulfonamide Antibiotics) Nausea Only       Current Outpatient Medications   Medication Sig    cholecalciferol (VITAMIN D3) (1000 Units /25 mcg) tablet Take  by mouth daily. LORazepam (ATIVAN) 1 mg tablet TAKE 1/2 TO 1 TABLET       NIGHTLY AS NEEDED FOR      ANXIETY. MAXIMUM DAILY     AMOUNT: 1 TABLET    simvastatin (ZOCOR) 20 mg tablet TAKE 1 TABLET DAILY    estradioL (ESTRACE) 0.01 % (0.1 mg/gram) vaginal cream APPLY 1 A SMALL AMOUNT INTO VAGINA THREE TIMES A WEEK , PEA-SIZED AMOUNT FINGERTIP APPLICATION    amLODIPine (NORVASC) 5 mg tablet TAKE 1 TABLET DAILY    gabapentin (Neurontin) 300 mg capsule Take 2 Capsules by mouth nightly.  Max Daily Amount: 600 mg.    levocetirizine (XYZAL) 5 mg tablet TAKE 1 TABLET DAILY    PARoxetine (PAXIL) 30 mg tablet TAKE 1 TABLET DAILY    acetaminophen (TYLENOL) 500 mg tablet Take  by mouth every six (6) hours as needed for Pain.    losartan (COZAAR) 100 mg tablet TAKE 1 TABLET DAILY    budesonide (ENTOCORT EC) 3 mg capsule Take 9 mg by mouth daily as needed. 1 capsule by mouth daily    dicyclomine (BENTYL) 10 mg capsule Take 10 mg by mouth as needed. 1 tablet by mouth daily as needed    MULTIVITAMIN W-MINERALS/LUTEIN (CENTRUM SILVER PO) Take 1 Tab by mouth. Takes one po daily. No current facility-administered medications for this visit. Past Medical History:   Diagnosis Date    Anxiety     Arthritis back pain    fingers    Asthma     MILD - NO INHALERS    Chronic pain     Colitis     Diverticulitis 06/11/2013    Hypercholesterolemia     Hypertension     IBS (irritable bowel syndrome)     Ill-defined condition     H/O UTI POST ISLAS CATH    Insomnia     Nausea & vomiting     Stenosis     spinal    Stroke ([facility] [address] 1996    tia   PATENT  FORAMEN  OVALE    Swollen L ankle         Past Surgical History:   Procedure Laterality Date    HX CATARACT REMOVAL Bilateral 2009    HX CHOLECYSTECTOMY  04/1997    HX COLONOSCOPY      x3    HX HYSTERECTOMY  1986    HX KNEE ARTHROSCOPY Left 04/1998    HX LUMBAR FUSION  02/2011    L4-L5    HX ORTHOPAEDIC Right     REPAIRED TENDONS ON RIGHT HAND AFTER FALL    HX SHOULDER REPLACEMENT Right 01/2020    HX TONSILLECTOMY  CHILDHOOD    HX WISDOM TEETH EXTRACTION      X3    IR INJ FORAMIN EPID LUMB ANES/STER SNGL  03/21/2022    [place] CARDIAC SURG PROCEDURE UNLIST  08/2004    repair of foramen ovale       Family History   Problem Relation Age of Onset    Other Mother         SUARACHNOID HEMORRHAGE-ANEURYSM    Stroke Mother     Cancer Father         PANCREATIC    Anesth Problems Daughter         N/V    No Known Problems Daughter         Social History     Socioeconomic History    Marital status:      Spouse name: Not on file    Number of children: Not on file    Years of education: Not on file    Highest education level: Not on file   Occupational History    Not on file   Tobacco Use    Smoking status: Never    Smokeless tobacco: Never   Vaping Use    Vaping Use: Never used   Substance and Sexual Activity    Alcohol use:  Yes Alcohol/week: 10.0 standard drinks     Types: 3 Glasses of wine, 7 Cans of beer per week    Drug use: No    Sexual activity: Not Currently     Partners: Male   Other Topics Concern    Not on file   Social History Narrative    Not on file     Social Determinants of Health     Financial Resource Strain: Not on file   Food Insecurity: Not on file   Transportation Needs: Not on file   Physical Activity: Not on file   Stress: Not on file   Social Connections: Not on file   Intimate Partner Violence: Not on file   Housing Stability: Not on file       Review of Systems   Musculoskeletal:         Shoulder post op     Vitals:  LMP 01/01/1986    There is no height or weight on file to calculate BMI. Ortho Exam     Left shoulder: Surgical dressing is dry. There is no active drainage from her incision. Full range of motion at the elbow, wrist and fingers. Does have mild amount of swelling and some ecchymosis that extended down to the hand. Distally she is neurovascular intact. X-rays of the demonstrate reverse total shoulder arthroplasty with left well-placed components. No fracture or dislocation noted. No abnormalities noted within the soft tissues. XR Results (most recent):  Results from Appointment encounter on 09/08/22    XR SHOULDER LT AP/LAT MIN 2 V    Narrative  Three-view x-ray of the left shoulder reveals well-seated prosthesis no lucencies identified. ASSESSMENT/PLAN:    Doing well from left reverse total shoulder. Discussed she may come out of her sling when she is resting or sitting at home otherwise when she is sleeping or when she is up and about she needs to have her sling on. She does have home health therapy it is continuing to come to her house so we gave her a prescription for outpatient physical therapy and made sure that she discussed with her home therapist the duration in which they will be coming. She is to schedule her outpatient PT once the home therapy runs out.   We will see her back in 4 weeks for repeat evaluation.         Billye Blizzard, MD

## 2022-09-08 NOTE — LETTER
9/8/2022    Patient: Edil Cervantes   YOB: 1935   Date of Visit: 9/8/2022     Mecca Rich MD  [address] [place] [address]  Via In Basket    Dear Mecca Rich MD,      Thank you for referring Ms. Kevin Arreaga to [facility] for evaluation. My notes for this consultation are attached. If you have questions, please do not hesitate to call me. I look forward to following your patient along with you.       Sincerely,    Kory Carrillo MD

## 2022-09-13 ENCOUNTER — OFFICE VISIT (OUTPATIENT)
Dept: ORTHOPEDIC SURGERY | Age: 87
End: 2022-09-13
Payer: MEDICARE

## 2022-09-13 DIAGNOSIS — Z98.1 S/P LUMBAR SPINAL FUSION: Primary | ICD-10-CM

## 2022-09-13 DIAGNOSIS — M48.04 THORACIC STENOSIS: ICD-10-CM

## 2022-09-13 PROCEDURE — 99213 OFFICE O/P EST LOW 20 MIN: CPT | Performed by: ORTHOPAEDIC SURGERY

## 2022-09-13 PROCEDURE — 1111F DSCHRG MED/CURRENT MED MERGE: CPT | Performed by: ORTHOPAEDIC SURGERY

## 2022-09-13 PROCEDURE — 1123F ACP DISCUSS/DSCN MKR DOCD: CPT | Performed by: ORTHOPAEDIC SURGERY

## 2022-09-13 PROCEDURE — 1090F PRES/ABSN URINE INCON ASSESS: CPT | Performed by: ORTHOPAEDIC SURGERY

## 2022-09-13 PROCEDURE — G8427 DOCREV CUR MEDS BY ELIG CLIN: HCPCS | Performed by: ORTHOPAEDIC SURGERY

## 2022-09-13 PROCEDURE — G8417 CALC BMI ABV UP PARAM F/U: HCPCS | Performed by: ORTHOPAEDIC SURGERY

## 2022-09-13 PROCEDURE — 1101F PT FALLS ASSESS-DOCD LE1/YR: CPT | Performed by: ORTHOPAEDIC SURGERY

## 2022-09-13 PROCEDURE — G8432 DEP SCR NOT DOC, RNG: HCPCS | Performed by: ORTHOPAEDIC SURGERY

## 2022-09-13 PROCEDURE — G8536 NO DOC ELDER MAL SCRN: HCPCS | Performed by: ORTHOPAEDIC SURGERY

## 2022-09-13 NOTE — LETTER
9/13/2022    Patient: Kellie Hand   YOB: 1935   Date of Visit: 9/13/2022     Linh Tavarez MD  [address] [place] [address]  Via In Basket    Dear Linh Tavarez MD,      Thank you for referring Ms. Pk Israel to [address] [place] [address] for evaluation. My notes for this consultation are attached. If you have questions, please do not hesitate to call me. I look forward to following your patient along with you.       Sincerely,    Louie Trevino MD

## 2022-09-13 NOTE — PROGRESS NOTES
Alfredo Simons (: 1935) is a 81 y.o. female, patient, here for evaluation of the following chief complaint(s):  Back Pain (S/p  rev lami L2/3 L3/4, rev fusion T10/L5)       ASSESSMENT/PLAN:    Below is the assessment and plan developed based on review of pertinent history, physical exam, labs, studies, and medications. Her main complaints at this time are related to fatigue after a long day. Patient has not really change since her last visit. She has done physical therapy. She still feels a lot of difficulty with prolonged standing or walking. I am going to get an MRI of her thoracic spine just to rule out adjacent level compression. Obviously were not looking for surgical intervention if at all possible. She may do well with injections. I will see her back with the test results    1. S/P lumbar spinal fusion  -     XR SPINE LUMB 2 OR 3 V; Future  -     [facility] SPINE WO CONT; Future  2. Thoracic stenosis  -     [facility] SPINE WO CONT; Future      No follow-ups on file. SUBJECTIVE/OBJECTIVE:  Alfredo Simons (: 1935) is a 81 y.o. female. No flowsheet data found. She is here for her 12-month visit. She had some return of some of her left posterior buttock and left leg symptoms over the last month or so. She has been working with physical therapy. The Neurontin does help. The pain is only at night when she is lying down. She denies any bowel bladder difficulties. She is just finished a round of physical therapy      Imaging:          XR Results (most recent):  Results from Appointment encounter on 22    XR SHOULDER LT AP/LAT MIN 2 V    Narrative  Three-view x-ray of the left shoulder reveals well-seated prosthesis no lucencies identified.        MRI Results (most recent):  Results from Orders Only encounter on 10/23/17    MRI BRAIN W WO CONT    Narrative  EXAM:  MRI BRAIN W WO CONT  INDICATION:  Schwannoma,  TECHNIQUE: Sagittal T1, axial T2 and FLAIR images followed by thin 2 mm axial  T1-weighted images of the temporal bone and posterior fossa, then intravenous  infusion 13 mL ProHance with repeat and axial and coronal T1-weighted images. Axial diffusion weighted images and axial T2 cisternogram images were obtained. Postgadolinium whole head T1.  COMPARISON: MRI of the head 4/12/17  FINDINGS:  The cerebellopontine angle, internal auditory canals and temporal bones are  normal.  A mass on the right at C1-2 again demonstrated. Craniocervical junction is  otherwise unremarkable. Flow voids are present in vertebral basilar and carotid artery systems. The other structures at the cranial base are otherwise unremarkable. The ventricular size and configuration are within normal limits. Multiple foci of T2 hyperintensity again noted in the cerebral white matter  without associated restricted diffusion or abnormal enhancement. Nonspecific  pattern possibly related to chronic small vessel ischemic change. Focal volume loss in the posterior medial right temporal lobe is chronic and  consistent with an old injury/infarction. No evidence of intracranial hemorrhage, other mass, acute infarct or abnormal  extra-axial fluid collections. Impression  IMPRESSION:  1. Right C1-2 mass again noted. Please see MRI cervical spine same date for  details. 2. No change in chronic nonspecific white matter foci of T2 hyperintensity. 3. 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          Allergies   Allergen Reactions    Adhesive Other (comments)    Antihistamines - Alkylamine Other (comments)    Cefdinir Other (comments)     Sick to stomach    Ciprofloxacin Diarrhea and Nausea and Vomiting    Flagyl [Metronidazole] Nausea and Vomiting    Other Medication Other (comments)     BANDAIDS - breaks out skin    Oxycodone-Acetaminophen Other (comments)     dizziness    Phenylephrine Other (comments)     Wire her up    Phenylpropanolamine Other (comments)     Wire her up Sulfa (Sulfonamide Antibiotics) Nausea Only       Current Outpatient Medications   Medication Sig    cholecalciferol (VITAMIN D3) (1000 Units /25 mcg) tablet Take  by mouth daily. LORazepam (ATIVAN) 1 mg tablet TAKE 1/2 TO 1 TABLET       NIGHTLY AS NEEDED FOR      ANXIETY. MAXIMUM DAILY     AMOUNT: 1 TABLET    simvastatin (ZOCOR) 20 mg tablet TAKE 1 TABLET DAILY    estradioL (ESTRACE) 0.01 % (0.1 mg/gram) vaginal cream APPLY 1 A SMALL AMOUNT INTO VAGINA THREE TIMES A WEEK , PEA-SIZED AMOUNT FINGERTIP APPLICATION    amLODIPine (NORVASC) 5 mg tablet TAKE 1 TABLET DAILY    gabapentin (Neurontin) 300 mg capsule Take 2 Capsules by mouth nightly. Max Daily Amount: 600 mg.    levocetirizine (XYZAL) 5 mg tablet TAKE 1 TABLET DAILY    PARoxetine (PAXIL) 30 mg tablet TAKE 1 TABLET DAILY    acetaminophen (TYLENOL) 500 mg tablet Take  by mouth every six (6) hours as needed for Pain.    losartan (COZAAR) 100 mg tablet TAKE 1 TABLET DAILY    budesonide (ENTOCORT EC) 3 mg capsule Take 9 mg by mouth daily as needed. 1 capsule by mouth daily    dicyclomine (BENTYL) 10 mg capsule Take 10 mg by mouth as needed. 1 tablet by mouth daily as needed    MULTIVITAMIN W-MINERALS/LUTEIN (CENTRUM SILVER PO) Take 1 Tab by mouth. Takes one po daily. No current facility-administered medications for this visit.        Past Medical History:   Diagnosis Date    Anxiety     Arthritis back pain    fingers    Asthma     MILD - NO INHALERS    Chronic pain     Colitis     Diverticulitis 06/11/2013    Hypercholesterolemia     Hypertension     IBS (irritable bowel syndrome)     Ill-defined condition     H/O UTI POST ISLAS CATH    Insomnia     Nausea & vomiting     Stenosis     spinal    Stroke ([facility] [address] 1996    tia   PATENT  FORAMEN  OVALE    Swollen L ankle         Past Surgical History:   Procedure Laterality Date    HX CATARACT REMOVAL Bilateral 2009    HX CHOLECYSTECTOMY  04/1997    HX COLONOSCOPY      x3    HX HYSTERECTOMY  1986    HX KNEE ARTHROSCOPY Left 04/1998    HX LUMBAR FUSION  02/2011    L4-L5    HX ORTHOPAEDIC Right     REPAIRED TENDONS ON RIGHT HAND AFTER FALL    HX SHOULDER REPLACEMENT Right 01/2020    HX TONSILLECTOMY  CHILDHOOD    HX WISDOM TEETH EXTRACTION      X3    IR INJ FORAMIN EPID LUMB ANES/STER SNGL  03/21/2022    [place] CARDIAC SURG PROCEDURE UNLIST  08/2004    repair of foramen ovale       Family History   Problem Relation Age of Onset    Other Mother         SUARACHNOID HEMORRHAGE-ANEURYSM    Stroke Mother     Cancer Father         PANCREATIC    Anesth Problems Daughter         N/V    No Known Problems Daughter         Social History     Tobacco Use    Smoking status: Never    Smokeless tobacco: Never   Vaping Use    Vaping Use: Never used   Substance Use Topics    Alcohol use: Yes     Alcohol/week: 10.0 standard drinks     Types: 3 Glasses of wine, 7 Cans of beer per week    Drug use: No        Review of Systems   Musculoskeletal:  Positive for back pain and gait problem. All other systems reviewed and are negative. Vitals:  LMP 01/01/1986    There is no height or weight on file to calculate BMI. Ortho Exam       Integumentary  Assessment of Surgical Incision - healing and consistent with normal anticipated wound healing. Neurologic  Sensory  Light Touch - Intact - Globally. Overall Assessment of Muscle Strength and Tone reveals  Lower Extremities - Right Iliopsoas - 5/5. Left Iliopsoas - 5/5. Right Tibialis Anterior - 5/5. Left Tibialis Anterior - 5/5. Right Gastroc-Soleus - 5/5. Left Gastroc-Soleus - 5/5. Right EHL - 5/5. Left EHL - 5/5. General Assessment of Reflexes  Right Ankle - Clonus is not present. Left Ankle - Clonus is not present. Reflexes (Dermatomes)  2/2 Normal - Left Achilles (L5-S2), Left Knee (L2-4), Right Achilles (L5-S2) and Right Knee (L2-4).     Musculoskeletal  Global Assessment  Examination of related systems reveals - well-developed, well-nourished, in no acute distress, alert and oriented x 3 and normal coordination. Spine/Ribs/Pelvis  Lumbosacral Spine - Examination of the lumbosacral spine reveals - no known fractures or deformities. Inspection and Palpation - Tenderness - moderate. Assessment of pain reveals the following findings - The pain is characterized as - moderate. Location - pain refers to lower back bilaterally. Some tenderness on palpation of left posterior buttock and hip region. An electronic signature was used to authenticate this note.   -- Cuauhtemoc Shoemaker MD

## 2022-09-13 NOTE — PROGRESS NOTES
1. Have you been to the ER, urgent care clinic since your last visit? Hospitalized since your last visit? No    2. Have you seen or consulted any other health care providers outside of the [address] [place] [address] since your last visit? Include any pap smears or colon screening.  No    Chief Complaint   Patient presents with    Back Pain     S/p  rev lami L2/3 L3/4, rev fusion T10/L5

## 2022-09-14 ENCOUNTER — PATIENT OUTREACH (OUTPATIENT)
Dept: CASE MANAGEMENT | Age: 87
End: 2022-09-14

## 2022-09-14 NOTE — PROGRESS NOTES
Care Transitions Follow Up Call    Care Transition Nurse (CTN) contacted the patient by telephone to follow up after admission on 9/1. Reports doing well. Denies shoulder pain. States she is required to wear sling at night and when she goes outside. Does not need to wear when she is in the house. Addressed changes since last contact: none  Follow up appointment completed? yes. Was follow up appointment scheduled within 7 days of discharge? yes. CTN reviewed medical action plan and red flags with patient and discussed any barriers to care and/or understanding of plan of care after discharge. Discussed appropriate site of care based on symptoms and resources available to patient including: PCP. The patient agrees to contact the PCP office for questions related to their healthcare. Patients top risk factors for readmission:  NA    Interventions to address risk factors:  [facility] follow up appointment(s):   Future Appointments   Date Time Provider Marty Josue   9/16/2022 10:30 AM AURY MR TOSMMR AMB RAD ROCKY   10/6/2022  2:00 PM MD AURY Benitez AMB   11/1/2022  2:40 PM MD AURY Hurd BS AMB   11/2/2022 10:00 AM MD JANEY Oliveira AMB     Non-[facility] follow up appointment(s): NA    CTN provided contact information for future needs. Plan for follow-up call in 5-7 days based on severity of symptoms and risk factors. Plan for next call: community resources-OP PT       Goals Addressed                   This Visit's Progress     Prevent complications post hospitalization.    On track       09/06/22  No falls  Appetite will improve  Will monitor for signs of infection  Will participate in [facility] PT to improve strength to L shoulder  Will attend follow up appt with Surgeon scheduled 9/8  Pt will remain out of the hospital or ER for remainder of DOLORES period    09/14/22  No falls  Appetite has improved  Denies evidence of infection  Participating in [facility] PT to improve strength  Attended fu appt with surgeon

## 2022-09-15 ENCOUNTER — TELEPHONE (OUTPATIENT)
Dept: INTERNAL MEDICINE CLINIC | Age: 87
End: 2022-09-15

## 2022-09-15 RX ORDER — MELOXICAM 7.5 MG/1
7.5 TABLET ORAL DAILY
Qty: 90 TABLET | Refills: 1 | Status: SHIPPED | OUTPATIENT
Start: 2022-09-15

## 2022-09-20 RX ORDER — LOSARTAN POTASSIUM 100 MG/1
TABLET ORAL
Qty: 90 TABLET | Refills: 3 | Status: SHIPPED | OUTPATIENT
Start: 2022-09-20

## 2022-09-21 ENCOUNTER — PATIENT OUTREACH (OUTPATIENT)
Dept: CASE MANAGEMENT | Age: 87
End: 2022-09-21

## 2022-09-21 NOTE — PROGRESS NOTES
Contacted the patient for a follow up. [facility]    Care Transitions Follow Up Call    Care Transition Nurse (CTN) contacted the patient by telephone to follow up after admission on 9/1. Reports doing well. Continues with HH PT x one week. Denies shoulder pain. CTN reviewed medical action plan and red flags with patient and discussed any barriers to care and/or understanding of plan of care after discharge. Discussed appropriate site of care based on symptoms and resources available to patient including: Home Health. Patients top risk factors for readmission:  NA    Interventions to address risk factors:  [facility] follow up appointment(s):   Future Appointments   Date Time Provider Marty Josue   10/6/2022  2:00 PM MD AURY Rodriguez BS [facility]   11/1/2022  2:40 PM MD AURY Kaba BS [facility]   11/2/2022 10:00 AM MD JANEY Pemberton [facility]     Non-[facility] follow up appointment(s):     CTN provided contact information for future needs. Plan for follow-up call in 10-14 days based on severity of symptoms and risk factors. Goals Addressed                   This Visit's Progress     COMPLETED: Prevent complications post hospitalization.           09/06/22  No falls  Appetite will improve  Will monitor for signs of infection  Will participate in [facility] PT to improve strength to L shoulder  Will attend follow up appt with Surgeon scheduled 9/8  Pt will remain out of the hospital or ER for remainder of [facility] period    09/14/22  No falls  Appetite has improved  Denies evidence of infection  Participating in [facility] PT to improve strength  Attended fu appt with surgeon

## 2022-09-28 ENCOUNTER — TELEPHONE (OUTPATIENT)
Dept: ORTHOPEDIC SURGERY | Age: 87
End: 2022-09-28

## 2022-09-28 DIAGNOSIS — Z98.1 S/P LUMBAR FUSION: ICD-10-CM

## 2022-09-28 DIAGNOSIS — M54.50 CHRONIC BILATERAL LOW BACK PAIN WITHOUT SCIATICA: ICD-10-CM

## 2022-09-28 DIAGNOSIS — Z98.1 S/P LUMBAR SPINAL FUSION: Primary | ICD-10-CM

## 2022-09-28 DIAGNOSIS — Z98.1 S/P LUMBAR SPINAL FUSION: ICD-10-CM

## 2022-09-28 DIAGNOSIS — M51.36 LUMBAR DEGENERATIVE DISC DISEASE: ICD-10-CM

## 2022-09-28 DIAGNOSIS — G89.29 CHRONIC BILATERAL LOW BACK PAIN WITHOUT SCIATICA: ICD-10-CM

## 2022-09-28 RX ORDER — GABAPENTIN 300 MG/1
CAPSULE ORAL
Qty: 60 CAPSULE | Refills: 5 | Status: SHIPPED | OUTPATIENT
Start: 2022-09-28 | End: 2022-11-02 | Stop reason: DRUGHIGH

## 2022-09-28 NOTE — TELEPHONE ENCOUNTER
Tiffani Bernard  Calling to request her MRI results, will she be a candidate for [facility] or will she need to wait until 11/1 to be seen? Study Result    Narrative & Impression   EXAM:  MRI [facility] SPINE WO CONT     INDICATION:   31-year-old female with Thoracics stenosis; status post lumbar  spinal fusion. Dx: S/P lumbar spinal fusion [Z98.1 (ICD-10-CM)]; Thoracic  stenosis [C81.94 (ICD-10-CM)]     COMPARISON: None. TECHNIQUE: Multiplanar multisequence acquisition without contrast of the  thoracic spine. CONTRAST: None. FINDINGS:  Suboptimally visualized bilateral transpedicular posterior spinal fusion  hardware extending from T10 through the visualized L2 levels. Hardware  associated signal distortion artifacts preclude optimal evaluation of adjacent  structures. There is a 0.4 cm anterolisthesis of T1 on T2 and retrolisthesis of T9 on T10. Vertebral body heights are maintained without evidence of acute fracture. Small  intraosseous hemangioma within T8-9 vertebral body. Degenerative disc changes  predominantly involving T7-T8, T8-T9 and T9-T10 resulting mild spinal canal  narrowing. No significant neural foramina narrowing. Suboptimal evaluation of  the spinal canal at the fused levels due to extensive signal distortion  artifact. The thoracic cord is normal in size and signal in the well-visualized  segment above the spinal fusion levels. Visualized soft tissues are  unremarkable. IMPRESSION  Status post lower thoracic/lumbar posterior spinal fusion,  suboptimally visualized. Hardware associated signal distortion artifact  precludes optimal evaluation of the adjacent structures. Mild spondylolisthesis predominantly involving T7-T10 without high-grade spinal  canal or neural foramina narrowing.

## 2022-09-29 ENCOUNTER — HOSPITAL ENCOUNTER (EMERGENCY)
Age: 87
Discharge: HOME | End: 2022-09-29
Attending: EMERGENCY MEDICINE
Payer: MEDICARE

## 2022-09-29 ENCOUNTER — APPOINTMENT (OUTPATIENT)
Dept: CT IMAGING | Age: 87
End: 2022-09-29
Attending: STUDENT IN AN ORGANIZED HEALTH CARE EDUCATION/TRAINING PROGRAM
Payer: MEDICARE

## 2022-09-29 VITALS
RESPIRATION RATE: 18 BRPM | OXYGEN SATURATION: 98 % | DIASTOLIC BLOOD PRESSURE: 82 MMHG | SYSTOLIC BLOOD PRESSURE: 155 MMHG | HEART RATE: 95 BPM | TEMPERATURE: 98 F

## 2022-09-29 DIAGNOSIS — M54.42 CHRONIC MIDLINE LOW BACK PAIN WITH LEFT-SIDED SCIATICA: Primary | ICD-10-CM

## 2022-09-29 DIAGNOSIS — G89.29 CHRONIC MIDLINE LOW BACK PAIN WITH LEFT-SIDED SCIATICA: Primary | ICD-10-CM

## 2022-09-29 LAB
ALBUMIN SERPL-MCNC: 4.4 G/DL (ref 3.5–5)
ALBUMIN/GLOB SERPL: 1.2 {RATIO} (ref 1.1–2.2)
ALP SERPL-CCNC: 155 U/L (ref 45–117)
ALT SERPL-CCNC: 23 U/L (ref 12–78)
ANION GAP SERPL CALC-SCNC: 7 MMOL/L (ref 5–15)
APPEARANCE UR: CLEAR
AST SERPL-CCNC: 22 U/L (ref 15–37)
BACTERIA URNS QL MICRO: NEGATIVE /HPF
BASOPHILS # BLD: 0 K/UL (ref 0–0.1)
BASOPHILS NFR BLD: 0 % (ref 0–1)
BILIRUB SERPL-MCNC: 0.6 MG/DL (ref 0.2–1)
BILIRUB UR QL: NEGATIVE
BUN SERPL-MCNC: 17 MG/DL (ref 6–20)
BUN/CREAT SERPL: 17 (ref 12–20)
CALCIUM SERPL-MCNC: 10 MG/DL (ref 8.5–10.1)
CHLORIDE SERPL-SCNC: 99 MMOL/L (ref 97–108)
CO2 SERPL-SCNC: 26 MMOL/L (ref 21–32)
COLOR UR: ABNORMAL
COMMENT, HOLDF: NORMAL
CREAT SERPL-MCNC: 1.03 MG/DL (ref 0.55–1.02)
DIFFERENTIAL METHOD BLD: ABNORMAL
EOSINOPHIL # BLD: 0 K/UL (ref 0–0.4)
EOSINOPHIL NFR BLD: 1 % (ref 0–7)
EPITH CASTS URNS QL MICRO: ABNORMAL /LPF
ERYTHROCYTE [DISTWIDTH] IN BLOOD BY AUTOMATED COUNT: 13.7 % (ref 11.5–14.5)
GLOBULIN SER CALC-MCNC: 3.6 G/DL (ref 2–4)
GLUCOSE SERPL-MCNC: 124 MG/DL (ref 65–100)
GLUCOSE UR STRIP.AUTO-MCNC: NEGATIVE MG/DL
HCT VFR BLD AUTO: 36.4 % (ref 35–47)
HGB BLD-MCNC: 12.2 G/DL (ref 11.5–16)
HGB UR QL STRIP: NEGATIVE
HYALINE CASTS URNS QL MICRO: ABNORMAL /LPF (ref 0–5)
IMM GRANULOCYTES # BLD AUTO: 0 K/UL (ref 0–0.04)
IMM GRANULOCYTES NFR BLD AUTO: 1 % (ref 0–0.5)
KETONES UR QL STRIP.AUTO: ABNORMAL MG/DL
LEUKOCYTE ESTERASE UR QL STRIP.AUTO: NEGATIVE
LYMPHOCYTES # BLD: 0.9 K/UL (ref 0.8–3.5)
LYMPHOCYTES NFR BLD: 11 % (ref 12–49)
MCH RBC QN AUTO: 31.2 PG (ref 26–34)
MCHC RBC AUTO-ENTMCNC: 33.5 G/DL (ref 30–36.5)
MCV RBC AUTO: 93.1 FL (ref 80–99)
MONOCYTES # BLD: 0.3 K/UL (ref 0–1)
MONOCYTES NFR BLD: 3 % (ref 5–13)
NEUTS SEG # BLD: 6.8 K/UL (ref 1.8–8)
NEUTS SEG NFR BLD: 84 % (ref 32–75)
NITRITE UR QL STRIP.AUTO: NEGATIVE
NRBC # BLD: 0 K/UL (ref 0–0.01)
NRBC BLD-RTO: 0 PER 100 WBC
PH UR STRIP: 7 [PH] (ref 5–8)
PLATELET # BLD AUTO: 283 K/UL (ref 150–400)
PMV BLD AUTO: 9.2 FL (ref 8.9–12.9)
POTASSIUM SERPL-SCNC: 4 MMOL/L (ref 3.5–5.1)
PROT SERPL-MCNC: 8 G/DL (ref 6.4–8.2)
PROT UR STRIP-MCNC: NEGATIVE MG/DL
RBC # BLD AUTO: 3.91 M/UL (ref 3.8–5.2)
RBC #/AREA URNS HPF: ABNORMAL /HPF (ref 0–5)
SAMPLES BEING HELD,HOLD: NORMAL
SODIUM SERPL-SCNC: 132 MMOL/L (ref 136–145)
SP GR UR REFRACTOMETRY: 1.01 (ref 1–1.03)
UR CULT HOLD, URHOLD: NORMAL
UROBILINOGEN UR QL STRIP.AUTO: 0.2 EU/DL (ref 0.2–1)
WBC # BLD AUTO: 8.1 K/UL (ref 3.6–11)
WBC URNS QL MICRO: ABNORMAL /HPF (ref 0–4)

## 2022-09-29 PROCEDURE — 72131 CT LUMBAR SPINE W/O DYE: CPT

## 2022-09-29 PROCEDURE — 36415 COLL VENOUS BLD VENIPUNCTURE: CPT

## 2022-09-29 PROCEDURE — 85025 COMPLETE CBC W/AUTO DIFF WBC: CPT

## 2022-09-29 PROCEDURE — 96374 THER/PROPH/DIAG INJ IV PUSH: CPT

## 2022-09-29 PROCEDURE — 97161 PT EVAL LOW COMPLEX 20 MIN: CPT

## 2022-09-29 PROCEDURE — 97116 GAIT TRAINING THERAPY: CPT

## 2022-09-29 PROCEDURE — 74011250636 HC RX REV CODE- 250/636: Performed by: STUDENT IN AN ORGANIZED HEALTH CARE EDUCATION/TRAINING PROGRAM

## 2022-09-29 PROCEDURE — 80053 COMPREHEN METABOLIC PANEL: CPT

## 2022-09-29 PROCEDURE — 99284 EMERGENCY DEPT VISIT MOD MDM: CPT

## 2022-09-29 PROCEDURE — 81001 URINALYSIS AUTO W/SCOPE: CPT

## 2022-09-29 PROCEDURE — 96375 TX/PRO/DX INJ NEW DRUG ADDON: CPT

## 2022-09-29 RX ORDER — KETOROLAC TROMETHAMINE 30 MG/ML
15 INJECTION, SOLUTION INTRAMUSCULAR; INTRAVENOUS ONCE
Status: COMPLETED | OUTPATIENT
Start: 2022-09-29 | End: 2022-09-29

## 2022-09-29 RX ORDER — KETOROLAC TROMETHAMINE 10 MG/1
10 TABLET, FILM COATED ORAL
Qty: 20 TABLET | Refills: 0 | Status: SHIPPED | OUTPATIENT
Start: 2022-09-29 | End: 2022-10-04

## 2022-09-29 RX ORDER — DEXAMETHASONE SODIUM PHOSPHATE 10 MG/ML
10 INJECTION INTRAMUSCULAR; INTRAVENOUS ONCE
Status: COMPLETED | OUTPATIENT
Start: 2022-09-29 | End: 2022-09-29

## 2022-09-29 RX ADMIN — KETOROLAC TROMETHAMINE 15 MG: 30 INJECTION, SOLUTION INTRAMUSCULAR at 11:50

## 2022-09-29 RX ADMIN — DEXAMETHASONE SODIUM PHOSPHATE 10 MG: 10 INJECTION INTRAMUSCULAR; INTRAVENOUS at 11:52

## 2022-09-29 NOTE — PROGRESS NOTES
PHYSICAL THERAPY EMERGENCY DEPARTMENT EVALUATION     Patient: Miriam Hodge (5 y.o. female)  Date: 9/29/2022  Primary Diagnosis: No admission diagnoses are documented for this encounter. Precautions:   Fall    ASSESSMENT  Based on the objective data described below, the patient presents with low back and LLE pain making it difficult for her to sit, transition positions, and walk. Therapy was asked to see pt in the ED focus care area for anticipated DC home. CT of the lumbar spine shows no acute fracture. Received pt sitting with right lateral lean to unweight her left buttock for pain management. At baseline patient is independent ambulating with a SPC, denies falls. Today she had difficulty ambulating short distance with her cane due to increased pain with weight bearing. Gait trained with a rolling walker with improvement observed in pt's tolerance to LLE weight bearing. She ambulated 120 ft with the walker with steady gait, safe walker technique. Pt continued to voice pain though appeared to have improved tolerance when using the walker as compared with the cane. Pt owns a RW. Recommended she consider using her walker at home for pain management. Also recommended she consider physical therapy for her chronic back issues and pain. Of note, pt recently completed therapy in the home for her shoulder and scheduled to start OPPT at [address] [place] [address] next week. Informed ED PA of the above. Current Level of Function Impacting Discharge (mobility/balance): Mod indep w/ transfers; Supv w/ RW progressing to independence with training provided in the ED today. Other factors to consider for discharge: pain management     Patient will benefit from continued physical therapy if admitted.      PLAN :  Recommendations and Planned Interventions: bed mobility training, transfer training, gait training, therapeutic exercises, patient and family training/education, and therapeutic activities Frequency/Duration: Patient will be followed by physical therapy: If plan changes and pt is admitted, physical therapy will see 3x/ wk to address goals. Recommendation for discharge: (in order for the patient to meet his/her long term goals)  Physical therapy at least 2 days/week in the home vs OPPT if pt feels she is able to make it to the clinic with her pain (already scheduled [facility]/ [facility] for her shoulder rehab    This discharge recommendation:  Has been made in collaboration with the attending provider and/or case management    Equipment recommendations for successful discharge (if) home: none (pt owns RW)     SUBJECTIVE:   Patient stated Oh the pain. The shot didn't help.   (when PT first arrived into the room)    OBJECTIVE DATA SUMMARY:   HISTORY:    Past Medical History:   Diagnosis Date    Anxiety     Arthritis back pain    fingers    Asthma     MILD - NO INHALERS    Chronic pain     Colitis     Diverticulitis 06/11/2013    Hypercholesterolemia     Hypertension     IBS (irritable bowel syndrome)     Ill-defined condition     H/O UTI POST ISLAS CATH    Insomnia     Nausea & vomiting     Stenosis     spinal    Stroke ([facility] [address] 1996    tia   PATENT  FORAMEN  OVALE    Swollen L ankle      Past Surgical History:   Procedure Laterality Date    HX CATARACT REMOVAL Bilateral 2009    HX CHOLECYSTECTOMY  04/1997    HX COLONOSCOPY      x3    HX HYSTERECTOMY  1986    HX KNEE ARTHROSCOPY Left 04/1998    HX LUMBAR FUSION  02/2011    L4-L5    HX ORTHOPAEDIC Right     REPAIRED TENDONS ON RIGHT HAND AFTER FALL    HX SHOULDER REPLACEMENT Right 01/2020    HX TONSILLECTOMY  CHILDHOOD    HX WISDOM TEETH EXTRACTION      X3    IR INJ FORAMIN EPID LUMB ANES/STER SNGL  03/21/2022    [place] CARDIAC SURG PROCEDURE UNLIST  08/2004    repair of foramen ovale       Home Situation  Home Environment: Private residence  # Steps to Enter: 3  Rails to Enter: Yes  Hand Rails : Bilateral  One/Two Story Residence: One story  Living Alone: No  Support Systems: Spouse/Significant Other, Friend/Neighbor  Current DME Used/Available at Home: Walker, rolling, Cane, straight    EXAMINATION/PRESENTATION/DECISION MAKING:   Critical Behavior:  Neurologic State: Alert  Orientation Level: Oriented to person, Oriented to place, Oriented to situation  Cognition: Appropriate decision making       Range Of Motion:  AROM: Generally decreased, functional                       Strength:    Strength: Within functional limits                    Tone & Sensation:   Tone: Normal              Sensation: Intact (light touch BLE)               Coordination:  Coordination: Within functional limits  Vision:          Functional Mobility:  Bed Mobility:  Received sitting in a chair in the focus care area           Transfers:  Sit to Stand: Modified independent; Additional time  Stand to Sit: Modified independent; Additional time (d/t pain)                       Balance:   Sitting: Intact; Without support  Standing: Intact; With support (RW & cane)  Ambulation/Gait Training:  Distance (ft): 120 Feet (ft) (60 ft x2 w/ seated break to use the commode)  Assistive Device: Walker, rolling;Gait belt;Cane, straight  Ambulation - Level of Assistance: Modified independent w/ her cane though with taxing effort d/t increased pain. Supervision w/ the walker for cues to improve techique/ posture). With training and practice, pt achieved independence with the RW. Gait Description (WDL): Exceptions to WDL  Gait Abnormalities: Antalgic;Decreased step clearance        Base of Support: Widened;Shift to right  Stance: Left decreased  Speed/Shellie: Slow  Step Length: Left shortened;Right shortened                         Stairs:  Number of Stairs Trained:  (Deferred. Pt felt she could manage the steps at home.   Reviewed technique to best manage LLE pain when negotiating steps.)           Therapeutic Activity and Education:   - Ambulates with SPC (a bit tall for her height, did not address d/t pt has been using the cane at it's current height and does not impact safety)   - Instructed in walker technique, appropriate height for home adjustment, and transfer safety. Recommended she use the walker to decrease LLE weight bearing and improve gait tolerance. Pt voiced agreement.    - Rec HHPT vs OPPT (pt to begin OPPT at [address] [place] [address] for her shoulder - just completed HHPT). Gave her the option. Discussed with PA who notes pt is well established with ortho, plan to defer to ortho    Physical Therapy Evaluation Charge Determination   History Examination Presentation Decision-Making   MEDIUM  Complexity : 1-2 comorbidities / personal factors will impact the outcome/ POC  MEDIUM Complexity : 3 Standardized tests and measures addressing body structure, function, activity limitation and / or participation in recreation  LOW Complexity : Stable, uncomplicated  LOW Complexity : FOTO score of       Based on the above components, the patient evaluation is determined to be of the following complexity level: LOW      Pain:  Increased LLE and back pain weight bearing (standing and sitting); 2/10 sitting with right lean; 10/10 ambulating though able to ambulate 120 ft w/ the RW with less antalgic gait when using the walker. Educated in the role of the walker to decrease LLE weight bearing and PT for her chronic back issues. Recommended discussions with ED attending/ RN re: medical management of pain. Activity Tolerance:   Fair and pain    After treatment patient left in no apparent distress:   Sitting in chair and in focus care room with RN in the room    COMMUNICATION/EDUCATION:   Communication/Collaboration:  Fall prevention education was provided and the patient/caregiver indicated understanding., Patient/family have participated as able in goal setting and plan of care. , and Patient/family agree to work toward stated goals and plan of care.     Findings and recommendations were discussed with: PA and Registered nurse.      Thank you for this referral.  Tess Bradley, PT   Time Calculation: 33 mins

## 2022-09-29 NOTE — ED PROVIDER NOTES
81-year-old female with history of anxiety, arthritis, asthma, diverticulitis, HLD, HTN, IBS and spinal stenosis presents to ED with 1 day of left-sided sciatic back pain. Patient reports that she has a history of sciatic pain and it feels similar to what she has had before. She notes that it started yesterday during the day and is located on the left side, radiating down her L leg. She called her orthopedic doctor (Dr. Erik Taylor) who had previously diagnosed her with spinal stenosis but was unable to reach him so she came into the ED instead. She adds that she had a MRI last week of her thoracic spine, she has not received these results yet. She notes that the pain has caused her to not be able to walk but denies any numbness, tingling, CP, SOB, dysuria, loss of bowel or bladder control, saddle paresthesias, fevers or chills. She notes that she took 2 oxycodone and tylenol last night but did not have much relief. She also takes gabapentin. The history is provided by the patient. Back Pain   Pertinent negatives include no chest pain, no fever, no headaches and no dysuria.       Past Medical History:   Diagnosis Date    Anxiety     Arthritis back pain    fingers    Asthma     MILD - NO INHALERS    Chronic pain     Colitis     Diverticulitis 06/11/2013    Hypercholesterolemia     Hypertension     IBS (irritable bowel syndrome)     Ill-defined condition     H/O UTI POST ISLAS CATH    Insomnia     Nausea & vomiting     Stenosis     spinal    Stroke ([facility] [address] 1996    tia   PATENT  FORAMEN  OVALE    Swollen L ankle        Past Surgical History:   Procedure Laterality Date    HX CATARACT REMOVAL Bilateral 2009    HX CHOLECYSTECTOMY  04/1997    HX COLONOSCOPY      x3    HX HYSTERECTOMY  1986    HX KNEE ARTHROSCOPY Left 04/1998    HX LUMBAR FUSION  02/2011    L4-L5    HX ORTHOPAEDIC Right     REPAIRED TENDONS ON RIGHT HAND AFTER FALL    HX SHOULDER REPLACEMENT Right 01/2020    HX TONSILLECTOMY  CHILDHOOD    HX WISDOM TEETH EXTRACTION      X3    IR INJ FORAMIN EPID LUMB ANES/STER SNGL  03/21/2022    [place] CARDIAC SURG PROCEDURE UNLIST  08/2004    repair of foramen ovale         Family History:   Problem Relation Age of Onset    Other Mother         SUARACHNOID HEMORRHAGE-ANEURYSM    Stroke Mother     Cancer Father         PANCREATIC    Anesth Problems Daughter         N/V    No Known Problems Daughter        Social History     Socioeconomic History    Marital status:      Spouse name: Not on file    Number of children: Not on file    Years of education: Not on file    Highest education level: Not on file   Occupational History    Not on file   Tobacco Use    Smoking status: Never    Smokeless tobacco: Never   Vaping Use    Vaping Use: Never used   Substance and Sexual Activity    Alcohol use: Yes     Alcohol/week: 10.0 standard drinks     Types: 3 Glasses of wine, 7 Cans of beer per week    Drug use: No    Sexual activity: Not Currently     Partners: Male   Other Topics Concern    Not on file   Social History Narrative    Not on file     Social Determinants of Health     Financial Resource Strain: Not on file   Food Insecurity: Not on file   Transportation Needs: Not on file   Physical Activity: Not on file   Stress: Not on file   Social Connections: Not on file   Intimate Partner Violence: Not on file   Housing Stability: Not on file         ALLERGIES: Adhesive, Antihistamines - alkylamine, Cefdinir, Ciprofloxacin, Flagyl [metronidazole], Other medication, Oxycodone-acetaminophen, Phenylephrine, Phenylpropanolamine, and Sulfa (sulfonamide antibiotics)    Review of Systems   Constitutional:  Negative for fever. HENT:  Negative for congestion and sinus pressure. Respiratory:  Negative for shortness of breath. Cardiovascular:  Negative for chest pain. Gastrointestinal:  Negative for nausea and vomiting. Genitourinary:  Negative for dysuria. Musculoskeletal:  Positive for back pain. Negative for myalgias. Neurological:  Negative for dizziness and headaches. Hematological:  Negative for adenopathy. Psychiatric/Behavioral:  The patient is not nervous/anxious. All other systems reviewed and are negative. Vitals:    09/29/22 0918   BP: 121/74   Pulse: 95   Resp: 18   Temp: 97.2 °F (36.2 °C)   SpO2: 98%            Physical Exam  Vitals and nursing note reviewed. Constitutional:       General: She is not in acute distress. Appearance: Normal appearance. She is normal weight. HENT:      Head: Normocephalic and atraumatic. Eyes:      Extraocular Movements: Extraocular movements intact. Pupils: Pupils are equal, round, and reactive to light. Cardiovascular:      Rate and Rhythm: Normal rate and regular rhythm. Heart sounds: Normal heart sounds. Pulmonary:      Breath sounds: Normal breath sounds. Abdominal:      Palpations: Abdomen is soft. Tenderness: There is no abdominal tenderness. Musculoskeletal:      Cervical back: Normal.      Thoracic back: Normal.      Lumbar back: Bony tenderness present. No tenderness. Normal range of motion. Negative right straight leg raise test and negative left straight leg raise test.   Lymphadenopathy:      Cervical: No cervical adenopathy. Skin:     General: Skin is warm and dry. Neurological:      General: No focal deficit present. Mental Status: She is alert and oriented to person, place, and time. Psychiatric:         Mood and Affect: Mood normal.         Behavior: Behavior normal.         Thought Content: Thought content normal.        MDM  Number of Diagnoses or Management Options  Chronic midline low back pain with left-sided sciatica  Diagnosis management comments: 61-year-old female with history of anxiety, arthritis, asthma, diverticulitis, HLD, HTN, IBS and spinal stenosis presents to ED with 1 day of left-sided sciatic back pain. Vital signs stable in triage.   Physical exam notable for lumbar's paraspinal muscle and spinous process tenderness no decreased range of motion and patient neurovascularly intact otherwise. Urine unremarkable. Labs unremarkable. CT of lumbar spine showed lumbosacral fusion without hardware loosening and spinal stenosis but otherwise no acute abnormalities. Patient received IV Decadron and Toradol in ED with improvement of symptoms. Consulted with Ortho as outlined below who recommended outpatient follow-up and also consulted with physical therapy therapy who came to evaluate patient and ambulated with her without issue. Physical therapy felt the patient was good to be discharged home. Patient will be discharged with instructions for conservative care, follow-up and return precautions. Amount and/or Complexity of Data Reviewed  Clinical lab tests: reviewed  Tests in the radiology section of CPT®: reviewed      ED Course as of 09/29/22 1336   Thu Sep 29, 2022   1232 Consulted with michelle Carvajal on call. Recommended pain control, PT ambulation and outpatient follow up []   1314 PT evaluated patient and felt that her pain was well-enough controlled that she would be safe to return home. Patient was able to ambulate without issue to the bathroom with walker [AH]      ED Course User Index  [AH] CAMILO Trejo       Procedures      Perfect Serve Consult for Orthopedic Surgery  12:14 PM    ED Room Number: R36/R36  Patient Name and age:  Tiffani Bernard 81 y.o.  female  Working Diagnosis:   1. Chronic midline low back pain with left-sided sciatica      Department: [facility] - [phone]    Other: 20-year-old female with history of spinal stenosis and lumbosacral fusion presents to ED with 1 day of severe L sciatic back pain. Patient is established with Dr. Naomia Opitz at [facility] for history for back issues but unable to contact him. She has been taking percocet at home with little relief and is having trouble ambulating. Otherwise patient NVI and no red flag symptoms.  CT revealed Lumbosacral fusion without hardware loosening, No acute fracture,  may be central stenosis and moderate left neural foraminal stenosis. Severe right and moderate left L5-S1 neural foraminal stenosis. Patient has received IV toradol and decadron in ED with minimal improvement and we have requested PT eval, but was hoping maybe someone could come see her as well due to age and severity of pain, assist with dispo decision?

## 2022-09-29 NOTE — DISCHARGE INSTRUCTIONS
Continue to monitor symptoms at home. Take new medication or Tylenol as needed for pain. Follow-up with orthopedic doctor and return with any changes or worsening.

## 2022-09-29 NOTE — ED TRIAGE NOTES
Pt arrives for left sided sciatic pain that started yesterday during the day, pt has had sciatica pain in the past. She called her orthopedist but was unable to reach him so she came to ED. Pt is unable to walk due to pain but denies loss of bowel or bladder control. Pt took 2 oxycodone last PM but it didn't relieve her pain. Pain 7/10.

## 2022-10-03 ENCOUNTER — PATIENT OUTREACH (OUTPATIENT)
Dept: CASE MANAGEMENT | Age: 87
End: 2022-10-03

## 2022-10-03 ENCOUNTER — TELEPHONE (OUTPATIENT)
Dept: ORTHOPEDIC SURGERY | Age: 87
End: 2022-10-03

## 2022-10-03 DIAGNOSIS — Z98.1 S/P LUMBAR SPINAL FUSION: Primary | ICD-10-CM

## 2022-10-03 RX ORDER — METHYLPREDNISOLONE 4 MG/1
TABLET ORAL
Qty: 1 DOSE PACK | Refills: 0 | Status: SHIPPED | OUTPATIENT
Start: 2022-10-03 | End: 2022-11-02 | Stop reason: ALTCHOICE

## 2022-10-03 NOTE — PROGRESS NOTES
Patient has graduated from the Transitions of Care Coordination  program on 10/3/2022. Patient/family has the ability to self-manage at this time Care management goals have been completed. Patient was not referred to the [facility] team for further management. Patient has Care Transition Nurse's contact information for any further questions, concerns, or needs.   Patients upcoming visits:    Future Appointments   Date Time Provider Marty Josue   10/6/2022  2:00 PM MD AURY Campa BS AMB   11/1/2022  2:40 PM MD AURY Morrison BS AMB   11/2/2022 10:00 AM Nancy Wagoner MD [facility] BS AMB

## 2022-10-06 ENCOUNTER — OFFICE VISIT (OUTPATIENT)
Dept: ORTHOPEDIC SURGERY | Age: 87
End: 2022-10-06
Payer: MEDICARE

## 2022-10-06 ENCOUNTER — TELEPHONE (OUTPATIENT)
Dept: ORTHOPEDIC SURGERY | Age: 87
End: 2022-10-06

## 2022-10-06 DIAGNOSIS — Z98.890 HISTORY OF REVERSE TOTAL REPLACEMENT OF LEFT SHOULDER JOINT: Primary | ICD-10-CM

## 2022-10-06 PROCEDURE — 99024 POSTOP FOLLOW-UP VISIT: CPT | Performed by: ORTHOPAEDIC SURGERY

## 2022-10-06 NOTE — LETTER
10/6/2022    Patient: Guera Walker   YOB: 1935   Date of Visit: 10/6/2022     Nohemi Marlow MD  [facility] [address]  Via In Basket    Dear Nohemi Marlow MD,      Thank you for referring Ms. Shaka Nino to [facility] for evaluation. My notes for this consultation are attached. If you have questions, please do not hesitate to call me. I look forward to following your patient along with you.       Sincerely,    Fe Mooney MD

## 2022-10-06 NOTE — PROGRESS NOTES
Mina Pearson (: 1935) is a 81 y.o. 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 She is doing well in regards to the shoulder. Unfortunately she is having problems with her back. She is having trouble getting into see her back doctor. However, she has had 2 injections recently without much improvement    Allergies   Allergen Reactions    Adhesive Other (comments)    Antihistamines - Alkylamine Other (comments)    Cefdinir Other (comments)     Sick to stomach    Ciprofloxacin Diarrhea and Nausea and Vomiting    Flagyl [Metronidazole] Nausea and Vomiting    Other Medication Other (comments)     BANDAIDS - breaks out skin    Oxycodone-Acetaminophen Other (comments)     dizziness    Phenylephrine Other (comments)     Wire her up    Phenylpropanolamine Other (comments)     Wire her up    Sulfa (Sulfonamide Antibiotics) Nausea Only       Current Outpatient Medications   Medication Sig    methylPREDNISolone (MEDROL DOSEPACK) 4 mg tablet Per dose pack instructions    gabapentin (NEURONTIN) 300 mg capsule TAKE 2 CAPSULES NIGHTLY. MAXIMUM DAILY AMOUNT: 600MG    losartan (COZAAR) 100 mg tablet TAKE 1 TABLET DAILY    meloxicam (MOBIC) 7.5 mg tablet Take 1 Tablet by mouth daily. cholecalciferol (VITAMIN D3) (1000 Units /25 mcg) tablet Take  by mouth daily. LORazepam (ATIVAN) 1 mg tablet TAKE 1/2 TO 1 TABLET       NIGHTLY AS NEEDED FOR      ANXIETY.  MAXIMUM DAILY     AMOUNT: 1 TABLET    simvastatin (ZOCOR) 20 mg tablet TAKE 1 TABLET DAILY    estradioL (ESTRACE) 0.01 % (0.1 mg/gram) vaginal cream APPLY 1 A SMALL AMOUNT INTO VAGINA THREE TIMES A WEEK , PEA-SIZED AMOUNT FINGERTIP APPLICATION    amLODIPine (NORVASC) 5 mg tablet TAKE 1 TABLET DAILY    levocetirizine (XYZAL) 5 mg tablet TAKE 1 TABLET DAILY    PARoxetine (PAXIL) 30 mg tablet TAKE 1 TABLET DAILY    acetaminophen (TYLENOL) 500 mg tablet Take  by mouth every six (6) hours as needed for Pain. dicyclomine (BENTYL) 10 mg capsule Take 10 mg by mouth as needed. 1 tablet by mouth daily as needed    MULTIVITAMIN W-MINERALS/LUTEIN (CENTRUM SILVER PO) Take 1 Tab by mouth. Takes one po daily. No current facility-administered medications for this visit.        Past Medical History:   Diagnosis Date    Anxiety     Arthritis back pain    fingers    Asthma     MILD - NO INHALERS    Chronic pain     Colitis     Diverticulitis 06/11/2013    Hypercholesterolemia     Hypertension     IBS (irritable bowel syndrome)     Ill-defined condition     H/O UTI POST ISLAS CATH    Insomnia     Nausea & vomiting     Stenosis     spinal    Stroke ([facility] [address] 1996    tia   PATENT  FORAMEN  OVALE    Swollen L ankle         Past Surgical History:   Procedure Laterality Date    HX CATARACT REMOVAL Bilateral 2009    HX CHOLECYSTECTOMY  04/1997    HX COLONOSCOPY      x3    HX HYSTERECTOMY  1986    HX KNEE ARTHROSCOPY Left 04/1998    HX LUMBAR FUSION  02/2011    L4-L5    HX ORTHOPAEDIC Right     REPAIRED TENDONS ON RIGHT HAND AFTER FALL    HX SHOULDER REPLACEMENT Right 01/2020    HX TONSILLECTOMY  CHILDHOOD    HX WISDOM TEETH EXTRACTION      X3    IR INJ FORAMIN EPID LUMB ANES/STER SNGL  03/21/2022    [place] CARDIAC SURG PROCEDURE UNLIST  08/2004    repair of foramen ovale       Family History   Problem Relation Age of Onset    Other Mother         SUARACHNOID HEMORRHAGE-ANEURYSM    Stroke Mother     Cancer Father         PANCREATIC    Anesth Problems Daughter         N/V    No Known Problems Daughter         Social History     Socioeconomic History    Marital status:      Spouse name: Not on file    Number of children: Not on file    Years of education: Not on file    Highest education level: Not on file   Occupational History    Not on file   Tobacco Use    Smoking status: Never    Smokeless tobacco: Never   Vaping Use Vaping Use: Never used   Substance and Sexual Activity    Alcohol use: Yes     Alcohol/week: 10.0 standard drinks     Types: 3 Glasses of wine, 7 Cans of beer per week    Drug use: No    Sexual activity: Not Currently     Partners: Male   Other Topics Concern    Not on file   Social History Narrative    Not on file     Social Determinants of Health     Financial Resource Strain: Not on file   Food Insecurity: Not on file   Transportation Needs: Not on file   Physical Activity: Not on file   Stress: Not on file   Social Connections: Not on file   Intimate Partner Violence: Not on file   Housing Stability: Not on file       Review of Systems   Musculoskeletal:         Left shoulder post op     Vitals:  [facility] 01/01/1986    There is no height or weight on file to calculate BMI. Ortho Exam     Left shoulder: Incision is healing very well. There is no drainage. No swelling. She has 90 degrees of forward elevation, 60 degrees lateral duction and 30 degrees of external rotation. She has very little pain. Neurovascular examination is intact    ASSESSMENT/PLAN:    She was provided a prescription to mobilize into outpatient physical therapy. Have also asked the physical therapist to take a look at her back. She can advance out of the sling.   Patient is to return to the office in 4 weeks        Annamarie Cuenca MD

## 2022-10-06 NOTE — TELEPHONE ENCOUNTER
I was following up on the patient to see if she had heard from [facility]. She reports she just got and BEN yesterday. She reports no relief at this point. I did educate her that the [facility] may take up to 7 to 14 days to see the full effects. She was advised that at any point a patient cancels the appointment with the surgeon because she does not wish to see a PA that I will be sure she is the first worked in.      Have a wonderful day,     Ralph Grimm RN, BSN  Registered Nurse to [address] [place] [address] [address] [place] [id number]    Office: [phone]  Fax: [phone]

## 2022-10-10 DIAGNOSIS — Z98.1 S/P LUMBAR SPINAL FUSION: ICD-10-CM

## 2022-10-10 DIAGNOSIS — Z98.1 S/P LUMBAR FUSION: Primary | ICD-10-CM

## 2022-10-10 RX ORDER — PREGABALIN 75 MG/1
75 CAPSULE ORAL 2 TIMES DAILY
Qty: 60 CAPSULE | Refills: 0 | Status: SHIPPED | OUTPATIENT
Start: 2022-10-10 | End: 2022-10-18

## 2022-10-10 NOTE — TELEPHONE ENCOUNTER
10/10/2022: patient reports that Dr. Azalia Ley increased her gabapentin to two in the morning and one in the morning. This is helping her sleep however it is not helping with her nerve pain. She was offered to increase to one at noon however she feels that taking more pills is too much and since it didn't work for her she would like to proceed with the Lyrica. She understands that she will take the lyrica instead of the gabapetin LTAC, located within [facility] PA/ Elder Holman last saw Dr. Deacon Acevedo on 09/13/202: I am going to get an MRI of her thoracic spine just to rule out adjacent level compression. Obviously were not looking for surgical intervention if at all possible. She may do well with injections. I will see her back with the test results. 09/28 : She calls for results of the MRI     09/28: Per Dr. Deacon Acevedo send to  spine and pain for eval and treat. In the meantime she is getting refill on gabapentin, mobic and medrol. Some from her PCP    09/29: She reports to the ER for her back pain, \"Pt arrives for left sided sciatic pain that started yesterday during the day, pt has had sciatica pain in the past. She called her orthopedist but was unable to reach him so she came to ED. Pt is unable to walk due to pain but denies loss of bowel or bladder control. Pt took 2 oxycodone last PM but it didn't relieve her pain. Pain 7/10. \"    10/3: call for severe pain, message by Gabriela sent to [place]. 10/3: Visit with Dr. Shana Murphy she voices her difficulities getting into the spine service line. Her apt is 11/1/2022.    10/3: I followedup with the patient she just had her FRANCO the day prior, education that the franco may take 7 to 14 days to get the full effect. She was offered a visit with the PA she declined wanting to see Dr. Deacon Acevedo.    10/10: Dashawn Waters RN  Good Morning,     I know you are aware of this sweet little lady.  We saw her on Thurs and she conveyed to Dr. Shana Murphy that she is not getting any better from her spinal injection. I know she had just had it but she did call me this morning saying she can t sleep. I certainly don't want to get in the middle of her care but is there anything you can do for her. So sorry to butt in but I just feel bad for her. Thanks for any help     Fabiano Solis       Please advise how to handle ?

## 2022-10-12 ENCOUNTER — TELEPHONE (OUTPATIENT)
Dept: ORTHOPEDIC SURGERY | Age: 87
End: 2022-10-12

## 2022-10-12 NOTE — TELEPHONE ENCOUNTER
Prior Auth for Lyrica completed in cover my meds    Your information has been submitted to [place] [employer] Part D. [employer] will review the request and will issue a decision, typically within 1-3 days from your submission. You can check the updated outcome later by reopening this request.    If [facility] has not responded in 1-3 days or if you have any questions about your ePA request, please contact [facility] Medicare Part D at [phone]. If you think there may be a problem with your PA request, use our live chat feature at the bottom right. Wait for Determination  Please wait for [employer] NCPDP 2017 to return a determination.     Ghazala Collazo RN

## 2022-10-18 ENCOUNTER — TELEPHONE (OUTPATIENT)
Dept: NEUROLOGY | Age: 87
End: 2022-10-18

## 2022-10-18 ENCOUNTER — OFFICE VISIT (OUTPATIENT)
Dept: ORTHOPEDIC SURGERY | Age: 87
End: 2022-10-18
Payer: MEDICARE

## 2022-10-18 VITALS — BODY MASS INDEX: 28.22 KG/M2 | WEIGHT: 135 LBS

## 2022-10-18 DIAGNOSIS — F11.99 OPIOID USE, UNSPECIFIED WITH UNSPECIFIED OPIOID-INDUCED DISORDER (HCC): ICD-10-CM

## 2022-10-18 DIAGNOSIS — Z98.1 S/P LUMBAR FUSION: Primary | ICD-10-CM

## 2022-10-18 PROCEDURE — G8427 DOCREV CUR MEDS BY ELIG CLIN: HCPCS | Performed by: ORTHOPAEDIC SURGERY

## 2022-10-18 PROCEDURE — G8417 CALC BMI ABV UP PARAM F/U: HCPCS | Performed by: ORTHOPAEDIC SURGERY

## 2022-10-18 PROCEDURE — 1090F PRES/ABSN URINE INCON ASSESS: CPT | Performed by: ORTHOPAEDIC SURGERY

## 2022-10-18 PROCEDURE — G8510 SCR DEP NEG, NO PLAN REQD: HCPCS | Performed by: ORTHOPAEDIC SURGERY

## 2022-10-18 PROCEDURE — 1101F PT FALLS ASSESS-DOCD LE1/YR: CPT | Performed by: ORTHOPAEDIC SURGERY

## 2022-10-18 PROCEDURE — 1123F ACP DISCUSS/DSCN MKR DOCD: CPT | Performed by: ORTHOPAEDIC SURGERY

## 2022-10-18 PROCEDURE — G8536 NO DOC ELDER MAL SCRN: HCPCS | Performed by: ORTHOPAEDIC SURGERY

## 2022-10-18 PROCEDURE — 99214 OFFICE O/P EST MOD 30 MIN: CPT | Performed by: ORTHOPAEDIC SURGERY

## 2022-10-18 RX ORDER — HYDROCODONE BITARTRATE AND ACETAMINOPHEN 5; 325 MG/1; MG/1
1 TABLET ORAL
Qty: 40 TABLET | Refills: 0 | Status: SHIPPED | OUTPATIENT
Start: 2022-10-18 | End: 2022-11-17

## 2022-10-18 NOTE — PROGRESS NOTES
Damian Muñoz (: 1935) is a 81 y.o. female, patient, here for evaluation of the following chief complaint(s):  LOW BACK PAIN       ASSESSMENT/PLAN:    Below is the assessment and plan developed based on review of pertinent history, physical exam, labs, studies, and medications. Her main complaints at this time are related to fatigue after a long day. Patient has not really change since her last visit. She has been complaining of severe left posterior hip pain and left SI joint pain. Occasionally she gets some left lateral thigh pain. And left lateral calf pain. She did have 1 round of injections without relief. Her pain is certainly more than I would expect given her imaging studies. I am ordering an EMG of the left lower extremity to evaluate for radiculopathy since his CT scan and MRIs have been completely unremarkable. I am also going to get a bone scan to rule out a sacral insufficiency fracture. I am also going to start her on some hydrocodone. Finally regarding send her for a left SI joint injection as well.      1. S/P lumbar fusion  -     HYDROcodone-acetaminophen (Norco) 5-325 mg per tablet; Take 1 Tablet by mouth every eight (8) hours as needed for Pain for up to 30 days. Max Daily Amount: 3 Tablets., Normal, Disp-40 Tablet, R-0  2. Opioid use, unspecified with unspecified opioid-induced disorder      No follow-ups on file. SUBJECTIVE/OBJECTIVE:  Damian Muñoz (: 1935) is a 81 y.o. female. Pain Assessment  10/18/2022   Location of Pain Leg   Location Modifiers Left   Severity of Pain 6        She is here for her 12-month visit. She had some return of some of her left posterior buttock and left leg symptoms over the last month or so. She has had to go to the urgent care for the severe pain in the left posterior buttock and posterior hip. It does radiate occasionally to the left leg. No other acute changes noted. She is on oral Neurontin.   She denies any bowel bladder difficulties. Imaging:          XR Results (most recent):  Results from Appointment encounter on 09/13/22    XR SPINE LUMB 2 OR 3 V    Narrative  AP and lateral of the lumbar spine demonstrates a stable T10-L5 fusion. No acute changes. MRI Results (most recent):  Results from Appointment encounter on 09/16/22     [facility] [address] CONT    Narrative  EXAM:  MRI [facility] SPINE WO CONT    INDICATION:   81-year-old female with Thoracics stenosis; status post lumbar  spinal fusion. Dx: S/P lumbar spinal fusion [Z98.1 (ICD-10-CM)]; Thoracic  stenosis [L02.40 (ICD-10-CM)]    COMPARISON: None. TECHNIQUE: Multiplanar multisequence acquisition without contrast of the  thoracic spine. CONTRAST: None. FINDINGS:  Suboptimally visualized bilateral transpedicular posterior spinal fusion  hardware extending from T10 through the visualized L2 levels. Hardware  associated signal distortion artifacts preclude optimal evaluation of adjacent  structures. There is a 0.4 cm anterolisthesis of T1 on T2 and retrolisthesis of T9 on T10. Vertebral body heights are maintained without evidence of acute fracture. Small  intraosseous hemangioma within T8-9 vertebral body. Degenerative disc changes  predominantly involving T7-T8, T8-T9 and T9-T10 resulting mild spinal canal  narrowing. No significant neural foramina narrowing. Suboptimal evaluation of  the spinal canal at the fused levels due to extensive signal distortion  artifact. The thoracic cord is normal in size and signal in the well-visualized  segment above the spinal fusion levels. Visualized soft tissues are  unremarkable. Impression  Status post lower thoracic/lumbar posterior spinal fusion,  suboptimally visualized. Hardware associated signal distortion artifact  precludes optimal evaluation of the adjacent structures. Mild spondylolisthesis predominantly involving T7-T10 without high-grade spinal  canal or neural foramina narrowing. Allergies   Allergen Reactions    Adhesive Other (comments)    Antihistamines - Alkylamine Other (comments)    Cefdinir Other (comments)     Sick to stomach    Ciprofloxacin Diarrhea and Nausea and Vomiting    Flagyl [Metronidazole] Nausea and Vomiting    Other Medication Other (comments)     BANDAIDS - breaks out skin    Oxycodone-Acetaminophen Other (comments)     dizziness    Phenylephrine Other (comments)     Wire her up    Phenylpropanolamine Other (comments)     Wire her up    Sulfa (Sulfonamide Antibiotics) Nausea Only       Current Outpatient Medications   Medication Sig    HYDROcodone-acetaminophen (Norco) 5-325 mg per tablet Take 1 Tablet by mouth every eight (8) hours as needed for Pain for up to 30 days. Max Daily Amount: 3 Tablets. methylPREDNISolone (MEDROL DOSEPACK) 4 mg tablet Per dose pack instructions    gabapentin (NEURONTIN) 300 mg capsule TAKE 2 CAPSULES NIGHTLY. MAXIMUM DAILY AMOUNT: 600MG    losartan (COZAAR) 100 mg tablet TAKE 1 TABLET DAILY    meloxicam (MOBIC) 7.5 mg tablet Take 1 Tablet by mouth daily. cholecalciferol (VITAMIN D3) (1000 Units /25 mcg) tablet Take  by mouth daily. LORazepam (ATIVAN) 1 mg tablet TAKE 1/2 TO 1 TABLET       NIGHTLY AS NEEDED FOR      ANXIETY. MAXIMUM DAILY     AMOUNT: 1 TABLET    simvastatin (ZOCOR) 20 mg tablet TAKE 1 TABLET DAILY    estradioL (ESTRACE) 0.01 % (0.1 mg/gram) vaginal cream APPLY 1 A SMALL AMOUNT INTO VAGINA THREE TIMES A WEEK , PEA-SIZED AMOUNT FINGERTIP APPLICATION    amLODIPine (NORVASC) 5 mg tablet TAKE 1 TABLET DAILY    levocetirizine (XYZAL) 5 mg tablet TAKE 1 TABLET DAILY    PARoxetine (PAXIL) 30 mg tablet TAKE 1 TABLET DAILY    acetaminophen (TYLENOL) 500 mg tablet Take  by mouth every six (6) hours as needed for Pain. dicyclomine (BENTYL) 10 mg capsule Take 10 mg by mouth as needed. 1 tablet by mouth daily as needed    MULTIVITAMIN W-MINERALS/LUTEIN (CENTRUM SILVER PO) Take 1 Tab by mouth. Takes one po daily.       No current facility-administered medications for this visit. Past Medical History:   Diagnosis Date    Anxiety     Arthritis back pain    fingers    Asthma     MILD - NO INHALERS    Chronic pain     Colitis     Diverticulitis 06/11/2013    Hypercholesterolemia     Hypertension     IBS (irritable bowel syndrome)     Ill-defined condition     H/O UTI POST ISLAS CATH    Insomnia     Nausea & vomiting     Stenosis     spinal    Stroke ([facility] [address] 1996    tia   PATENT  FORAMEN  OVALE    Swollen L ankle         Past Surgical History:   Procedure Laterality Date    HX CATARACT REMOVAL Bilateral 2009    HX CHOLECYSTECTOMY  04/1997    HX COLONOSCOPY      x3    HX HYSTERECTOMY  1986    HX KNEE ARTHROSCOPY Left 04/1998    HX LUMBAR FUSION  02/2011    L4-L5    HX ORTHOPAEDIC Right     REPAIRED TENDONS ON RIGHT HAND AFTER FALL    HX SHOULDER REPLACEMENT Right 01/2020    HX TONSILLECTOMY  CHILDHOOD    HX WISDOM TEETH EXTRACTION      X3    IR INJ FORAMIN EPID LUMB ANES/STER SNGL  03/21/2022    [place] CARDIAC SURG PROCEDURE UNLIST  08/2004    repair of foramen ovale       Family History   Problem Relation Age of Onset    Other Mother         SUARACHNOID HEMORRHAGE-ANEURYSM    Stroke Mother     Cancer Father         PANCREATIC    Anesth Problems Daughter         N/V    No Known Problems Daughter         Social History     Tobacco Use    Smoking status: Never    Smokeless tobacco: Never   Vaping Use    Vaping Use: Never used   Substance Use Topics    Alcohol use: Yes     Alcohol/week: 10.0 standard drinks     Types: 3 Glasses of wine, 7 Cans of beer per week    Drug use: No        Review of Systems   Musculoskeletal:  Positive for back pain and gait problem. All other systems reviewed and are negative. Vitals: Wt 135 lb (61.2 kg)   LMP 01/01/1986   BMI 28.22 kg/m²    Body mass index is 28.22 kg/m².     Ortho Exam       Integumentary  Assessment of Surgical Incision - healing and consistent with normal anticipated wound healing. Neurologic  Sensory  Light Touch - Intact - Globally. Overall Assessment of Muscle Strength and Tone reveals  Lower Extremities - Right Iliopsoas - 5/5. Left Iliopsoas - 5/5. Right Tibialis Anterior - 5/5. Left Tibialis Anterior - 5/5. Right Gastroc-Soleus - 5/5. Left Gastroc-Soleus - 5/5. Right EHL - 5/5. Left EHL - 5/5. General Assessment of Reflexes  Right Ankle - Clonus is not present. Left Ankle - Clonus is not present. Reflexes (Dermatomes)  2/2 Normal - Left Achilles (L5-S2), Left Knee (L2-4), Right Achilles (L5-S2) and Right Knee (L2-4). Musculoskeletal  Global Assessment  Examination of related systems reveals - well-developed, well-nourished, in no acute distress, alert and oriented x 3 and normal coordination. Spine/Ribs/Pelvis  Lumbosacral Spine - Examination of the lumbosacral spine reveals - no known fractures or deformities. Inspection and Palpation - Tenderness - moderate. Assessment of pain reveals the following findings - The pain is characterized as - moderate. Location - pain refers to lower back bilaterally. Some tenderness on palpation of left posterior buttock and hip region. An electronic signature was used to authenticate this note.   -- Rocky Snyder MD

## 2022-10-31 ENCOUNTER — TELEPHONE (OUTPATIENT)
Dept: ORTHOPEDIC SURGERY | Age: 87
End: 2022-10-31

## 2022-10-31 DIAGNOSIS — Z98.1 S/P LUMBAR FUSION: ICD-10-CM

## 2022-11-01 ENCOUNTER — HOSPITAL ENCOUNTER (OUTPATIENT)
Dept: NUCLEAR MEDICINE | Age: 87
Discharge: HOME | End: 2022-11-01
Attending: ORTHOPAEDIC SURGERY
Payer: MEDICARE

## 2022-11-01 DIAGNOSIS — Z98.1 S/P LUMBAR FUSION: ICD-10-CM

## 2022-11-01 PROCEDURE — 78306 BONE IMAGING WHOLE BODY: CPT

## 2022-11-01 NOTE — PROGRESS NOTES
Subjective:     Damian Muñoz is a 81 y.o. female who presents for follow up of hypertension and hyperlipidemia. Diet and Lifestyle: generally follows a low sodium diet  Home BP Monitoring: is not measured at home    Cardiovascular ROS: taking medications as instructed, no medication side effects noted, no TIA's, no chest pain on exertion, no dyspnea on exertion, no swelling of ankles, no orthostatic dizziness or lightheadedness, no palpitations. New concerns:   Since last visit had left reverse total shoulder replacement through Dr. Juan Francisco Pinzon. Recently saw Dr. Ora Khan for her low back pain/left posterior hip pain. Is ordering SI joint injections and EMGs (Tomorrow). Recent bone scan showed no evidence of pelvic fracture. Started on Hydrocodone for pain- taking 1 tid with the gabapentin - helpled her sleep but has run out. Currently taking 1 gabapentin in morning and 2 at night. Pain is worse late afternoon until night. Back to working with cane. No weakness. Note reviewed with patient. Saw Dr. Valery Fraga with [facility] due to multiresistant UTI. Given additional abx which cleared her urine. Current Outpatient Medications   Medication Sig Dispense Refill    gabapentin (NEURONTIN) 300 mg capsule Take 1 cap po in am, 1 cap po midday and 2 cap po qhs. 360 Capsule 1    HYDROcodone-acetaminophen (Norco) 5-325 mg per tablet Take 1 Tablet by mouth every eight (8) hours as needed for Pain for up to 30 days. Max Daily Amount: 3 Tablets. 40 Tablet 0    losartan (COZAAR) 100 mg tablet TAKE 1 TABLET DAILY 90 Tablet 3    meloxicam (MOBIC) 7.5 mg tablet Take 1 Tablet by mouth daily. 90 Tablet 1    cholecalciferol (VITAMIN D3) (1000 Units /25 mcg) tablet Take  by mouth daily. LORazepam (ATIVAN) 1 mg tablet TAKE 1/2 TO 1 TABLET       NIGHTLY AS NEEDED FOR      ANXIETY.  MAXIMUM DAILY     AMOUNT: 1 TABLET 90 Tablet 1    simvastatin (ZOCOR) 20 mg tablet TAKE 1 TABLET DAILY 90 Tablet 3    estradioL (ESTRACE) 0.01 % (0.1 mg/gram) vaginal cream APPLY 1 A SMALL AMOUNT INTO VAGINA THREE TIMES A WEEK , PEA-SIZED AMOUNT FINGERTIP APPLICATION      amLODIPine (NORVASC) 5 mg tablet TAKE 1 TABLET DAILY 90 Tablet 3    levocetirizine (XYZAL) 5 mg tablet TAKE 1 TABLET DAILY 90 Tablet 3    PARoxetine (PAXIL) 30 mg tablet TAKE 1 TABLET DAILY 90 Tablet 3    acetaminophen (TYLENOL) 500 mg tablet Take  by mouth every six (6) hours as needed for Pain. dicyclomine (BENTYL) 10 mg capsule Take 10 mg by mouth as needed. 1 tablet by mouth daily as needed      MULTIVITAMIN W-MINERALS/LUTEIN (CENTRUM SILVER PO) Take 1 Tab by mouth. Takes one po daily. Lab Results   Component Value Date/Time    Cholesterol, total 198 05/02/2022 10:20 AM    HDL Cholesterol 80 05/02/2022 10:20 AM    LDL, calculated 98 05/02/2022 10:20 AM    Triglyceride 100 05/02/2022 10:20 AM    CHOL/HDL Ratio 2.5 05/02/2022 10:20 AM     Lab Results   Component Value Date/Time    ALT (SGPT) 23 09/29/2022 10:39 AM    Alk. phosphatase 155 (H) 09/29/2022 10:39 AM    Bilirubin, direct 0.2 03/30/2020 08:46 AM    Bilirubin, total 0.6 09/29/2022 10:39 AM    Albumin 4.4 09/29/2022 10:39 AM    Protein, total 8.0 09/29/2022 10:39 AM    INR 1.0 08/25/2022 03:50 PM    Prothrombin time 10.1 08/25/2022 03:50 PM    PLATELET 091 77/78/1862 10:39 AM       Lab Results   Component Value Date/Time    GFR est non-AA 51 (L) 09/29/2022 10:39 AM    GFRNA, POC 53 (L) 09/29/2020 10:40 AM    GFR est AA >60 09/29/2022 10:39 AM    GFRAA, POC >60 09/29/2020 10:40 AM    Creatinine 1.03 (H) 09/29/2022 10:39 AM    Creatinine (POC) 1.0 09/29/2020 10:40 AM    BUN 17 09/29/2022 10:39 AM    Sodium 132 (L) 09/29/2022 10:39 AM    Potassium 4.0 09/29/2022 10:39 AM    Chloride 99 09/29/2022 10:39 AM    CO2 26 09/29/2022 10:39 AM    Magnesium 1.9 09/29/2021 10:45 AM    Phosphorus 2.6 09/19/2021 05:06 AM        Review of Systems, additional:  Pertinent items are noted in HPI.     Objective: Visit Vitals  BP (!) 142/92 (BP 1 Location: Left upper arm, BP Patient Position: Sitting, BP Cuff Size: Adult)   Pulse 89   Temp 98.1 °F (36.7 °C) (Temporal)   Resp 18   Ht 4' 10\" (1.473 m)   Wt 131 lb 12.8 oz (59.8 kg)   LMP 01/01/1986   SpO2 97%   BMI 27.55 kg/m²     Appearance: alert, well appearing, and in no distress and oriented to person, place, and time. General exam:   NECK: supple, no lad, no bruit, no tm  LUNGS: cta bilat  CV rrr, no m/g/r  ABD: soft, nt, nd, nabs  EXT: no c/c/e        Assessment/Plan:       Diagnoses and all orders for this visit:    Essential hypertension - well controlled, cont Losartan 100mg every day and Amlodipine 5mg every day. Pure hypercholesterolemia - controlled in past, continue simvastatin 20mg every day. -     LIPID PANEL; Future    Radicular pain of left lower extremity - in tears secondary to pain. Ongoing w/up with Dr. Lynnette Gautam. We will increase her gabapentin from 300mg 3 per day to 4 per day. Will contact us on Monday , may increase to 3 gabapentin at night. She will contact Dr. Lynnette Gautam re hydrocodone refill.    -     gabapentin (NEURONTIN) 300 mg capsule;  Take 1 cap po in am, 1 cap po midday and 2 cap po qhs., Normal, Disp-360 Capsule, R-1

## 2022-11-02 ENCOUNTER — OFFICE VISIT (OUTPATIENT)
Dept: INTERNAL MEDICINE CLINIC | Age: 87
End: 2022-11-02
Payer: MEDICARE

## 2022-11-02 VITALS
RESPIRATION RATE: 18 BRPM | DIASTOLIC BLOOD PRESSURE: 92 MMHG | BODY MASS INDEX: 27.66 KG/M2 | SYSTOLIC BLOOD PRESSURE: 142 MMHG | TEMPERATURE: 98.1 F | OXYGEN SATURATION: 97 % | WEIGHT: 131.8 LBS | HEIGHT: 58 IN | HEART RATE: 89 BPM

## 2022-11-02 DIAGNOSIS — E78.00 PURE HYPERCHOLESTEROLEMIA: ICD-10-CM

## 2022-11-02 DIAGNOSIS — M54.10 RADICULAR PAIN OF LEFT LOWER EXTREMITY: ICD-10-CM

## 2022-11-02 DIAGNOSIS — I10 ESSENTIAL HYPERTENSION: Primary | ICD-10-CM

## 2022-11-02 PROCEDURE — G8536 NO DOC ELDER MAL SCRN: HCPCS | Performed by: INTERNAL MEDICINE

## 2022-11-02 PROCEDURE — 99214 OFFICE O/P EST MOD 30 MIN: CPT | Performed by: INTERNAL MEDICINE

## 2022-11-02 PROCEDURE — G8417 CALC BMI ABV UP PARAM F/U: HCPCS | Performed by: INTERNAL MEDICINE

## 2022-11-02 PROCEDURE — 1090F PRES/ABSN URINE INCON ASSESS: CPT | Performed by: INTERNAL MEDICINE

## 2022-11-02 PROCEDURE — G8510 SCR DEP NEG, NO PLAN REQD: HCPCS | Performed by: INTERNAL MEDICINE

## 2022-11-02 PROCEDURE — G0463 HOSPITAL OUTPT CLINIC VISIT: HCPCS | Performed by: INTERNAL MEDICINE

## 2022-11-02 PROCEDURE — G8427 DOCREV CUR MEDS BY ELIG CLIN: HCPCS | Performed by: INTERNAL MEDICINE

## 2022-11-02 PROCEDURE — 1101F PT FALLS ASSESS-DOCD LE1/YR: CPT | Performed by: INTERNAL MEDICINE

## 2022-11-02 RX ORDER — GABAPENTIN 300 MG/1
CAPSULE ORAL
Qty: 360 CAPSULE | Refills: 1 | Status: SHIPPED | OUTPATIENT
Start: 2022-11-02

## 2022-11-02 NOTE — TELEPHONE ENCOUNTER
Patients PCP has refilled her gabapentin 300 mg one in the morning, one at noon and two at bedtime. She calls today to request a rx for Norco which was last provided at her last office visit on 10/18/2022. That note did state that her pain is more than expected.      She is awaiting her EMG tomorrow

## 2022-11-02 NOTE — PROGRESS NOTES
Chief Complaint   Patient presents with    Follow-up          Vitals:    11/02/22 1001   BP: (!) 142/92   Pulse: 89   Resp: 18   Temp: 98.1 °F (36.7 °C)   TempSrc: Temporal   SpO2: 97%   Weight: 131 lb 12.8 oz (59.8 kg)   Height: 4' 10\" (1.473 m)   PainSc:   5   PainLoc: Hip   LMP: 01/01/1986       Current Outpatient Medications on File Prior to Visit   Medication Sig Dispense Refill    HYDROcodone-acetaminophen (Norco) 5-325 mg per tablet Take 1 Tablet by mouth every eight (8) hours as needed for Pain for up to 30 days. Max Daily Amount: 3 Tablets. 40 Tablet 0    gabapentin (NEURONTIN) 300 mg capsule TAKE 2 CAPSULES NIGHTLY. MAXIMUM DAILY AMOUNT: 600MG 60 Capsule 5    losartan (COZAAR) 100 mg tablet TAKE 1 TABLET DAILY 90 Tablet 3    meloxicam (MOBIC) 7.5 mg tablet Take 1 Tablet by mouth daily. 90 Tablet 1    cholecalciferol (VITAMIN D3) (1000 Units /25 mcg) tablet Take  by mouth daily. LORazepam (ATIVAN) 1 mg tablet TAKE 1/2 TO 1 TABLET       NIGHTLY AS NEEDED FOR      ANXIETY. MAXIMUM DAILY     AMOUNT: 1 TABLET 90 Tablet 1    simvastatin (ZOCOR) 20 mg tablet TAKE 1 TABLET DAILY 90 Tablet 3    estradioL (ESTRACE) 0.01 % (0.1 mg/gram) vaginal cream APPLY 1 A SMALL AMOUNT INTO VAGINA THREE TIMES A WEEK , PEA-SIZED AMOUNT FINGERTIP APPLICATION      amLODIPine (NORVASC) 5 mg tablet TAKE 1 TABLET DAILY 90 Tablet 3    levocetirizine (XYZAL) 5 mg tablet TAKE 1 TABLET DAILY 90 Tablet 3    PARoxetine (PAXIL) 30 mg tablet TAKE 1 TABLET DAILY 90 Tablet 3    acetaminophen (TYLENOL) 500 mg tablet Take  by mouth every six (6) hours as needed for Pain. dicyclomine (BENTYL) 10 mg capsule Take 10 mg by mouth as needed. 1 tablet by mouth daily as needed      MULTIVITAMIN W-MINERALS/LUTEIN (CENTRUM SILVER PO) Take 1 Tab by mouth. Takes one po daily.        [DISCONTINUED] methylPREDNISolone (MEDROL DOSEPACK) 4 mg tablet Per dose pack instructions 1 Dose Pack 0     No current facility-administered medications on file prior to visit. There are no preventive care reminders to display for this patient. 1. \"Have you been to the ER, urgent care clinic since your last visit? Hospitalized since your last visit? \" No    2. \"Have you seen or consulted any other health care providers outside of the [address] [place] [address] since your last visit? \" No     3. For patients aged 39-70: Has the patient had a colonoscopy / FIT/ Cologuard? Yes - no Care Gap present      If the patient is female:    4. For patients aged 41-77: Has the patient had a mammogram within the past 2 years? Yes - no Care Gap present      5. For patients aged 21-65: Has the patient had a pap smear?  NA - based on age or sex

## 2022-11-02 NOTE — PATIENT INSTRUCTIONS
Increase Gabapentin to 1 capsule in am, 1 capsule in afternoon and 2 capsules in evening. MYCHART on Monday with an update in your pain level. If you can get hold of Dr. Irving Gusman nurse by Friday, Kita Leventhal me about the hydrocodone refill.

## 2022-11-04 ENCOUNTER — TELEPHONE (OUTPATIENT)
Dept: INTERNAL MEDICINE CLINIC | Age: 87
End: 2022-11-04

## 2022-11-04 DIAGNOSIS — E78.00 PURE HYPERCHOLESTEROLEMIA: ICD-10-CM

## 2022-11-05 LAB
CHOLEST SERPL-MCNC: 219 MG/DL
HDLC SERPL-MCNC: 87 MG/DL
HDLC SERPL: 2.5 {RATIO} (ref 0–5)
LDLC SERPL CALC-MCNC: 113.2 MG/DL (ref 0–100)
TRIGL SERPL-MCNC: 94 MG/DL (ref ?–150)
VLDLC SERPL CALC-MCNC: 18.8 MG/DL

## 2022-11-05 RX ORDER — LIDOCAINE 50 MG/G
PATCH TOPICAL
Qty: 60 EACH | Refills: 0 | Status: SHIPPED | OUTPATIENT
Start: 2022-11-05

## 2022-11-08 ENCOUNTER — OFFICE VISIT (OUTPATIENT)
Dept: ORTHOPEDIC SURGERY | Age: 87
End: 2022-11-08
Payer: MEDICARE

## 2022-11-08 VITALS — HEIGHT: 58 IN | WEIGHT: 131 LBS | BODY MASS INDEX: 27.5 KG/M2

## 2022-11-08 DIAGNOSIS — Z98.1 S/P LUMBAR FUSION: Primary | ICD-10-CM

## 2022-11-08 DIAGNOSIS — M48.062 SPINAL STENOSIS OF LUMBAR REGION WITH NEUROGENIC CLAUDICATION: ICD-10-CM

## 2022-11-08 PROCEDURE — 1123F ACP DISCUSS/DSCN MKR DOCD: CPT | Performed by: ORTHOPAEDIC SURGERY

## 2022-11-08 PROCEDURE — 1101F PT FALLS ASSESS-DOCD LE1/YR: CPT | Performed by: ORTHOPAEDIC SURGERY

## 2022-11-08 PROCEDURE — 99214 OFFICE O/P EST MOD 30 MIN: CPT | Performed by: ORTHOPAEDIC SURGERY

## 2022-11-08 PROCEDURE — G8417 CALC BMI ABV UP PARAM F/U: HCPCS | Performed by: ORTHOPAEDIC SURGERY

## 2022-11-08 PROCEDURE — 1090F PRES/ABSN URINE INCON ASSESS: CPT | Performed by: ORTHOPAEDIC SURGERY

## 2022-11-08 PROCEDURE — G8536 NO DOC ELDER MAL SCRN: HCPCS | Performed by: ORTHOPAEDIC SURGERY

## 2022-11-08 PROCEDURE — G8432 DEP SCR NOT DOC, RNG: HCPCS | Performed by: ORTHOPAEDIC SURGERY

## 2022-11-08 PROCEDURE — G8427 DOCREV CUR MEDS BY ELIG CLIN: HCPCS | Performed by: ORTHOPAEDIC SURGERY

## 2022-11-08 NOTE — LETTER
11/8/2022    Patient: Genoveva Amen   YOB: 1935   Date of Visit: 11/8/2022     Harry Hagen MD  [address] [place] [address]  Via In Basket    Dear Harry Hagen MD,      Thank you for referring Ms. Iftikhar Reyes to [facility] for evaluation. My notes for this consultation are attached. If you have questions, please do not hesitate to call me. I look forward to following your patient along with you.       Sincerely,    Dylon Ramos MD

## 2022-11-08 NOTE — PROGRESS NOTES
1. Have you been to the ER, urgent care clinic since your last visit? Hospitalized since your last visit? No    2. Have you seen or consulted any other health care providers outside of the [address] [place] [address] since your last visit? Include any pap smears or colon screening.  No    Chief Complaint   Patient presents with    Back Pain     Bone Scan Results 11/01/22, Left Lower Extremity EMG with Dr Yamilka Zamudio

## 2022-11-08 NOTE — PROGRESS NOTES
Damian Muñoz (: 1935) is a 81 y.o. female, patient, here for evaluation of the following chief complaint(s):  Back Pain (Bone Scan Results 22, Left Lower Extremity EMG with Dr Brent Pino)       ASSESSMENT/PLAN:    Below is the assessment and plan developed based on review of pertinent history, physical exam, labs, studies, and medications. Her main complaints at this time are related to fatigue after a long day. Patient has not really change since her last visit. She has been complaining of severe left posterior hip pain and left SI joint pain. The SI joint injection did not help her out much. She has had EMG which is chronic but not acute L5 radicular symptoms. Her bone scan does not demonstrate a pelvic insufficiency fracture. The only thing I can think of at this time that we have not evaluated is the L5-S1 region junctional stenosis. I like to get a CT myelogram to further evaluate whether surgical intervention is required or perhaps direct different injections. She will continue with the Lidoderm patches at this time. She will see me back after the CT milligrams completed     1. S/P lumbar fusion  -     CT SPINE LUMB W CONT; Future  -     XR MYELO LUMBAR; Future  2. Spinal stenosis of lumbar region with neurogenic claudication      Return in about 4 weeks (around 2022) for Dr. Ora Khan. SUBJECTIVE/OBJECTIVE:  Damian Muñoz (: 1935) is a 81 y.o. female. Pain Assessment  10/18/2022   Location of Pain Leg   Location Modifiers Left   Severity of Pain 6        She is here for the testing we ordered last time. She still is having severe left posterior buttock pain and pain that radiates into the left lateral calf and foot as she states. She did have an EMG as well as the bone scan. She did have a prescription for Lidoderm patches which seem to have helped some of the left posterior buttock pain. The SI joint injection did not seem to help her symptoms much at all.   No other acute changes noted      Imaging:          XR Results (most recent):  Results from Appointment encounter on 09/13/22    XR SPINE LUMB 2 OR 3 V    Narrative  AP and lateral of the lumbar spine demonstrates a stable T10-L5 fusion. No acute changes. MRI Results (most recent):  Results from Appointment encounter on 09/16/22     [facility] [address] CONT    Narrative  EXAM:  MRI [facility] SPINE WO CONT    INDICATION:   69-year-old female with Thoracics stenosis; status post lumbar  spinal fusion. Dx: S/P lumbar spinal fusion [Z98.1 (ICD-10-CM)]; Thoracic  stenosis [A85.43 (ICD-10-CM)]    COMPARISON: None. TECHNIQUE: Multiplanar multisequence acquisition without contrast of the  thoracic spine. CONTRAST: None. FINDINGS:  Suboptimally visualized bilateral transpedicular posterior spinal fusion  hardware extending from T10 through the visualized L2 levels. Hardware  associated signal distortion artifacts preclude optimal evaluation of adjacent  structures. There is a 0.4 cm anterolisthesis of T1 on T2 and retrolisthesis of T9 on T10. Vertebral body heights are maintained without evidence of acute fracture. Small  intraosseous hemangioma within T8-9 vertebral body. Degenerative disc changes  predominantly involving T7-T8, T8-T9 and T9-T10 resulting mild spinal canal  narrowing. No significant neural foramina narrowing. Suboptimal evaluation of  the spinal canal at the fused levels due to extensive signal distortion  artifact. The thoracic cord is normal in size and signal in the well-visualized  segment above the spinal fusion levels. Visualized soft tissues are  unremarkable. Impression  Status post lower thoracic/lumbar posterior spinal fusion,  suboptimally visualized. Hardware associated signal distortion artifact  precludes optimal evaluation of the adjacent structures. Mild spondylolisthesis predominantly involving T7-T10 without high-grade spinal  canal or neural foramina narrowing. Allergies   Allergen Reactions    Adhesive Other (comments)    Antihistamines - Alkylamine Other (comments)    Cefdinir Other (comments)     Sick to stomach    Ciprofloxacin Diarrhea and Nausea and Vomiting    Flagyl [Metronidazole] Nausea and Vomiting    Other Medication Other (comments)     BANDAIDS - breaks out skin    Oxycodone-Acetaminophen Other (comments)     dizziness    Phenylephrine Other (comments)     Wire her up    Phenylpropanolamine Other (comments)     Wire her up    Sulfa (Sulfonamide Antibiotics) Nausea Only       Current Outpatient Medications   Medication Sig    lidocaine (LIDODERM) 5 % Apply up to 3 patches to the affected area for 12 hours a day and remove for 12 hours a day.    gabapentin (NEURONTIN) 300 mg capsule Take 1 cap po in am, 1 cap po midday and 2 cap po qhs. HYDROcodone-acetaminophen (Norco) 5-325 mg per tablet Take 1 Tablet by mouth every eight (8) hours as needed for Pain for up to 30 days. Max Daily Amount: 3 Tablets. losartan (COZAAR) 100 mg tablet TAKE 1 TABLET DAILY    meloxicam (MOBIC) 7.5 mg tablet Take 1 Tablet by mouth daily. cholecalciferol (VITAMIN D3) (1000 Units /25 mcg) tablet Take  by mouth daily. LORazepam (ATIVAN) 1 mg tablet TAKE 1/2 TO 1 TABLET       NIGHTLY AS NEEDED FOR      ANXIETY. MAXIMUM DAILY     AMOUNT: 1 TABLET    simvastatin (ZOCOR) 20 mg tablet TAKE 1 TABLET DAILY    estradioL (ESTRACE) 0.01 % (0.1 mg/gram) vaginal cream APPLY 1 A SMALL AMOUNT INTO VAGINA THREE TIMES A WEEK , PEA-SIZED AMOUNT FINGERTIP APPLICATION    amLODIPine (NORVASC) 5 mg tablet TAKE 1 TABLET DAILY    levocetirizine (XYZAL) 5 mg tablet TAKE 1 TABLET DAILY    PARoxetine (PAXIL) 30 mg tablet TAKE 1 TABLET DAILY    acetaminophen (TYLENOL) 500 mg tablet Take  by mouth every six (6) hours as needed for Pain. dicyclomine (BENTYL) 10 mg capsule Take 10 mg by mouth as needed.  1 tablet by mouth daily as needed    MULTIVITAMIN W-MINERALS/LUTEIN (CENTRUM SILVER PO) Take 1 Tab by mouth. Takes one po daily. No current facility-administered medications for this visit. Past Medical History:   Diagnosis Date    Anxiety     Arthritis back pain    fingers    Asthma     MILD - NO INHALERS    Chronic pain     Colitis     Diverticulitis 06/11/2013    Hypercholesterolemia     Hypertension     IBS (irritable bowel syndrome)     Ill-defined condition     H/O UTI POST ISLAS CATH    Insomnia     Nausea & vomiting     Stenosis     spinal    Stroke ([facility] [address] 1996    tia   PATENT  FORAMEN  OVALE    Swollen L ankle         Past Surgical History:   Procedure Laterality Date    HX BACK SURGERY  2021    HX CATARACT REMOVAL Bilateral 2009    HX CHOLECYSTECTOMY  04/1997    HX COLONOSCOPY      x3    HX HYSTERECTOMY  1986    HX KNEE ARTHROSCOPY Left 04/1998    HX LUMBAR FUSION  02/2011    L4-L5    HX ORTHOPAEDIC Right     REPAIRED TENDONS ON RIGHT HAND AFTER FALL    HX SHOULDER REPLACEMENT Right 01/2020    HX SHOULDER REPLACEMENT Left 2022    HX TONSILLECTOMY  CHILDHOOD    HX WISDOM TEETH EXTRACTION      X3    IR INJ FORAMIN EPID LUMB ANES/STER SNGL  03/21/2022    [place] CARDIAC SURG PROCEDURE UNLIST  08/2004    repair of foramen ovale       Family History   Problem Relation Age of Onset    Other Mother         SUARACHNOID HEMORRHAGE-ANEURYSM    Stroke Mother     Cancer Father         PANCREATIC    Anesth Problems Daughter         N/V    No Known Problems Daughter         Social History     Tobacco Use    Smoking status: Never    Smokeless tobacco: Never   Vaping Use    Vaping Use: Never used   Substance Use Topics    Alcohol use: Yes     Alcohol/week: 10.0 standard drinks     Types: 3 Glasses of wine, 7 Cans of beer per week    Drug use: No        Review of Systems   Musculoskeletal:  Positive for back pain and gait problem. All other systems reviewed and are negative.        Vitals:  Ht 4' 10\" (1.473 m)   Wt 131 lb (59.4 kg)   LMP 01/01/1986   BMI 27.38 kg/m²    Body mass index is 27.38 kg/m². Ortho Exam       Integumentary  Assessment of Surgical Incision - healing and consistent with normal anticipated wound healing. Neurologic  Sensory  Light Touch - Intact - Globally. Overall Assessment of Muscle Strength and Tone reveals  Lower Extremities - Right Iliopsoas - 5/5. Left Iliopsoas - 5/5. Right Tibialis Anterior - 5/5. Left Tibialis Anterior - 5/5. Right Gastroc-Soleus - 5/5. Left Gastroc-Soleus - 5/5. Right EHL - 5/5. Left EHL - 5/5. General Assessment of Reflexes  Right Ankle - Clonus is not present. Left Ankle - Clonus is not present. Reflexes (Dermatomes)  2/2 Normal - Left Achilles (L5-S2), Left Knee (L2-4), Right Achilles (L5-S2) and Right Knee (L2-4). Musculoskeletal  Global Assessment  Examination of related systems reveals - well-developed, well-nourished, in no acute distress, alert and oriented x 3 and normal coordination. Spine/Ribs/Pelvis  Lumbosacral Spine - Examination of the lumbosacral spine reveals - no known fractures or deformities. Inspection and Palpation - Tenderness - moderate. Assessment of pain reveals the following findings - The pain is characterized as - moderate. Location - pain refers to lower back bilaterally. Some tenderness on palpation of left posterior buttock and hip region. An electronic signature was used to authenticate this note.   -- Vanessa Rogers MD

## 2022-11-08 NOTE — PATIENT INSTRUCTIONS
Spondylolysis and Spondylolisthesis: Exercises  Introduction  Here are some examples of exercises for you to try. The exercises may be suggested for a condition or for rehabilitation. Start each exercise slowly. Ease off the exercises if you start to have pain. You will be told when to start these exercises and which ones will work best for you. How to do the exercises  Single knee-to-chest  Lie on your back with your knees bent and your feet flat on the floor. You can put a small pillow under your head and neck if it is more comfortable. Bring one knee to your chest, keeping the other foot flat on the floor. Keep your lower back pressed to the floor. Hold for 15 to 30 seconds. Relax, and lower the knee to the starting position. Repeat with the other leg. Repeat 2 to 4 times with each leg. To get more stretch, put your other leg flat on the floor while pulling your knee to your chest.  Double knee-to-chest  Lie on your back with your knees bent and your feet flat on the floor. You can put a small pillow under your head and neck if it is more comfortable. Bring both knees to your chest.  Keep your lower back pressed to the floor. Hold for 15 to 30 seconds. Relax, and lower your knees to the starting position. Repeat 2 to 4 times. Alternate arm and leg (bird dog) exercise  Do this exercise slowly. Try to keep your body straight at all times. Start on the floor, on your hands and knees. Tighten your belly muscles by pulling your belly button in toward your spine. Be sure you continue to breathe normally and do not hold your breath. Raise one arm off the floor, and hold it straight out in front of you. Be careful not to let your shoulder drop down, because that will twist your trunk. Hold for about 6 seconds, then lower your arm and switch to your other arm. Repeat 8 to 12 times on each arm. When you can do this exercise with ease and no pain, repeat steps 1 through 5.  But this time do it with one leg raised off the floor, holding your leg straight out behind you. Be careful not to let your hip drop down, because that will twist your trunk. When holding your leg straight out becomes easier, try raising your opposite arm at the same time, and repeat steps 1 through 5. Bridging  Lie on your back with both knees bent. Your knees should be bent about 90 degrees. Then push your feet into the floor, squeeze your buttocks, and lift your hips off the floor until your shoulders, hips, and knees are all in a straight line. Hold for about 6 seconds as you continue to breathe normally, and then slowly lower your hips back down to the floor and rest for up to 10 seconds. Repeat 8 to 12 times. Curl-ups  Lie on the floor on your back with your knees bent at a 90-degree angle. Your feet should be flat on the floor, about 12 inches from your buttocks. Cross your arms over your chest. If this bothers your neck, try putting your hands behind your neck (not your head), with your elbows spread apart. Slowly tighten your belly muscles and raise your shoulder blades off the floor. Keep your head in line with your body, and do not press your chin to your chest.  Hold this position for 1 or 2 seconds, then slowly lower yourself back down to the floor. Repeat 8 to 12 times. Plank  Do this exercise slowly. Try to keep your body straight at all times, and do not let one hip drop lower than the other. Lie on your stomach, resting your upper body on your forearms. Tighten your belly muscles by pulling your belly button in toward your spine. Keeping your knees on the floor, press down with your forearms to lift your upper body off the floor. Hold for about 6 seconds, then lower your body to the floor. Rest for up to 10 seconds. Repeat 8 to 12 times. Over time, work up to holding for 15 to 30 seconds each time.   If this exercise is easy to do with your knees on the floor, try doing this exercise with your knees and legs straight, supported by your toes on the floor. Follow-up care is a key part of your treatment and safety. Be sure to make and go to all appointments, and call your doctor if you are having problems. It's also a good idea to know your test results and keep a list of the medicines you take. Current as of: March 9, 2022               Content Version: 13.4  © 2006-2022 [employer], [employer]. Care instructions adapted under license by [employer] (which disclaims liability or warranty for this information). If you have questions about a medical condition or this instruction, always ask your healthcare professional. [place] [address] any warranty or liability for your use of this information.

## 2022-11-10 ENCOUNTER — OFFICE VISIT (OUTPATIENT)
Dept: ORTHOPEDIC SURGERY | Age: 87
End: 2022-11-10
Payer: MEDICARE

## 2022-11-10 ENCOUNTER — TELEPHONE (OUTPATIENT)
Dept: ORTHOPEDIC SURGERY | Age: 87
End: 2022-11-10

## 2022-11-10 DIAGNOSIS — Z98.890 HISTORY OF REVERSE TOTAL REPLACEMENT OF LEFT SHOULDER JOINT: Primary | ICD-10-CM

## 2022-11-10 PROCEDURE — 99024 POSTOP FOLLOW-UP VISIT: CPT | Performed by: ORTHOPAEDIC SURGERY

## 2022-11-10 NOTE — PROGRESS NOTES
Errol Sanchez (: 1935) is a 81 y.o. female, patient, here for evaluation of the following chief complaint(s):  Shoulder Pain (Left shoulder post op)       HPI:    Patient presents to the office today status post reverse total shoulder replacement left. She continues to make slow and steady progress with physical therapy    Allergies   Allergen Reactions    Adhesive Other (comments)    Antihistamines - Alkylamine Other (comments)    Cefdinir Other (comments)     Sick to stomach    Ciprofloxacin Diarrhea and Nausea and Vomiting    Flagyl [Metronidazole] Nausea and Vomiting    Other Medication Other (comments)     BANDAIDS - breaks out skin    Oxycodone-Acetaminophen Other (comments)     dizziness    Phenylephrine Other (comments)     Wire her up    Phenylpropanolamine Other (comments)     Wire her up    Sulfa (Sulfonamide Antibiotics) Nausea Only       Current Outpatient Medications   Medication Sig    lidocaine (LIDODERM) 5 % Apply up to 3 patches to the affected area for 12 hours a day and remove for 12 hours a day.    gabapentin (NEURONTIN) 300 mg capsule Take 1 cap po in am, 1 cap po midday and 2 cap po qhs. HYDROcodone-acetaminophen (Norco) 5-325 mg per tablet Take 1 Tablet by mouth every eight (8) hours as needed for Pain for up to 30 days. Max Daily Amount: 3 Tablets. losartan (COZAAR) 100 mg tablet TAKE 1 TABLET DAILY    meloxicam (MOBIC) 7.5 mg tablet Take 1 Tablet by mouth daily. cholecalciferol (VITAMIN D3) (1000 Units /25 mcg) tablet Take  by mouth daily. LORazepam (ATIVAN) 1 mg tablet TAKE 1/2 TO 1 TABLET       NIGHTLY AS NEEDED FOR      ANXIETY.  MAXIMUM DAILY     AMOUNT: 1 TABLET    simvastatin (ZOCOR) 20 mg tablet TAKE 1 TABLET DAILY    estradioL (ESTRACE) 0.01 % (0.1 mg/gram) vaginal cream APPLY 1 A SMALL AMOUNT INTO VAGINA THREE TIMES A WEEK , PEA-SIZED AMOUNT FINGERTIP APPLICATION    amLODIPine (NORVASC) 5 mg tablet TAKE 1 TABLET DAILY    levocetirizine (XYZAL) 5 mg tablet TAKE 1 TABLET DAILY    PARoxetine (PAXIL) 30 mg tablet TAKE 1 TABLET DAILY    acetaminophen (TYLENOL) 500 mg tablet Take  by mouth every six (6) hours as needed for Pain. dicyclomine (BENTYL) 10 mg capsule Take 10 mg by mouth as needed. 1 tablet by mouth daily as needed    MULTIVITAMIN W-MINERALS/LUTEIN (CENTRUM SILVER PO) Take 1 Tab by mouth. Takes one po daily. No current facility-administered medications for this visit.        Past Medical History:   Diagnosis Date    Anxiety     Arthritis back pain    fingers    Asthma     MILD - NO INHALERS    Chronic pain     Colitis     Diverticulitis 06/11/2013    Hypercholesterolemia     Hypertension     IBS (irritable bowel syndrome)     Ill-defined condition     H/O UTI POST ISLAS CATH    Insomnia     Nausea & vomiting     Stenosis     spinal    Stroke ([facility] [address] 1996    tia   PATENT  FORAMEN  OVALE    Swollen L ankle         Past Surgical History:   Procedure Laterality Date    HX BACK SURGERY  2021    HX CATARACT REMOVAL Bilateral 2009    HX CHOLECYSTECTOMY  04/1997    HX COLONOSCOPY      x3    HX HYSTERECTOMY  1986    HX KNEE ARTHROSCOPY Left 04/1998    HX LUMBAR FUSION  02/2011    L4-L5    HX ORTHOPAEDIC Right     REPAIRED TENDONS ON RIGHT HAND AFTER FALL    HX SHOULDER REPLACEMENT Right 01/2020    HX SHOULDER REPLACEMENT Left 2022    HX TONSILLECTOMY  CHILDHOOD    HX WISDOM TEETH EXTRACTION      X3    IR INJ FORAMIN EPID LUMB ANES/STER SNGL  03/21/2022    [place] CARDIAC SURG PROCEDURE UNLIST  08/2004    repair of foramen ovale       Family History   Problem Relation Age of Onset    Other Mother         SUARACHNOID HEMORRHAGE-ANEURYSM    Stroke Mother     Cancer Father         PANCREATIC    Anesth Problems Daughter         N/V    No Known Problems Daughter         Social History     Socioeconomic History    Marital status:      Spouse name: Not on file    Number of children: Not on file    Years of education: Not on file    Highest education level: Not on file Occupational History    Not on file   Tobacco Use    Smoking status: Never    Smokeless tobacco: Never   Vaping Use    Vaping Use: Never used   Substance and Sexual Activity    Alcohol use: Yes     Alcohol/week: 10.0 standard drinks     Types: 3 Glasses of wine, 7 Cans of beer per week    Drug use: No    Sexual activity: Not Currently     Partners: Male   Other Topics Concern    Not on file   Social History Narrative    Not on file     Social Determinants of Health     Financial Resource Strain: Low Risk     Difficulty of Paying Living Expenses: Not hard at all   Food Insecurity: No Food Insecurity    Worried About Running Out of Food in the Last Year: Never true    Ran Out of Food in the Last Year: Never true   Transportation Needs: Not on file   Physical Activity: Not on file   Stress: Not on file   Social Connections: Not on file   Intimate Partner Violence: Not on file   Housing Stability: Not on file       Review of Systems   Musculoskeletal:         Left reverse TSA      Vitals:  LMP 01/01/1986    There is no height or weight on file to calculate BMI. Ortho Exam     Left shoulder: Incision is healed. She has no effusion. Range of motion is 120 degrees of forward elevation, 80 degrees lateral duction and 50 degrees of external rotation. She has minimal pain. Neurovascular examination is intact    ASSESSMENT/PLAN:    Patient is making slow and steady progress. She is to continue with physical therapy.   Patient is to return to the office in 4 weeks        Ayah Casas MD

## 2022-11-10 NOTE — LETTER
11/10/2022    Patient: Isabela Cleveland   YOB: 1935   Date of Visit: 11/10/2022     Mireya Chavez MD  [address] [place] [address]  Via In Basket    Dear Mireya Chavez MD,      Thank you for referring Ms. Bill Atkinson to [facility] for evaluation. My notes for this consultation are attached. If you have questions, please do not hesitate to call me. I look forward to following your patient along with you.       Sincerely,    Kiel Weir MD

## 2022-11-28 DIAGNOSIS — F41.9 ANXIETY: ICD-10-CM

## 2022-11-29 RX ORDER — LORAZEPAM 1 MG/1
TABLET ORAL
Qty: 90 TABLET | Refills: 1 | Status: SHIPPED | OUTPATIENT
Start: 2022-11-29

## 2022-12-05 ENCOUNTER — HOSPITAL ENCOUNTER (OUTPATIENT)
Dept: CT IMAGING | Age: 87
Discharge: HOME | End: 2022-12-05
Attending: ORTHOPAEDIC SURGERY
Payer: MEDICARE

## 2022-12-05 ENCOUNTER — HOSPITAL ENCOUNTER (OUTPATIENT)
Dept: GENERAL RADIOLOGY | Age: 87
Discharge: HOME | End: 2022-12-05
Attending: ORTHOPAEDIC SURGERY
Payer: MEDICARE

## 2022-12-05 DIAGNOSIS — Z98.1 S/P LUMBAR FUSION: ICD-10-CM

## 2022-12-05 PROCEDURE — 72132 CT LUMBAR SPINE W/DYE: CPT

## 2022-12-05 PROCEDURE — 62304 MYELOGRAPHY LUMBAR INJECTION: CPT

## 2022-12-05 PROCEDURE — 74011000636 HC RX REV CODE- 636: Performed by: PHYSICIAN ASSISTANT

## 2022-12-05 PROCEDURE — 74011000250 HC RX REV CODE- 250: Performed by: PHYSICIAN ASSISTANT

## 2022-12-05 RX ORDER — PAROXETINE 30 MG/1
TABLET, FILM COATED ORAL
Qty: 90 TABLET | Refills: 3 | Status: SHIPPED | OUTPATIENT
Start: 2022-12-05

## 2022-12-05 RX ORDER — LIDOCAINE HYDROCHLORIDE 10 MG/ML
10 INJECTION INFILTRATION; PERINEURAL
Status: COMPLETED | OUTPATIENT
Start: 2022-12-05 | End: 2022-12-05

## 2022-12-05 RX ORDER — SODIUM BICARBONATE 42 MG/ML
2 INJECTION, SOLUTION INTRAVENOUS
Status: COMPLETED | OUTPATIENT
Start: 2022-12-05 | End: 2022-12-05

## 2022-12-05 RX ADMIN — IOHEXOL 20 ML: 180 INJECTION INTRAVENOUS at 09:41

## 2022-12-05 RX ADMIN — LIDOCAINE HYDROCHLORIDE 10 ML: 10 INJECTION, SOLUTION INFILTRATION; PERINEURAL at 09:41

## 2022-12-05 RX ADMIN — SODIUM BICARBONATE 10 MG: 42 INJECTION, SOLUTION INTRAVENOUS at 09:40

## 2022-12-05 NOTE — PROGRESS NOTES
Patient to holding area post myelogram. Patient in an elevated position. Patient in NAD. Patient given copy of discharge instructions and verbalized understanding. Patient wheeled to exit via wheelchair. Patient in NAD. No bleeding noted at puncture site.

## 2022-12-05 NOTE — DISCHARGE INSTRUCTIONS
Should you experience any of these significant changes, please call [phone] between the hours of 7:30 am and 3:30 pm or [phone] after hours. After hours, ask the  to page the X-ray Technologist, and describe the problem to the technologist. If you are experiencing chest pain, shortness of breath, altered mental state, unusual bleeding or any other emergent symptom you should call 911 immediately.

## 2022-12-08 ENCOUNTER — OFFICE VISIT (OUTPATIENT)
Dept: ORTHOPEDIC SURGERY | Age: 87
End: 2022-12-08
Payer: MEDICARE

## 2022-12-08 VITALS — BODY MASS INDEX: 27.5 KG/M2 | HEIGHT: 58 IN | WEIGHT: 131 LBS

## 2022-12-08 DIAGNOSIS — Z98.1 S/P LUMBAR FUSION: Primary | ICD-10-CM

## 2022-12-08 DIAGNOSIS — M48.062 SPINAL STENOSIS OF LUMBAR REGION WITH NEUROGENIC CLAUDICATION: ICD-10-CM

## 2022-12-08 NOTE — PATIENT INSTRUCTIONS
Spondylolysis and Spondylolisthesis: Exercises  Introduction  Here are some examples of exercises for you to try. The exercises may be suggested for a condition or for rehabilitation. Start each exercise slowly. Ease off the exercises if you start to have pain. You will be told when to start these exercises and which ones will work best for you. How to do the exercises  Single knee-to-chest  Lie on your back with your knees bent and your feet flat on the floor. You can put a small pillow under your head and neck if it is more comfortable. Bring one knee to your chest, keeping the other foot flat on the floor. Keep your lower back pressed to the floor. Hold for 15 to 30 seconds. Relax, and lower the knee to the starting position. Repeat with the other leg. Repeat 2 to 4 times with each leg. To get more stretch, put your other leg flat on the floor while pulling your knee to your chest.  Double knee-to-chest  Lie on your back with your knees bent and your feet flat on the floor. You can put a small pillow under your head and neck if it is more comfortable. Bring both knees to your chest.  Keep your lower back pressed to the floor. Hold for 15 to 30 seconds. Relax, and lower your knees to the starting position. Repeat 2 to 4 times. Alternate arm and leg (bird dog) exercise  Do this exercise slowly. Try to keep your body straight at all times. Start on the floor, on your hands and knees. Tighten your belly muscles by pulling your belly button in toward your spine. Be sure you continue to breathe normally and do not hold your breath. Raise one arm off the floor, and hold it straight out in front of you. Be careful not to let your shoulder drop down, because that will twist your trunk. Hold for about 6 seconds, then lower your arm and switch to your other arm. Repeat 8 to 12 times on each arm. When you can do this exercise with ease and no pain, repeat steps 1 through 5.  But this time do it with one leg raised off the floor, holding your leg straight out behind you. Be careful not to let your hip drop down, because that will twist your trunk. When holding your leg straight out becomes easier, try raising your opposite arm at the same time, and repeat steps 1 through 5. Bridging  Lie on your back with both knees bent. Your knees should be bent about 90 degrees. Then push your feet into the floor, squeeze your buttocks, and lift your hips off the floor until your shoulders, hips, and knees are all in a straight line. Hold for about 6 seconds as you continue to breathe normally, and then slowly lower your hips back down to the floor and rest for up to 10 seconds. Repeat 8 to 12 times. Curl-ups  Lie on the floor on your back with your knees bent at a 90-degree angle. Your feet should be flat on the floor, about 12 inches from your buttocks. Cross your arms over your chest. If this bothers your neck, try putting your hands behind your neck (not your head), with your elbows spread apart. Slowly tighten your belly muscles and raise your shoulder blades off the floor. Keep your head in line with your body, and do not press your chin to your chest.  Hold this position for 1 or 2 seconds, then slowly lower yourself back down to the floor. Repeat 8 to 12 times. Plank  Do this exercise slowly. Try to keep your body straight at all times, and do not let one hip drop lower than the other. Lie on your stomach, resting your upper body on your forearms. Tighten your belly muscles by pulling your belly button in toward your spine. Keeping your knees on the floor, press down with your forearms to lift your upper body off the floor. Hold for about 6 seconds, then lower your body to the floor. Rest for up to 10 seconds. Repeat 8 to 12 times. Over time, work up to holding for 15 to 30 seconds each time.   If this exercise is easy to do with your knees on the floor, try doing this exercise with your knees and legs straight, supported by your toes on the floor. Follow-up care is a key part of your treatment and safety. Be sure to make and go to all appointments, and call your doctor if you are having problems. It's also a good idea to know your test results and keep a list of the medicines you take. Current as of: March 9, 2022               Content Version: 13.4  © 2006-2022 [employer]wise, [employer]. Care instructions adapted under license by [employer] (which disclaims liability or warranty for this information). If you have questions about a medical condition or this instruction, always ask your healthcare professional. [place] [address] any warranty or liability for your use of this information.

## 2022-12-08 NOTE — PROGRESS NOTES
Genoveva Lr (: 1935) is a 81 y.o. female, patient, here for evaluation of the following chief complaint(s):  Back Pain (Lumbar CT Results 22, s/p Revision Fusion T10/L5 (9/15/21))       ASSESSMENT/PLAN:    Below is the assessment and plan developed based on review of pertinent history, physical exam, labs, studies, and medications. Her main complaints at this time are related to fatigue after a long day. Patient has not really change since her last visit. She has the CT myelogram which shows spondylosis at L5-S1 with foraminal narrowing. There is also some mild residual stenosis noted L3-4. No other acute findings noted. I would like her to get a round of epidurals at L3-4 to see if this gives her any temporary relief. Ultimately she may be a candidate for a exploration of fusion and revision laminectomy for decompression of the L3-4 level. If we were going to do that we would probably extend her fusion to the pelvis for the spondylosis that she is developed at L5-S1. She will see us back after the injections complete     1. S/P lumbar fusion  -     IR INJ SPINE THER SUBST LUM/SAC W IMG; Future  2. Spinal stenosis of lumbar region with neurogenic claudication      No follow-ups on file. SUBJECTIVE/OBJECTIVE:  Genoveva Lr (: 1935) is a 81 y.o. female. Pain Assessment  10/18/2022   Location of Pain Leg   Location Modifiers Left   Severity of Pain 6        She is here for the testing we ordered last time. She still is having severe left posterior buttock pain and pain that radiates into the left lateral calf and foot as she states. She did have an EMG as well as the bone scan. She did have a prescription for Lidoderm patches which seem to have helped some of the left posterior buttock pain. The SI joint injection did not seem to help her symptoms much at all.   No other acute changes noted      Imaging:          XR Results (most recent):  Results from [facility] Encounter encounter on 22    XR MYELO LUMBAR    Narrative  PATIENT NAME: Oliver Mathias  AGE, : 81 years, 1935  MRN: [id number]  DATE: 2022 10:20 AM    SUPERVISING PHYSICIAN: Mason Schwab MD  OPERATING PROVIDER: Isa Hayes. Manjula Abad PA-C    PROCEDURE(S): LUMBAR PUNCTURE FOR CT MYELOGRAPHY    HISTORY: s/p lumbar fusion    TECHNIQUE:  Informed written consent was obtained from the patient following discussion of  the risks, benefits, and alternatives to the procedure. The patient was brought  to the procedure area and positively identified. A timeout was performed. A preprocedure fluoroscopic image of the lumbar spine was obtained and the  proposed access level was marked on the skin. The skin of the target area was  prepped and draped using Betadine and sterile barrier technique which includes:  cap and mask, sterile gloves, and sterile site drape. The skin and underlying soft tissues were anesthetized with 5 cc of 1%  lidocaine. A 22 gauge spinal needle was then inserted under fluoroscopic  guidance into the thecal sac. A small test injection of contrast was used to  confirm positioning. Approximately 15 cc of Omnipaque 180 was then injected into  the thecal sac. The spinal needle was then removed and fluoroscopic spot imaging  was performed. The patient was then taken to the CT scanner for post myelogram CT. Subsequently, the patient was observed in the holding area for 2 hours and was  asymptomatic. There is no evidence of immediate complication. The patient was  discharged home in stable condition. RADIATION:  Air Kerma: 40.59 mGy    COMPLICATIONS: none    Impression  1. Technically successful lumbar myelogram.  2. Please refer to the post myelogram CT report for details.        MRI Results (most recent):  Results from Appointment encounter on 22     [facility] [address] CONT    Narrative  EXAM:  MRI [facility] SPINE WO CONT    INDICATION:   52-year-old female with Thoracics stenosis; status post lumbar  spinal fusion. Dx: S/P lumbar spinal fusion [Z98.1 (ICD-10-CM)]; Thoracic  stenosis [D11.32 (ICD-10-CM)]    COMPARISON: None. TECHNIQUE: Multiplanar multisequence acquisition without contrast of the  thoracic spine. CONTRAST: None. FINDINGS:  Suboptimally visualized bilateral transpedicular posterior spinal fusion  hardware extending from T10 through the visualized L2 levels. Hardware  associated signal distortion artifacts preclude optimal evaluation of adjacent  structures. There is a 0.4 cm anterolisthesis of T1 on T2 and retrolisthesis of T9 on T10. Vertebral body heights are maintained without evidence of acute fracture. Small  intraosseous hemangioma within T8-9 vertebral body. Degenerative disc changes  predominantly involving T7-T8, T8-T9 and T9-T10 resulting mild spinal canal  narrowing. No significant neural foramina narrowing. Suboptimal evaluation of  the spinal canal at the fused levels due to extensive signal distortion  artifact. The thoracic cord is normal in size and signal in the well-visualized  segment above the spinal fusion levels. Visualized soft tissues are  unremarkable. Impression  Status post lower thoracic/lumbar posterior spinal fusion,  suboptimally visualized. Hardware associated signal distortion artifact  precludes optimal evaluation of the adjacent structures. Mild spondylolisthesis predominantly involving T7-T10 without high-grade spinal  canal or neural foramina narrowing.          Allergies   Allergen Reactions    Adhesive Other (comments)    Antihistamines - Alkylamine Other (comments)    Cefdinir Other (comments)     Sick to stomach    Ciprofloxacin Diarrhea and Nausea and Vomiting    Flagyl [Metronidazole] Nausea and Vomiting    Other Medication Other (comments)     BANDAIDS - breaks out skin    Oxycodone-Acetaminophen Other (comments)     dizziness    Phenylephrine Other (comments)     Wire her up    Phenylpropanolamine Other (comments)     Wire her up    Sulfa (Sulfonamide Antibiotics) Nausea Only       Current Outpatient Medications   Medication Sig    PARoxetine (PAXIL) 30 mg tablet TAKE 1 TABLET DAILY    LORazepam (ATIVAN) 1 mg tablet TAKE 1/2 TO 1 TABLET       NIGHTLY AS NEEDED FOR      ANXIETY. MAXIMUM DAILY     AMOUNT: 1 TABLET    lidocaine (LIDODERM) 5 % Apply up to 3 patches to the affected area for 12 hours a day and remove for 12 hours a day.    gabapentin (NEURONTIN) 300 mg capsule Take 1 cap po in am, 1 cap po midday and 2 cap po qhs.    losartan (COZAAR) 100 mg tablet TAKE 1 TABLET DAILY    meloxicam (MOBIC) 7.5 mg tablet Take 1 Tablet by mouth daily. cholecalciferol (VITAMIN D3) (1000 Units /25 mcg) tablet Take  by mouth daily. simvastatin (ZOCOR) 20 mg tablet TAKE 1 TABLET DAILY    estradioL (ESTRACE) 0.01 % (0.1 mg/gram) vaginal cream APPLY 1 A SMALL AMOUNT INTO VAGINA THREE TIMES A WEEK , PEA-SIZED AMOUNT FINGERTIP APPLICATION    amLODIPine (NORVASC) 5 mg tablet TAKE 1 TABLET DAILY    levocetirizine (XYZAL) 5 mg tablet TAKE 1 TABLET DAILY    acetaminophen (TYLENOL) 500 mg tablet Take  by mouth every six (6) hours as needed for Pain. dicyclomine (BENTYL) 10 mg capsule Take 10 mg by mouth as needed. 1 tablet by mouth daily as needed    MULTIVITAMIN W-MINERALS/LUTEIN (CENTRUM SILVER PO) Take 1 Tab by mouth. Takes one po daily. No current facility-administered medications for this visit.        Past Medical History:   Diagnosis Date    Anxiety     Arthritis back pain    fingers    Asthma     MILD - NO INHALERS    Chronic pain     Colitis     Diverticulitis 06/11/2013    Hypercholesterolemia     Hypertension     IBS (irritable bowel syndrome)     Ill-defined condition     H/O UTI POST ISLAS CATH    Insomnia     Nausea & vomiting     Stenosis     spinal    Stroke ([facility] [address] 1996    tia   PATENT  FORAMEN  OVALE    Swollen L ankle         Past Surgical History:   Procedure Laterality Date    HX BACK SURGERY  2021    HX CATARACT REMOVAL Bilateral 2009    HX CHOLECYSTECTOMY  04/1997    HX COLONOSCOPY      x3    HX HYSTERECTOMY  1986    HX KNEE ARTHROSCOPY Left 04/1998    HX LUMBAR FUSION  02/2011    L4-L5    HX ORTHOPAEDIC Right     REPAIRED TENDONS ON RIGHT HAND AFTER FALL    HX SHOULDER REPLACEMENT Right 01/2020    HX SHOULDER REPLACEMENT Left 2022    HX TONSILLECTOMY  CHILDHOOD    HX WISDOM TEETH EXTRACTION      X3    IR INJ FORAMIN EPID LUMB ANES/STER SNGL  03/21/2022    [facility] CARDIAC SURG PROCEDURE UNLIST  08/2004    repair of foramen ovale       Family History   Problem Relation Age of Onset    Other Mother         SUARACHNOID HEMORRHAGE-ANEURYSM    Stroke Mother     Cancer Father         PANCREATIC    Anesth Problems Daughter         N/V    No Known Problems Daughter         Social History     Tobacco Use    Smoking status: Never    Smokeless tobacco: Never   Vaping Use    Vaping Use: Never used   Substance Use Topics    Alcohol use: Yes     Alcohol/week: 10.0 standard drinks     Types: 3 Glasses of wine, 7 Cans of beer per week    Drug use: No        Review of Systems   Musculoskeletal:  Positive for back pain and gait problem. All other systems reviewed and are negative. Vitals:  Ht 4' 10\" (1.473 m)   Wt 131 lb (59.4 kg)   LMP 01/01/1986   BMI 27.38 kg/m²    Body mass index is 27.38 kg/m². Ortho Exam       Integumentary  Assessment of Surgical Incision - healing and consistent with normal anticipated wound healing. Neurologic  Sensory  Light Touch - Intact - Globally. Overall Assessment of Muscle Strength and Tone reveals  Lower Extremities - Right Iliopsoas - 5/5. Left Iliopsoas - 5/5. Right Tibialis Anterior - 5/5. Left Tibialis Anterior - 5/5. Right Gastroc-Soleus - 5/5. Left Gastroc-Soleus - 5/5. Right EHL - 5/5. Left EHL - 5/5. General Assessment of Reflexes  Right Ankle - Clonus is not present. Left Ankle - Clonus is not present.   Reflexes (Dermatomes)  2/2 Normal - Left Achilles (L5-S2), Left Knee (L2-4), Right Achilles (L5-S2) and Right Knee (L2-4). Musculoskeletal  Global Assessment  Examination of related systems reveals - well-developed, well-nourished, in no acute distress, alert and oriented x 3 and normal coordination. Spine/Ribs/Pelvis  Lumbosacral Spine - Examination of the lumbosacral spine reveals - no known fractures or deformities. Inspection and Palpation - Tenderness - moderate. Assessment of pain reveals the following findings - The pain is characterized as - moderate. Location - pain refers to lower back bilaterally. Some tenderness on palpation of left posterior buttock and hip region. An electronic signature was used to authenticate this note.   -- Lowell Stephens MD

## 2022-12-08 NOTE — PROGRESS NOTES
1. Have you been to the ER, urgent care clinic since your last visit? Hospitalized since your last visit? No    2. Have you seen or consulted any other health care providers outside of the [address] [place] [address] since your last visit? Include any pap smears or colon screening.  No    Chief Complaint   Patient presents with    Back Pain     Lumbar CT Results 12/05/22, s/p Revision Fusion T10/L5 (9/15/21)

## 2022-12-12 ENCOUNTER — TELEPHONE (OUTPATIENT)
Dept: ORTHOPEDIC SURGERY | Age: 87
End: 2022-12-12

## 2022-12-12 DIAGNOSIS — Z98.1 S/P LUMBAR SPINAL FUSION: ICD-10-CM

## 2022-12-12 DIAGNOSIS — Z98.1 S/P LUMBAR FUSION: Primary | ICD-10-CM

## 2022-12-12 DIAGNOSIS — M48.062 SPINAL STENOSIS OF LUMBAR REGION WITH NEUROGENIC CLAUDICATION: ICD-10-CM

## 2022-12-12 DIAGNOSIS — M51.36 LUMBAR DEGENERATIVE DISC DISEASE: ICD-10-CM

## 2022-12-13 ENCOUNTER — OFFICE VISIT (OUTPATIENT)
Dept: ORTHOPEDIC SURGERY | Age: 87
End: 2022-12-13
Payer: MEDICARE

## 2022-12-13 DIAGNOSIS — M25.512 CHRONIC LEFT SHOULDER PAIN: Primary | ICD-10-CM

## 2022-12-13 DIAGNOSIS — G89.29 CHRONIC LEFT SHOULDER PAIN: Primary | ICD-10-CM

## 2022-12-13 NOTE — LETTER
12/14/2022    Patient: Cameron Vizcaino   YOB: 1935   Date of Visit: 12/13/2022     Tripp Bunn MD  [address] [place] [address]  Via In Basket    Dear Tripp Bunn MD,      Thank you for referring Ms. Yudith Cohen to [facility] for evaluation. My notes for this consultation are attached. If you have questions, please do not hesitate to call me. I look forward to following your patient along with you.       Sincerely,    Rod Jones MD

## 2022-12-13 NOTE — PROGRESS NOTES
Lilibeth Valencia (: 1935) is a 81 y.o. female, patient, here for evaluation of the following chief complaint(s):  Shoulder Pain (Left shoulder follow up)       HPI:    Patient returns to the office now status post reverse total shoulder replacement left. Patient is doing better. Physical therapy has been helpful . She continues to struggle with her back. Allergies   Allergen Reactions    Adhesive Other (comments)    Antihistamines - Alkylamine Other (comments)    Cefdinir Other (comments)     Sick to stomach    Ciprofloxacin Diarrhea and Nausea and Vomiting    Flagyl [Metronidazole] Nausea and Vomiting    Other Medication Other (comments)     BANDAIDS - breaks out skin    Oxycodone-Acetaminophen Other (comments)     dizziness    Phenylephrine Other (comments)     Wire her up    Phenylpropanolamine Other (comments)     Wire her up    Sulfa (Sulfonamide Antibiotics) Nausea Only       Current Outpatient Medications   Medication Sig    PARoxetine (PAXIL) 30 mg tablet TAKE 1 TABLET DAILY    LORazepam (ATIVAN) 1 mg tablet TAKE 1/2 TO 1 TABLET       NIGHTLY AS NEEDED FOR      ANXIETY. MAXIMUM DAILY     AMOUNT: 1 TABLET    lidocaine (LIDODERM) 5 % Apply up to 3 patches to the affected area for 12 hours a day and remove for 12 hours a day.    gabapentin (NEURONTIN) 300 mg capsule Take 1 cap po in am, 1 cap po midday and 2 cap po qhs.    losartan (COZAAR) 100 mg tablet TAKE 1 TABLET DAILY    meloxicam (MOBIC) 7.5 mg tablet Take 1 Tablet by mouth daily. cholecalciferol (VITAMIN D3) (1000 Units /25 mcg) tablet Take  by mouth daily.     simvastatin (ZOCOR) 20 mg tablet TAKE 1 TABLET DAILY    estradioL (ESTRACE) 0.01 % (0.1 mg/gram) vaginal cream APPLY 1 A SMALL AMOUNT INTO VAGINA THREE TIMES A WEEK , PEA-SIZED AMOUNT FINGERTIP APPLICATION    amLODIPine (NORVASC) 5 mg tablet TAKE 1 TABLET DAILY    levocetirizine (XYZAL) 5 mg tablet TAKE 1 TABLET DAILY    acetaminophen (TYLENOL) 500 mg tablet Take  by mouth every six (6) hours as needed for Pain. dicyclomine (BENTYL) 10 mg capsule Take 10 mg by mouth as needed. 1 tablet by mouth daily as needed    MULTIVITAMIN W-MINERALS/LUTEIN (CENTRUM SILVER PO) Take 1 Tab by mouth. Takes one po daily. No current facility-administered medications for this visit.        Past Medical History:   Diagnosis Date    Anxiety     Arthritis back pain    fingers    Asthma     MILD - NO INHALERS    Chronic pain     Colitis     Diverticulitis 06/11/2013    Hypercholesterolemia     Hypertension     IBS (irritable bowel syndrome)     Ill-defined condition     H/O UTI POST ISLAS CATH    Insomnia     Nausea & vomiting     Stenosis     spinal    Stroke ([facility] [address] 1996    tia   PATENT  FORAMEN  OVALE    Swollen L ankle         Past Surgical History:   Procedure Laterality Date    HX BACK SURGERY  2021    HX CATARACT REMOVAL Bilateral 2009    HX CHOLECYSTECTOMY  04/1997    HX COLONOSCOPY      x3    HX HYSTERECTOMY  1986    HX KNEE ARTHROSCOPY Left 04/1998    HX LUMBAR FUSION  02/2011    L4-L5    HX ORTHOPAEDIC Right     REPAIRED TENDONS ON RIGHT HAND AFTER FALL    HX SHOULDER REPLACEMENT Right 01/2020    HX SHOULDER REPLACEMENT Left 2022    HX TONSILLECTOMY  CHILDHOOD    HX WISDOM TEETH EXTRACTION      X3    IR INJ FORAMIN EPID LUMB ANES/STER SNGL  03/21/2022    [facility] CARDIAC SURG PROCEDURE UNLIST  08/2004    repair of foramen ovale       Family History   Problem Relation Age of Onset    Other Mother         SUARACHNOID HEMORRHAGE-ANEURYSM    Stroke Mother     Cancer Father         PANCREATIC    Anesth Problems Daughter         N/V    No Known Problems Daughter         Social History     Socioeconomic History    Marital status:      Spouse name: Not on file    Number of children: Not on file    Years of education: Not on file    Highest education level: Not on file   Occupational History    Not on file   Tobacco Use    Smoking status: Never    Smokeless tobacco: Never   Vaping Use    Vaping Use: Never used Substance and Sexual Activity    Alcohol use: Yes     Alcohol/week: 10.0 standard drinks     Types: 3 Glasses of wine, 7 Cans of beer per week    Drug use: No    Sexual activity: Not Currently     Partners: Male   Other Topics Concern    Not on file   Social History Narrative    Not on file     Social Determinants of Health     Financial Resource Strain: Low Risk     Difficulty of Paying Living Expenses: Not hard at all   Food Insecurity: No Food Insecurity    Worried About Running Out of Food in the Last Year: Never true    Ran Out of Food in the Last Year: Never true   Transportation Needs: Not on file   Physical Activity: Not on file   Stress: Not on file   Social Connections: Not on file   Intimate Partner Violence: Not on file   Housing Stability: Not on file       Review of Systems   Musculoskeletal:         Left shoulder pain     Vitals:  LMP 01/01/1986    There is no height or weight on file to calculate BMI. Ortho Exam     Left shoulder: Incision is healed. Patient has 140 degrees of forward elevation, 110 degrees lateral duction 60 degrees of external rotation. Patient has minimal to no pain with manipulation of the shoulder. Her strength is 3+/4/5 throughout. Neurovascular examination intact    ASSESSMENT/PLAN:    Patient is doing very well. I would suggest another 4 weeks of physical therapy. She most likely will be discharged to home exercise program by that time. I would ask her to return to the office in 3 months for repeat x-ray. She was reminded to avoid elective dental procedures until that time.         Fe Mooney MD

## 2022-12-19 ENCOUNTER — HOSPITAL ENCOUNTER (OUTPATIENT)
Dept: INTERVENTIONAL RADIOLOGY/VASCULAR | Age: 87
Discharge: HOME | End: 2022-12-19
Attending: ORTHOPAEDIC SURGERY
Payer: MEDICARE

## 2022-12-19 VITALS
RESPIRATION RATE: 18 BRPM | OXYGEN SATURATION: 97 % | HEART RATE: 98 BPM | SYSTOLIC BLOOD PRESSURE: 147 MMHG | DIASTOLIC BLOOD PRESSURE: 91 MMHG

## 2022-12-19 DIAGNOSIS — Z98.1 S/P LUMBAR FUSION: ICD-10-CM

## 2022-12-19 PROCEDURE — 74011250636 HC RX REV CODE- 250/636: Performed by: STUDENT IN AN ORGANIZED HEALTH CARE EDUCATION/TRAINING PROGRAM

## 2022-12-19 PROCEDURE — 74011000250 HC RX REV CODE- 250

## 2022-12-19 PROCEDURE — 62323 NJX INTERLAMINAR LMBR/SAC: CPT

## 2022-12-19 PROCEDURE — 74011000636 HC RX REV CODE- 636: Performed by: STUDENT IN AN ORGANIZED HEALTH CARE EDUCATION/TRAINING PROGRAM

## 2022-12-19 RX ORDER — LIDOCAINE HYDROCHLORIDE 10 MG/ML
INJECTION, SOLUTION EPIDURAL; INFILTRATION; INTRACAUDAL; PERINEURAL
Status: COMPLETED
Start: 2022-12-19 | End: 2022-12-19

## 2022-12-19 RX ORDER — LIDOCAINE HYDROCHLORIDE 10 MG/ML
15 INJECTION, SOLUTION EPIDURAL; INFILTRATION; INTRACAUDAL; PERINEURAL
Status: COMPLETED | OUTPATIENT
Start: 2022-12-19 | End: 2022-12-19

## 2022-12-19 RX ORDER — DEXAMETHASONE SODIUM PHOSPHATE 10 MG/ML
10 INJECTION INTRAMUSCULAR; INTRAVENOUS
Status: COMPLETED | OUTPATIENT
Start: 2022-12-19 | End: 2022-12-19

## 2022-12-19 RX ADMIN — DEXAMETHASONE SODIUM PHOSPHATE 10 MG: 10 INJECTION INTRAMUSCULAR; INTRAVENOUS at 15:31

## 2022-12-19 RX ADMIN — LIDOCAINE HYDROCHLORIDE 5 ML: 10 INJECTION, SOLUTION EPIDURAL; INFILTRATION; INTRACAUDAL; PERINEURAL at 15:31

## 2022-12-19 RX ADMIN — IOHEXOL 5 ML: 180 INJECTION INTRAVENOUS at 15:32

## 2022-12-19 NOTE — DISCHARGE INSTRUCTIONS
Lumbar Epidural Steroid Injection: What to Expect at [address] [place] [address]  During your lumbar epidural injection, your doctor injected steroid medicine into the area around your spinal cord to help with pain, tingling, or numbness. Steroids don't always work. And when they do, it takes a few days. But the pain relief can last for several days to a few months or longer. Your injection may also have included a numbing medicine that works right away for a short time. It may leave your legs feeling heavy or numb at first. You will probably be able to walk. But you may need to be extra careful. The effects of the numbing medicine should wear off in a few hours. This care sheet gives you a general idea about how long it will take for you to recover. But each person recovers at a different pace. Follow the steps below to feel better as quickly as possible. How can you care for yourself at home? Activity    You may want to do less than normal for a few days. But you may also be able to return to your daily routine. You may shower in 24 hours. Do not take a bath or go swimming until the site is healed - this may take up to 2 weeks. Diet    You can eat your normal diet. Stay hydrated. Medicines    Your doctor will tell you if and when you can restart your medicines. You will also get instructions about taking any new medicines. If you stopped taking aspirin or some other blood thinner, your doctor will tell you when to start taking it again. Be safe with medicines. Read and follow all instructions on the label. If the doctor gave you a prescription medicine for pain, take it as prescribed. If you are not taking a prescription pain medicine, ask your doctor if you can take an over-the-counter medicine. Ice    If the site of your injection feels sore or tender, put ice or a cold pack on it for 10 to 20 minutes at a time. Put a thin cloth between the ice and your skin.    Follow-up care is a key part of your treatment and safety. Be sure to make and go to all appointments, and call your doctor if you are having problems. It's also a good idea to know your test results and keep a list of the medicines you take. When should you call for help? Call 911 anytime you think you may need emergency care. For example, call if:    You passed out (lost consciousness). You have trouble breathing. Call your doctor now or seek immediate medical care if:    You have new pain, or your pain gets worse. You have new numbness in your buttocks or legs. You have new weakness in your buttocks or legs. You have a severe headache. You have new loss of bowel or bladder control. You have signs of infection, such as: Increased pain, swelling, warmth, or redness. A fever. Watch closely for any changes in your health, and be sure to contact your doctor if:    You do not get better as expected. Current as of: February 23, 2022               Content Version: 13.4  © 2006-2022 [employer]. Care instructions adapted under license by [employer] (which disclaims liability or warranty for this information). If you have questions about a medical condition or this instruction, always ask your healthcare professional. [place] [address] any warranty or liability for your use of this information.

## 2022-12-19 NOTE — H&P
Radiology History and Physical    Patient: Damian Muñoz 81 y.o. female       Chief Complaint: No chief complaint on file. History of Present Illness: 81year old woman with back pain and LLE radiculopathy    History:    Past Medical History:   Diagnosis Date    Anxiety     Arthritis back pain    fingers    Asthma     MILD - NO INHALERS    Chronic pain     Colitis     Diverticulitis 06/11/2013    Hypercholesterolemia     Hypertension     IBS (irritable bowel syndrome)     Ill-defined condition     H/O UTI POST ISLAS CATH    Insomnia     Nausea & vomiting     Stenosis     spinal    Stroke ([facility] [address] 1996    tia   PATENT  FORAMEN  OVALE    Swollen L ankle      Family History   Problem Relation Age of Onset    Other Mother         SUARACHNOID HEMORRHAGE-ANEURYSM    Stroke Mother     Cancer Father         PANCREATIC    Anesth Problems Daughter         N/V    No Known Problems Daughter      Social History     Socioeconomic History    Marital status:      Spouse name: Not on file    Number of children: Not on file    Years of education: Not on file    Highest education level: Not on file   Occupational History    Not on file   Tobacco Use    Smoking status: Never    Smokeless tobacco: Never   Vaping Use    Vaping Use: Never used   Substance and Sexual Activity    Alcohol use:  Yes     Alcohol/week: 10.0 standard drinks     Types: 3 Glasses of wine, 7 Cans of beer per week    Drug use: No    Sexual activity: Not Currently     Partners: Male   Other Topics Concern    Not on file   Social History Narrative    Not on file     Social Determinants of Health     Financial Resource Strain: Low Risk     Difficulty of Paying Living Expenses: Not hard at all   Food Insecurity: No Food Insecurity    Worried About Running Out of Food in the Last Year: Never true    Ran Out of Food in the Last Year: Never true   Transportation Needs: Not on file   Physical Activity: Not on file   Stress: Not on file   Social Connections: Not on file   Intimate Partner Violence: Not on file   Housing Stability: Not on file       Allergies: Allergies   Allergen Reactions    Adhesive Other (comments)    Antihistamines - Alkylamine Other (comments)    Cefdinir Other (comments)     Sick to stomach    Ciprofloxacin Diarrhea and Nausea and Vomiting    Flagyl [Metronidazole] Nausea and Vomiting    Other Medication Other (comments)     BANDAIDS - breaks out skin    Oxycodone-Acetaminophen Other (comments)     dizziness    Phenylephrine Other (comments)     Wire her up    Phenylpropanolamine Other (comments)     Wire her up    Sulfa (Sulfonamide Antibiotics) Nausea Only       Current Medications:  Current Outpatient Medications   Medication Sig    PARoxetine (PAXIL) 30 mg tablet TAKE 1 TABLET DAILY    LORazepam (ATIVAN) 1 mg tablet TAKE 1/2 TO 1 TABLET       NIGHTLY AS NEEDED FOR      ANXIETY. MAXIMUM DAILY     AMOUNT: 1 TABLET    lidocaine (LIDODERM) 5 % Apply up to 3 patches to the affected area for 12 hours a day and remove for 12 hours a day.    gabapentin (NEURONTIN) 300 mg capsule Take 1 cap po in am, 1 cap po midday and 2 cap po qhs.    losartan (COZAAR) 100 mg tablet TAKE 1 TABLET DAILY    meloxicam (MOBIC) 7.5 mg tablet Take 1 Tablet by mouth daily. cholecalciferol (VITAMIN D3) (1000 Units /25 mcg) tablet Take  by mouth daily. simvastatin (ZOCOR) 20 mg tablet TAKE 1 TABLET DAILY    estradioL (ESTRACE) 0.01 % (0.1 mg/gram) vaginal cream APPLY 1 A SMALL AMOUNT INTO VAGINA THREE TIMES A WEEK , PEA-SIZED AMOUNT FINGERTIP APPLICATION    amLODIPine (NORVASC) 5 mg tablet TAKE 1 TABLET DAILY    levocetirizine (XYZAL) 5 mg tablet TAKE 1 TABLET DAILY    acetaminophen (TYLENOL) 500 mg tablet Take  by mouth every six (6) hours as needed for Pain. dicyclomine (BENTYL) 10 mg capsule Take 10 mg by mouth as needed. 1 tablet by mouth daily as needed    MULTIVITAMIN W-MINERALS/LUTEIN (CENTRUM SILVER PO) Take 1 Tab by mouth. Takes one po daily.       Current Facility-Administered Medications   Medication Dose Route Frequency    lidocaine (PF) (XYLOCAINE) 10 mg/mL (1 %) injection 15 mL  15 mL SubCUTAneous RAD ONCE    iohexoL (OMNIPAQUE) 180 mg iodine/mL injection 10 mL  10 mL Intra artICUlar RAD ONCE    dexamethasone (PF) (DECADRON) 10 mg/mL injection 10 mg  10 mg Epidural RAD ONCE        Physical Exam:  Blood pressure (!) 147/91, pulse 98, resp. rate 18, last menstrual period 01/01/1986, SpO2 97 %. GENERAL: alert, cooperative, no distress, appears stated age,   LUNG: Nonlabored respiration on room air  HEART: regular rate and rhythm    Alerts:    Hospital Problems  Date Reviewed: 12/14/2022   None      Laboratory:    No results for input(s): HGB, HCT, WBC, PLT, INR, BUN, CREA, K, CRCLT, HGBEXT, HCTEXT, PLTEXT, INREXT in the last 72 hours. No lab exists for component: PTT, PT      Plan of Care/Planned Procedure:  Risks, benefits, and alternatives reviewed with patient and she agrees to proceed with the procedure.      Left L3-4 transforaminal BEN    Margareth Tripp MD  Interventional Radiology  [facility], [facility].  3:16 PM, 12/19/2022

## 2022-12-19 NOTE — PROGRESS NOTES
Pt arrives ambulatory to angio department accompanied by  for Tl procedure. All assessments completed and consent was reviewed. Education given was regarding procedure, local sedation, post-procedure care and  management/follow-up. Opportunity for questions was provided and all questions and concerns were addressed.

## 2023-01-03 ENCOUNTER — OFFICE VISIT (OUTPATIENT)
Dept: ORTHOPEDIC SURGERY | Age: 88
End: 2023-01-03
Payer: MEDICARE

## 2023-01-03 VITALS — HEIGHT: 58 IN | BODY MASS INDEX: 27.5 KG/M2 | WEIGHT: 131 LBS

## 2023-01-03 DIAGNOSIS — Z98.1 S/P LUMBAR SPINAL FUSION: Primary | ICD-10-CM

## 2023-01-03 DIAGNOSIS — M43.16 SPONDYLOLISTHESIS OF LUMBAR REGION: ICD-10-CM

## 2023-01-03 PROCEDURE — 1101F PT FALLS ASSESS-DOCD LE1/YR: CPT | Performed by: ORTHOPAEDIC SURGERY

## 2023-01-03 PROCEDURE — G8417 CALC BMI ABV UP PARAM F/U: HCPCS | Performed by: ORTHOPAEDIC SURGERY

## 2023-01-03 PROCEDURE — G8427 DOCREV CUR MEDS BY ELIG CLIN: HCPCS | Performed by: ORTHOPAEDIC SURGERY

## 2023-01-03 PROCEDURE — 1090F PRES/ABSN URINE INCON ASSESS: CPT | Performed by: ORTHOPAEDIC SURGERY

## 2023-01-03 PROCEDURE — G8432 DEP SCR NOT DOC, RNG: HCPCS | Performed by: ORTHOPAEDIC SURGERY

## 2023-01-03 PROCEDURE — 1123F ACP DISCUSS/DSCN MKR DOCD: CPT | Performed by: ORTHOPAEDIC SURGERY

## 2023-01-03 PROCEDURE — 99213 OFFICE O/P EST LOW 20 MIN: CPT | Performed by: ORTHOPAEDIC SURGERY

## 2023-01-03 PROCEDURE — G8536 NO DOC ELDER MAL SCRN: HCPCS | Performed by: ORTHOPAEDIC SURGERY

## 2023-01-03 NOTE — PROGRESS NOTES
1. Have you been to the ER, urgent care clinic since your last visit? Hospitalized since your last visit? No    2. Have you seen or consulted any other health care providers outside of the [address] [place] [address] since your last visit? Include any pap smears or colon screening.  No    Chief Complaint   Patient presents with    Back Pain     Inj f/u

## 2023-01-03 NOTE — PROGRESS NOTES
Edil Cervantes (: 1935) is a 81 y.o. female, patient, here for evaluation of the following chief complaint(s):  Back Pain (Inj f/u)       ASSESSMENT/PLAN:    Below is the assessment and plan developed based on review of pertinent history, physical exam, labs, studies, and medications. Her main complaints at this time are related to fatigue after a long day. Patient has not really change since her last visit. She has the CT myelogram which shows spondylosis at L5-S1 with foraminal narrowing. There is also some mild residual stenosis noted L3-4. She had significant improvement with her injection at L3-4 which was done 3 weeks ago. I would like to start some physical therapy to see if we can improve on this and I will see her back in 6 weeks but if she starts to have symptoms again in terms of the left hip and buttock we can order a second left L3-4 BEN. 1. S/P lumbar spinal fusion  -     REFERRAL TO PHYSICAL THERAPY; Future  2. Spondylolisthesis of lumbar region      Return in about 6 weeks (around 2023) for Dr. Wendy Waldrop. SUBJECTIVE/OBJECTIVE:  Edil Cervantes (: 1935) is a 81 y.o. female. Pain Assessment  10/18/2022   Location of Pain Leg   Location Modifiers Left   Severity of Pain 6        She is here for the testing we ordered last time. She still is having severe left posterior buttock pain and pain that radiates into the left lateral calf and foot as she states. She did have an EMG as well as the bone scan. She did have a prescription for Lidoderm patches which seem to have helped some of the left posterior buttock pain. The SI joint injection did not seem to help her symptoms much at all. No other acute changes noted    L3-4 BEN follow.     Imaging:          XR Results (most recent):  Results from Hospital Encounter encounter on 22    XR MYELO LUMBAR    Narrative  PATIENT NAME: Bianka Huston  AGE, : 81 years, 1935  MRN: [id number]  DATE: 2022 10:20 AM    SUPERVISING PHYSICIAN: Fortunato Mary MD  OPERATING PROVIDER: Terri Hall PA-C    PROCEDURE(S): LUMBAR PUNCTURE FOR CT MYELOGRAPHY    HISTORY: s/p lumbar fusion    TECHNIQUE:  Informed written consent was obtained from the patient following discussion of  the risks, benefits, and alternatives to the procedure. The patient was brought  to the procedure area and positively identified. A timeout was performed. A preprocedure fluoroscopic image of the lumbar spine was obtained and the  proposed access level was marked on the skin. The skin of the target area was  prepped and draped using Betadine and sterile barrier technique which includes:  cap and mask, sterile gloves, and sterile site drape. The skin and underlying soft tissues were anesthetized with 5 cc of 1%  lidocaine. A 22 gauge spinal needle was then inserted under fluoroscopic  guidance into the thecal sac. A small test injection of contrast was used to  confirm positioning. Approximately 15 cc of Omnipaque 180 was then injected into  the thecal sac. The spinal needle was then removed and fluoroscopic spot imaging  was performed. The patient was then taken to the CT scanner for post myelogram CT. Subsequently, the patient was observed in the holding area for 2 hours and was  asymptomatic. There is no evidence of immediate complication. The patient was  discharged home in stable condition. RADIATION:  Air Kerma: 77.81 mGy    COMPLICATIONS: none    Impression  1. Technically successful lumbar myelogram.  2. Please refer to the post myelogram CT report for details. MRI Results (most recent):  Results from Appointment encounter on 09/16/22     [facility] [address] CONT    Narrative  EXAM:  MRI [facility] SPINE WO CONT    INDICATION:   15-year-old female with Thoracics stenosis; status post lumbar  spinal fusion. Dx: S/P lumbar spinal fusion [Z98.1 (ICD-10-CM)]; Thoracic  stenosis [C89.31 (ICD-10-CM)]    COMPARISON: None.     TECHNIQUE: Multiplanar multisequence acquisition without contrast of the  thoracic spine. CONTRAST: None. FINDINGS:  Suboptimally visualized bilateral transpedicular posterior spinal fusion  hardware extending from T10 through the visualized L2 levels. Hardware  associated signal distortion artifacts preclude optimal evaluation of adjacent  structures. There is a 0.4 cm anterolisthesis of T1 on T2 and retrolisthesis of T9 on T10. Vertebral body heights are maintained without evidence of acute fracture. Small  intraosseous hemangioma within T8-9 vertebral body. Degenerative disc changes  predominantly involving T7-T8, T8-T9 and T9-T10 resulting mild spinal canal  narrowing. No significant neural foramina narrowing. Suboptimal evaluation of  the spinal canal at the fused levels due to extensive signal distortion  artifact. The thoracic cord is normal in size and signal in the well-visualized  segment above the spinal fusion levels. Visualized soft tissues are  unremarkable. Impression  Status post lower thoracic/lumbar posterior spinal fusion,  suboptimally visualized. Hardware associated signal distortion artifact  precludes optimal evaluation of the adjacent structures. Mild spondylolisthesis predominantly involving T7-T10 without high-grade spinal  canal or neural foramina narrowing.          Allergies   Allergen Reactions    Adhesive Other (comments)    Antihistamines - Alkylamine Other (comments)    Cefdinir Other (comments)     Sick to stomach    Ciprofloxacin Diarrhea and Nausea and Vomiting    Flagyl [Metronidazole] Nausea and Vomiting    Other Medication Other (comments)     BANDAIDS - breaks out skin    Oxycodone-Acetaminophen Other (comments)     dizziness    Phenylephrine Other (comments)     Wire her up    Phenylpropanolamine Other (comments)     Wire her up    Sulfa (Sulfonamide Antibiotics) Nausea Only       Current Outpatient Medications   Medication Sig    PARoxetine (PAXIL) 30 mg tablet TAKE 1 TABLET DAILY    LORazepam (ATIVAN) 1 mg tablet TAKE 1/2 TO 1 TABLET       NIGHTLY AS NEEDED FOR      ANXIETY. MAXIMUM DAILY     AMOUNT: 1 TABLET    gabapentin (NEURONTIN) 300 mg capsule Take 1 cap po in am, 1 cap po midday and 2 cap po qhs.    losartan (COZAAR) 100 mg tablet TAKE 1 TABLET DAILY    meloxicam (MOBIC) 7.5 mg tablet Take 1 Tablet by mouth daily. cholecalciferol (VITAMIN D3) (1000 Units /25 mcg) tablet Take  by mouth daily. simvastatin (ZOCOR) 20 mg tablet TAKE 1 TABLET DAILY    estradioL (ESTRACE) 0.01 % (0.1 mg/gram) vaginal cream APPLY 1 A SMALL AMOUNT INTO VAGINA THREE TIMES A WEEK , PEA-SIZED AMOUNT FINGERTIP APPLICATION    amLODIPine (NORVASC) 5 mg tablet TAKE 1 TABLET DAILY    levocetirizine (XYZAL) 5 mg tablet TAKE 1 TABLET DAILY    acetaminophen (TYLENOL) 500 mg tablet Take  by mouth every six (6) hours as needed for Pain. dicyclomine (BENTYL) 10 mg capsule Take 10 mg by mouth as needed. 1 tablet by mouth daily as needed    MULTIVITAMIN W-MINERALS/LUTEIN (CENTRUM SILVER PO) Take 1 Tab by mouth. Takes one po daily. lidocaine (LIDODERM) 5 % Apply up to 3 patches to the affected area for 12 hours a day and remove for 12 hours a day. No current facility-administered medications for this visit.        Past Medical History:   Diagnosis Date    Anxiety     Arthritis back pain    fingers    Asthma     MILD - NO INHALERS    Chronic pain     Colitis     Diverticulitis 06/11/2013    Hypercholesterolemia     Hypertension     IBS (irritable bowel syndrome)     Ill-defined condition     H/O UTI POST ISLAS CATH    Insomnia     Nausea & vomiting     Stenosis     spinal    Stroke ([facility] [address] 1996    tia   PATENT  FORAMEN  OVALE    Swollen L ankle         Past Surgical History:   Procedure Laterality Date    HX BACK SURGERY  2021    HX CATARACT REMOVAL Bilateral 2009    HX CHOLECYSTECTOMY  04/1997    HX COLONOSCOPY      x3    HX HYSTERECTOMY  1986    HX KNEE ARTHROSCOPY Left 04/1998    HX LUMBAR FUSION 02/2011    L4-L5    HX ORTHOPAEDIC Right     REPAIRED TENDONS ON RIGHT HAND AFTER FALL    HX SHOULDER REPLACEMENT Right 01/2020    HX SHOULDER REPLACEMENT Left 2022    HX TONSILLECTOMY  CHILDHOOD    HX WISDOM TEETH EXTRACTION      X3    IR INJ FORAMIN EPID LUMB ANES/STER SNGL  03/21/2022    IR INJ SPINE THER SUBST LUM/SAC W IMG  12/19/2022    TN UNLISTED PROCEDURE CARDIAC SURGERY  08/2004    repair of foramen ovale       Family History   Problem Relation Age of Onset    Other Mother         SUARACHNOID HEMORRHAGE-ANEURYSM    Stroke Mother     Cancer Father         PANCREATIC    Anesth Problems Daughter         N/V    No Known Problems Daughter         Social History     Tobacco Use    Smoking status: Never    Smokeless tobacco: Never   Vaping Use    Vaping Use: Never used   Substance Use Topics    Alcohol use: Yes     Alcohol/week: 10.0 standard drinks     Types: 3 Glasses of wine, 7 Cans of beer per week    Drug use: No        Review of Systems   Musculoskeletal:  Positive for back pain and gait problem. All other systems reviewed and are negative. Vitals:  Ht 4' 10\" (1.473 m)   Wt 131 lb (59.4 kg)   LMP 01/01/1986   BMI 27.38 kg/m²    Body mass index is 27.38 kg/m². Ortho Exam       Integumentary  Assessment of Surgical Incision - healing and consistent with normal anticipated wound healing. Neurologic  Sensory  Light Touch - Intact - Globally. Overall Assessment of Muscle Strength and Tone reveals  Lower Extremities - Right Iliopsoas - 5/5. Left Iliopsoas - 5/5. Right Tibialis Anterior - 5/5. Left Tibialis Anterior - 5/5. Right Gastroc-Soleus - 5/5. Left Gastroc-Soleus - 5/5. Right EHL - 5/5. Left EHL - 5/5. General Assessment of Reflexes  Right Ankle - Clonus is not present. Left Ankle - Clonus is not present. Reflexes (Dermatomes)  2/2 Normal - Left Achilles (L5-S2), Left Knee (L2-4), Right Achilles (L5-S2) and Right Knee (L2-4).     Musculoskeletal  Global Assessment  Examination of related systems reveals - well-developed, well-nourished, in no acute distress, alert and oriented x 3 and normal coordination. Spine/Ribs/Pelvis  Lumbosacral Spine - Examination of the lumbosacral spine reveals - no known fractures or deformities. Inspection and Palpation - Tenderness - moderate. Assessment of pain reveals the following findings - The pain is characterized as - moderate. Location - pain refers to lower back bilaterally. Some tenderness on palpation of left posterior buttock and hip region. An electronic signature was used to authenticate this note.   -- Marge Ramirez MD

## 2023-01-03 NOTE — PATIENT INSTRUCTIONS
Spondylolysis and Spondylolisthesis: Exercises  Introduction  Here are some examples of exercises for you to try. The exercises may be suggested for a condition or for rehabilitation. Start each exercise slowly. Ease off the exercises if you start to have pain. You will be told when to start these exercises and which ones will work best for you. How to do the exercises  Single knee-to-chest  Lie on your back with your knees bent and your feet flat on the floor. You can put a small pillow under your head and neck if it is more comfortable. Bring one knee to your chest, keeping the other foot flat on the floor. Keep your lower back pressed to the floor. Hold for 15 to 30 seconds. Relax, and lower the knee to the starting position. Repeat with the other leg. Repeat 2 to 4 times with each leg. To get more stretch, put your other leg flat on the floor while pulling your knee to your chest.  Double knee-to-chest  Lie on your back with your knees bent and your feet flat on the floor. You can put a small pillow under your head and neck if it is more comfortable. Bring both knees to your chest.  Keep your lower back pressed to the floor. Hold for 15 to 30 seconds. Relax, and lower your knees to the starting position. Repeat 2 to 4 times. Alternate arm and leg (bird dog) exercise  Do this exercise slowly. Try to keep your body straight at all times. Start on the floor, on your hands and knees. Tighten your belly muscles by pulling your belly button in toward your spine. Be sure you continue to breathe normally and do not hold your breath. Raise one arm off the floor, and hold it straight out in front of you. Be careful not to let your shoulder drop down, because that will twist your trunk. Hold for about 6 seconds, then lower your arm and switch to your other arm. Repeat 8 to 12 times on each arm. When you can do this exercise with ease and no pain, repeat steps 1 through 5.  But this time do it with one leg raised off the floor, holding your leg straight out behind you. Be careful not to let your hip drop down, because that will twist your trunk. When holding your leg straight out becomes easier, try raising your opposite arm at the same time, and repeat steps 1 through 5. Bridging  Lie on your back with both knees bent. Your knees should be bent about 90 degrees. Then push your feet into the floor, squeeze your buttocks, and lift your hips off the floor until your shoulders, hips, and knees are all in a straight line. Hold for about 6 seconds as you continue to breathe normally, and then slowly lower your hips back down to the floor and rest for up to 10 seconds. Repeat 8 to 12 times. Curl-ups  Lie on the floor on your back with your knees bent at a 90-degree angle. Your feet should be flat on the floor, about 12 inches from your buttocks. Cross your arms over your chest. If this bothers your neck, try putting your hands behind your neck (not your head), with your elbows spread apart. Slowly tighten your belly muscles and raise your shoulder blades off the floor. Keep your head in line with your body, and do not press your chin to your chest.  Hold this position for 1 or 2 seconds, then slowly lower yourself back down to the floor. Repeat 8 to 12 times. Plank  Do this exercise slowly. Try to keep your body straight at all times, and do not let one hip drop lower than the other. Lie on your stomach, resting your upper body on your forearms. Tighten your belly muscles by pulling your belly button in toward your spine. Keeping your knees on the floor, press down with your forearms to lift your upper body off the floor. Hold for about 6 seconds, then lower your body to the floor. Rest for up to 10 seconds. Repeat 8 to 12 times. Over time, work up to holding for 15 to 30 seconds each time.   If this exercise is easy to do with your knees on the floor, try doing this exercise with your knees and legs straight, supported by your toes on the floor. Follow-up care is a key part of your treatment and safety. Be sure to make and go to all appointments, and call your doctor if you are having problems. It's also a good idea to know your test results and keep a list of the medicines you take. Current as of: March 9, 2022               Content Version: 13.4  © 2006-2022 [employer]wise, [employer]. Care instructions adapted under license by [employer] (which disclaims liability or warranty for this information). If you have questions about a medical condition or this instruction, always ask your healthcare professional. [place] [address] any warranty or liability for your use of this information.

## 2023-01-03 NOTE — LETTER
1/3/2023    Patient: Jackie Sullivan   YOB: 1935   Date of Visit: 1/3/2023     Tatianna Lobato MD  [address] [place] [address]  Via In Basket    Dear Tatianna Lobato MD,      Thank you for referring Ms. Carmine Martin to [facility] for evaluation. My notes for this consultation are attached. If you have questions, please do not hesitate to call me. I look forward to following your patient along with you.       Sincerely,    Marge Ramirez MD

## 2023-01-23 DIAGNOSIS — Z98.1 S/P LUMBAR FUSION: ICD-10-CM

## 2023-02-07 ENCOUNTER — OFFICE VISIT (OUTPATIENT)
Dept: ORTHOPEDIC SURGERY | Age: 88
End: 2023-02-07
Payer: MEDICARE

## 2023-02-07 DIAGNOSIS — M25.512 CHRONIC LEFT SHOULDER PAIN: ICD-10-CM

## 2023-02-07 DIAGNOSIS — Z96.612 HISTORY OF TOTAL SHOULDER REPLACEMENT, LEFT: Primary | ICD-10-CM

## 2023-02-07 DIAGNOSIS — G89.29 CHRONIC LEFT SHOULDER PAIN: ICD-10-CM

## 2023-02-07 PROCEDURE — G8417 CALC BMI ABV UP PARAM F/U: HCPCS | Performed by: ORTHOPAEDIC SURGERY

## 2023-02-07 PROCEDURE — 1123F ACP DISCUSS/DSCN MKR DOCD: CPT | Performed by: ORTHOPAEDIC SURGERY

## 2023-02-07 PROCEDURE — G8427 DOCREV CUR MEDS BY ELIG CLIN: HCPCS | Performed by: ORTHOPAEDIC SURGERY

## 2023-02-07 PROCEDURE — 99213 OFFICE O/P EST LOW 20 MIN: CPT | Performed by: ORTHOPAEDIC SURGERY

## 2023-02-07 PROCEDURE — G8536 NO DOC ELDER MAL SCRN: HCPCS | Performed by: ORTHOPAEDIC SURGERY

## 2023-02-07 PROCEDURE — 1090F PRES/ABSN URINE INCON ASSESS: CPT | Performed by: ORTHOPAEDIC SURGERY

## 2023-02-07 PROCEDURE — G8432 DEP SCR NOT DOC, RNG: HCPCS | Performed by: ORTHOPAEDIC SURGERY

## 2023-02-07 PROCEDURE — 1101F PT FALLS ASSESS-DOCD LE1/YR: CPT | Performed by: ORTHOPAEDIC SURGERY

## 2023-02-07 RX ORDER — AMOXICILLIN 500 MG/1
CAPSULE ORAL
Qty: 2 CAPSULE | Refills: 4 | Status: SHIPPED | OUTPATIENT
Start: 2023-02-07

## 2023-02-07 NOTE — LETTER
2/7/2023    Patient: Checo Mckeon   YOB: 1935   Date of Visit: 2/7/2023     Addis Calderon MD  [address] [place] [address]  Via In Basket    Dear Addis Calderon MD,      Thank you for referring Ms. Toya Bhatti to [facility] for evaluation. My notes for this consultation are attached. If you have questions, please do not hesitate to call me. I look forward to following your patient along with you.       Sincerely,    Juan Jose Cheng MD
no

## 2023-02-07 NOTE — PROGRESS NOTES
Evette Ibarra (: 1935) is a 81 y.o. female, patient, here for evaluation of the following chief complaint(s):  Shoulder Pain (Left shoulder post op)       HPI:    Patient presents the office today status post reverse total shoulder replacement of the left. She is doing quite well. She is almost 6 months status post surgery. She is been very pleased with her movement. She has no complaints of pain    Allergies   Allergen Reactions    Adhesive Other (comments)    Antihistamines - Alkylamine Other (comments)    Cefdinir Other (comments)     Sick to stomach    Ciprofloxacin Diarrhea and Nausea and Vomiting    Flagyl [Metronidazole] Nausea and Vomiting    Other Medication Other (comments)     BANDAIDS - breaks out skin    Oxycodone-Acetaminophen Other (comments)     dizziness    Phenylephrine Other (comments)     Wire her up    Phenylpropanolamine Other (comments)     Wire her up    Sulfa (Sulfonamide Antibiotics) Nausea Only       Current Outpatient Medications   Medication Sig    PARoxetine (PAXIL) 30 mg tablet TAKE 1 TABLET DAILY    LORazepam (ATIVAN) 1 mg tablet TAKE 1/2 TO 1 TABLET       NIGHTLY AS NEEDED FOR      ANXIETY. MAXIMUM DAILY     AMOUNT: 1 TABLET    gabapentin (NEURONTIN) 300 mg capsule Take 1 cap po in am, 1 cap po midday and 2 cap po qhs.    losartan (COZAAR) 100 mg tablet TAKE 1 TABLET DAILY    meloxicam (MOBIC) 7.5 mg tablet Take 1 Tablet by mouth daily. cholecalciferol (VITAMIN D3) (1000 Units /25 mcg) tablet Take  by mouth daily. simvastatin (ZOCOR) 20 mg tablet TAKE 1 TABLET DAILY    estradioL (ESTRACE) 0.01 % (0.1 mg/gram) vaginal cream APPLY 1 A SMALL AMOUNT INTO VAGINA THREE TIMES A WEEK , PEA-SIZED AMOUNT FINGERTIP APPLICATION    amLODIPine (NORVASC) 5 mg tablet TAKE 1 TABLET DAILY    levocetirizine (XYZAL) 5 mg tablet TAKE 1 TABLET DAILY    acetaminophen (TYLENOL) 500 mg tablet Take  by mouth every six (6) hours as needed for Pain.     dicyclomine (BENTYL) 10 mg capsule Take 10 mg by mouth as needed. 1 tablet by mouth daily as needed    MULTIVITAMIN W-MINERALS/LUTEIN (CENTRUM SILVER PO) Take 1 Tab by mouth. Takes one po daily. No current facility-administered medications for this visit.        Past Medical History:   Diagnosis Date    Anxiety     Arthritis back pain    fingers    Asthma     MILD - NO INHALERS    Chronic pain     Colitis     Diverticulitis 06/11/2013    Hypercholesterolemia     Hypertension     IBS (irritable bowel syndrome)     Ill-defined condition     H/O UTI POST ISLAS CATH    Insomnia     Nausea & vomiting     Stenosis     spinal    Stroke ([facility] [address] 1996    tia   PATENT  FORAMEN  OVALE    Swollen L ankle         Past Surgical History:   Procedure Laterality Date    HX BACK SURGERY  2021    HX CATARACT REMOVAL Bilateral 2009    HX CHOLECYSTECTOMY  04/1997    HX COLONOSCOPY      x3    HX HYSTERECTOMY  1986    HX KNEE ARTHROSCOPY Left 04/1998    HX LUMBAR FUSION  02/2011    L4-L5    HX ORTHOPAEDIC Right     REPAIRED TENDONS ON RIGHT HAND AFTER FALL    HX SHOULDER REPLACEMENT Right 01/2020    HX SHOULDER REPLACEMENT Left 2022    HX TONSILLECTOMY  CHILDHOOD    HX WISDOM TEETH EXTRACTION      X3    IR INJ FORAMIN EPID LUMB ANES/STER SNGL  03/21/2022    IR INJ SPINE THER SUBST LUM/SAC W IMG  12/19/2022    ND UNLISTED PROCEDURE CARDIAC SURGERY  08/2004    repair of foramen ovale       Family History   Problem Relation Age of Onset    Other Mother         SUARACHNOID HEMORRHAGE-ANEURYSM    Stroke Mother     Cancer Father         PANCREATIC    Anesth Problems Daughter         N/V    No Known Problems Daughter         Social History     Socioeconomic History    Marital status:      Spouse name: Not on file    Number of children: Not on file    Years of education: Not on file    Highest education level: Not on file   Occupational History    Not on file   Tobacco Use    Smoking status: Never    Smokeless tobacco: Never   Vaping Use    Vaping Use: Never used   Substance and Sexual Activity    Alcohol use: Yes     Alcohol/week: 10.0 standard drinks     Types: 3 Glasses of wine, 7 Cans of beer per week    Drug use: No    Sexual activity: Not Currently     Partners: Male   Other Topics Concern    Not on file   Social History Narrative    Not on file     Social Determinants of Health     Financial Resource Strain: Low Risk     Difficulty of Paying Living Expenses: Not hard at all   Food Insecurity: No Food Insecurity    Worried About Running Out of Food in the Last Year: Never true    Ran Out of Food in the Last Year: Never true   Transportation Needs: Not on file   Physical Activity: Not on file   Stress: Not on file   Social Connections: Not on file   Intimate Partner Violence: Not on file   Housing Stability: Not on file       Review of Systems   Musculoskeletal:         Left shoulder       Vitals:  LMP 01/01/1986    There is no height or weight on file to calculate BMI. Ortho Exam     Patient is alert and oriented x3. Patient is in no acute distress. Patient ambulates with an antalgic gait      Left shoulder: Incision is healed. Patient has 125 degrees of forward elevation, 110 degrees lateral duction and 60 degrees of external rotation. Patient has minimal to no pain with manipulation of the shoulder. Her strength is 4/5 throughout. Neurovascular examination is intact. Right shoulder: Incision from prior shoulder replacement has healed well. She has about 140 degrees of elevation on the right she has no complaints of pain. Her strength is 4/5 throughout. Neurovascular examination is intact. XR Results (most recent):  Results from Appointment encounter on 02/07/23    XR SHOULDER LT AP/LAT MIN 2 V    Narrative  Three-view x-ray of the left shoulder reveals a well-seated prosthesis no lucencies identified       ASSESSMENT/PLAN:    Patient is 6-month status post reverse total shoulder replacement the left she is doing very well.   I have encouraged her to continue with exercises. I reminded her to take antibiotics prior to dental procedures. We will write a prescription for amoxicillin. She is to return to the office in 6 months for repeat x-ray.   She is to call if she develops any unusual pain or swelling        Rianna Espinoza MD

## 2023-02-14 ENCOUNTER — OFFICE VISIT (OUTPATIENT)
Dept: ORTHOPEDIC SURGERY | Age: 88
End: 2023-02-14
Payer: MEDICARE

## 2023-02-14 DIAGNOSIS — Z98.1 S/P LUMBAR FUSION: Primary | ICD-10-CM

## 2023-02-14 DIAGNOSIS — M48.062 SPINAL STENOSIS OF LUMBAR REGION WITH NEUROGENIC CLAUDICATION: ICD-10-CM

## 2023-02-14 NOTE — PROGRESS NOTES
Maggie Zamudio (: 1935) is a 81 y.o. female, patient, here for evaluation of the following chief complaint(s):  LOW BACK PAIN       ASSESSMENT/PLAN:    Below is the assessment and plan developed based on review of pertinent history, physical exam, labs, studies, and medications. Her main complaints at this time are related to fatigue after a long day. Patient has not really change since her last visit. She has the CT myelogram which shows spondylosis at L5-S1 with foraminal narrowing. There is also some mild residual stenosis noted L3-4. At this point I would like her to continue with physical therapy. I will see her back in 2 to 3 months. I will get EOS scans on her thoracolumbar spine to look at her sagittal alignment prior to the next visit. Obviously would try to avoid further surgical intervention       1. S/P lumbar fusion  2. Spinal stenosis of lumbar region with neurogenic claudication      No follow-ups on file. SUBJECTIVE/OBJECTIVE:  Maggie Zamudio (: 1935) is a 81 y.o. female. Pain Assessment  2023   Location of Pain Back   Location Modifiers -   Severity of Pain 2        She is here for the testing we ordered last time. She still is having severe left posterior buttock pain and pain that radiates into the left lateral calf and foot as she states. She is progressing with physical therapy slowly but surely. No other acute changes noted        Imaging:          XR Results (most recent):  Results from Appointment encounter on 23    XR SHOULDER LT AP/LAT MIN 2 V    Narrative  Three-view x-ray of the left shoulder reveals a well-seated prosthesis no lucencies identified       MRI Results (most recent):  Results from Appointment encounter on 22    MRI [facility] SPINE WO CONT    Narrative  EXAM:  MRI [facility] SPINE WO CONT    INDICATION:   61-year-old female with Thoracics stenosis; status post lumbar  spinal fusion.  Dx: S/P lumbar spinal fusion [Z98.1 (ICD-10-CM)]; Thoracic  stenosis [S61.19 (ICD-10-CM)]    COMPARISON: None. TECHNIQUE: Multiplanar multisequence acquisition without contrast of the  thoracic spine. CONTRAST: None. FINDINGS:  Suboptimally visualized bilateral transpedicular posterior spinal fusion  hardware extending from T10 through the visualized L2 levels. Hardware  associated signal distortion artifacts preclude optimal evaluation of adjacent  structures. There is a 0.4 cm anterolisthesis of T1 on T2 and retrolisthesis of T9 on T10. Vertebral body heights are maintained without evidence of acute fracture. Small  intraosseous hemangioma within T8-9 vertebral body. Degenerative disc changes  predominantly involving T7-T8, T8-T9 and T9-T10 resulting mild spinal canal  narrowing. No significant neural foramina narrowing. Suboptimal evaluation of  the spinal canal at the fused levels due to extensive signal distortion  artifact. The thoracic cord is normal in size and signal in the well-visualized  segment above the spinal fusion levels. Visualized soft tissues are  unremarkable. Impression  Status post lower thoracic/lumbar posterior spinal fusion,  suboptimally visualized. Hardware associated signal distortion artifact  precludes optimal evaluation of the adjacent structures. Mild spondylolisthesis predominantly involving T7-T10 without high-grade spinal  canal or neural foramina narrowing.          Allergies   Allergen Reactions    Adhesive Other (comments)    Antihistamines - Alkylamine Other (comments)    Cefdinir Other (comments)     Sick to stomach    Ciprofloxacin Diarrhea and Nausea and Vomiting    Flagyl [Metronidazole] Nausea and Vomiting    Other Medication Other (comments)     BANDAIDS - breaks out skin    Oxycodone-Acetaminophen Other (comments)     dizziness    Phenylephrine Other (comments)     Wire her up    Phenylpropanolamine Other (comments)     Wire her up    Sulfa (Sulfonamide Antibiotics) Nausea Only Current Outpatient Medications   Medication Sig    PARoxetine (PAXIL) 30 mg tablet TAKE 1 TABLET DAILY    LORazepam (ATIVAN) 1 mg tablet TAKE 1/2 TO 1 TABLET       NIGHTLY AS NEEDED FOR      ANXIETY. MAXIMUM DAILY     AMOUNT: 1 TABLET    gabapentin (NEURONTIN) 300 mg capsule Take 1 cap po in am, 1 cap po midday and 2 cap po qhs.    losartan (COZAAR) 100 mg tablet TAKE 1 TABLET DAILY    meloxicam (MOBIC) 7.5 mg tablet Take 1 Tablet by mouth daily. cholecalciferol (VITAMIN D3) (1000 Units /25 mcg) tablet Take  by mouth daily. simvastatin (ZOCOR) 20 mg tablet TAKE 1 TABLET DAILY    estradioL (ESTRACE) 0.01 % (0.1 mg/gram) vaginal cream APPLY 1 A SMALL AMOUNT INTO VAGINA THREE TIMES A WEEK , PEA-SIZED AMOUNT FINGERTIP APPLICATION    amLODIPine (NORVASC) 5 mg tablet TAKE 1 TABLET DAILY    levocetirizine (XYZAL) 5 mg tablet TAKE 1 TABLET DAILY    acetaminophen (TYLENOL) 500 mg tablet Take  by mouth every six (6) hours as needed for Pain. dicyclomine (BENTYL) 10 mg capsule Take 10 mg by mouth as needed. 1 tablet by mouth daily as needed    MULTIVITAMIN W-MINERALS/LUTEIN (CENTRUM SILVER PO) Take 1 Tab by mouth. Takes one po daily. No current facility-administered medications for this visit.        Past Medical History:   Diagnosis Date    Anxiety     Arthritis back pain    fingers    Asthma     MILD - NO INHALERS    Chronic pain     Colitis     Diverticulitis 06/11/2013    Hypercholesterolemia     Hypertension     IBS (irritable bowel syndrome)     Ill-defined condition     H/O UTI POST ISLAS CATH    Insomnia     Nausea & vomiting     Stenosis     spinal    Stroke ([facility] [address] 1996    tia   PATENT  FORAMEN  OVALE    Swollen L ankle         Past Surgical History:   Procedure Laterality Date    HX BACK SURGERY  2021    HX CATARACT REMOVAL Bilateral 2009    HX CHOLECYSTECTOMY  04/1997    HX COLONOSCOPY      x3    HX HYSTERECTOMY  1986    HX KNEE ARTHROSCOPY Left 04/1998    HX LUMBAR FUSION  02/2011    L4-L5    HX ORTHOPAEDIC Right     REPAIRED TENDONS ON RIGHT HAND AFTER FALL    HX SHOULDER REPLACEMENT Right 01/2020    HX SHOULDER REPLACEMENT Left 2022    HX TONSILLECTOMY  CHILDHOOD    HX WISDOM TEETH EXTRACTION      X3    IR INJ FORAMIN EPID LUMB ANES/STER SNGL  03/21/2022    IR INJ SPINE THER SUBST LUM/SAC W IMG  12/19/2022    ME UNLISTED PROCEDURE CARDIAC SURGERY  08/2004    repair of foramen ovale       Family History   Problem Relation Age of Onset    Other Mother         SUARACHNOID HEMORRHAGE-ANEURYSM    Stroke Mother     Cancer Father         PANCREATIC    Anesth Problems Daughter         N/V    No Known Problems Daughter         Social History     Tobacco Use    Smoking status: Never    Smokeless tobacco: Never   Vaping Use    Vaping Use: Never used   Substance Use Topics    Alcohol use: Yes     Alcohol/week: 10.0 standard drinks     Types: 3 Glasses of wine, 7 Cans of beer per week    Drug use: No        Review of Systems   Musculoskeletal:  Positive for back pain and gait problem. All other systems reviewed and are negative. Vitals:  LMP 01/01/1986    There is no height or weight on file to calculate BMI. Ortho Exam       Integumentary  Assessment of Surgical Incision - healing and consistent with normal anticipated wound healing. Neurologic  Sensory  Light Touch - Intact - Globally. Overall Assessment of Muscle Strength and Tone reveals  Lower Extremities - Right Iliopsoas - 5/5. Left Iliopsoas - 5/5. Right Tibialis Anterior - 5/5. Left Tibialis Anterior - 5/5. Right Gastroc-Soleus - 5/5. Left Gastroc-Soleus - 5/5. Right EHL - 5/5. Left EHL - 5/5. General Assessment of Reflexes  Right Ankle - Clonus is not present. Left Ankle - Clonus is not present. Reflexes (Dermatomes)  2/2 Normal - Left Achilles (L5-S2), Left Knee (L2-4), Right Achilles (L5-S2) and Right Knee (L2-4).     Musculoskeletal  Global Assessment  Examination of related systems reveals - well-developed, well-nourished, in no acute distress, alert and oriented x 3 and normal coordination. Spine/Ribs/Pelvis  Lumbosacral Spine - Examination of the lumbosacral spine reveals - no known fractures or deformities. Inspection and Palpation - Tenderness - moderate. Assessment of pain reveals the following findings - The pain is characterized as - moderate. Location - pain refers to lower back bilaterally. Some tenderness on palpation of left posterior buttock and hip region. An electronic signature was used to authenticate this note.   -- James Jordan MD

## 2023-02-14 NOTE — PATIENT INSTRUCTIONS
Spondylolysis and Spondylolisthesis: Exercises  Introduction  Here are some examples of exercises for you to try. The exercises may be suggested for a condition or for rehabilitation. Start each exercise slowly. Ease off the exercises if you start to have pain. You will be told when to start these exercises and which ones will work best for you. How to do the exercises  Single knee-to-chest  Lie on your back with your knees bent and your feet flat on the floor. You can put a small pillow under your head and neck if it is more comfortable. Bring one knee to your chest, keeping the other foot flat on the floor. Keep your lower back pressed to the floor. Hold for 15 to 30 seconds. Relax, and lower the knee to the starting position. Repeat with the other leg. Repeat 2 to 4 times with each leg. To get more stretch, put your other leg flat on the floor while pulling your knee to your chest.  Double knee-to-chest  Lie on your back with your knees bent and your feet flat on the floor. You can put a small pillow under your head and neck if it is more comfortable. Bring both knees to your chest.  Keep your lower back pressed to the floor. Hold for 15 to 30 seconds. Relax, and lower your knees to the starting position. Repeat 2 to 4 times. Alternate arm and leg (bird dog) exercise  Do this exercise slowly. Try to keep your body straight at all times. Start on the floor, on your hands and knees. Tighten your belly muscles by pulling your belly button in toward your spine. Be sure you continue to breathe normally and do not hold your breath. Raise one arm off the floor, and hold it straight out in front of you. Be careful not to let your shoulder drop down, because that will twist your trunk. Hold for about 6 seconds, then lower your arm and switch to your other arm. Repeat 8 to 12 times on each arm. When you can do this exercise with ease and no pain, repeat steps 1 through 5.  But this time do it with one leg raised off the floor, holding your leg straight out behind you. Be careful not to let your hip drop down, because that will twist your trunk. When holding your leg straight out becomes easier, try raising your opposite arm at the same time, and repeat steps 1 through 5. Bridging  Lie on your back with both knees bent. Your knees should be bent about 90 degrees. Then push your feet into the floor, squeeze your buttocks, and lift your hips off the floor until your shoulders, hips, and knees are all in a straight line. Hold for about 6 seconds as you continue to breathe normally, and then slowly lower your hips back down to the floor and rest for up to 10 seconds. Repeat 8 to 12 times. Curl-ups  Lie on the floor on your back with your knees bent at a 90-degree angle. Your feet should be flat on the floor, about 12 inches from your buttocks. Cross your arms over your chest. If this bothers your neck, try putting your hands behind your neck (not your head), with your elbows spread apart. Slowly tighten your belly muscles and raise your shoulder blades off the floor. Keep your head in line with your body, and do not press your chin to your chest.  Hold this position for 1 or 2 seconds, then slowly lower yourself back down to the floor. Repeat 8 to 12 times. Plank  Do this exercise slowly. Try to keep your body straight at all times, and do not let one hip drop lower than the other. Lie on your stomach, resting your upper body on your forearms. Tighten your belly muscles by pulling your belly button in toward your spine. Keeping your knees on the floor, press down with your forearms to lift your upper body off the floor. Hold for about 6 seconds, then lower your body to the floor. Rest for up to 10 seconds. Repeat 8 to 12 times. Over time, work up to holding for 15 to 30 seconds each time.   If this exercise is easy to do with your knees on the floor, try doing this exercise with your knees and legs straight, supported by your toes on the floor. Follow-up care is a key part of your treatment and safety. Be sure to make and go to all appointments, and call your doctor if you are having problems. It's also a good idea to know your test results and keep a list of the medicines you take. Current as of: March 9, 2022               Content Version: 13.4  © 2006-2022 Healthwise, [employer]. Care instructions adapted under license by [employer] (which disclaims liability or warranty for this information). If you have questions about a medical condition or this instruction, always ask your healthcare professional. [place] [address] any warranty or liability for your use of this information.

## 2023-02-14 NOTE — LETTER
2/14/2023    Patient: Gerald Jalloh   YOB: 1935   Date of Visit: 2/14/2023     Mary Levy MD  [address] [place] [address]  Via In Basket    Dear Mary Levy MD,      Thank you for referring Ms. Porsche Carmona to [facility] for evaluation. My notes for this consultation are attached. If you have questions, please do not hesitate to call me. I look forward to following your patient along with you.       Sincerely,    Laisha Lord MD

## 2023-02-17 ENCOUNTER — HOSPITAL ENCOUNTER (OUTPATIENT)
Dept: GENERAL RADIOLOGY | Age: 88
Discharge: HOME | End: 2023-02-17
Attending: ORTHOPAEDIC SURGERY
Payer: MEDICARE

## 2023-02-17 DIAGNOSIS — M48.062 SPINAL STENOSIS OF LUMBAR REGION WITH NEUROGENIC CLAUDICATION: ICD-10-CM

## 2023-02-17 DIAGNOSIS — Z98.1 S/P LUMBAR FUSION: ICD-10-CM

## 2023-02-17 PROCEDURE — 72083 X-RAY EXAM ENTIRE SPI 4/5 VW: CPT

## 2023-03-21 ENCOUNTER — OFFICE VISIT (OUTPATIENT)
Dept: ORTHOPEDIC SURGERY | Age: 88
End: 2023-03-21

## 2023-03-21 VITALS — WEIGHT: 131 LBS | BODY MASS INDEX: 27.5 KG/M2 | HEIGHT: 58 IN

## 2023-03-21 DIAGNOSIS — M48.062 SPINAL STENOSIS OF LUMBAR REGION WITH NEUROGENIC CLAUDICATION: ICD-10-CM

## 2023-03-21 DIAGNOSIS — Z98.1 S/P LUMBAR FUSION: Primary | ICD-10-CM

## 2023-03-21 NOTE — PROGRESS NOTES
Adrienne Corcoran (: 1935) is a 81 y.o. female, patient, here for evaluation of the following chief complaint(s):  Back Pain (EOS 23, S/P T10/L5 Fusion 9/15/21)       ASSESSMENT/PLAN:    Below is the assessment and plan developed based on review of pertinent history, physical exam, labs, studies, and medications. Her main complaints at this time are related to fatigue after a long day. Patient has not really change since her last visit. She has the CT myelogram which shows spondylosis at L5-S1 with foraminal narrowing. The EOS scan was reviewed today. She does have a slight lumbar lordosis and pelvic incidence mismatch. It is approximately 25 degrees. This would account for her inability to stand upright for long periods of time. However we did discuss that it is not worth the surgical risk for a repeat intervention including an osteotomy. She will continue with pain management at this time. She does have spondylosis at L5-S1 below her fusion. She can be sent for bilateral L5-S1 injections given the localized lower back pain. Let us know if she would like to proceed with that. Otherwise I will see her back in 3 months. 1. S/P lumbar fusion  -     IR INJ SPINE THER SUBST LUM/SAC W IMG; Future  -     REFERRAL TO PHYSICAL THERAPY; Future  2. Spinal stenosis of lumbar region with neurogenic claudication  -     IR INJ SPINE THER SUBST LUM/SAC W IMG; Future  -     REFERRAL TO PHYSICAL THERAPY; Future      No follow-ups on file. SUBJECTIVE/OBJECTIVE:  Adrienne Corcoran (: 1935) is a 81 y.o. female. Pain Assessment  2023   Location of Pain Back   Location Modifiers -   Severity of Pain 2        She is here for the testing we ordered last time. She comes in today for follow-up of her longstanding films. She still complains of fatigue with prolonged standing. She denies any radicular symptoms today. Mostly lower back pain and buttock pain with standing.   She denies any bowel bladder difficulties. She continues with therapy at this time. Imaging:          XR Results (most recent):  Results from Hospital Encounter encounter on 02/17/23    EOS XR FULL SPINE 4 VIEWS    Narrative  EXAM:  EOS XR FULL SPINE 4 VIEWS    INDICATION: Scoliosis. Previous lumbar spine surgery. COMPARISON: Lumbar spine views on 9/13/2022. TECHNIQUE: Scoliosis series standing PA and neutral, flexion, and extension  lateral views of the thoraco lumbar spine for scoliosis assessment by the method  of Vaughn. Surgeon may perform additional measurements on the EOS system and  report them separately in the surgeon's clinical note. FINDINGS: Left Curvature of the thoracic spine at T3 measures 7 degrees. Right  curvature of the thoracic spine at T6 measures 11 degrees. Left curvature of the  thoracic spine at T10 measures 16 degrees. Right curvature of the lumbar spine  at L2-L3 measures 14 degrees. Grade 1 anterolisthesis of S1 on S2 does not change with flexion or extension. No evidence of instability. Thoracic spine degenerative disc disease is  moderate. Lumbar spine posterior fusion hardware spans T11-S1. S1 is a  lumbar-type vertebral body. Left glenohumeral reverse prosthetic components are  partially imaged. Impression  Multifocal spine curvatures. No instability. No hardware complication. MRI Results (most recent):  Results from Appointment encounter on 09/16/22     [facility] [address] CONT    Narrative  EXAM:  MRI [facility] SPINE WO CONT    INDICATION:   81-year-old female with Thoracics stenosis; status post lumbar  spinal fusion. Dx: S/P lumbar spinal fusion [Z98.1 (ICD-10-CM)]; Thoracic  stenosis [V08.77 (ICD-10-CM)]    COMPARISON: None. TECHNIQUE: Multiplanar multisequence acquisition without contrast of the  thoracic spine. CONTRAST: None.     FINDINGS:  Suboptimally visualized bilateral transpedicular posterior spinal fusion  hardware extending from T10 through the visualized L2 levels. Hardware  associated signal distortion artifacts preclude optimal evaluation of adjacent  structures. There is a 0.4 cm anterolisthesis of T1 on T2 and retrolisthesis of T9 on T10. Vertebral body heights are maintained without evidence of acute fracture. Small  intraosseous hemangioma within T8-9 vertebral body. Degenerative disc changes  predominantly involving T7-T8, T8-T9 and T9-T10 resulting mild spinal canal  narrowing. No significant neural foramina narrowing. Suboptimal evaluation of  the spinal canal at the fused levels due to extensive signal distortion  artifact. The thoracic cord is normal in size and signal in the well-visualized  segment above the spinal fusion levels. Visualized soft tissues are  unremarkable. Impression  Status post lower thoracic/lumbar posterior spinal fusion,  suboptimally visualized. Hardware associated signal distortion artifact  precludes optimal evaluation of the adjacent structures. Mild spondylolisthesis predominantly involving T7-T10 without high-grade spinal  canal or neural foramina narrowing. Allergies   Allergen Reactions    Adhesive Other (comments)    Antihistamines - Alkylamine Other (comments)    Cefdinir Other (comments)     Sick to stomach    Ciprofloxacin Diarrhea and Nausea and Vomiting    Flagyl [Metronidazole] Nausea and Vomiting    Other Medication Other (comments)     BANDAIDS - breaks out skin    Oxycodone-Acetaminophen Other (comments)     dizziness    Phenylephrine Other (comments)     Wire her up    Phenylpropanolamine Other (comments)     Wire her up    Sulfa (Sulfonamide Antibiotics) Nausea Only       Current Outpatient Medications   Medication Sig    PARoxetine (PAXIL) 30 mg tablet TAKE 1 TABLET DAILY    LORazepam (ATIVAN) 1 mg tablet TAKE 1/2 TO 1 TABLET       NIGHTLY AS NEEDED FOR      ANXIETY.  MAXIMUM DAILY     AMOUNT: 1 TABLET    gabapentin (NEURONTIN) 300 mg capsule Take 1 cap po in am, 1 cap po midday and 2 cap po qhs. losartan (COZAAR) 100 mg tablet TAKE 1 TABLET DAILY    meloxicam (MOBIC) 7.5 mg tablet Take 1 Tablet by mouth daily. cholecalciferol (VITAMIN D3) (1000 Units /25 mcg) tablet Take  by mouth daily. simvastatin (ZOCOR) 20 mg tablet TAKE 1 TABLET DAILY    estradioL (ESTRACE) 0.01 % (0.1 mg/gram) vaginal cream APPLY 1 A SMALL AMOUNT INTO VAGINA THREE TIMES A WEEK , PEA-SIZED AMOUNT FINGERTIP APPLICATION    amLODIPine (NORVASC) 5 mg tablet TAKE 1 TABLET DAILY    levocetirizine (XYZAL) 5 mg tablet TAKE 1 TABLET DAILY    acetaminophen (TYLENOL) 500 mg tablet Take  by mouth every six (6) hours as needed for Pain. dicyclomine (BENTYL) 10 mg capsule Take 10 mg by mouth as needed. 1 tablet by mouth daily as needed    MULTIVITAMIN W-MINERALS/LUTEIN (CENTRUM SILVER PO) Take 1 Tab by mouth. Takes one po daily. No current facility-administered medications for this visit.        Past Medical History:   Diagnosis Date    Anxiety     Arthritis back pain    fingers    Asthma     MILD - NO INHALERS    Chronic pain     Colitis     Diverticulitis 06/11/2013    Hypercholesterolemia     Hypertension     IBS (irritable bowel syndrome)     Ill-defined condition     H/O UTI POST ISLAS CATH    Insomnia     Nausea & vomiting     Stenosis     spinal    Stroke ([facility] [address] 1996    tia   PATENT  FORAMEN  OVALE    Swollen L ankle         Past Surgical History:   Procedure Laterality Date    HX BACK SURGERY  2021    HX CATARACT REMOVAL Bilateral 2009    HX CHOLECYSTECTOMY  04/1997    HX COLONOSCOPY      x3    HX HYSTERECTOMY  1986    HX KNEE ARTHROSCOPY Left 04/1998    HX LUMBAR FUSION  02/2011    L4-L5    HX ORTHOPAEDIC Right     REPAIRED TENDONS ON RIGHT HAND AFTER FALL    HX SHOULDER REPLACEMENT Right 01/2020    HX SHOULDER REPLACEMENT Left 2022    HX TONSILLECTOMY  CHILDHOOD    HX WISDOM TEETH EXTRACTION      X3    IR INJ FORAMIN EPID LUMB ANES/STER SNGL  03/21/2022    IR INJ SPINE THER SUBST LUM/SAC W IMG  12/19/2022    MI UNLISTED PROCEDURE CARDIAC SURGERY  08/2004    repair of foramen ovale       Family History   Problem Relation Age of Onset    Other Mother         SUARACHNOID HEMORRHAGE-ANEURYSM    Stroke Mother     Cancer Father         PANCREATIC    Anesth Problems Daughter         N/V    No Known Problems Daughter         Social History     Tobacco Use    Smoking status: Never    Smokeless tobacco: Never   Vaping Use    Vaping Use: Never used   Substance Use Topics    Alcohol use: Yes     Alcohol/week: 10.0 standard drinks     Types: 3 Glasses of wine, 7 Cans of beer per week    Drug use: No        Review of Systems   Musculoskeletal:  Positive for back pain and gait problem. All other systems reviewed and are negative. Vitals:  Ht 4' 10\" (1.473 m)   Wt 131 lb (59.4 kg)   LMP 01/01/1986   BMI 27.38 kg/m²    Body mass index is 27.38 kg/m². Ortho Exam       Integumentary  Assessment of Surgical Incision - healing and consistent with normal anticipated wound healing. Neurologic  Sensory  Light Touch - Intact - Globally. Overall Assessment of Muscle Strength and Tone reveals  Lower Extremities - Right Iliopsoas - 5/5. Left Iliopsoas - 5/5. Right Tibialis Anterior - 5/5. Left Tibialis Anterior - 5/5. Right Gastroc-Soleus - 5/5. Left Gastroc-Soleus - 5/5. Right EHL - 5/5. Left EHL - 5/5. General Assessment of Reflexes  Right Ankle - Clonus is not present. Left Ankle - Clonus is not present. Reflexes (Dermatomes)  2/2 Normal - Left Achilles (L5-S2), Left Knee (L2-4), Right Achilles (L5-S2) and Right Knee (L2-4). Musculoskeletal  Global Assessment  Examination of related systems reveals - well-developed, well-nourished, in no acute distress, alert and oriented x 3 and normal coordination. Spine/Ribs/Pelvis  Lumbosacral Spine - Examination of the lumbosacral spine reveals - no known fractures or deformities. Inspection and Palpation - Tenderness - moderate.  Assessment of pain reveals the following findings - The pain is characterized as - moderate. Location - pain refers to lower back bilaterally. Some tenderness on palpation of left posterior buttock and hip region. An electronic signature was used to authenticate this note.   -- Shavon Hernandez MD

## 2023-03-21 NOTE — PATIENT INSTRUCTIONS
Spondylolysis and Spondylolisthesis: Exercises  Introduction  Here are some examples of exercises for you to try. The exercises may be suggested for a condition or for rehabilitation. Start each exercise slowly. Ease off the exercises if you start to have pain. You will be told when to start these exercises and which ones will work best for you. How to do the exercises  Single knee-to-chest  Lie on your back with your knees bent and your feet flat on the floor. You can put a small pillow under your head and neck if it is more comfortable. Bring one knee to your chest, keeping the other foot flat on the floor. Keep your lower back pressed to the floor. Hold for 15 to 30 seconds. Relax, and lower the knee to the starting position. Repeat with the other leg. Repeat 2 to 4 times with each leg. To get more stretch, put your other leg flat on the floor while pulling your knee to your chest.  Double knee-to-chest  Lie on your back with your knees bent and your feet flat on the floor. You can put a small pillow under your head and neck if it is more comfortable. Bring both knees to your chest.  Keep your lower back pressed to the floor. Hold for 15 to 30 seconds. Relax, and lower your knees to the starting position. Repeat 2 to 4 times. Alternate arm and leg (bird dog) exercise  Do this exercise slowly. Try to keep your body straight at all times. Start on the floor, on your hands and knees. Tighten your belly muscles by pulling your belly button in toward your spine. Be sure you continue to breathe normally and do not hold your breath. Raise one arm off the floor, and hold it straight out in front of you. Be careful not to let your shoulder drop down, because that will twist your trunk. Hold for about 6 seconds, then lower your arm and switch to your other arm. Repeat 8 to 12 times on each arm. When you can do this exercise with ease and no pain, repeat steps 1 through 5.  But this time do it with one leg raised off the floor, holding your leg straight out behind you. Be careful not to let your hip drop down, because that will twist your trunk. When holding your leg straight out becomes easier, try raising your opposite arm at the same time, and repeat steps 1 through 5. Bridging  Lie on your back with both knees bent. Your knees should be bent about 90 degrees. Then push your feet into the floor, squeeze your buttocks, and lift your hips off the floor until your shoulders, hips, and knees are all in a straight line. Hold for about 6 seconds as you continue to breathe normally, and then slowly lower your hips back down to the floor and rest for up to 10 seconds. Repeat 8 to 12 times. Curl-ups  Lie on the floor on your back with your knees bent at a 90-degree angle. Your feet should be flat on the floor, about 12 inches from your buttocks. Cross your arms over your chest. If this bothers your neck, try putting your hands behind your neck (not your head), with your elbows spread apart. Slowly tighten your belly muscles and raise your shoulder blades off the floor. Keep your head in line with your body, and do not press your chin to your chest.  Hold this position for 1 or 2 seconds, then slowly lower yourself back down to the floor. Repeat 8 to 12 times. Plank  Do this exercise slowly. Try to keep your body straight at all times, and do not let one hip drop lower than the other. Lie on your stomach, resting your upper body on your forearms. Tighten your belly muscles by pulling your belly button in toward your spine. Keeping your knees on the floor, press down with your forearms to lift your upper body off the floor. Hold for about 6 seconds, then lower your body to the floor. Rest for up to 10 seconds. Repeat 8 to 12 times. Over time, work up to holding for 15 to 30 seconds each time.   If this exercise is easy to do with your knees on the floor, try doing this exercise with your knees and legs straight, supported by your toes on the floor. Follow-up care is a key part of your treatment and safety. Be sure to make and go to all appointments, and call your doctor if you are having problems. It's also a good idea to know your test results and keep a list of the medicines you take. Current as of: March 9, 2022               Content Version: 13.4  © 2006-2022 Healthwise, [employer]. Care instructions adapted under license by [employer] (which disclaims liability or warranty for this information). If you have questions about a medical condition or this instruction, always ask your healthcare professional. [place] [address] any warranty or liability for your use of this information.

## 2023-03-21 NOTE — PROGRESS NOTES
1. Have you been to the ER, urgent care clinic since your last visit? Hospitalized since your last visit? No    2. Have you seen or consulted any other health care providers outside of the [address] [place] [address] since your last visit? Include any pap smears or colon screening.  No    Chief Complaint   Patient presents with    Back Pain     EOS 2/17/23, S/P T10/L5 Fusion 9/15/21

## 2023-03-23 RX ORDER — MELOXICAM 7.5 MG/1
TABLET ORAL
Qty: 90 TABLET | Refills: 1 | Status: SHIPPED | OUTPATIENT
Start: 2023-03-23

## 2023-04-23 RX ORDER — LEVOCETIRIZINE DIHYDROCHLORIDE 5 MG/1
TABLET, FILM COATED ORAL
Qty: 90 TABLET | Refills: 3 | Status: SHIPPED | OUTPATIENT
Start: 2023-04-23

## 2023-04-23 RX ORDER — AMLODIPINE BESYLATE 5 MG/1
TABLET ORAL
Qty: 90 TABLET | Refills: 3 | Status: SHIPPED | OUTPATIENT
Start: 2023-04-23

## 2023-05-01 PROBLEM — F11.99 OPIOID USE, UNSPECIFIED WITH UNSPECIFIED OPIOID-INDUCED DISORDER (HCC): Status: RESOLVED | Noted: 2022-10-18 | Resolved: 2023-05-01

## 2023-05-01 NOTE — PROGRESS NOTES
Subjective:     Juju Langston is a 81 y.o. female who presents for follow up of hypertension and hyperlipidemia. Diet and Lifestyle: generally follows a low sodium diet  Home BP Monitoring: is well controlled at home    Cardiovascular ROS: taking medications as instructed, no medication side effects noted, no TIA's, no chest pain on exertion, notes stable dyspnea on exertion, no change, no swelling of ankles, no orthostatic dizziness or lightheadedness, no palpitations. New concerns:   Lumbar spinal stenosis T10-L5 in 2021. Still with pain. Has been seeing Dr. Jennifer Hernandez who has offered injections. First set helped. 2nd set has not helped. Walking with cane. Currently on gabapentin 1 am and 2 qhs and meloxicam.  Can't cook for long. Echo kyleot doing well with end stage COPD. Continued diarrhea. Seeing Dr. Maryann Patterson PA/NP. Had labs and stool studies. Celiac negative. Taking metamucil bid for 1 week which has helped. Mono [place]aker in yard, skin tear left shin. Seeing wound center. Area is healing well. Was wearing compression hose left leg but no longer. Current Outpatient Medications   Medication Sig Dispense Refill    cholestyramine-aspartame (QUESTRAN LIGHT) 4 gram packet TAKE 1 PACKET DAILY BY MOUTH      mupirocin (BACTROBAN) 2 % ointment APPLY OINTMENT THINLY THREE TIMES DAILY TO WOUND ON LEG      levocetirizine (XYZAL) 5 mg tablet TAKE 1 TABLET DAILY 90 Tablet 3    amLODIPine (NORVASC) 5 mg tablet TAKE 1 TABLET DAILY 90 Tablet 3    meloxicam (MOBIC) 7.5 mg tablet TAKE 1 TABLET DAILY 90 Tablet 1    PARoxetine (PAXIL) 30 mg tablet TAKE 1 TABLET DAILY 90 Tablet 3    LORazepam (ATIVAN) 1 mg tablet TAKE 1/2 TO 1 TABLET       NIGHTLY AS NEEDED FOR      ANXIETY.  MAXIMUM DAILY     AMOUNT: 1 TABLET 90 Tablet 1    gabapentin (NEURONTIN) 300 mg capsule Take 1 cap po in am, 1 cap po midday and 2 cap po qhs. 360 Capsule 1    losartan (COZAAR) 100 mg tablet TAKE 1 TABLET DAILY 90 Tablet 3 cholecalciferol (VITAMIN D3) (1000 Units /25 mcg) tablet Take  by mouth daily. simvastatin (ZOCOR) 20 mg tablet TAKE 1 TABLET DAILY 90 Tablet 3    estradioL (ESTRACE) 0.01 % (0.1 mg/gram) vaginal cream APPLY 1 A SMALL AMOUNT INTO VAGINA THREE TIMES A WEEK , PEA-SIZED AMOUNT FINGERTIP APPLICATION      acetaminophen (TYLENOL) 500 mg tablet Take  by mouth every six (6) hours as needed for Pain. MULTIVITAMIN W-MINERALS/LUTEIN (CENTRUM SILVER PO) Take 1 Tab by mouth. Takes one po daily. Lab Results   Component Value Date/Time    Hemoglobin A1c 5.5 08/25/2022 03:50 PM    Hemoglobin A1c 5.2 08/31/2021 09:18 AM    Hemoglobin A1c 5.6 01/10/2020 02:26 PM    Glucose 124 (H) 09/29/2022 10:39 AM    Glucose (POC) 91 09/01/2022 06:50 AM    LDL, calculated 113.2 (H) 11/04/2022 10:28 AM    Creatinine (POC) 1.0 09/29/2020 10:40 AM    Creatinine 1.03 (H) 09/29/2022 10:39 AM      Lab Results   Component Value Date/Time    Cholesterol, total 219 (H) 11/04/2022 10:28 AM    HDL Cholesterol 87 11/04/2022 10:28 AM    LDL, calculated 113.2 (H) 11/04/2022 10:28 AM    Triglyceride 94 11/04/2022 10:28 AM    CHOL/HDL Ratio 2.5 11/04/2022 10:28 AM     Lab Results   Component Value Date/Time    ALT (SGPT) 23 09/29/2022 10:39 AM    Alk.  phosphatase 155 (H) 09/29/2022 10:39 AM    Bilirubin, direct 0.2 03/30/2020 08:46 AM    Bilirubin, total 0.6 09/29/2022 10:39 AM    Albumin 4.4 09/29/2022 10:39 AM    Protein, total 8.0 09/29/2022 10:39 AM    INR 1.0 08/25/2022 03:50 PM    Prothrombin time 10.1 08/25/2022 03:50 PM    PLATELET 011 74/86/1353 10:39 AM       Lab Results   Component Value Date/Time    GFR est non-AA 51 (L) 09/29/2022 10:39 AM    GFRNA, POC 53 (L) 09/29/2020 10:40 AM    GFR est AA >60 09/29/2022 10:39 AM    GFRAA, POC >60 09/29/2020 10:40 AM    Creatinine 1.03 (H) 09/29/2022 10:39 AM    Creatinine (POC) 1.0 09/29/2020 10:40 AM    BUN 17 09/29/2022 10:39 AM    Sodium 132 (L) 09/29/2022 10:39 AM    Potassium 4.0 09/29/2022 10:39 AM    Chloride 99 09/29/2022 10:39 AM    CO2 26 09/29/2022 10:39 AM    Magnesium 1.9 09/29/2021 10:45 AM    Phosphorus 2.6 09/19/2021 05:06 AM        Review of Systems, additional:  Pertinent items are noted in HPI. Objective:     Visit Vitals  /70 (BP 1 Location: Left upper arm, BP Patient Position: Sitting, BP Cuff Size: Adult)   Pulse 79   Temp 98.1 °F (36.7 °C) (Temporal)   Resp 20   Ht 5' (1.524 m)   Wt 133 lb 3.2 oz (60.4 kg)   LMP 01/01/1986   SpO2 97%   BMI 26.01 kg/m²     Appearance: alert, well appearing, and in no distress and oriented to person, place, and time. General exam:   . NECK: supple, no lad, no bruit, no tm  LUNGS: cta bilat  CV rrr, no m/g/r  ABD: soft, nt, nd, nabs  EXT: no c/c/e       Assessment/Plan:       Diagnoses and all orders for this visit:    1. Essential hypertension - well controlled, cont Losartan 100mg every day and amlodipine 5mg every day. -     METABOLIC PANEL, COMPREHENSIVE; Future  -     LIPID PANEL; Future    2. Pure hypercholesterolemia - well controlled. Continue simvastatin 20mg every day. Repeat labs  -     METABOLIC PANEL, COMPREHENSIVE; Future  -     LIPID PANEL; Future    3. Dilatation of thoracic aorta ([facility] [address] - followed by cards    4. Stage 3a chronic kidney disease ([facility] [address] - controlled, cannot tolerate back if not on Meloxicam.  Theriot kidney risk to continue. Repeat labs. 5. Lumbar stenosis with neurogenic claudication - continued pain, now walking with cane. Followed by Dr. Chava Maier . Continue gabapentin and meloxicam.     6. Vitamin D deficiency - cont supplements. Repeat labs. -     VITAMIN D, 25 HYDROXY; Future    7. Medicare annual wellness visit, subsequent      Follow-up and Dispositions    Return in about 6 months (around 11/2/2023) for htn, hyperlipidemia.          This is the Subsequent Medicare Annual Wellness Exam, performed 12 months or more after the Initial AWV or the last Subsequent AWV    I have reviewed the patient's medical history in detail and updated the computerized patient record. Assessment/Plan   Education and counseling provided:  Are appropriate based on today's review and evaluation    1. Essential hypertension  2. Pure hypercholesterolemia  3. Dilatation of thoracic aorta (HCC)  4. Stage 3a chronic kidney disease ([address]  5. Lumbar stenosis with neurogenic claudication       Depression Risk Factor Screening     3 most recent PHQ Screens 5/2/2023   Little interest or pleasure in doing things Not at all   Feeling down, depressed, irritable, or hopeless Not at all   Total Score PHQ 2 0       Alcohol & Drug Abuse Risk Screen    Do you average more than 1 drink per night or more than 7 drinks a week:  No    On any one occasion in the past three months have you have had more than 3 drinks containing alcohol:  No          Functional Ability and Level of Safety    Hearing: Hearing is good. Activities of Daily Living: The home contains: handrails and grab bars  Patient needs help with:  preparing meals      Ambulation: with difficulty, uses a cane     Fall Risk:  Fall Risk Assessment, last 12 mths 11/2/2022   Able to walk? Yes   Fall in past 12 months? 0   Do you feel unsteady? 0   Are you worried about falling 0   Is the gait abnormal? -   Number of falls in past 12 months -   Fall with injury?  -      Abuse Screen:  Patient is not abused       Cognitive Screening    Has your family/caregiver stated any concerns about your memory: no     Cognitive Screening: Normal -      Health Maintenance Due     Health Maintenance Due   Topic Date Due    Medicare Yearly Exam  05/02/2024       Patient Care Team   Patient Care Team:  Jacqui Street MD as PCP - General (Internal Medicine Physician)  Jacqui Street MD as PCP - [facility] Empaneled Provider  Shannan Bentley MD (Gastroenterology)  Ck Higgins MD (Ophthalmology)  Carlin Richards MD (Otolaryngology)  Hoda Bazan MD as Physician (Hand Surgery Physician)    History     Patient Active Problem List   Diagnosis Code    HTN (hypertension) I10    Hyperlipemia E78.5    Rectal bleeding K62.5    Environmental allergies Z91.09    Hyponatremia E87.1    Hypokalemia E87.6    Sleep disturbance G47.9    Diverticulitis K57.92    Colitis K52.9    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    Anxiety F41.9    CRI (chronic renal insufficiency) N18.9    Advance directive placed in chart this admission Z78.9    Advance directive in chart Z78.9    History of YAG laser capsulotomy of lens of left eye Z98.42    Posterior capsular opacification visually significant of right eye H26.491    Posterior vitreous detachment of left eye H43.812    Pseudophakia of both eyes Z96.1    Rotator cuff arthropathy M12.819    Lumbar stenosis with neurogenic claudication M48.062    Dilatation of thoracic aorta (HCC) I77.810    Chronic renal disease, stage III N18.30    Left rotator cuff tear arthropathy M75.102, M12.812     Past Medical History:   Diagnosis Date    Anxiety     Arthritis back pain    fingers    Asthma     MILD - NO INHALERS    Chronic pain     Colitis     Diverticulitis 06/11/2013    Hypercholesterolemia     Hypertension     IBS (irritable bowel syndrome)     Ill-defined condition     H/O UTI POST ISLAS CATH    Insomnia     Nausea & vomiting     Stenosis     spinal    Stroke ([facility] [address] 1996    [address]    Swollen L ankle       Past Surgical History:   Procedure Laterality Date    HX BACK SURGERY  2021    HX CATARACT REMOVAL Bilateral 2009    HX CHOLECYSTECTOMY  04/1997    HX COLONOSCOPY      x3    HX HYSTERECTOMY  1986    HX KNEE ARTHROSCOPY Left 04/1998    HX LUMBAR FUSION  02/2011    L4-L5    HX ORTHOPAEDIC Right     REPAIRED TENDONS ON RIGHT HAND AFTER FALL    HX SHOULDER REPLACEMENT Right 01/2020    HX SHOULDER REPLACEMENT Left 2022    HX TONSILLECTOMY  CHILDHOOD    HX WISDOM TEETH EXTRACTION      X3    IR INJ FORAMIN EPID LUMB ANES/STER SNGL 03/21/2022    IR INJ SPINE THER SUBST LUM/SAC W IMG  12/19/2022    IR INJ SPINE THER SUBST LUM/SAC W IMG  4/11/2023    CT UNLISTED PROCEDURE CARDIAC SURGERY  08/2004    repair of foramen ovale     Current Outpatient Medications   Medication Sig Dispense Refill    cholestyramine-aspartame (QUESTRAN LIGHT) 4 gram packet TAKE 1 PACKET DAILY BY MOUTH      mupirocin (BACTROBAN) 2 % ointment APPLY OINTMENT THINLY THREE TIMES DAILY TO WOUND ON LEG      levocetirizine (XYZAL) 5 mg tablet TAKE 1 TABLET DAILY 90 Tablet 3    amLODIPine (NORVASC) 5 mg tablet TAKE 1 TABLET DAILY 90 Tablet 3    meloxicam (MOBIC) 7.5 mg tablet TAKE 1 TABLET DAILY 90 Tablet 1    PARoxetine (PAXIL) 30 mg tablet TAKE 1 TABLET DAILY 90 Tablet 3    LORazepam (ATIVAN) 1 mg tablet TAKE 1/2 TO 1 TABLET       NIGHTLY AS NEEDED FOR      ANXIETY. MAXIMUM DAILY     AMOUNT: 1 TABLET 90 Tablet 1    gabapentin (NEURONTIN) 300 mg capsule Take 1 cap po in am, 1 cap po midday and 2 cap po qhs. 360 Capsule 1    losartan (COZAAR) 100 mg tablet TAKE 1 TABLET DAILY 90 Tablet 3    cholecalciferol (VITAMIN D3) (1000 Units /25 mcg) tablet Take  by mouth daily. simvastatin (ZOCOR) 20 mg tablet TAKE 1 TABLET DAILY 90 Tablet 3    estradioL (ESTRACE) 0.01 % (0.1 mg/gram) vaginal cream APPLY 1 A SMALL AMOUNT INTO VAGINA THREE TIMES A WEEK , PEA-SIZED AMOUNT FINGERTIP APPLICATION      acetaminophen (TYLENOL) 500 mg tablet Take  by mouth every six (6) hours as needed for Pain. MULTIVITAMIN W-MINERALS/LUTEIN (CENTRUM SILVER PO) Take 1 Tab by mouth. Takes one po daily.         Allergies   Allergen Reactions    Adhesive Other (comments)    Adhesive Tape-Silicones Other (comments)    Antihistamines - Alkylamine Other (comments)    Cefdinir Other (comments)     Sick to stomach    Ciprofloxacin Diarrhea and Nausea and Vomiting    Flagyl [Metronidazole] Nausea and Vomiting    Other Medication Other (comments)     BANDAIDS - breaks out skin    Oxycodone-Acetaminophen Other (comments)     dizziness    Phenylephrine Other (comments)     Wire her up    Phenylpropanolamine Other (comments)     Wire her up    Sulfa (Sulfonamide Antibiotics) Nausea Only       Family History   Problem Relation Age of Onset    Other Mother         SUARACHNOID HEMORRHAGE-ANEURYSM    Stroke Mother     Cancer Father         PANCREATIC    Anesth Problems Daughter         N/V    No Known Problems Daughter      Social History     Tobacco Use    Smoking status: Never    Smokeless tobacco: Never   Substance Use Topics    Alcohol use:  Yes     Alcohol/week: 10.0 standard drinks     Types: 3 Glasses of wine, 7 Cans of beer per week         Maggie Flores MD

## 2023-05-02 ENCOUNTER — OFFICE VISIT (OUTPATIENT)
Dept: INTERNAL MEDICINE CLINIC | Age: 88
End: 2023-05-02
Payer: MEDICARE

## 2023-05-02 VITALS
OXYGEN SATURATION: 97 % | HEIGHT: 60 IN | HEART RATE: 79 BPM | TEMPERATURE: 98.1 F | WEIGHT: 133.2 LBS | DIASTOLIC BLOOD PRESSURE: 70 MMHG | BODY MASS INDEX: 26.15 KG/M2 | SYSTOLIC BLOOD PRESSURE: 111 MMHG | RESPIRATION RATE: 20 BRPM

## 2023-05-02 DIAGNOSIS — Z00.00 MEDICARE ANNUAL WELLNESS VISIT, SUBSEQUENT: ICD-10-CM

## 2023-05-02 DIAGNOSIS — E78.00 PURE HYPERCHOLESTEROLEMIA: ICD-10-CM

## 2023-05-02 DIAGNOSIS — N18.31 STAGE 3A CHRONIC KIDNEY DISEASE (HCC): ICD-10-CM

## 2023-05-02 DIAGNOSIS — I10 ESSENTIAL HYPERTENSION: Primary | ICD-10-CM

## 2023-05-02 DIAGNOSIS — I77.810 DILATATION OF THORACIC AORTA (HCC): ICD-10-CM

## 2023-05-02 DIAGNOSIS — M48.062 LUMBAR STENOSIS WITH NEUROGENIC CLAUDICATION: ICD-10-CM

## 2023-05-02 DIAGNOSIS — E55.9 VITAMIN D DEFICIENCY: ICD-10-CM

## 2023-05-02 PROCEDURE — G8510 SCR DEP NEG, NO PLAN REQD: HCPCS | Performed by: INTERNAL MEDICINE

## 2023-05-02 PROCEDURE — 99214 OFFICE O/P EST MOD 30 MIN: CPT | Performed by: INTERNAL MEDICINE

## 2023-05-02 PROCEDURE — G8417 CALC BMI ABV UP PARAM F/U: HCPCS | Performed by: INTERNAL MEDICINE

## 2023-05-02 PROCEDURE — G8427 DOCREV CUR MEDS BY ELIG CLIN: HCPCS | Performed by: INTERNAL MEDICINE

## 2023-05-02 PROCEDURE — G8536 NO DOC ELDER MAL SCRN: HCPCS | Performed by: INTERNAL MEDICINE

## 2023-05-02 PROCEDURE — 1101F PT FALLS ASSESS-DOCD LE1/YR: CPT | Performed by: INTERNAL MEDICINE

## 2023-05-02 PROCEDURE — G0439 PPPS, SUBSEQ VISIT: HCPCS | Performed by: INTERNAL MEDICINE

## 2023-05-02 PROCEDURE — 1090F PRES/ABSN URINE INCON ASSESS: CPT | Performed by: INTERNAL MEDICINE

## 2023-05-02 PROCEDURE — G0463 HOSPITAL OUTPT CLINIC VISIT: HCPCS | Performed by: INTERNAL MEDICINE

## 2023-05-02 RX ORDER — CIPROFLOXACIN 500 MG/1
500 TABLET ORAL 2 TIMES DAILY
COMMUNITY
Start: 2023-04-02 | End: 2023-05-02 | Stop reason: ALTCHOICE

## 2023-05-02 RX ORDER — MUPIROCIN 20 MG/G
OINTMENT TOPICAL
COMMUNITY
Start: 2023-03-06

## 2023-05-02 RX ORDER — CHOLESTYRAMINE 4 G/4.8G
POWDER, FOR SUSPENSION ORAL
COMMUNITY
Start: 2023-03-17

## 2023-05-04 DIAGNOSIS — E55.9 VITAMIN D DEFICIENCY: ICD-10-CM

## 2023-05-04 DIAGNOSIS — I10 ESSENTIAL HYPERTENSION: ICD-10-CM

## 2023-05-04 DIAGNOSIS — E78.00 PURE HYPERCHOLESTEROLEMIA: ICD-10-CM

## 2023-05-05 LAB
25(OH)D3 SERPL-MCNC: 25.5 NG/ML (ref 30–100)
ALBUMIN SERPL-MCNC: 3.6 G/DL (ref 3.5–5)
ALBUMIN/GLOB SERPL: 1.3 (ref 1.1–2.2)
ALP SERPL-CCNC: 100 U/L (ref 45–117)
ALT SERPL-CCNC: 20 U/L (ref 12–78)
ANION GAP SERPL CALC-SCNC: 3 MMOL/L (ref 5–15)
AST SERPL-CCNC: 18 U/L (ref 15–37)
BILIRUB SERPL-MCNC: 0.8 MG/DL (ref 0.2–1)
BUN SERPL-MCNC: 12 MG/DL (ref 6–20)
BUN/CREAT SERPL: 14 (ref 12–20)
CALCIUM SERPL-MCNC: 8.9 MG/DL (ref 8.5–10.1)
CHLORIDE SERPL-SCNC: 106 MMOL/L (ref 97–108)
CHOLEST SERPL-MCNC: 175 MG/DL
CO2 SERPL-SCNC: 30 MMOL/L (ref 21–32)
CREAT SERPL-MCNC: 0.87 MG/DL (ref 0.55–1.02)
GLOBULIN SER CALC-MCNC: 2.8 G/DL (ref 2–4)
GLUCOSE SERPL-MCNC: 96 MG/DL (ref 65–100)
HDLC SERPL-MCNC: 86 MG/DL
HDLC SERPL: 2 (ref 0–5)
LDLC SERPL CALC-MCNC: 75 MG/DL (ref 0–100)
POTASSIUM SERPL-SCNC: 4.1 MMOL/L (ref 3.5–5.1)
PROT SERPL-MCNC: 6.4 G/DL (ref 6.4–8.2)
SODIUM SERPL-SCNC: 139 MMOL/L (ref 136–145)
TRIGL SERPL-MCNC: 70 MG/DL (ref ?–150)
VLDLC SERPL CALC-MCNC: 14 MG/DL

## 2023-05-24 RX ORDER — LORAZEPAM 1 MG/1
TABLET ORAL
Qty: 90 TABLET | Refills: 1 | OUTPATIENT
Start: 2023-05-24

## 2023-06-08 DIAGNOSIS — F41.9 ANXIETY: Primary | ICD-10-CM

## 2023-06-09 RX ORDER — LORAZEPAM 1 MG/1
TABLET ORAL
Qty: 90 TABLET | Refills: 1 | Status: SHIPPED | OUTPATIENT
Start: 2023-06-09 | End: 2023-12-06

## 2023-06-10 ENCOUNTER — TELEPHONE (OUTPATIENT)
Age: 88
End: 2023-06-10

## 2023-06-10 NOTE — TELEPHONE ENCOUNTER
Patient called on call. Gave number to [facility] requesting Ativan pills for several days. Prescription sent to Mail order. Filled Prescription  Ativan 1 mg for 4 pills. Pt has apt with PCP in 3 days.

## 2023-06-20 RX ORDER — SIMVASTATIN 20 MG
TABLET ORAL
Qty: 90 TABLET | Refills: 3 | Status: SHIPPED | OUTPATIENT
Start: 2023-06-20

## 2023-06-23 ENCOUNTER — HOSPITAL ENCOUNTER (OUTPATIENT)
Facility: HOSPITAL | Age: 88
Discharge: HOME | End: 2023-06-23
Attending: STUDENT IN AN ORGANIZED HEALTH CARE EDUCATION/TRAINING PROGRAM | Admitting: STUDENT IN AN ORGANIZED HEALTH CARE EDUCATION/TRAINING PROGRAM
Payer: MEDICARE

## 2023-06-23 VITALS
SYSTOLIC BLOOD PRESSURE: 151 MMHG | HEART RATE: 84 BPM | TEMPERATURE: 97.7 F | DIASTOLIC BLOOD PRESSURE: 105 MMHG | RESPIRATION RATE: 16 BRPM

## 2023-06-23 DIAGNOSIS — M43.16 SPONDYLOLISTHESIS OF LUMBAR REGION: ICD-10-CM

## 2023-06-23 DIAGNOSIS — M43.16 SPONDYLOLISTHESIS, LUMBAR REGION: ICD-10-CM

## 2023-06-23 DIAGNOSIS — M54.50 LOW BACK PAIN, UNSPECIFIED BACK PAIN LATERALITY, UNSPECIFIED CHRONICITY, UNSPECIFIED WHETHER SCIATICA PRESENT: ICD-10-CM

## 2023-06-23 DIAGNOSIS — M54.50 CHRONIC BILATERAL LOW BACK PAIN WITHOUT SCIATICA: ICD-10-CM

## 2023-06-23 DIAGNOSIS — M48.062 SPINAL STENOSIS, LUMBAR REGION WITH NEUROGENIC CLAUDICATION: ICD-10-CM

## 2023-06-23 DIAGNOSIS — M48.062 PSEUDOCLAUDICATION SYNDROME: ICD-10-CM

## 2023-06-23 DIAGNOSIS — G89.29 CHRONIC BILATERAL LOW BACK PAIN WITHOUT SCIATICA: ICD-10-CM

## 2023-06-23 PROCEDURE — 2709999900 IR INJ EPIDURAL LUMBAR SACRAL WO IMG

## 2023-06-23 PROCEDURE — 6360000002 HC RX W HCPCS: Performed by: STUDENT IN AN ORGANIZED HEALTH CARE EDUCATION/TRAINING PROGRAM

## 2023-06-23 PROCEDURE — 6360000004 HC RX CONTRAST MEDICATION: Performed by: STUDENT IN AN ORGANIZED HEALTH CARE EDUCATION/TRAINING PROGRAM

## 2023-06-23 PROCEDURE — 62323 NJX INTERLAMINAR LMBR/SAC: CPT

## 2023-06-23 PROCEDURE — 2500000003 HC RX 250 WO HCPCS: Performed by: STUDENT IN AN ORGANIZED HEALTH CARE EDUCATION/TRAINING PROGRAM

## 2023-06-23 RX ORDER — DEXAMETHASONE SODIUM PHOSPHATE 10 MG/ML
INJECTION, SOLUTION INTRAMUSCULAR; INTRAVENOUS PRN
Status: COMPLETED | OUTPATIENT
Start: 2023-06-23 | End: 2023-06-23

## 2023-06-23 RX ORDER — LIDOCAINE HYDROCHLORIDE 10 MG/ML
INJECTION, SOLUTION EPIDURAL; INFILTRATION; INTRACAUDAL; PERINEURAL PRN
Status: COMPLETED | OUTPATIENT
Start: 2023-06-23 | End: 2023-06-23

## 2023-06-23 RX ADMIN — LIDOCAINE HYDROCHLORIDE 8 ML: 10 INJECTION, SOLUTION EPIDURAL; INFILTRATION; INTRACAUDAL; PERINEURAL at 14:01

## 2023-06-23 RX ADMIN — IOHEXOL 5 ML: 180 INJECTION INTRAVENOUS at 14:02

## 2023-06-23 RX ADMIN — DEXAMETHASONE SODIUM PHOSPHATE 15 MG: 10 INJECTION, SOLUTION INTRAMUSCULAR; INTRAVENOUS at 14:02

## 2023-06-23 NOTE — DISCHARGE INSTRUCTIONS
Side effects of sedation medications and other medications used today have been reviewed. Notify us of nausea, itching, hives, dizziness, or anything else out of the ordinary. Should you experience any of these significant changes, please call [phone] between the hours of 7:30 am and 10 pm or [phone] after hours.  After hours, ask the  to page the [address] Technologist, and describe the problem to the technologist.

## 2023-06-30 ENCOUNTER — TELEPHONE (OUTPATIENT)
Age: 88
End: 2023-06-30

## 2023-07-03 DIAGNOSIS — M48.062 SPINAL STENOSIS, LUMBAR REGION WITH NEUROGENIC CLAUDICATION: Primary | ICD-10-CM

## 2023-07-05 RX ORDER — GABAPENTIN 300 MG/1
CAPSULE ORAL
Qty: 120 CAPSULE | Refills: 5 | Status: SHIPPED | OUTPATIENT
Start: 2023-07-05 | End: 2023-12-05

## 2023-08-03 ENCOUNTER — TRANSCRIBE ORDERS (OUTPATIENT)
Facility: HOSPITAL | Age: 88
End: 2023-08-03

## 2023-08-03 DIAGNOSIS — Z12.31 VISIT FOR SCREENING MAMMOGRAM: Primary | ICD-10-CM

## 2023-08-21 RX ORDER — MELOXICAM 7.5 MG/1
TABLET ORAL
Qty: 90 TABLET | Refills: 1 | Status: SHIPPED | OUTPATIENT
Start: 2023-08-21

## 2023-08-22 ENCOUNTER — HOSPITAL ENCOUNTER (OUTPATIENT)
Facility: HOSPITAL | Age: 88
Discharge: HOME | End: 2023-08-25
Attending: INTERNAL MEDICINE
Payer: MEDICARE

## 2023-08-22 VITALS — WEIGHT: 130 LBS | HEIGHT: 58 IN | BODY MASS INDEX: 27.29 KG/M2

## 2023-08-22 DIAGNOSIS — Z12.31 VISIT FOR SCREENING MAMMOGRAM: ICD-10-CM

## 2023-08-22 PROCEDURE — 77067 SCR MAMMO BI INCL CAD: CPT

## 2023-09-26 ENCOUNTER — TELEPHONE (OUTPATIENT)
Age: 88
End: 2023-09-26

## 2023-09-26 NOTE — TELEPHONE ENCOUNTER
Identified patient 2 identifiers verified. Patient accepted an appointment for Sept 27, 23 ,9:00 am  with Dr. Moises Chavez for leg swelling.

## 2023-09-26 NOTE — TELEPHONE ENCOUNTER
Patient was seen  at   [facility] with Rodo Betancur and  was told to be seen by PCP as soon as possible  because of swelling in both legs and aching. Patient  requesting a short visit today or WED 9/27/23.  Patient wants a call back at [phone]

## 2023-09-27 ENCOUNTER — OFFICE VISIT (OUTPATIENT)
Age: 88
End: 2023-09-27
Payer: MEDICARE

## 2023-09-27 VITALS
WEIGHT: 137 LBS | RESPIRATION RATE: 16 BRPM | SYSTOLIC BLOOD PRESSURE: 135 MMHG | DIASTOLIC BLOOD PRESSURE: 82 MMHG | HEART RATE: 89 BPM | TEMPERATURE: 97.7 F | BODY MASS INDEX: 28.76 KG/M2 | HEIGHT: 58 IN

## 2023-09-27 DIAGNOSIS — R06.09 DYSPNEA ON EXERTION: ICD-10-CM

## 2023-09-27 DIAGNOSIS — R60.0 BILATERAL LEG EDEMA: Primary | ICD-10-CM

## 2023-09-27 DIAGNOSIS — M48.062 SPINAL STENOSIS, LUMBAR REGION WITH NEUROGENIC CLAUDICATION: ICD-10-CM

## 2023-09-27 DIAGNOSIS — I10 ESSENTIAL (PRIMARY) HYPERTENSION: ICD-10-CM

## 2023-09-27 LAB
COMMENT:: NORMAL
SPECIMEN HOLD: NORMAL

## 2023-09-27 PROCEDURE — 1036F TOBACCO NON-USER: CPT | Performed by: INTERNAL MEDICINE

## 2023-09-27 PROCEDURE — 99214 OFFICE O/P EST MOD 30 MIN: CPT | Performed by: INTERNAL MEDICINE

## 2023-09-27 PROCEDURE — 1123F ACP DISCUSS/DSCN MKR DOCD: CPT | Performed by: INTERNAL MEDICINE

## 2023-09-27 PROCEDURE — G8419 CALC BMI OUT NRM PARAM NOF/U: HCPCS | Performed by: INTERNAL MEDICINE

## 2023-09-27 PROCEDURE — G8427 DOCREV CUR MEDS BY ELIG CLIN: HCPCS | Performed by: INTERNAL MEDICINE

## 2023-09-27 PROCEDURE — 1090F PRES/ABSN URINE INCON ASSESS: CPT | Performed by: INTERNAL MEDICINE

## 2023-09-27 RX ORDER — BUDESONIDE 9 MG/1
9 TABLET, FILM COATED, EXTENDED RELEASE ORAL PRN
COMMUNITY

## 2023-09-27 RX ORDER — MULTIVIT-MIN/FA/LYCOPEN/LUTEIN .4-300-25
1 TABLET ORAL
COMMUNITY

## 2023-09-27 SDOH — ECONOMIC STABILITY: FOOD INSECURITY: WITHIN THE PAST 12 MONTHS, YOU WORRIED THAT YOUR FOOD WOULD RUN OUT BEFORE YOU GOT MONEY TO BUY MORE.: NEVER TRUE

## 2023-09-27 SDOH — ECONOMIC STABILITY: FOOD INSECURITY: WITHIN THE PAST 12 MONTHS, THE FOOD YOU BOUGHT JUST DIDN'T LAST AND YOU DIDN'T HAVE MONEY TO GET MORE.: NEVER TRUE

## 2023-09-27 SDOH — ECONOMIC STABILITY: HOUSING INSECURITY
IN THE LAST 12 MONTHS, WAS THERE A TIME WHEN YOU DID NOT HAVE A STEADY PLACE TO SLEEP OR SLEPT IN A SHELTER (INCLUDING NOW)?: NO

## 2023-09-27 SDOH — ECONOMIC STABILITY: INCOME INSECURITY: HOW HARD IS IT FOR YOU TO PAY FOR THE VERY BASICS LIKE FOOD, HOUSING, MEDICAL CARE, AND HEATING?: NOT HARD AT ALL

## 2023-09-28 LAB
ANION GAP SERPL CALC-SCNC: 7 MMOL/L (ref 5–15)
BUN SERPL-MCNC: 17 MG/DL (ref 6–20)
BUN/CREAT SERPL: 17 (ref 12–20)
CALCIUM SERPL-MCNC: 10.3 MG/DL (ref 8.5–10.1)
CHLORIDE SERPL-SCNC: 99 MMOL/L (ref 97–108)
CO2 SERPL-SCNC: 30 MMOL/L (ref 21–32)
CREAT SERPL-MCNC: 1.03 MG/DL (ref 0.55–1.02)
GLUCOSE SERPL-MCNC: 90 MG/DL (ref 65–100)
NT PRO BNP: 239 PG/ML
POTASSIUM SERPL-SCNC: 4.3 MMOL/L (ref 3.5–5.1)
SODIUM SERPL-SCNC: 136 MMOL/L (ref 136–145)

## 2023-10-11 ENCOUNTER — OFFICE VISIT (OUTPATIENT)
Age: 88
End: 2023-10-11
Payer: MEDICARE

## 2023-10-11 VITALS
RESPIRATION RATE: 18 BRPM | WEIGHT: 138.2 LBS | HEART RATE: 91 BPM | DIASTOLIC BLOOD PRESSURE: 61 MMHG | OXYGEN SATURATION: 92 % | BODY MASS INDEX: 28.88 KG/M2 | SYSTOLIC BLOOD PRESSURE: 106 MMHG | TEMPERATURE: 97.5 F

## 2023-10-11 DIAGNOSIS — F41.9 ANXIETY: ICD-10-CM

## 2023-10-11 DIAGNOSIS — N18.31 CHRONIC KIDNEY DISEASE, STAGE 3A (HCC): ICD-10-CM

## 2023-10-11 DIAGNOSIS — I10 ESSENTIAL (PRIMARY) HYPERTENSION: Primary | ICD-10-CM

## 2023-10-11 DIAGNOSIS — E78.00 PURE HYPERCHOLESTEROLEMIA, UNSPECIFIED: ICD-10-CM

## 2023-10-11 DIAGNOSIS — E55.9 VITAMIN D DEFICIENCY, UNSPECIFIED: ICD-10-CM

## 2023-10-11 DIAGNOSIS — R60.0 BILATERAL LEG EDEMA: ICD-10-CM

## 2023-10-11 DIAGNOSIS — M48.062 SPINAL STENOSIS, LUMBAR REGION WITH NEUROGENIC CLAUDICATION: ICD-10-CM

## 2023-10-11 PROCEDURE — 1090F PRES/ABSN URINE INCON ASSESS: CPT | Performed by: INTERNAL MEDICINE

## 2023-10-11 PROCEDURE — 1123F ACP DISCUSS/DSCN MKR DOCD: CPT | Performed by: INTERNAL MEDICINE

## 2023-10-11 PROCEDURE — 1036F TOBACCO NON-USER: CPT | Performed by: INTERNAL MEDICINE

## 2023-10-11 PROCEDURE — G8427 DOCREV CUR MEDS BY ELIG CLIN: HCPCS | Performed by: INTERNAL MEDICINE

## 2023-10-11 PROCEDURE — 99214 OFFICE O/P EST MOD 30 MIN: CPT | Performed by: INTERNAL MEDICINE

## 2023-10-11 PROCEDURE — G8484 FLU IMMUNIZE NO ADMIN: HCPCS | Performed by: INTERNAL MEDICINE

## 2023-10-11 PROCEDURE — G8419 CALC BMI OUT NRM PARAM NOF/U: HCPCS | Performed by: INTERNAL MEDICINE

## 2023-10-11 RX ORDER — LOSARTAN POTASSIUM 100 MG/1
100 TABLET ORAL DAILY
Qty: 90 TABLET | Refills: 3 | Status: SHIPPED | OUTPATIENT
Start: 2023-10-11

## 2023-10-11 RX ORDER — LORAZEPAM 1 MG/1
TABLET ORAL
Qty: 90 TABLET | Refills: 1 | Status: SHIPPED | OUTPATIENT
Start: 2023-10-11 | End: 2024-03-11

## 2023-10-11 NOTE — PROGRESS NOTES
/'Subjective:     Yvrose Griffith is a 81 y.o. female who presents for follow up of hypertension and hyperlipidemia. Diet and Lifestyle: limiting salt and increasing water. Home BP Monitoring: not measured. Cardiovascular ROS: taking medications as instructed, no medication side effects noted, no TIA's, no chest pain on exertion, no dyspnea on exertion, noting swelling of ankles, no orthostatic dizziness or lightheadedness, no orthopnea or paroxysmal nocturnal dyspnea, no palpitations. New concerns:   le edema. Seen 2 weeks ago. Labs looked good. Had her elvated legs, use compression hose and  her salt intake. Compression hose were \"killing her\" so did not wear. Top of stockings were digging into legs. Knees were swollen bilat  Spinal stenosis. ? Spinal stimulator. Seeing  Dr. Eduard Marion next week. for second opinion. Continues on Gabapentin. Current Outpatient Medications   Medication Sig Dispense Refill    LORazepam (ATIVAN) 1 MG tablet TAKE 1/2 TO 1 TABLET       NIGHTLY AS NEEDED FOR      ANXIETY. MAXIMUM DAILY     AMOUNT: 1 TABLET 90 tablet 1    losartan (COZAAR) 100 MG tablet Take 1 tablet by mouth daily 90 tablet 3    Psyllium (METAMUCIL 4 IN 1 FIBER PO) Take by mouth 1 tsp daily      Multiple Vitamins-Minerals (CENTRUM SILVER ADULT 50+) TABS Take 1 tablet by mouth      Budesonide ER (UCERIS) 9 MG TB24 Take 9 mg by mouth as needed      meloxicam (MOBIC) 7.5 MG tablet TAKE 1 TABLET DAILY 90 tablet 1    gabapentin (NEURONTIN) 300 MG capsule Take 1 cap po in am, 1 cap po midday and 2 cap po qhs.  (Patient taking differently: Take 1 cap po in am,  and 2 cap po qhs.) 120 capsule 5    simvastatin (ZOCOR) 20 MG tablet TAKE 1 TABLET DAILY 90 tablet 3    acetaminophen (TYLENOL) 500 MG tablet Take by mouth every 6 hours as needed      amLODIPine (NORVASC) 5 MG tablet TAKE 1 TABLET DAILY      vitamin D (CHOLECALCIFEROL) 25 MCG (1000 UT) TABS tablet Take by mouth daily      estradiol (ESTRACE) 0.1

## 2023-10-12 ENCOUNTER — TELEPHONE (OUTPATIENT)
Age: 88
End: 2023-10-12

## 2023-10-12 NOTE — TELEPHONE ENCOUNTER
The patient is looking to become a NP with Palliative and Dr. Harp Comment. Her , Mckayla Alvarado is a current patient.   # [phone]

## 2023-10-16 NOTE — TELEPHONE ENCOUNTER
[address] [facility] [address] Outpatient Palliative Medicine Office  Nursing Note  [phone]  Fax [phone]     Name:  Roberto Son  YOB: 1935     Returned call to patient who said that her  Benedict Victoria is a Palliative patient of Dr. Ace Jenkins. Ms. Nikolas Martinez says their PCP Dr. Matt Nielsen is retiring and she would like for Dr. Ace Jenkins to be her PCP. This nurse explained that she will forward this request to Dr. Ace Jenkins and call patient back. Patient voices understanding.     Anni Titus RN, Gerontological Nursing-[facility]  Palliative Medicine  [phone]

## 2023-10-23 ENCOUNTER — TELEPHONE (OUTPATIENT)
Age: 88
End: 2023-10-23

## 2023-10-23 NOTE — TELEPHONE ENCOUNTER
Papular erythematous rash arms and legs. Itches. Using TAC cream  - helps but not going away. Not on trunk. Will continue TAC cream.  Increase Xyzal to bid.

## 2023-10-23 NOTE — TELEPHONE ENCOUNTER
----- Message from Rdorigo Alcocer LPN sent at 69/67/3288  1:19 PM EDT -----  Regarding: FW: Rash  Contact: [phone]    ----- Message -----  From: Tiera Green  Sent: 10/19/2023  10:00 AM EDT  To: Carlotta Guzmán [facility] Clinical Staff  Subject: Rash                                             Pink itchy rash on arms and legs along with edema. Using Triamcinolone Acetonide Cream  USP  0.1% I had.       Quintella Slim

## 2023-10-26 ENCOUNTER — HOSPITAL ENCOUNTER (OUTPATIENT)
Facility: HOSPITAL | Age: 88
Discharge: HOME | End: 2023-10-28
Attending: INTERNAL MEDICINE
Payer: MEDICARE

## 2023-10-26 VITALS — HEIGHT: 58 IN | BODY MASS INDEX: 28.89 KG/M2

## 2023-10-26 DIAGNOSIS — R60.0 BILATERAL LEG EDEMA: ICD-10-CM

## 2023-10-26 PROCEDURE — 93306 TTE W/DOPPLER COMPLETE: CPT

## 2023-10-27 LAB
ECHO AO ASC DIAM: 3.7 CM
ECHO AO ROOT DIAM: 3.7 CM
ECHO AV AREA PEAK VELOCITY: 2.6 CM2
ECHO AV PEAK GRADIENT: 9 MMHG
ECHO AV PEAK VELOCITY: 1.5 M/S
ECHO AV VELOCITY RATIO: 0.8
ECHO LA DIAMETER: 4.1 CM
ECHO LA TO AORTIC ROOT RATIO: 1.11
ECHO LV E' LATERAL VELOCITY: 9 CM/S
ECHO LV E' SEPTAL VELOCITY: 7 CM/S
ECHO LV EDV A4C: 56 ML
ECHO LV EJECTION FRACTION A4C: 67 %
ECHO LV ESV A4C: 19 ML
ECHO LV FRACTIONAL SHORTENING: 34 % (ref 28–44)
ECHO LV INTERNAL DIMENSION DIASTOLIC: 3.8 CM (ref 3.9–5.3)
ECHO LV INTERNAL DIMENSION SYSTOLIC: 2.5 CM
ECHO LV IVSD: 1.1 CM (ref 0.6–0.9)
ECHO LV MASS 2D: 134.7 G (ref 67–162)
ECHO LV POSTERIOR WALL DIASTOLIC: 1.1 CM (ref 0.6–0.9)
ECHO LV RELATIVE WALL THICKNESS RATIO: 0.58
ECHO LVOT AREA: 3.1 CM2
ECHO LVOT DIAM: 2 CM
ECHO LVOT PEAK GRADIENT: 6 MMHG
ECHO LVOT PEAK VELOCITY: 1.2 M/S
ECHO MV A VELOCITY: 0.98 M/S
ECHO MV E DECELERATION TIME (DT): 201.9 MS
ECHO MV E VELOCITY: 0.64 M/S
ECHO MV E/A RATIO: 0.65
ECHO MV E/E' LATERAL: 7.11
ECHO MV E/E' RATIO (AVERAGED): 8.13
ECHO MV E/E' SEPTAL: 9.14
ECHO PV MAX VELOCITY: 1.2 M/S
ECHO PV PEAK GRADIENT: 5 MMHG
ECHO RV FREE WALL PEAK S': 14 CM/S
ECHO RV TAPSE: 1.6 CM (ref 1.7–?)
ECHO TV REGURGITANT MAX VELOCITY: 2.54 M/S
ECHO TV REGURGITANT PEAK GRADIENT: 26 MMHG

## 2023-10-31 ENCOUNTER — NURSE TRIAGE (OUTPATIENT)
Dept: OTHER | Facility: CLINIC | Age: 88
End: 2023-10-31

## 2023-10-31 ENCOUNTER — TELEPHONE (OUTPATIENT)
Age: 88
End: 2023-10-31

## 2023-10-31 NOTE — TELEPHONE ENCOUNTER
[facility]    Received call from JEANIE at [facility] with [employer]. Subjective: Caller states \"redness and swelling to both legs/ankles\"     Current Symptoms:   -swelling to both ankles  -c/o red rash with itching  -denies SOB, pain    Onset: 2 months ago; worsening    Pain Severity: 0/10    Temperature: denies fevers     What has been tried:   -compression stockings  -elevation  -topical triamcinolone acetonide rash twice daily relieves the itching    Recommended disposition: See in Office Within 2 Weeks    Care advice provided, patient verbalizes understanding; denies any other questions or concerns; instructed to call back for any new or worsening symptoms. Patient/Caller agrees with recommended disposition; writer provided warm transfer to [employer] at [facility] for appointment scheduling    Attention Provider: Thank you for allowing me to participate in the care of your patient. The patient was connected to triage in response to information provided to the ECC/PSC. Please do not respond through this encounter as the response is not directed to a shared pool. Reason for Disposition   Ankle swelling is a chronic symptom (recurrent or ongoing AND present > 4 weeks)    Protocols used:  Ankle Swelling-ADULT-[place]

## 2023-10-31 NOTE — TELEPHONE ENCOUNTER
----- Message from Lily Tam sent at 10/31/2023  9:50 AM EDT -----  Subject: Appointment Request    Reason for Call: Established Patient Appointment needed: Routine Return   from RN Triage    QUESTIONS    Reason for appointment request? No appointments available during search     Additional Information for Provider?  Return from NT for ankle swelling and   rash  Needs next 2 weeks   ---------------------------------------------------------------------------  --------------  Mariaelena Randolph [facility]  [phone]; OK to leave message on voicemail  ---------------------------------------------------------------------------  --------------  SCRIPT ANSWERS

## 2023-11-02 ENCOUNTER — OFFICE VISIT (OUTPATIENT)
Age: 88
End: 2023-11-02
Payer: MEDICARE

## 2023-11-02 VITALS
WEIGHT: 132 LBS | SYSTOLIC BLOOD PRESSURE: 146 MMHG | OXYGEN SATURATION: 99 % | HEART RATE: 96 BPM | TEMPERATURE: 97 F | BODY MASS INDEX: 27.6 KG/M2 | DIASTOLIC BLOOD PRESSURE: 71 MMHG | RESPIRATION RATE: 20 BRPM

## 2023-11-02 DIAGNOSIS — L30.9 CHRONIC DERMATITIS: ICD-10-CM

## 2023-11-02 DIAGNOSIS — M25.471 ANKLE EDEMA, BILATERAL: Primary | ICD-10-CM

## 2023-11-02 DIAGNOSIS — M25.472 ANKLE EDEMA, BILATERAL: Primary | ICD-10-CM

## 2023-11-02 PROBLEM — H43.812 POSTERIOR VITREOUS DETACHMENT OF LEFT EYE: Status: RESOLVED | Noted: 2017-10-02 | Resolved: 2023-11-02

## 2023-11-02 PROCEDURE — 99214 OFFICE O/P EST MOD 30 MIN: CPT | Performed by: NURSE PRACTITIONER

## 2023-11-02 PROCEDURE — 1090F PRES/ABSN URINE INCON ASSESS: CPT | Performed by: NURSE PRACTITIONER

## 2023-11-02 PROCEDURE — 1036F TOBACCO NON-USER: CPT | Performed by: NURSE PRACTITIONER

## 2023-11-02 PROCEDURE — G8419 CALC BMI OUT NRM PARAM NOF/U: HCPCS | Performed by: NURSE PRACTITIONER

## 2023-11-02 PROCEDURE — 1123F ACP DISCUSS/DSCN MKR DOCD: CPT | Performed by: NURSE PRACTITIONER

## 2023-11-02 PROCEDURE — G8427 DOCREV CUR MEDS BY ELIG CLIN: HCPCS | Performed by: NURSE PRACTITIONER

## 2023-11-02 PROCEDURE — G8484 FLU IMMUNIZE NO ADMIN: HCPCS | Performed by: NURSE PRACTITIONER

## 2023-11-02 RX ORDER — FUROSEMIDE 20 MG/1
20 TABLET ORAL DAILY
Qty: 4 TABLET | Refills: 0 | Status: SHIPPED | OUTPATIENT
Start: 2023-11-02 | End: 2023-11-06

## 2023-11-02 ASSESSMENT — ENCOUNTER SYMPTOMS: SHORTNESS OF BREATH: 0

## 2023-11-13 ENCOUNTER — HOSPITAL ENCOUNTER (OUTPATIENT)
Facility: HOSPITAL | Age: 88
Setting detail: OBSERVATION
Discharge: HOME | End: 2023-11-16
Attending: EMERGENCY MEDICINE | Admitting: STUDENT IN AN ORGANIZED HEALTH CARE EDUCATION/TRAINING PROGRAM
Payer: MEDICARE

## 2023-11-13 ENCOUNTER — APPOINTMENT (OUTPATIENT)
Facility: HOSPITAL | Age: 88
End: 2023-11-13
Payer: MEDICARE

## 2023-11-13 DIAGNOSIS — R47.01 APHASIA: ICD-10-CM

## 2023-11-13 DIAGNOSIS — G45.9 TIA (TRANSIENT ISCHEMIC ATTACK): Primary | ICD-10-CM

## 2023-11-13 DIAGNOSIS — N39.0 URINARY TRACT INFECTION WITHOUT HEMATURIA, SITE UNSPECIFIED: ICD-10-CM

## 2023-11-13 DIAGNOSIS — E87.6 HYPOKALEMIA: ICD-10-CM

## 2023-11-13 LAB
ALBUMIN SERPL-MCNC: 3 G/DL (ref 3.5–5)
ALBUMIN/GLOB SERPL: 1 (ref 1.1–2.2)
ALP SERPL-CCNC: 74 U/L (ref 45–117)
ALT SERPL-CCNC: 16 U/L (ref 12–78)
ANION GAP SERPL CALC-SCNC: 5 MMOL/L (ref 5–15)
APPEARANCE UR: CLEAR
AST SERPL-CCNC: 13 U/L (ref 15–37)
BACTERIA URNS QL MICRO: ABNORMAL /HPF
BASOPHILS # BLD: 0 K/UL (ref 0–0.1)
BASOPHILS NFR BLD: 0 % (ref 0–1)
BILIRUB SERPL-MCNC: 0.7 MG/DL (ref 0.2–1)
BILIRUB UR QL: NEGATIVE
BUN SERPL-MCNC: 19 MG/DL (ref 6–20)
BUN/CREAT SERPL: 15 (ref 12–20)
CALCIUM SERPL-MCNC: 12.3 MG/DL (ref 8.5–10.1)
CHLORIDE SERPL-SCNC: 100 MMOL/L (ref 97–108)
CO2 SERPL-SCNC: 30 MMOL/L (ref 21–32)
COLOR UR: ABNORMAL
COMMENT:: NORMAL
CREAT SERPL-MCNC: 1.26 MG/DL (ref 0.55–1.02)
DIFFERENTIAL METHOD BLD: ABNORMAL
EKG ATRIAL RATE: 79 BPM
EKG DIAGNOSIS: NORMAL
EKG P AXIS: 29 DEGREES
EKG P-R INTERVAL: 186 MS
EKG Q-T INTERVAL: 332 MS
EKG QRS DURATION: 82 MS
EKG QTC CALCULATION (BAZETT): 380 MS
EKG R AXIS: 20 DEGREES
EKG T AXIS: 11 DEGREES
EKG VENTRICULAR RATE: 79 BPM
EOSINOPHIL # BLD: 0.1 K/UL (ref 0–0.4)
EOSINOPHIL NFR BLD: 1 % (ref 0–7)
EPITH CASTS URNS QL MICRO: ABNORMAL /LPF
ERYTHROCYTE [DISTWIDTH] IN BLOOD BY AUTOMATED COUNT: 12.4 % (ref 11.5–14.5)
GLOBULIN SER CALC-MCNC: 3 G/DL (ref 2–4)
GLUCOSE SERPL-MCNC: 140 MG/DL (ref 65–100)
GLUCOSE UR STRIP.AUTO-MCNC: NEGATIVE MG/DL
HCT VFR BLD AUTO: 30.2 % (ref 35–47)
HGB BLD-MCNC: 10.3 G/DL (ref 11.5–16)
HGB UR QL STRIP: NEGATIVE
HYALINE CASTS URNS QL MICRO: ABNORMAL /LPF (ref 0–5)
IMM GRANULOCYTES # BLD AUTO: 0.1 K/UL (ref 0–0.04)
IMM GRANULOCYTES NFR BLD AUTO: 1 % (ref 0–0.5)
KETONES UR QL STRIP.AUTO: ABNORMAL MG/DL
LEUKOCYTE ESTERASE UR QL STRIP.AUTO: ABNORMAL
LYMPHOCYTES # BLD: 1 K/UL (ref 0.8–3.5)
LYMPHOCYTES NFR BLD: 8 % (ref 12–49)
MAGNESIUM SERPL-MCNC: 1.3 MG/DL (ref 1.6–2.4)
MCH RBC QN AUTO: 31.9 PG (ref 26–34)
MCHC RBC AUTO-ENTMCNC: 34.1 G/DL (ref 30–36.5)
MCV RBC AUTO: 93.5 FL (ref 80–99)
MONOCYTES # BLD: 1.2 K/UL (ref 0–1)
MONOCYTES NFR BLD: 10 % (ref 5–13)
NEUTS SEG # BLD: 9.7 K/UL (ref 1.8–8)
NEUTS SEG NFR BLD: 80 % (ref 32–75)
NITRITE UR QL STRIP.AUTO: NEGATIVE
NRBC # BLD: 0 K/UL (ref 0–0.01)
NRBC BLD-RTO: 0 PER 100 WBC
PH UR STRIP: 6.5 (ref 5–8)
PLATELET # BLD AUTO: 274 K/UL (ref 150–400)
PMV BLD AUTO: 9.4 FL (ref 8.9–12.9)
POTASSIUM SERPL-SCNC: 2.9 MMOL/L (ref 3.5–5.1)
PROT SERPL-MCNC: 6 G/DL (ref 6.4–8.2)
PROT UR STRIP-MCNC: NEGATIVE MG/DL
RBC # BLD AUTO: 3.23 M/UL (ref 3.8–5.2)
RBC #/AREA URNS HPF: ABNORMAL /HPF (ref 0–5)
SODIUM SERPL-SCNC: 135 MMOL/L (ref 136–145)
SP GR UR REFRACTOMETRY: 1.01 (ref 1–1.03)
SPECIMEN HOLD: NORMAL
TROPONIN I SERPL HS-MCNC: 14 NG/L (ref 0–51)
TROPONIN I SERPL HS-MCNC: 15 NG/L (ref 0–51)
TSH SERPL DL<=0.05 MIU/L-ACNC: 1.73 UIU/ML (ref 0.36–3.74)
UROBILINOGEN UR QL STRIP.AUTO: 0.2 EU/DL (ref 0.2–1)
WBC # BLD AUTO: 12.1 K/UL (ref 3.6–11)
WBC URNS QL MICRO: ABNORMAL /HPF (ref 0–4)

## 2023-11-13 PROCEDURE — 87086 URINE CULTURE/COLONY COUNT: CPT

## 2023-11-13 PROCEDURE — 84484 ASSAY OF TROPONIN QUANT: CPT

## 2023-11-13 PROCEDURE — 6370000000 HC RX 637 (ALT 250 FOR IP): Performed by: STUDENT IN AN ORGANIZED HEALTH CARE EDUCATION/TRAINING PROGRAM

## 2023-11-13 PROCEDURE — 84443 ASSAY THYROID STIM HORMONE: CPT

## 2023-11-13 PROCEDURE — 85025 COMPLETE CBC W/AUTO DIFF WBC: CPT

## 2023-11-13 PROCEDURE — 6370000000 HC RX 637 (ALT 250 FOR IP): Performed by: EMERGENCY MEDICINE

## 2023-11-13 PROCEDURE — 99285 EMERGENCY DEPT VISIT HI MDM: CPT

## 2023-11-13 PROCEDURE — 93005 ELECTROCARDIOGRAM TRACING: CPT | Performed by: EMERGENCY MEDICINE

## 2023-11-13 PROCEDURE — 70450 CT HEAD/BRAIN W/O DYE: CPT

## 2023-11-13 PROCEDURE — 36415 COLL VENOUS BLD VENIPUNCTURE: CPT

## 2023-11-13 PROCEDURE — 81001 URINALYSIS AUTO W/SCOPE: CPT

## 2023-11-13 PROCEDURE — G0378 HOSPITAL OBSERVATION PER HR: HCPCS

## 2023-11-13 PROCEDURE — 87040 BLOOD CULTURE FOR BACTERIA: CPT

## 2023-11-13 PROCEDURE — 87077 CULTURE AEROBIC IDENTIFY: CPT

## 2023-11-13 PROCEDURE — 83735 ASSAY OF MAGNESIUM: CPT

## 2023-11-13 PROCEDURE — 2580000003 HC RX 258: Performed by: STUDENT IN AN ORGANIZED HEALTH CARE EDUCATION/TRAINING PROGRAM

## 2023-11-13 PROCEDURE — 80053 COMPREHEN METABOLIC PANEL: CPT

## 2023-11-13 RX ORDER — POTASSIUM CHLORIDE 750 MG/1
40 TABLET, FILM COATED, EXTENDED RELEASE ORAL ONCE
Status: COMPLETED | OUTPATIENT
Start: 2023-11-13 | End: 2023-11-13

## 2023-11-13 RX ORDER — ASPIRIN 300 MG/1
300 SUPPOSITORY RECTAL DAILY
Status: DISCONTINUED | OUTPATIENT
Start: 2023-11-13 | End: 2023-11-16 | Stop reason: HOSPADM

## 2023-11-13 RX ORDER — ASPIRIN 81 MG/1
81 TABLET, CHEWABLE ORAL DAILY
Status: DISCONTINUED | OUTPATIENT
Start: 2023-11-13 | End: 2023-11-16 | Stop reason: HOSPADM

## 2023-11-13 RX ORDER — SODIUM CHLORIDE 9 MG/ML
INJECTION, SOLUTION INTRAVENOUS CONTINUOUS
Status: DISPENSED | OUTPATIENT
Start: 2023-11-13 | End: 2023-11-15

## 2023-11-13 RX ORDER — ACETAMINOPHEN 650 MG/1
650 SUPPOSITORY RECTAL EVERY 6 HOURS PRN
Status: DISCONTINUED | OUTPATIENT
Start: 2023-11-13 | End: 2023-11-16 | Stop reason: HOSPADM

## 2023-11-13 RX ORDER — ONDANSETRON 2 MG/ML
4 INJECTION INTRAMUSCULAR; INTRAVENOUS EVERY 6 HOURS PRN
Status: DISCONTINUED | OUTPATIENT
Start: 2023-11-13 | End: 2023-11-14 | Stop reason: SDUPTHER

## 2023-11-13 RX ORDER — HYDRALAZINE HYDROCHLORIDE 20 MG/ML
10 INJECTION INTRAMUSCULAR; INTRAVENOUS EVERY 6 HOURS PRN
Status: DISCONTINUED | OUTPATIENT
Start: 2023-11-13 | End: 2023-11-16 | Stop reason: HOSPADM

## 2023-11-13 RX ORDER — GABAPENTIN 100 MG/1
200 CAPSULE ORAL 2 TIMES DAILY
Status: DISCONTINUED | OUTPATIENT
Start: 2023-11-13 | End: 2023-11-16 | Stop reason: HOSPADM

## 2023-11-13 RX ORDER — AMLODIPINE BESYLATE 5 MG/1
5 TABLET ORAL DAILY
Status: DISCONTINUED | OUTPATIENT
Start: 2023-11-14 | End: 2023-11-16 | Stop reason: HOSPADM

## 2023-11-13 RX ORDER — LORAZEPAM 0.5 MG/1
0.5 TABLET ORAL 2 TIMES DAILY PRN
Status: DISCONTINUED | OUTPATIENT
Start: 2023-11-13 | End: 2023-11-16 | Stop reason: HOSPADM

## 2023-11-13 RX ORDER — ATORVASTATIN CALCIUM 40 MG/1
40 TABLET, FILM COATED ORAL DAILY
Status: DISCONTINUED | OUTPATIENT
Start: 2023-11-14 | End: 2023-11-14

## 2023-11-13 RX ORDER — ONDANSETRON 4 MG/1
4 TABLET, ORALLY DISINTEGRATING ORAL EVERY 8 HOURS PRN
Status: DISCONTINUED | OUTPATIENT
Start: 2023-11-13 | End: 2023-11-16 | Stop reason: HOSPADM

## 2023-11-13 RX ORDER — ONDANSETRON 4 MG/1
4 TABLET, ORALLY DISINTEGRATING ORAL EVERY 8 HOURS PRN
Status: DISCONTINUED | OUTPATIENT
Start: 2023-11-13 | End: 2023-11-14 | Stop reason: SDUPTHER

## 2023-11-13 RX ORDER — ONDANSETRON 2 MG/ML
4 INJECTION INTRAMUSCULAR; INTRAVENOUS EVERY 6 HOURS PRN
Status: DISCONTINUED | OUTPATIENT
Start: 2023-11-13 | End: 2023-11-16 | Stop reason: HOSPADM

## 2023-11-13 RX ORDER — LOSARTAN POTASSIUM 50 MG/1
100 TABLET ORAL DAILY
Status: DISCONTINUED | OUTPATIENT
Start: 2023-11-14 | End: 2023-11-16 | Stop reason: HOSPADM

## 2023-11-13 RX ORDER — ACETAMINOPHEN 325 MG/1
650 TABLET ORAL EVERY 6 HOURS PRN
Status: DISCONTINUED | OUTPATIENT
Start: 2023-11-13 | End: 2023-11-16 | Stop reason: HOSPADM

## 2023-11-13 RX ORDER — POLYETHYLENE GLYCOL 3350 17 G/17G
17 POWDER, FOR SOLUTION ORAL DAILY PRN
Status: DISCONTINUED | OUTPATIENT
Start: 2023-11-13 | End: 2023-11-16 | Stop reason: HOSPADM

## 2023-11-13 RX ORDER — PAROXETINE HYDROCHLORIDE 20 MG/1
30 TABLET, FILM COATED ORAL DAILY
Status: DISCONTINUED | OUTPATIENT
Start: 2023-11-14 | End: 2023-11-16 | Stop reason: HOSPADM

## 2023-11-13 RX ADMIN — GABAPENTIN 200 MG: 100 CAPSULE ORAL at 21:03

## 2023-11-13 RX ADMIN — ASPIRIN 81 MG CHEWABLE TABLET 81 MG: 81 TABLET CHEWABLE at 21:04

## 2023-11-13 RX ADMIN — ACETAMINOPHEN 650 MG: 325 TABLET ORAL at 23:27

## 2023-11-13 RX ADMIN — POTASSIUM CHLORIDE 40 MEQ: 750 TABLET, FILM COATED, EXTENDED RELEASE ORAL at 18:36

## 2023-11-13 RX ADMIN — SODIUM CHLORIDE: 9 INJECTION, SOLUTION INTRAVENOUS at 21:05

## 2023-11-13 ASSESSMENT — PAIN SCALES - GENERAL
PAINLEVEL_OUTOF10: 6
PAINLEVEL_OUTOF10: 0

## 2023-11-13 ASSESSMENT — PAIN DESCRIPTION - LOCATION: LOCATION: HEAD

## 2023-11-13 NOTE — ED PROVIDER NOTES
[address] EMERGENCY DEP  EMERGENCY DEPARTMENT ENCOUNTER        CHIEF COMPLAINT     No chief complaint on file. HISTORY OF PRESENT ILLNESS      88y F here with intermittent aphasia over the past few days. Comes and goes without warning. Says she kind of ignored it because she is busy taking care of her . No focal weakness or numbness. No gait problems. It has been ongoing for about a week. Review of External Medical Records:     Nursing Notes were reviewed. REVIEW OF SYSTEMS         Review of Systems   Constitutional: (-) weight loss. HEENT: (-) stiff neck   Eyes: (-) discharge. Respiratory: (-) cough. Cardiovascular: (-) syncope. Gastrointestinal: (-) blood in stool. Genitourinary: (-) hematuria. Musculoskeletal: (-) myalgias. Neurological: (-) seizure. Skin: (-) petechiae  Lymph/Immunologic: (-) enlarged lymph nodes  All other systems reviewed and are negative.          PAST MEDICAL HISTORY     Past Medical History:   Diagnosis Date    Anxiety     Arthritis back pain    fingers    Asthma     MILD - NO INHALERS    Chronic pain     Colitis     Diverticulitis 06/11/2013    Hypercholesterolemia     Hypertension     IBS (irritable bowel syndrome)     Ill-defined condition     H/O UTI POST DOW CATH    Insomnia     Nausea & vomiting     Stroke ([address]) 1996    tia   PATENT  FORAMEN  [place]    Swollen L ankle          SURGICAL HISTORY       Past Surgical History:   Procedure Laterality Date    BACK SURGERY  2021    CATARACT REMOVAL Bilateral 2009    CHOLECYSTECTOMY  04/1997    COLONOSCOPY      x3    HYSTERECTOMY (CERVIX STATUS UNKNOWN)  1986    IR INJ INTERLAMINAR LUMBAR/SAC  12/19/2022    IR INJ INTERLAMINAR LUMBAR/SAC  4/11/2023    IR LUMBAR TRANSFORAMINAL EPIDURAL SINGLE  03/21/2022    KNEE ARTHROSCOPY Left 04/1998    LUMBAR FUSION  02/2011    L4-L5    ORTHOPEDIC SURGERY Right     REPAIRED TENDONS ON RIGHT HAND AFTER FALL    CO UNLISTED PROCEDURE CARDIAC SURGERY  08/2004    repair of

## 2023-11-13 NOTE — ED TRIAGE NOTES
Pt arrives via EMS from home for intermittent aphasia over the past 4 days. Per EMS patient was altered en route. EKG showed afib. A&Ox4 on arrival.        HX of TIA.

## 2023-11-14 ENCOUNTER — APPOINTMENT (OUTPATIENT)
Facility: HOSPITAL | Age: 88
End: 2023-11-14
Payer: MEDICARE

## 2023-11-14 PROBLEM — I63.9 ACUTE STROKE DUE TO ISCHEMIA (HCC): Status: ACTIVE | Noted: 2023-11-14

## 2023-11-14 PROBLEM — G45.9 TIA (TRANSIENT ISCHEMIC ATTACK): Status: ACTIVE | Noted: 2023-11-14

## 2023-11-14 LAB
ANION GAP SERPL CALC-SCNC: 7 MMOL/L (ref 5–15)
BASOPHILS # BLD: 0 K/UL (ref 0–0.1)
BASOPHILS NFR BLD: 0 % (ref 0–1)
BUN SERPL-MCNC: 16 MG/DL (ref 6–20)
BUN/CREAT SERPL: 16 (ref 12–20)
CALCIUM SERPL-MCNC: 11.5 MG/DL (ref 8.5–10.1)
CHLORIDE SERPL-SCNC: 102 MMOL/L (ref 97–108)
CHOLEST SERPL-MCNC: 134 MG/DL
CO2 SERPL-SCNC: 28 MMOL/L (ref 21–32)
CREAT SERPL-MCNC: 1.02 MG/DL (ref 0.55–1.02)
DIFFERENTIAL METHOD BLD: ABNORMAL
EOSINOPHIL # BLD: 0.3 K/UL (ref 0–0.4)
EOSINOPHIL NFR BLD: 3 % (ref 0–7)
ERYTHROCYTE [DISTWIDTH] IN BLOOD BY AUTOMATED COUNT: 12.4 % (ref 11.5–14.5)
EST. AVERAGE GLUCOSE BLD GHB EST-MCNC: 100 MG/DL
GLUCOSE SERPL-MCNC: 148 MG/DL (ref 65–100)
HBA1C MFR BLD: 5.1 % (ref 4–5.6)
HCT VFR BLD AUTO: 27.9 % (ref 35–47)
HDLC SERPL-MCNC: 64 MG/DL
HDLC SERPL: 2.1 (ref 0–5)
HGB BLD-MCNC: 9.5 G/DL (ref 11.5–16)
IMM GRANULOCYTES # BLD AUTO: 0.1 K/UL (ref 0–0.04)
IMM GRANULOCYTES NFR BLD AUTO: 1 % (ref 0–0.5)
LDLC SERPL CALC-MCNC: 57 MG/DL (ref 0–100)
LYMPHOCYTES # BLD: 1.3 K/UL (ref 0.8–3.5)
LYMPHOCYTES NFR BLD: 12 % (ref 12–49)
MAGNESIUM SERPL-MCNC: 1.7 MG/DL (ref 1.6–2.4)
MCH RBC QN AUTO: 32.1 PG (ref 26–34)
MCHC RBC AUTO-ENTMCNC: 34.1 G/DL (ref 30–36.5)
MCV RBC AUTO: 94.3 FL (ref 80–99)
MONOCYTES # BLD: 1 K/UL (ref 0–1)
MONOCYTES NFR BLD: 10 % (ref 5–13)
NEUTS SEG # BLD: 7.6 K/UL (ref 1.8–8)
NEUTS SEG NFR BLD: 74 % (ref 32–75)
NRBC # BLD: 0 K/UL (ref 0–0.01)
NRBC BLD-RTO: 0 PER 100 WBC
PHOSPHATE SERPL-MCNC: 1.5 MG/DL (ref 2.6–4.7)
PLATELET # BLD AUTO: 251 K/UL (ref 150–400)
PMV BLD AUTO: 9.6 FL (ref 8.9–12.9)
POTASSIUM SERPL-SCNC: 3 MMOL/L (ref 3.5–5.1)
RBC # BLD AUTO: 2.96 M/UL (ref 3.8–5.2)
SODIUM SERPL-SCNC: 137 MMOL/L (ref 136–145)
TRIGL SERPL-MCNC: 65 MG/DL
VLDLC SERPL CALC-MCNC: 13 MG/DL
WBC # BLD AUTO: 10.3 K/UL (ref 3.6–11)

## 2023-11-14 PROCEDURE — 96365 THER/PROPH/DIAG IV INF INIT: CPT

## 2023-11-14 PROCEDURE — 83036 HEMOGLOBIN GLYCOSYLATED A1C: CPT

## 2023-11-14 PROCEDURE — 97116 GAIT TRAINING THERAPY: CPT

## 2023-11-14 PROCEDURE — 70551 MRI BRAIN STEM W/O DYE: CPT

## 2023-11-14 PROCEDURE — 6360000002 HC RX W HCPCS: Performed by: STUDENT IN AN ORGANIZED HEALTH CARE EDUCATION/TRAINING PROGRAM

## 2023-11-14 PROCEDURE — 70498 CT ANGIOGRAPHY NECK: CPT

## 2023-11-14 PROCEDURE — 96368 THER/DIAG CONCURRENT INF: CPT

## 2023-11-14 PROCEDURE — G0378 HOSPITAL OBSERVATION PER HR: HCPCS

## 2023-11-14 PROCEDURE — 80048 BASIC METABOLIC PNL TOTAL CA: CPT

## 2023-11-14 PROCEDURE — 2500000003 HC RX 250 WO HCPCS: Performed by: STUDENT IN AN ORGANIZED HEALTH CARE EDUCATION/TRAINING PROGRAM

## 2023-11-14 PROCEDURE — 97161 PT EVAL LOW COMPLEX 20 MIN: CPT

## 2023-11-14 PROCEDURE — 97530 THERAPEUTIC ACTIVITIES: CPT

## 2023-11-14 PROCEDURE — 84100 ASSAY OF PHOSPHORUS: CPT

## 2023-11-14 PROCEDURE — 96375 TX/PRO/DX INJ NEW DRUG ADDON: CPT

## 2023-11-14 PROCEDURE — 2580000003 HC RX 258: Performed by: STUDENT IN AN ORGANIZED HEALTH CARE EDUCATION/TRAINING PROGRAM

## 2023-11-14 PROCEDURE — 6370000000 HC RX 637 (ALT 250 FOR IP): Performed by: STUDENT IN AN ORGANIZED HEALTH CARE EDUCATION/TRAINING PROGRAM

## 2023-11-14 PROCEDURE — 99223 1ST HOSP IP/OBS HIGH 75: CPT | Performed by: PSYCHIATRY & NEUROLOGY

## 2023-11-14 PROCEDURE — 96366 THER/PROPH/DIAG IV INF ADDON: CPT

## 2023-11-14 PROCEDURE — 73120 X-RAY EXAM OF HAND: CPT

## 2023-11-14 PROCEDURE — 92610 EVALUATE SWALLOWING FUNCTION: CPT | Performed by: SPEECH-LANGUAGE PATHOLOGIST

## 2023-11-14 PROCEDURE — 83735 ASSAY OF MAGNESIUM: CPT

## 2023-11-14 PROCEDURE — 6360000004 HC RX CONTRAST MEDICATION: Performed by: RADIOLOGY

## 2023-11-14 PROCEDURE — 6370000000 HC RX 637 (ALT 250 FOR IP): Performed by: NURSE PRACTITIONER

## 2023-11-14 PROCEDURE — 92523 SPEECH SOUND LANG COMPREHEN: CPT | Performed by: SPEECH-LANGUAGE PATHOLOGIST

## 2023-11-14 PROCEDURE — 85025 COMPLETE CBC W/AUTO DIFF WBC: CPT

## 2023-11-14 PROCEDURE — 96367 TX/PROPH/DG ADDL SEQ IV INF: CPT

## 2023-11-14 PROCEDURE — 80061 LIPID PANEL: CPT

## 2023-11-14 PROCEDURE — 36415 COLL VENOUS BLD VENIPUNCTURE: CPT

## 2023-11-14 RX ORDER — MAGNESIUM SULFATE IN WATER 40 MG/ML
2000 INJECTION, SOLUTION INTRAVENOUS ONCE
Status: COMPLETED | OUTPATIENT
Start: 2023-11-14 | End: 2023-11-14

## 2023-11-14 RX ORDER — ATORVASTATIN CALCIUM 20 MG/1
20 TABLET, FILM COATED ORAL DAILY
Status: DISCONTINUED | OUTPATIENT
Start: 2023-11-15 | End: 2023-11-16 | Stop reason: HOSPADM

## 2023-11-14 RX ADMIN — ASPIRIN 81 MG CHEWABLE TABLET 81 MG: 81 TABLET CHEWABLE at 08:31

## 2023-11-14 RX ADMIN — DOXYCYCLINE 100 MG: 100 INJECTION, POWDER, LYOPHILIZED, FOR SOLUTION INTRAVENOUS at 18:39

## 2023-11-14 RX ADMIN — POTASSIUM PHOSPHATE, MONOBASIC 250 MG: 500 TABLET, SOLUBLE ORAL at 20:19

## 2023-11-14 RX ADMIN — GABAPENTIN 200 MG: 100 CAPSULE ORAL at 08:31

## 2023-11-14 RX ADMIN — PAROXETINE HYDROCHLORIDE 30 MG: 20 TABLET, FILM COATED ORAL at 08:31

## 2023-11-14 RX ADMIN — WATER 2000 MG: 1 INJECTION INTRAMUSCULAR; INTRAVENOUS; SUBCUTANEOUS at 20:19

## 2023-11-14 RX ADMIN — AMLODIPINE BESYLATE 5 MG: 5 TABLET ORAL at 08:31

## 2023-11-14 RX ADMIN — DOXYCYCLINE 100 MG: 100 INJECTION, POWDER, LYOPHILIZED, FOR SOLUTION INTRAVENOUS at 07:46

## 2023-11-14 RX ADMIN — POTASSIUM PHOSPHATE, MONOBASIC 250 MG: 500 TABLET, SOLUBLE ORAL at 13:02

## 2023-11-14 RX ADMIN — MAGNESIUM SULFATE HEPTAHYDRATE 2000 MG: 40 INJECTION, SOLUTION INTRAVENOUS at 02:18

## 2023-11-14 RX ADMIN — LOSARTAN POTASSIUM 100 MG: 50 TABLET, FILM COATED ORAL at 08:31

## 2023-11-14 RX ADMIN — AMPICILLIN SODIUM AND SULBACTAM SODIUM 3000 MG: 2; 1 INJECTION, POWDER, FOR SOLUTION INTRAMUSCULAR; INTRAVENOUS at 21:15

## 2023-11-14 RX ADMIN — ACETAMINOPHEN 650 MG: 325 TABLET ORAL at 21:15

## 2023-11-14 RX ADMIN — ATORVASTATIN CALCIUM 40 MG: 40 TABLET, FILM COATED ORAL at 08:31

## 2023-11-14 RX ADMIN — LORAZEPAM 0.5 MG: 0.5 TABLET ORAL at 00:24

## 2023-11-14 RX ADMIN — SODIUM CHLORIDE: 9 INJECTION, SOLUTION INTRAVENOUS at 07:16

## 2023-11-14 RX ADMIN — POTASSIUM PHOSPHATE, MONOBASIC 250 MG: 500 TABLET, SOLUBLE ORAL at 17:37

## 2023-11-14 RX ADMIN — IOPAMIDOL 100 ML: 755 INJECTION, SOLUTION INTRAVENOUS at 17:24

## 2023-11-14 RX ADMIN — LORAZEPAM 0.5 MG: 0.5 TABLET ORAL at 21:15

## 2023-11-14 RX ADMIN — GABAPENTIN 200 MG: 100 CAPSULE ORAL at 20:18

## 2023-11-14 RX ADMIN — AMPICILLIN SODIUM AND SULBACTAM SODIUM 3000 MG: 2; 1 INJECTION, POWDER, FOR SOLUTION INTRAMUSCULAR; INTRAVENOUS at 09:06

## 2023-11-14 NOTE — CONSULTS
ORTHOPAEDIC CONSULT NOTE    Subjective:     Date of Consultation:  November 14, 2023  Referring Physician:  Florencio Candace is a 81 y.o. female with past medical history significant for hypertension, hypercholesterolemia, widespread osteoarthritis and numerous orthopedic surgeries is seen for left thumb pain and abscess. Patient states that she began with a small area of pain and inflammation about her nail on the left hand which then progressively worsened over the course of 1 to 2 weeks. [facility] where she had a superficial abscess lanced, cultured and drained. She was subsequently brought to the emergency department with complaints of recurrent aphasia-like symptoms and has been admitted for work-up related to that. We have been asked to see the patient regarding her left thumb. She states that she has pain with any pressure to the thumb. She denies any known injuries. She states that she has longstanding hand arthritis issues with joint mobility problems at her baseline. She had operations on her right hand following a fall and has been followed by Dr. Lorena Andersno for her hand issues up till now. She denies any recent fevers or chills. She denies any tingling or numbness.     Patient Active Problem List    Diagnosis Date Noted    Aphasia 11/13/2023    Bilateral leg edema 09/27/2023    Left rotator cuff tear arthropathy 09/01/2022    Chronic renal disease, stage III ([address]) 06/03/2022    Dilatation of thoracic aorta ([address]) 05/02/2022    Lumbar stenosis with neurogenic claudication 09/14/2021    Rotator cuff arthropathy 01/30/2020    Advance directive in chart 03/26/2019    History of YAG laser capsulotomy of lens of left eye 10/02/2017    Pseudophakia of both eyes 10/02/2017    Posterior capsular opacification visually significant of right eye 10/02/2017    Advance directive placed in chart this admission 03/24/2017    CRI (chronic renal insufficiency) 09/06/2016
- 5.20 M/uL    Hemoglobin 9.5 (L) 11.5 - 16.0 g/dL    Hematocrit 27.9 (L) 35.0 - 47.0 %    MCV 94.3 80.0 - 99.0 FL    MCH 32.1 26.0 - 34.0 PG    MCHC 34.1 30.0 - 36.5 g/dL    RDW 12.4 11.5 - 14.5 %    Platelets 323 079 - 515 K/uL    MPV 9.6 8.9 - 12.9 FL    Nucleated RBCs 0.0 0  WBC    nRBC 0.00 0.00 - 0.01 K/uL    Neutrophils % 74 32 - 75 %    Lymphocytes % 12 12 - 49 %    Monocytes % 10 5 - 13 %    Eosinophils % 3 0 - 7 %    Basophils % 0 0 - 1 %    Immature Granulocytes 1 (H) 0.0 - 0.5 %    Neutrophils Absolute 7.6 1.8 - 8.0 K/UL    Lymphocytes Absolute 1.3 0.8 - 3.5 K/UL    Monocytes Absolute 1.0 0.0 - 1.0 K/UL    Eosinophils Absolute 0.3 0.0 - 0.4 K/UL    Basophils Absolute 0.0 0.0 - 0.1 K/UL    Absolute Immature Granulocyte 0.1 (H) 0.00 - 0.04 K/UL    Differential Type AUTOMATED     Magnesium    Collection Time: 11/14/23 12:39 AM   Result Value Ref Range    Magnesium 1.7 1.6 - 2.4 mg/dL     MRI Result (most recent):  MRI THORACIC SPINE WO CONTRAST 09/16/2022    Narrative  EXAM:  MRI THORAC SPINE WO CONT    INDICATION:   71-year-old female with Thoracics stenosis; status post lumbar  spinal fusion. Dx: S/P lumbar spinal fusion [Z98.1 (ICD-10-CM)]; Thoracic  stenosis [N39.41 (ICD-10-CM)]    COMPARISON: None. TECHNIQUE: Multiplanar multisequence acquisition without contrast of the  thoracic spine. CONTRAST: None. FINDINGS:  Suboptimally visualized bilateral transpedicular posterior spinal fusion  hardware extending from T10 through the visualized L2 levels. Hardware  associated signal distortion artifacts preclude optimal evaluation of adjacent  structures. There is a 0.4 cm anterolisthesis of T1 on T2 and retrolisthesis of T9 on T10. Vertebral body heights are maintained without evidence of acute fracture. Small  intraosseous hemangioma within T8-9 vertebral body. Degenerative disc changes  predominantly involving T7-T8, T8-T9 and T9-T10 resulting mild spinal canal  narrowing.  No significant
11. 5 (H) 8.5 - 10.1 MG/DL   Phosphorus    Collection Time: 11/14/23 12:39 AM   Result Value Ref Range    Phosphorus 1.5 (L) 2.6 - 4.7 MG/DL   CBC with Auto Differential    Collection Time: 11/14/23 12:39 AM   Result Value Ref Range    WBC 10.3 3.6 - 11.0 K/uL    RBC 2.96 (L) 3.80 - 5.20 M/uL    Hemoglobin 9.5 (L) 11.5 - 16.0 g/dL    Hematocrit 27.9 (L) 35.0 - 47.0 %    MCV 94.3 80.0 - 99.0 FL    MCH 32.1 26.0 - 34.0 PG    MCHC 34.1 30.0 - 36.5 g/dL    RDW 12.4 11.5 - 14.5 %    Platelets 466 259 - 775 K/uL    MPV 9.6 8.9 - 12.9 FL    Nucleated RBCs 0.0 0  WBC    nRBC 0.00 0.00 - 0.01 K/uL    Neutrophils % 74 32 - 75 %    Lymphocytes % 12 12 - 49 %    Monocytes % 10 5 - 13 %    Eosinophils % 3 0 - 7 %    Basophils % 0 0 - 1 %    Immature Granulocytes 1 (H) 0.0 - 0.5 %    Neutrophils Absolute 7.6 1.8 - 8.0 K/UL    Lymphocytes Absolute 1.3 0.8 - 3.5 K/UL    Monocytes Absolute 1.0 0.0 - 1.0 K/UL    Eosinophils Absolute 0.3 0.0 - 0.4 K/UL    Basophils Absolute 0.0 0.0 - 0.1 K/UL    Absolute Immature Granulocyte 0.1 (H) 0.00 - 0.04 K/UL    Differential Type AUTOMATED     Magnesium    Collection Time: 11/14/23 12:39 AM   Result Value Ref Range    Magnesium 1.7 1.6 - 2.4 mg/dL         Impression:     Principal Problem:    Aphasia  Resolved Problems:    * No resolved hospital problems. *      Plan:   Suspected left thumb paronychia    Patient has already undergone lancing of left thumb paronychia. Awaiting culture results from [facility].     - NPO at midnight, re-eval tomorrow, if concern for further purulence may consider nail plate removal, could be done bedside if no other areas needed for I and D.     Placed back in nonadherent bulky dressing  Maintain elevation of left upper extremity at hand  Consider Toradol for inflammation relief  Remain on IV antibiotics.   If symptoms not improving on IV antibiotics consider MRI of hand to rule out deep collection or joint infection

## 2023-11-14 NOTE — WOUND CARE
Wound Care Note:     New consult placed by NP request for left thumb infection; seen with Dajuan Montaño NP    Chart shows:  Admitted for aphagia  Past Medical History:   Diagnosis Date    Anxiety     Arthritis back pain    fingers    Asthma     MILD - NO INHALERS    Chronic pain     Colitis     Diverticulitis 06/11/2013    Hypercholesterolemia     Hypertension     IBS (irritable bowel syndrome)     Ill-defined condition     H/O UTI POST DOW CATH    Insomnia     Nausea & vomiting     Stroke ([address]) 1996    tia   PATENT  FORAMEN  OVALE    Swollen L ankle      Wound Culture obtained at [facility], results to be faxed. WBC = 10.3 on 11/14/23    Assessment:   Patient is alert and talking, continent with some assistance needed in repositioning. Bed: P500  Diet: Adult diet regular  Patient reports some pain in thumb, no number given. Bilateral heels, buttocks, and sacral skin intact and without erythema. 1. POA left thumb edematous, wet loose skin appears to be deflated blister, while cleaning thumb this skin was coming loose and was removed, wound bed with scattered pink and white tissue, base of finger nail is loose and had some purulent drainage expressed, wound edges are open, violette-wound with some erythema distally. Cleansed with VASHE, Opticell AG, 4 x 4 and roll gauze applied. Patient being seen by MARLEN Pierce, still waiting cultures from [facility] and xray  has been ordered. Wound care orders obtained from Dajuan Montaño NP. Patient up in the chair. Recommendations:    Left thumb- NOTE:  If Opticell AG is sticking to wound moisten with VASHE then remove, daily cleanse with VASHE, apply a piece of Opticell AG, cover with 4 x 4 and secure with roll gauze and tape. Skin Care & Pressure Prevention:  Minimize layers of linen/pads under patient to optimize support surface.     Turn/reposition approximately every 2 hours and offload

## 2023-11-14 NOTE — ED NOTES
Pt needed tylenol before she left the floor. Pt was transported before I gave it to her.  Pt's new nurse Magda Manzo RN called and notified     Modesto Echevarria RN  11/13/23 6113

## 2023-11-14 NOTE — H&P
Eosinophils Absolute 0.1 0.0 - 0.4 K/UL    Basophils Absolute 0.0 0.0 - 0.1 K/UL    Absolute Immature Granulocyte 0.1 (H) 0.00 - 0.04 K/UL    Differential Type AUTOMATED     Comprehensive Metabolic Panel    Collection Time: 11/13/23  4:35 PM   Result Value Ref Range    Sodium 135 (L) 136 - 145 mmol/L    Potassium 2.9 (L) 3.5 - 5.1 mmol/L    Chloride 100 97 - 108 mmol/L    CO2 30 21 - 32 mmol/L    Anion Gap 5 5 - 15 mmol/L    Glucose 140 (H) 65 - 100 mg/dL    BUN 19 6 - 20 MG/DL    Creatinine 1.26 (H) 0.55 - 1.02 MG/DL    Bun/Cre Ratio 15 12 - 20      Est, Glom Filt Rate 41 (L) >60 ml/min/1.73m2    Calcium 12.3 (H) 8.5 - 10.1 MG/DL    Total Bilirubin 0.7 0.2 - 1.0 MG/DL    ALT 16 12 - 78 U/L    AST 13 (L) 15 - 37 U/L    Alk Phosphatase 74 45 - 117 U/L    Total Protein 6.0 (L) 6.4 - 8.2 g/dL    Albumin 3.0 (L) 3.5 - 5.0 g/dL    Globulin 3.0 2.0 - 4.0 g/dL    Albumin/Globulin Ratio 1.0 (L) 1.1 - 2.2     Extra Tubes Hold    Collection Time: 11/13/23  4:35 PM   Result Value Ref Range    Specimen HOld 1RED,1BLU     Comment:        Add-on orders for these samples will be processed based on acceptable specimen integrity and analyte stability, which may vary by analyte.    Troponin    Collection Time: 11/13/23  4:35 PM   Result Value Ref Range    Troponin, High Sensitivity 14 0 - 51 ng/L   Urinalysis with Microscopic    Collection Time: 11/13/23  5:39 PM   Result Value Ref Range    Color, UA YELLOW/STRAW      Appearance CLEAR CLEAR      Specific Gravity, UA 1.010 1.003 - 1.030      pH, Urine 6.5 5.0 - 8.0      Protein, UA Negative NEG mg/dL    Glucose, UA Negative NEG mg/dL    Ketones, Urine TRACE (A) NEG mg/dL    Bilirubin Urine Negative NEG      Blood, Urine Negative NEG      Urobilinogen, Urine 0.2 0.2 - 1.0 EU/dL    Nitrite, Urine Negative NEG      Leukocyte Esterase, Urine SMALL (A) NEG      WBC, UA 10-20 0 - 4 /hpf    RBC, UA 0-5 0 - 5 /hpf    Epithelial Cells UA MODERATE (A) FEW /lpf    BACTERIA, URINE 2+ (A) NEG /hpf

## 2023-11-14 NOTE — ED NOTES
Bedside and Verbal shift change report given to [employer] PAMELA Rowe (oncoming nurse) by Governor PAMELA Inman (offgoing nurse). Report included the following information Nurse Handoff Report, ED Encounter Summary, ED SBAR, Adult Overview, Intake/Output, MAR, and Recent Results.        Jennifer Francis RN  11/13/23 1956

## 2023-11-15 LAB
ANION GAP SERPL CALC-SCNC: 7 MMOL/L (ref 5–15)
BASOPHILS # BLD: 0 K/UL (ref 0–0.1)
BASOPHILS NFR BLD: 1 % (ref 0–1)
BUN SERPL-MCNC: 12 MG/DL (ref 6–20)
BUN/CREAT SERPL: 13 (ref 12–20)
CALCIUM SERPL-MCNC: 10.6 MG/DL (ref 8.5–10.1)
CHLORIDE SERPL-SCNC: 103 MMOL/L (ref 97–108)
CO2 SERPL-SCNC: 29 MMOL/L (ref 21–32)
CREAT SERPL-MCNC: 0.96 MG/DL (ref 0.55–1.02)
DIFFERENTIAL METHOD BLD: ABNORMAL
EOSINOPHIL # BLD: 0.6 K/UL (ref 0–0.4)
EOSINOPHIL NFR BLD: 8 % (ref 0–7)
ERYTHROCYTE [DISTWIDTH] IN BLOOD BY AUTOMATED COUNT: 12.5 % (ref 11.5–14.5)
GLUCOSE SERPL-MCNC: 89 MG/DL (ref 65–100)
HCT VFR BLD AUTO: 29.6 % (ref 35–47)
HGB BLD-MCNC: 10 G/DL (ref 11.5–16)
IMM GRANULOCYTES # BLD AUTO: 0 K/UL (ref 0–0.04)
IMM GRANULOCYTES NFR BLD AUTO: 1 % (ref 0–0.5)
LYMPHOCYTES # BLD: 1.1 K/UL (ref 0.8–3.5)
LYMPHOCYTES NFR BLD: 14 % (ref 12–49)
MAGNESIUM SERPL-MCNC: 1.6 MG/DL (ref 1.6–2.4)
MCH RBC QN AUTO: 31.9 PG (ref 26–34)
MCHC RBC AUTO-ENTMCNC: 33.8 G/DL (ref 30–36.5)
MCV RBC AUTO: 94.6 FL (ref 80–99)
MONOCYTES # BLD: 0.6 K/UL (ref 0–1)
MONOCYTES NFR BLD: 8 % (ref 5–13)
NEUTS SEG # BLD: 5.4 K/UL (ref 1.8–8)
NEUTS SEG NFR BLD: 68 % (ref 32–75)
NRBC # BLD: 0 K/UL (ref 0–0.01)
NRBC BLD-RTO: 0 PER 100 WBC
PLATELET # BLD AUTO: 275 K/UL (ref 150–400)
PMV BLD AUTO: 9.5 FL (ref 8.9–12.9)
POTASSIUM SERPL-SCNC: 2.9 MMOL/L (ref 3.5–5.1)
RBC # BLD AUTO: 3.13 M/UL (ref 3.8–5.2)
SODIUM SERPL-SCNC: 139 MMOL/L (ref 136–145)
WBC # BLD AUTO: 7.8 K/UL (ref 3.6–11)

## 2023-11-15 PROCEDURE — 99232 SBSQ HOSP IP/OBS MODERATE 35: CPT | Performed by: NURSE PRACTITIONER

## 2023-11-15 PROCEDURE — 97116 GAIT TRAINING THERAPY: CPT

## 2023-11-15 PROCEDURE — G0378 HOSPITAL OBSERVATION PER HR: HCPCS

## 2023-11-15 PROCEDURE — 6370000000 HC RX 637 (ALT 250 FOR IP): Performed by: PSYCHIATRY & NEUROLOGY

## 2023-11-15 PROCEDURE — 6370000000 HC RX 637 (ALT 250 FOR IP): Performed by: PHYSICIAN ASSISTANT

## 2023-11-15 PROCEDURE — 2580000003 HC RX 258: Performed by: STUDENT IN AN ORGANIZED HEALTH CARE EDUCATION/TRAINING PROGRAM

## 2023-11-15 PROCEDURE — 2500000003 HC RX 250 WO HCPCS: Performed by: STUDENT IN AN ORGANIZED HEALTH CARE EDUCATION/TRAINING PROGRAM

## 2023-11-15 PROCEDURE — 97530 THERAPEUTIC ACTIVITIES: CPT

## 2023-11-15 PROCEDURE — 6370000000 HC RX 637 (ALT 250 FOR IP): Performed by: NURSE PRACTITIONER

## 2023-11-15 PROCEDURE — 96376 TX/PRO/DX INJ SAME DRUG ADON: CPT

## 2023-11-15 PROCEDURE — 85025 COMPLETE CBC W/AUTO DIFF WBC: CPT

## 2023-11-15 PROCEDURE — 97165 OT EVAL LOW COMPLEX 30 MIN: CPT

## 2023-11-15 PROCEDURE — 6360000002 HC RX W HCPCS: Performed by: STUDENT IN AN ORGANIZED HEALTH CARE EDUCATION/TRAINING PROGRAM

## 2023-11-15 PROCEDURE — APPNB30 APP NON BILLABLE TIME 0-30 MINS: Performed by: PHYSICIAN ASSISTANT

## 2023-11-15 PROCEDURE — 6370000000 HC RX 637 (ALT 250 FOR IP): Performed by: STUDENT IN AN ORGANIZED HEALTH CARE EDUCATION/TRAINING PROGRAM

## 2023-11-15 PROCEDURE — 92507 TX SP LANG VOICE COMM INDIV: CPT

## 2023-11-15 PROCEDURE — 83735 ASSAY OF MAGNESIUM: CPT

## 2023-11-15 PROCEDURE — 36415 COLL VENOUS BLD VENIPUNCTURE: CPT

## 2023-11-15 PROCEDURE — 80048 BASIC METABOLIC PNL TOTAL CA: CPT

## 2023-11-15 PROCEDURE — 96366 THER/PROPH/DIAG IV INF ADDON: CPT

## 2023-11-15 PROCEDURE — 2500000003 HC RX 250 WO HCPCS: Performed by: PHYSICIAN ASSISTANT

## 2023-11-15 RX ORDER — CLOPIDOGREL BISULFATE 75 MG/1
75 TABLET ORAL DAILY
Status: DISCONTINUED | OUTPATIENT
Start: 2023-11-15 | End: 2023-11-16 | Stop reason: HOSPADM

## 2023-11-15 RX ORDER — LIDOCAINE HYDROCHLORIDE 10 MG/ML
10 INJECTION, SOLUTION EPIDURAL; INFILTRATION; INTRACAUDAL; PERINEURAL ONCE
Status: COMPLETED | OUTPATIENT
Start: 2023-11-15 | End: 2023-11-15

## 2023-11-15 RX ADMIN — POTASSIUM PHOSPHATE, MONOBASIC 250 MG: 500 TABLET, SOLUBLE ORAL at 16:18

## 2023-11-15 RX ADMIN — LOSARTAN POTASSIUM 100 MG: 50 TABLET, FILM COATED ORAL at 08:32

## 2023-11-15 RX ADMIN — DOXYCYCLINE 100 MG: 100 INJECTION, POWDER, LYOPHILIZED, FOR SOLUTION INTRAVENOUS at 07:07

## 2023-11-15 RX ADMIN — AMPICILLIN SODIUM AND SULBACTAM SODIUM 3000 MG: 2; 1 INJECTION, POWDER, FOR SOLUTION INTRAMUSCULAR; INTRAVENOUS at 08:34

## 2023-11-15 RX ADMIN — POTASSIUM PHOSPHATE, MONOBASIC 250 MG: 500 TABLET, SOLUBLE ORAL at 21:53

## 2023-11-15 RX ADMIN — AMLODIPINE BESYLATE 5 MG: 5 TABLET ORAL at 08:32

## 2023-11-15 RX ADMIN — POTASSIUM PHOSPHATE, MONOBASIC 250 MG: 500 TABLET, SOLUBLE ORAL at 11:23

## 2023-11-15 RX ADMIN — GABAPENTIN 200 MG: 100 CAPSULE ORAL at 08:31

## 2023-11-15 RX ADMIN — WATER 2000 MG: 1 INJECTION INTRAMUSCULAR; INTRAVENOUS; SUBCUTANEOUS at 19:01

## 2023-11-15 RX ADMIN — LORAZEPAM 0.5 MG: 0.5 TABLET ORAL at 22:02

## 2023-11-15 RX ADMIN — LIDOCAINE HYDROCHLORIDE 10 ML: 10 INJECTION, SOLUTION EPIDURAL; INFILTRATION; INTRACAUDAL; PERINEURAL at 09:54

## 2023-11-15 RX ADMIN — GABAPENTIN 200 MG: 100 CAPSULE ORAL at 21:53

## 2023-11-15 RX ADMIN — ASPIRIN 81 MG CHEWABLE TABLET 81 MG: 81 TABLET CHEWABLE at 08:31

## 2023-11-15 RX ADMIN — PAROXETINE HYDROCHLORIDE 30 MG: 20 TABLET, FILM COATED ORAL at 23:16

## 2023-11-15 RX ADMIN — CLOPIDOGREL BISULFATE 75 MG: 75 TABLET ORAL at 13:04

## 2023-11-15 RX ADMIN — POTASSIUM PHOSPHATE, MONOBASIC 250 MG: 500 TABLET, SOLUBLE ORAL at 07:07

## 2023-11-15 RX ADMIN — ATORVASTATIN CALCIUM 20 MG: 20 TABLET, FILM COATED ORAL at 08:31

## 2023-11-16 VITALS
HEART RATE: 91 BPM | HEIGHT: 58 IN | DIASTOLIC BLOOD PRESSURE: 85 MMHG | OXYGEN SATURATION: 100 % | WEIGHT: 123.68 LBS | TEMPERATURE: 98.1 F | RESPIRATION RATE: 16 BRPM | BODY MASS INDEX: 25.96 KG/M2 | SYSTOLIC BLOOD PRESSURE: 157 MMHG

## 2023-11-16 LAB
ANION GAP SERPL CALC-SCNC: 8 MMOL/L (ref 5–15)
BACTERIA SPEC CULT: NORMAL
BUN SERPL-MCNC: 12 MG/DL (ref 6–20)
BUN/CREAT SERPL: 12 (ref 12–20)
CALCIUM SERPL-MCNC: 10.9 MG/DL (ref 8.5–10.1)
CC UR VC: NORMAL
CHLORIDE SERPL-SCNC: 101 MMOL/L (ref 97–108)
CO2 SERPL-SCNC: 27 MMOL/L (ref 21–32)
CREAT SERPL-MCNC: 1.01 MG/DL (ref 0.55–1.02)
GLUCOSE SERPL-MCNC: 100 MG/DL (ref 65–100)
POTASSIUM SERPL-SCNC: 3.5 MMOL/L (ref 3.5–5.1)
SERVICE CMNT-IMP: NORMAL
SODIUM SERPL-SCNC: 136 MMOL/L (ref 136–145)

## 2023-11-16 PROCEDURE — 6370000000 HC RX 637 (ALT 250 FOR IP): Performed by: PHYSICIAN ASSISTANT

## 2023-11-16 PROCEDURE — 80048 BASIC METABOLIC PNL TOTAL CA: CPT

## 2023-11-16 PROCEDURE — 36415 COLL VENOUS BLD VENIPUNCTURE: CPT

## 2023-11-16 PROCEDURE — G0378 HOSPITAL OBSERVATION PER HR: HCPCS

## 2023-11-16 PROCEDURE — 6370000000 HC RX 637 (ALT 250 FOR IP): Performed by: NURSE PRACTITIONER

## 2023-11-16 PROCEDURE — 6370000000 HC RX 637 (ALT 250 FOR IP): Performed by: STUDENT IN AN ORGANIZED HEALTH CARE EDUCATION/TRAINING PROGRAM

## 2023-11-16 PROCEDURE — 6370000000 HC RX 637 (ALT 250 FOR IP): Performed by: PSYCHIATRY & NEUROLOGY

## 2023-11-16 RX ORDER — POTASSIUM CHLORIDE 750 MG/1
40 TABLET, FILM COATED, EXTENDED RELEASE ORAL ONCE
Status: COMPLETED | OUTPATIENT
Start: 2023-11-16 | End: 2023-11-16

## 2023-11-16 RX ORDER — CLOPIDOGREL BISULFATE 75 MG/1
75 TABLET ORAL DAILY
Qty: 19 TABLET | Refills: 0 | Status: SHIPPED | OUTPATIENT
Start: 2023-11-17 | End: 2023-12-06

## 2023-11-16 RX ORDER — ASPIRIN 81 MG/1
81 TABLET, CHEWABLE ORAL DAILY
Qty: 30 TABLET | Refills: 3 | Status: SHIPPED | OUTPATIENT
Start: 2023-11-17

## 2023-11-16 RX ORDER — CEPHALEXIN 500 MG/1
500 CAPSULE ORAL 4 TIMES DAILY
Qty: 16 CAPSULE | Refills: 0 | Status: SHIPPED | OUTPATIENT
Start: 2023-11-16 | End: 2023-11-20

## 2023-11-16 RX ADMIN — POTASSIUM CHLORIDE 40 MEQ: 750 TABLET, FILM COATED, EXTENDED RELEASE ORAL at 09:02

## 2023-11-16 RX ADMIN — CLOPIDOGREL BISULFATE 75 MG: 75 TABLET ORAL at 09:02

## 2023-11-16 RX ADMIN — LOSARTAN POTASSIUM 100 MG: 50 TABLET, FILM COATED ORAL at 09:02

## 2023-11-16 RX ADMIN — GABAPENTIN 200 MG: 100 CAPSULE ORAL at 09:02

## 2023-11-16 RX ADMIN — ASPIRIN 81 MG CHEWABLE TABLET 81 MG: 81 TABLET CHEWABLE at 09:02

## 2023-11-16 RX ADMIN — POTASSIUM PHOSPHATE, MONOBASIC 250 MG: 500 TABLET, SOLUBLE ORAL at 11:55

## 2023-11-16 RX ADMIN — AMLODIPINE BESYLATE 5 MG: 5 TABLET ORAL at 09:02

## 2023-11-16 RX ADMIN — ATORVASTATIN CALCIUM 20 MG: 20 TABLET, FILM COATED ORAL at 09:02

## 2023-11-16 RX ADMIN — POTASSIUM PHOSPHATE, MONOBASIC 250 MG: 500 TABLET, SOLUBLE ORAL at 09:02

## 2023-11-16 NOTE — DISCHARGE INSTRUCTIONS
effect  or edema.  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 Discussed this finding with Neurology NP, who had spoken with MD Maykel Torres ([facility]) no intervention at this time  - Continue ASA/Statin  - Clopidogrel x 21 days EOT 12/06/23  - Follow up with neurology as an outpatient     Cellulitis of left thumb  ? Paronychia  - May need deeper incision and drainage  - Obtain x-ray of left thumb  - ABX: stopping doxycycline and unasyn today. L thumb without purulence or fluctuance. SIRS negative  - Orthopedic surgery following, preformed removal of L thumbnail at bedside today  - ABX: will discharge with cephalexin x 5 days to complete 7 days total ABX. Hypophosphatemia  Hypokalemia  - Repleted with Kphos   - Recommend follow up with PCP for repeat lab work    Possible Acute cystitis  -? Unclear if contributory to #1 above  - Moderate epithelial cells (not a great specimen)  - UCX initially with probable enterococcus, final C&S noting only urogenital sunshine      Hypertension  -Continue home amlodipine and losartan     Dyslipidemia  -Continue home statin     Mood disorder  -Continue home Paxil       CONSULTATIONS: IP CONSULT TO NEUROLOGY  IP CONSULT TO ORTHOPEDIC SURGERY  IP CONSULT HOME HEALTH    PROCEDURES/SURGERIES: * No surgery found *    PENDING TEST RESULTS:   At the time of discharge the following test results are still pending: none    FOLLOW UP APPOINTMENTS:   [email or web address]     AADDITIONAL CARE RECOMMENDATIONS: Follow up with your PCP within 72 hours if possible, Follow up with Dr. Olive March next week for follow up of Left thumb nail removal     DIET: regular diet     ACTIVITY: activity as tolerated and Per Home health     WOUND CARE: Wash wound with Vashe daily, apply silver dressing to thumb and wrap with gauze.  Follow up with Orthopedic surgeon next week     EQUIPMENT needed: TBD      DISCHARGE

## 2023-11-16 NOTE — DISCHARGE SUMMARY
Discharge Summary       PATIENT ID: Dago Bose  MRN: [id number]   YOB: 1935    DATE OF ADMISSION: 11/13/2023  4:06 PM    DATE OF DISCHARGE: 11/15/2023   PRIMARY CARE PROVIDER: Stevie Lewis MD     ATTENDING PHYSICIAN: Marley Murray MD  DISCHARGING PROVIDER: Marisela Resendez PA-C    To contact this individual call [phone] and ask the  to page. If unavailable ask to be transferred the Adult Hospitalist Department. CONSULTATIONS: IP CONSULT TO NEUROLOGY  IP CONSULT TO ORTHOPEDIC SURGERY    PROCEDURES/SURGERIES: * No surgery found *     ADMITTING [address] [facility], [address] COURSE:   Dago Bose is a 81 y.o. female with hx of htn, dyslipidemia, chronic low back pain, asthma, dyslipidemia who presented to ed with complaints of word finding and expressive difficulty. She states over the last week, she has noted that although her short and long-term memory are intact, she has had difficulty with word finding and expressing herself which is unusual for her. States she has history of TIA several years ago with mild memory deficit following this which she feels has improved. Patient endorses difficulty with word remembering some artery which has been ongoing for the last few days. She has had no associated falls or head injury. Additionally, patient states she noted a sore to her left thumb almost 2 weeks ago which has progressed to an abscess. [facility] and states she had a superficial incision and drainage and was prescribed an antibiotic. Records she was prescribed doxycycline. The patient denies any fever, chills, chest or abdominal pain, nausea, vomiting, cough, congestion, recent illness, palpitations, or dysuria.      Remarkable vitals on ER Presentation: bp to 188/92  Labs Remarkable for: k-2.9, cr 1.26, wbc 12.1, UA: 2+ bact, small Les, trace Ketone  ER Images: ct head: no acute process  ER Rx: k-40 meq           DISCHARGE DIAGNOSES

## 2023-11-16 NOTE — CARE COORDINATION
Transition of Care Plan:    RUR: Observation   Prior Level of Functioning: Independent    Disposition: Home with Family Assistance and [address]  Per conversation she reported that she was not interested in this cm coordinating services with a [address] agency. Follow up appointments: PCP  DME needed: None   Transportation at discharge: Spouse  IM/IMM Medicare/ letter given: N/A   Caregiver Contact: Glenys Laguna [phone]; Serjio Hart [phone]; Ajit Rasmussen [phone]  Discharge Caregiver contacted prior to discharge? N/A  Care Conference needed? No   Barriers to discharge: Labs pending    11/16/23 1101   Service Assessment   Patient Orientation Alert and Oriented   Cognition Alert   History Provided By Patient   Primary Caregiver Self   Support Systems Spouse/Significant Other   Patient's Healthcare Decision Maker is: Legal Next of Kin   PCP Verified by CM Yes   Last Visit to PCP Within last 3 months   Prior Functional Level Independent in ADLs/IADLs   Current Functional Level Independent in ADLs/IADLs   Services At/After Discharge   Transition of Care Consult (CM Consult) Discharge Planning;Home Health   Internal Home Health No   Reason Outside Agency Chosen Unable to staff case   Mode of Transport at Discharge Other (see comment)   Condition of Participation: Discharge Planning   The Plan for Transition of Care is related to the following treatment goals: Home   The Patient and/or Patient Representative was provided with a Choice of Provider? Patient   The Patient and/Or Patient Representative agree with the Discharge Plan? Yes   Freedom of Choice list was provided with basic dialogue that supports the patient's individualized plan of care/goals, treatment preferences, and shares the quality data associated with the providers?   Yes

## 2023-11-18 LAB
BACTERIA SPEC CULT: NORMAL
BACTERIA SPEC CULT: NORMAL
SERVICE CMNT-IMP: NORMAL
SERVICE CMNT-IMP: NORMAL

## 2023-12-04 ENCOUNTER — OFFICE VISIT (OUTPATIENT)
Age: 88
End: 2023-12-04
Payer: MEDICARE

## 2023-12-04 VITALS
RESPIRATION RATE: 22 BRPM | BODY MASS INDEX: 27.18 KG/M2 | HEART RATE: 89 BPM | OXYGEN SATURATION: 90 % | WEIGHT: 126 LBS | SYSTOLIC BLOOD PRESSURE: 109 MMHG | HEIGHT: 57 IN | DIASTOLIC BLOOD PRESSURE: 70 MMHG | TEMPERATURE: 97 F

## 2023-12-04 DIAGNOSIS — E83.42 HYPOMAGNESEMIA: ICD-10-CM

## 2023-12-04 DIAGNOSIS — R47.89 WORD FINDING DIFFICULTY: Primary | ICD-10-CM

## 2023-12-04 DIAGNOSIS — E87.6 HYPOKALEMIA: ICD-10-CM

## 2023-12-04 DIAGNOSIS — G45.9 TIA (TRANSIENT ISCHEMIC ATTACK): ICD-10-CM

## 2023-12-04 DIAGNOSIS — Z09 HOSPITAL DISCHARGE FOLLOW-UP: ICD-10-CM

## 2023-12-04 DIAGNOSIS — L03.012 PARONYCHIA OF LEFT THUMB: ICD-10-CM

## 2023-12-04 PROBLEM — H43.819 VITREOUS DEGENERATION: Status: ACTIVE | Noted: 2020-03-03

## 2023-12-04 PROBLEM — H04.123 DRY EYE SYNDROME OF BILATERAL LACRIMAL GLANDS: Status: ACTIVE | Noted: 2023-12-04

## 2023-12-04 LAB
ANION GAP SERPL CALC-SCNC: 5 MMOL/L (ref 5–15)
BUN SERPL-MCNC: 12 MG/DL (ref 6–20)
BUN/CREAT SERPL: 11 (ref 12–20)
CALCIUM SERPL-MCNC: 11.8 MG/DL (ref 8.5–10.1)
CHLORIDE SERPL-SCNC: 104 MMOL/L (ref 97–108)
CO2 SERPL-SCNC: 30 MMOL/L (ref 21–32)
CREAT SERPL-MCNC: 1.08 MG/DL (ref 0.55–1.02)
GLUCOSE SERPL-MCNC: 86 MG/DL (ref 65–100)
MAGNESIUM SERPL-MCNC: 1.5 MG/DL (ref 1.6–2.4)
POTASSIUM SERPL-SCNC: 3.3 MMOL/L (ref 3.5–5.1)
SODIUM SERPL-SCNC: 139 MMOL/L (ref 136–145)

## 2023-12-04 PROCEDURE — 99214 OFFICE O/P EST MOD 30 MIN: CPT | Performed by: NURSE PRACTITIONER

## 2023-12-04 PROCEDURE — 1123F ACP DISCUSS/DSCN MKR DOCD: CPT | Performed by: NURSE PRACTITIONER

## 2023-12-04 PROCEDURE — G8484 FLU IMMUNIZE NO ADMIN: HCPCS | Performed by: NURSE PRACTITIONER

## 2023-12-04 PROCEDURE — G8419 CALC BMI OUT NRM PARAM NOF/U: HCPCS | Performed by: NURSE PRACTITIONER

## 2023-12-04 PROCEDURE — 1090F PRES/ABSN URINE INCON ASSESS: CPT | Performed by: NURSE PRACTITIONER

## 2023-12-04 PROCEDURE — 1036F TOBACCO NON-USER: CPT | Performed by: NURSE PRACTITIONER

## 2023-12-04 PROCEDURE — G8427 DOCREV CUR MEDS BY ELIG CLIN: HCPCS | Performed by: NURSE PRACTITIONER

## 2023-12-04 PROCEDURE — 1111F DSCHRG MED/CURRENT MED MERGE: CPT | Performed by: NURSE PRACTITIONER

## 2023-12-05 ENCOUNTER — TELEPHONE (OUTPATIENT)
Age: 88
End: 2023-12-05

## 2023-12-05 RX ORDER — MAGNESIUM GLYCINATE 100 MG
1 CAPSULE ORAL NIGHTLY
Qty: 180 CAPSULE | Refills: 1 | Status: SHIPPED | OUTPATIENT
Start: 2023-12-05 | End: 2023-12-05 | Stop reason: SDUPTHER

## 2023-12-05 RX ORDER — MAGNESIUM GLYCINATE 100 MG
1 CAPSULE ORAL NIGHTLY
Qty: 10 CAPSULE | Refills: 1 | Status: SHIPPED | OUTPATIENT
Start: 2023-12-05 | End: 2023-12-06 | Stop reason: ALTCHOICE

## 2023-12-05 NOTE — TELEPHONE ENCOUNTER
Called patient. Used 2 identifiers. Follow up from visit yesterday. Did drink water. Hit leg on . Was bleeding a lot. Bandaged. Reviewed recent labs. Added a magnesium supplement to help with sleep. Reports she did not take Ativan last night as directed. Has not taken BP yet today. She will schedule 1 week follow up with me. Sounded better on phone with less word finding.

## 2023-12-06 ENCOUNTER — APPOINTMENT (OUTPATIENT)
Facility: HOSPITAL | Age: 88
End: 2023-12-06
Payer: MEDICARE

## 2023-12-06 ENCOUNTER — TELEPHONE (OUTPATIENT)
Age: 88
End: 2023-12-06

## 2023-12-06 ENCOUNTER — NURSE TRIAGE (OUTPATIENT)
Dept: OTHER | Facility: CLINIC | Age: 88
End: 2023-12-06

## 2023-12-06 ENCOUNTER — HOSPITAL ENCOUNTER (OUTPATIENT)
Facility: HOSPITAL | Age: 88
Setting detail: OBSERVATION
Discharge: HOME | End: 2023-12-07
Attending: EMERGENCY MEDICINE | Admitting: HOSPITALIST
Payer: MEDICARE

## 2023-12-06 DIAGNOSIS — F41.9 ANXIETY: ICD-10-CM

## 2023-12-06 DIAGNOSIS — G62.9 NEUROPATHY: ICD-10-CM

## 2023-12-06 DIAGNOSIS — R42 DIZZINESS: Primary | ICD-10-CM

## 2023-12-06 LAB
ALBUMIN SERPL-MCNC: 4.2 G/DL (ref 3.5–5)
ALBUMIN/GLOB SERPL: 1.4 (ref 1.1–2.2)
ALP SERPL-CCNC: 93 U/L (ref 45–117)
ALT SERPL-CCNC: 30 U/L (ref 12–78)
ANION GAP SERPL CALC-SCNC: 9 MMOL/L (ref 5–15)
APPEARANCE UR: CLEAR
AST SERPL-CCNC: 29 U/L (ref 15–37)
BACTERIA URNS QL MICRO: NEGATIVE /HPF
BASOPHILS # BLD: 0 K/UL (ref 0–0.1)
BASOPHILS NFR BLD: 0 % (ref 0–1)
BILIRUB SERPL-MCNC: 1.4 MG/DL (ref 0.2–1)
BILIRUB UR QL: NEGATIVE
BUN SERPL-MCNC: 11 MG/DL (ref 6–20)
BUN/CREAT SERPL: 10 (ref 12–20)
CALCIUM SERPL-MCNC: 12.5 MG/DL (ref 8.5–10.1)
CHLORIDE SERPL-SCNC: 101 MMOL/L (ref 97–108)
CO2 SERPL-SCNC: 30 MMOL/L (ref 21–32)
COLOR UR: ABNORMAL
COMMENT:: NORMAL
CREAT SERPL-MCNC: 1.1 MG/DL (ref 0.55–1.02)
DIFFERENTIAL METHOD BLD: NORMAL
EOSINOPHIL # BLD: 0 K/UL (ref 0–0.4)
EOSINOPHIL NFR BLD: 1 % (ref 0–7)
EPITH CASTS URNS QL MICRO: ABNORMAL /LPF
ERYTHROCYTE [DISTWIDTH] IN BLOOD BY AUTOMATED COUNT: 13 % (ref 11.5–14.5)
GLOBULIN SER CALC-MCNC: 2.9 G/DL (ref 2–4)
GLUCOSE SERPL-MCNC: 119 MG/DL (ref 65–100)
GLUCOSE UR STRIP.AUTO-MCNC: NEGATIVE MG/DL
HCT VFR BLD AUTO: 37.2 % (ref 35–47)
HGB BLD-MCNC: 12.9 G/DL (ref 11.5–16)
HGB UR QL STRIP: NEGATIVE
HYALINE CASTS URNS QL MICRO: ABNORMAL /LPF (ref 0–5)
IMM GRANULOCYTES # BLD AUTO: 0 K/UL (ref 0–0.04)
IMM GRANULOCYTES NFR BLD AUTO: 0 % (ref 0–0.5)
KETONES UR QL STRIP.AUTO: NEGATIVE MG/DL
LEUKOCYTE ESTERASE UR QL STRIP.AUTO: ABNORMAL
LYMPHOCYTES # BLD: 1.6 K/UL (ref 0.8–3.5)
LYMPHOCYTES NFR BLD: 21 % (ref 12–49)
MCH RBC QN AUTO: 31.9 PG (ref 26–34)
MCHC RBC AUTO-ENTMCNC: 34.7 G/DL (ref 30–36.5)
MCV RBC AUTO: 92.1 FL (ref 80–99)
MONOCYTES # BLD: 0.5 K/UL (ref 0–1)
MONOCYTES NFR BLD: 7 % (ref 5–13)
NEUTS SEG # BLD: 5.2 K/UL (ref 1.8–8)
NEUTS SEG NFR BLD: 71 % (ref 32–75)
NITRITE UR QL STRIP.AUTO: NEGATIVE
NRBC # BLD: 0 K/UL (ref 0–0.01)
NRBC BLD-RTO: 0 PER 100 WBC
PH UR STRIP: 7.5 (ref 5–8)
PLATELET # BLD AUTO: 231 K/UL (ref 150–400)
PMV BLD AUTO: 10.7 FL (ref 8.9–12.9)
POTASSIUM SERPL-SCNC: 3.3 MMOL/L (ref 3.5–5.1)
PROT SERPL-MCNC: 7.1 G/DL (ref 6.4–8.2)
PROT UR STRIP-MCNC: NEGATIVE MG/DL
RBC # BLD AUTO: 4.04 M/UL (ref 3.8–5.2)
RBC #/AREA URNS HPF: ABNORMAL /HPF (ref 0–5)
SODIUM SERPL-SCNC: 140 MMOL/L (ref 136–145)
SP GR UR REFRACTOMETRY: 1.01 (ref 1–1.03)
SPECIMEN HOLD: NORMAL
SPECIMEN HOLD: NORMAL
TROPONIN I SERPL HS-MCNC: 14 NG/L (ref 0–51)
UROBILINOGEN UR QL STRIP.AUTO: 0.2 EU/DL (ref 0.2–1)
WBC # BLD AUTO: 7.3 K/UL (ref 3.6–11)
WBC URNS QL MICRO: ABNORMAL /HPF (ref 0–4)

## 2023-12-06 PROCEDURE — 6360000004 HC RX CONTRAST MEDICATION: Performed by: RADIOLOGY

## 2023-12-06 PROCEDURE — G0378 HOSPITAL OBSERVATION PER HR: HCPCS

## 2023-12-06 PROCEDURE — 70498 CT ANGIOGRAPHY NECK: CPT

## 2023-12-06 PROCEDURE — 96365 THER/PROPH/DIAG IV INF INIT: CPT

## 2023-12-06 PROCEDURE — 93005 ELECTROCARDIOGRAM TRACING: CPT | Performed by: EMERGENCY MEDICINE

## 2023-12-06 PROCEDURE — 6360000002 HC RX W HCPCS: Performed by: HOSPITALIST

## 2023-12-06 PROCEDURE — 0042T CT BRAIN PERFUSION: CPT

## 2023-12-06 PROCEDURE — 6370000000 HC RX 637 (ALT 250 FOR IP): Performed by: HOSPITALIST

## 2023-12-06 PROCEDURE — 2580000003 HC RX 258: Performed by: HOSPITALIST

## 2023-12-06 PROCEDURE — 81001 URINALYSIS AUTO W/SCOPE: CPT

## 2023-12-06 PROCEDURE — 96366 THER/PROPH/DIAG IV INF ADDON: CPT

## 2023-12-06 PROCEDURE — 84484 ASSAY OF TROPONIN QUANT: CPT

## 2023-12-06 PROCEDURE — APPNB45 APP NON BILLABLE 31-45 MINUTES: Performed by: NURSE PRACTITIONER

## 2023-12-06 PROCEDURE — 2580000003 HC RX 258: Performed by: EMERGENCY MEDICINE

## 2023-12-06 PROCEDURE — 99285 EMERGENCY DEPT VISIT HI MDM: CPT

## 2023-12-06 PROCEDURE — 85025 COMPLETE CBC W/AUTO DIFF WBC: CPT

## 2023-12-06 PROCEDURE — 96372 THER/PROPH/DIAG INJ SC/IM: CPT

## 2023-12-06 PROCEDURE — 70450 CT HEAD/BRAIN W/O DYE: CPT

## 2023-12-06 PROCEDURE — 6370000000 HC RX 637 (ALT 250 FOR IP): Performed by: EMERGENCY MEDICINE

## 2023-12-06 PROCEDURE — 36415 COLL VENOUS BLD VENIPUNCTURE: CPT

## 2023-12-06 PROCEDURE — 80053 COMPREHEN METABOLIC PANEL: CPT

## 2023-12-06 PROCEDURE — 96375 TX/PRO/DX INJ NEW DRUG ADDON: CPT

## 2023-12-06 RX ORDER — ONDANSETRON 2 MG/ML
4 INJECTION INTRAMUSCULAR; INTRAVENOUS EVERY 6 HOURS PRN
Status: DISCONTINUED | OUTPATIENT
Start: 2023-12-06 | End: 2023-12-07 | Stop reason: HOSPADM

## 2023-12-06 RX ORDER — ENOXAPARIN SODIUM 100 MG/ML
30 INJECTION SUBCUTANEOUS DAILY
Status: DISCONTINUED | OUTPATIENT
Start: 2023-12-06 | End: 2023-12-07 | Stop reason: HOSPADM

## 2023-12-06 RX ORDER — LORAZEPAM 1 MG/1
1 TABLET ORAL
COMMUNITY
End: 2023-12-06 | Stop reason: ALTCHOICE

## 2023-12-06 RX ORDER — SODIUM CHLORIDE 9 MG/ML
INJECTION, SOLUTION INTRAVENOUS PRN
Status: DISCONTINUED | OUTPATIENT
Start: 2023-12-06 | End: 2023-12-07 | Stop reason: HOSPADM

## 2023-12-06 RX ORDER — SODIUM CHLORIDE AND POTASSIUM CHLORIDE 300; 900 MG/100ML; MG/100ML
INJECTION, SOLUTION INTRAVENOUS CONTINUOUS
Status: DISCONTINUED | OUTPATIENT
Start: 2023-12-06 | End: 2023-12-07

## 2023-12-06 RX ORDER — ONDANSETRON 4 MG/1
4 TABLET, ORALLY DISINTEGRATING ORAL EVERY 8 HOURS PRN
Status: DISCONTINUED | OUTPATIENT
Start: 2023-12-06 | End: 2023-12-07 | Stop reason: HOSPADM

## 2023-12-06 RX ORDER — SODIUM CHLORIDE 9 MG/ML
INJECTION, SOLUTION INTRAVENOUS CONTINUOUS
Status: DISCONTINUED | OUTPATIENT
Start: 2023-12-06 | End: 2023-12-06

## 2023-12-06 RX ORDER — GABAPENTIN 300 MG/1
300 CAPSULE ORAL 4 TIMES DAILY
COMMUNITY
End: 2023-12-06 | Stop reason: ALTCHOICE

## 2023-12-06 RX ORDER — POTASSIUM CHLORIDE 750 MG/1
20 TABLET, FILM COATED, EXTENDED RELEASE ORAL ONCE
Status: COMPLETED | OUTPATIENT
Start: 2023-12-06 | End: 2023-12-06

## 2023-12-06 RX ORDER — SODIUM CHLORIDE 0.9 % (FLUSH) 0.9 %
5-40 SYRINGE (ML) INJECTION EVERY 12 HOURS SCHEDULED
Status: DISCONTINUED | OUTPATIENT
Start: 2023-12-06 | End: 2023-12-07 | Stop reason: HOSPADM

## 2023-12-06 RX ORDER — PAROXETINE HYDROCHLORIDE 20 MG/1
30 TABLET, FILM COATED ORAL DAILY
Status: DISCONTINUED | OUTPATIENT
Start: 2023-12-06 | End: 2023-12-07 | Stop reason: HOSPADM

## 2023-12-06 RX ORDER — ATORVASTATIN CALCIUM 40 MG/1
80 TABLET, FILM COATED ORAL NIGHTLY
Status: DISCONTINUED | OUTPATIENT
Start: 2023-12-06 | End: 2023-12-07

## 2023-12-06 RX ORDER — GABAPENTIN 300 MG/1
300 CAPSULE ORAL 2 TIMES DAILY
Status: DISCONTINUED | OUTPATIENT
Start: 2023-12-06 | End: 2023-12-07 | Stop reason: HOSPADM

## 2023-12-06 RX ORDER — CLOPIDOGREL BISULFATE 75 MG/1
75 TABLET ORAL DAILY
Status: DISCONTINUED | OUTPATIENT
Start: 2023-12-07 | End: 2023-12-07

## 2023-12-06 RX ORDER — 0.9 % SODIUM CHLORIDE 0.9 %
1000 INTRAVENOUS SOLUTION INTRAVENOUS
Status: COMPLETED | OUTPATIENT
Start: 2023-12-06 | End: 2023-12-06

## 2023-12-06 RX ORDER — SODIUM CHLORIDE 0.9 % (FLUSH) 0.9 %
5-40 SYRINGE (ML) INJECTION PRN
Status: DISCONTINUED | OUTPATIENT
Start: 2023-12-06 | End: 2023-12-07 | Stop reason: HOSPADM

## 2023-12-06 RX ORDER — ASPIRIN 81 MG/1
324 TABLET, CHEWABLE ORAL
Status: COMPLETED | OUTPATIENT
Start: 2023-12-06 | End: 2023-12-06

## 2023-12-06 RX ORDER — POLYETHYLENE GLYCOL 3350 17 G/17G
17 POWDER, FOR SOLUTION ORAL DAILY PRN
Status: DISCONTINUED | OUTPATIENT
Start: 2023-12-06 | End: 2023-12-07 | Stop reason: HOSPADM

## 2023-12-06 RX ORDER — LORAZEPAM 2 MG/1
1 TABLET ORAL EVERY EVENING
Status: DISCONTINUED | OUTPATIENT
Start: 2023-12-06 | End: 2023-12-07 | Stop reason: HOSPADM

## 2023-12-06 RX ADMIN — POTASSIUM CHLORIDE 20 MEQ: 750 TABLET, FILM COATED, EXTENDED RELEASE ORAL at 18:59

## 2023-12-06 RX ADMIN — IOPAMIDOL 40 ML: 755 INJECTION, SOLUTION INTRAVENOUS at 15:43

## 2023-12-06 RX ADMIN — SODIUM CHLORIDE 1000 ML: 9 INJECTION, SOLUTION INTRAVENOUS at 16:20

## 2023-12-06 RX ADMIN — GABAPENTIN 300 MG: 300 CAPSULE ORAL at 21:55

## 2023-12-06 RX ADMIN — ENOXAPARIN SODIUM 30 MG: 100 INJECTION SUBCUTANEOUS at 18:59

## 2023-12-06 RX ADMIN — ONDANSETRON 4 MG: 2 INJECTION INTRAMUSCULAR; INTRAVENOUS at 19:00

## 2023-12-06 RX ADMIN — IOPAMIDOL 80 ML: 755 INJECTION, SOLUTION INTRAVENOUS at 15:44

## 2023-12-06 RX ADMIN — POTASSIUM CHLORIDE AND SODIUM CHLORIDE: 900; 300 INJECTION, SOLUTION INTRAVENOUS at 18:59

## 2023-12-06 RX ADMIN — ATORVASTATIN CALCIUM 80 MG: 40 TABLET, FILM COATED ORAL at 21:56

## 2023-12-06 RX ADMIN — SODIUM CHLORIDE, PRESERVATIVE FREE 10 ML: 5 INJECTION INTRAVENOUS at 19:05

## 2023-12-06 RX ADMIN — ASPIRIN 81 MG CHEWABLE TABLET 324 MG: 81 TABLET CHEWABLE at 16:20

## 2023-12-06 RX ADMIN — LORAZEPAM 1 MG: 1 TABLET ORAL at 18:59

## 2023-12-06 ASSESSMENT — PAIN SCALES - GENERAL: PAINLEVEL_OUTOF10: 0

## 2023-12-06 ASSESSMENT — PAIN - FUNCTIONAL ASSESSMENT: PAIN_FUNCTIONAL_ASSESSMENT: NONE - DENIES PAIN

## 2023-12-06 NOTE — PROGRESS NOTES
Neurocritical Care Code Stroke Documentation      Symptoms:  Dizziness and word finding difficulty, was admitted 3 weeks ago for same symptoms, MRI brain showed punctate L parieto-occipital stroke    Baseline mRS:      Last Known Well:   2100   Medical hx: Past Medical History:   Diagnosis Date    Anxiety     Arthritis back pain    fingers    Asthma     MILD - NO INHALERS    Chronic pain     Colitis     Diverticulitis 2013    Hypercholesterolemia     Hypertension     IBS (irritable bowel syndrome)     Ill-defined condition     H/O UTI POST DOW CATH    Insomnia     Nausea & vomiting     Stroke ([address])     tia   PATENT  FORAMEN  OVALE    Swollen L ankle       Anticoagulation:  None but is on DAPT with ASA+plavix   VAN:   Negative   NIHSS:   1a-LOC:0    1b-Month/Age:0    1c-Open/Close Hand:0    2-Best Gaze:0    3-Visual Fields:0    4-Facial Palsy:0    5a-Left Arm:0    5b-Right Arm:0    6a-Left Le    6b-Right Le    7-Limb Ataxia:0    8-Sensory:0    9-Best Language:1 (word finding difficulty)    10-Dysarthria:0    11-Extinction/Inattention:0  TOTAL SCORE:1   Imaging:   CT head negative for acute process    CTA unchanged left vert occlusion, no LVO    CTP   Plan:   TNK Candidate: NO    Mechanical thrombectomy Candidate: NO     *Perform dysphagia screening prior to any PO intake*    Discussed with: Dr Adali Santos time: Met EMS in scanner  Time spent: 35 minutes.      LUCHO Sanchez - NP  Neurocritical Care Nurse Practitioner  [phone]

## 2023-12-06 NOTE — ED TRIAGE NOTES
Pt arrives via EMS for hypertension with SBP 200s at home. Pt complains of dizziness and nausea this morning when she woke up that has resolved. LKW 9pm last night.    Pt recently admitted here for TIA/stroke last week.

## 2023-12-06 NOTE — PROGRESS NOTES
Gabapentin dose adjusted from 300 mg QID to 300 mg BID for CrCl 29 mL/min per [facility]/[employer]-approved renal dose adjustment protocol.

## 2023-12-06 NOTE — H&P
History and Physical    Date of Service:  12/6/2023  Primary Care Provider: Chris Oswald MD  Source of information: The patient and Chart review    Chief Complaint: Dizziness      History of Presenting Illness:   Desi Bustamante is a 81 y.o. female with PMH significant for stroke, hypertension, hyperlipidemia presented with dizziness and word finding difficulty and is being admitted for stroke evaluation. Symptoms have started yesterday. She did not have any visual symptoms or focal weakness. Brain MRI during recent hospitalization for similar symptoms showed punctate acute infarction in the medial left parietal occipital junction. 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 She is compliant with aspirin, Plavix. In the ED she was evaluated with CT, CT angiogram of the head and neck which did not show any new acute findings. She was evaluated by teleneurologist recommended continuing Plavix, increasing aspirin from  and admission for observation and further evaluation. The patient denies any headache, blurry vision, sore throat, trouble swallowing, trouble with speech, chest pain, SOB, cough, fever, chills, N/V/D, abd pain, urinary symptoms, constipation, recent travels, sick contacts, focal or generalized neurological symptoms, falls, injuries, rashes, contact with COVID-19 diagnosed patients, hematemesis, melena, hemoptysis, hematuria, rashes, denies starting any new medications and denies any other concerns or problems besides as mentioned above. REVIEW OF SYSTEMS:  A comprehensive review of systems was negative except for that written in the History of Present Illness.      Past Medical History:   Diagnosis Date    Anxiety     Arthritis back pain    fingers    Asthma     MILD - NO INHALERS    Chronic pain     Colitis     Diverticulitis 06/11/2013    Hypercholesterolemia     Hypertension     IBS (irritable bowel

## 2023-12-06 NOTE — TELEPHONE ENCOUNTER
Called and left a detailed voice message for callback 12:56 PM   Sol Kamara, ANABELL 12/06/23
Called and verified name and date of birth. Pt took losartan x2 hours ago. BP reading as of 1:43 PM: 196/94  Too high per provider, Didier Schuster, DANI. As per plan, patient strongly encouraged to seek immediate medical attention for current problem at nearest ED per provider instructions. Pt understands and Complies with provider instructions and directions. No further questions at this time.      González Mclaughlin, CCT  12/6/23  1:48 PM
Per Provider Take BP 2 hours after medication. If elevated above 160 OR If having problems with speech or walking/ call EMS. Take BP medicine as directed. Called and verified name and date of birth. As of now 11:28 AM, BP is 194/108  Last took losartan: not today   Symptoms no changes. Will take medicine as directed, will follow above instructions exactly. Pt to take losartan and wait two hours and if above 313 systolic, will seek emergency medical assistance. Pt understands and Complies with provider instructions and directions. No further questions at this time.      Monico Goodpasture, ANABELL  12/6/23  11:33 AM
Pt is very concerned about very high blood pressure and would like to speak to the provider as soon as possible.
Vision changes: no   Dizziness/vertigo: pt reports not standing up. Pt has had ice water today  Pt had one bite of food so far today, made pt sick and pt did not eat. 10:14 AM BP reading - 164/90  Pt is still very worried at this time. Reassured patient to best of ability. Pt is anxious about BP being this high. Will route to provider to continue to advise next course of action. If you start feeling any worsening of symptoms such as but not restricted to chest pain, shortness of breath,shortness of breath on exertion, headache, nausea, vomiting, diarrhea, seek immediate medical attention per provider. Pt understands and Complies with provider instructions and directions. No further questions at this time.      Signed By: Ravi Pagan Clinical Care Technician    12/06/23  10:18 AM

## 2023-12-06 NOTE — TELEPHONE ENCOUNTER
Location of [place]: [place]    Received call from [facility] at [facility] with [employer]. Subjective: Caller states \"high BP\"     Call was discinnected when ECC agent hung up. Writer attempted to call patient back, no answer, left vm to call office. Attention Provider: Thank you for allowing me to participate in the care of your patient. The patient was connected to triage in response to information provided to the ECC/PSC. Please do not respond through this encounter as the response is not directed to a shared pool. Reason for Disposition   Message left on unidentified voice mail. Phone number verified.     Protocols used: No Contact or Duplicate Contact Call-ADULT-[place]

## 2023-12-06 NOTE — ED PROVIDER NOTES
ON RIGHT HAND AFTER FALL    [facility] UNLISTED PROCEDURE CARDIAC SURGERY  08/2004    repair of foramen ovale    SHOULDER ARTHROPLASTY Right 01/2020    SHOULDER ARTHROPLASTY Left 2022    TONSILLECTOMY  CHILDHOOD    WISDOM TOOTH EXTRACTION      X3         ALLERGIES     Adhesive tape, Cefdinir, Oxycodone-acetaminophen, Phenylephrine, Phenylpropanolamine, Sulfa antibiotics, Ciprofloxacin, and Metronidazole    FAMILY HISTORY       Family History   Problem Relation Age of Onset    Cancer Father         PANCREATIC    Stroke Mother     No Known Problems Daughter     Other Mother         SUARACHNOID HEMORRHAGE-ANEURYSM    Anesth Problems Daughter         N/V          SOCIAL HISTORY       Social History     Socioeconomic History    Marital status:    Tobacco Use    Smoking status: Never    Smokeless tobacco: Never   Substance and Sexual Activity    Alcohol use: Yes     Alcohol/week: 10.0 standard drinks of alcohol    Drug use: No     Social Determinants of Health     Financial Resource Strain: Low Risk  (9/27/2023)    Overall Financial Resource Strain (CARDIA)     Difficulty of Paying Living Expenses: Not hard at all   Transportation Needs: Unknown (9/27/2023)    PRAPARE - Transportation     Lack of Transportation (Non-Medical): No   Housing Stability: Unknown (9/27/2023)    Housing Stability Vital Sign     Unstable Housing in the Last Year: No           PHYSICAL EXAM       ED Triage Vitals   BP Temp Temp src Pulse Resp SpO2 Height Weight   -- -- -- -- -- -- -- --     Nursing note and vitals reviewed. Constitutional: oriented to person, place, and time. appears well-developed and well-nourished. No distress. Head: Normocephalic and atraumatic. Sclera anicteric  Nose: No rhinorrhea  Mouth/Throat: Oropharynx is clear and moist. Pharynx normal  Eyes: Conjunctivae are normal. Pupils are equal, round, and reactive to light. Right eye exhibits no discharge. Left eye exhibits no discharge. Neck: Painless normal range of motion. ED ([phone])    Other:  Juana Rondon here with dizziness and trouble getting the right words out. Last known well yesterday. Admitted here 2-3 weeks ago for CVA. No new findings on imaging. Per teleneuro - continue on plavix 75mg (was supposed to stop after today) and increase ASA from 81mg to 325mg daily and admit for workup. Total critical care time spent exclusive of procedures:  35 minutes.       Procedures  Unless otherwise noted below, none     Procedures                (Please note that portions of this note were completed with a voice recognition program.  Efforts were made to edit the dictations but occasionally words are mis-transcribed.)    Gisell Casarez MD (electronically signed)  Emergency Attending Physician / Physician Assistant / Nurse Practitioner              Sid Ferreira MD  12/06/23 9951       Sid Ferreira MD  12/06/23 [facility] Luz Elena Graf MD  12/06/23 6271

## 2023-12-07 ENCOUNTER — TELEPHONE (OUTPATIENT)
Age: 88
End: 2023-12-07

## 2023-12-07 ENCOUNTER — APPOINTMENT (OUTPATIENT)
Facility: HOSPITAL | Age: 88
End: 2023-12-07
Payer: MEDICARE

## 2023-12-07 ENCOUNTER — TELEPHONE (OUTPATIENT)
Facility: HOSPITAL | Age: 88
End: 2023-12-07

## 2023-12-07 VITALS
BODY MASS INDEX: 27.27 KG/M2 | HEIGHT: 57 IN | OXYGEN SATURATION: 92 % | SYSTOLIC BLOOD PRESSURE: 135 MMHG | TEMPERATURE: 98.8 F | DIASTOLIC BLOOD PRESSURE: 73 MMHG | RESPIRATION RATE: 16 BRPM | HEART RATE: 77 BPM

## 2023-12-07 PROBLEM — G45.9 TIA (TRANSIENT ISCHEMIC ATTACK): Status: RESOLVED | Noted: 2023-11-14 | Resolved: 2023-12-07

## 2023-12-07 LAB
ANION GAP SERPL CALC-SCNC: 5 MMOL/L (ref 5–15)
BUN SERPL-MCNC: 12 MG/DL (ref 6–20)
BUN/CREAT SERPL: 11 (ref 12–20)
CALCIUM SERPL-MCNC: 10.9 MG/DL (ref 8.5–10.1)
CHLORIDE SERPL-SCNC: 106 MMOL/L (ref 97–108)
CHOLEST SERPL-MCNC: 189 MG/DL
CO2 SERPL-SCNC: 30 MMOL/L (ref 21–32)
CREAT SERPL-MCNC: 1.1 MG/DL (ref 0.55–1.02)
EKG ATRIAL RATE: 67 BPM
EKG DIAGNOSIS: NORMAL
EKG P AXIS: 50 DEGREES
EKG P-R INTERVAL: 188 MS
EKG Q-T INTERVAL: 370 MS
EKG QRS DURATION: 88 MS
EKG QTC CALCULATION (BAZETT): 390 MS
EKG R AXIS: 20 DEGREES
EKG T AXIS: 31 DEGREES
EKG VENTRICULAR RATE: 67 BPM
ERYTHROCYTE [DISTWIDTH] IN BLOOD BY AUTOMATED COUNT: 13.2 % (ref 11.5–14.5)
EST. AVERAGE GLUCOSE BLD GHB EST-MCNC: 103 MG/DL
GLUCOSE SERPL-MCNC: 99 MG/DL (ref 65–100)
HBA1C MFR BLD: 5.2 % (ref 4–5.6)
HCT VFR BLD AUTO: 35.2 % (ref 35–47)
HDLC SERPL-MCNC: 67 MG/DL
HDLC SERPL: 2.8 (ref 0–5)
HGB BLD-MCNC: 11.9 G/DL (ref 11.5–16)
LDLC SERPL CALC-MCNC: 92.4 MG/DL (ref 0–100)
MCH RBC QN AUTO: 32 PG (ref 26–34)
MCHC RBC AUTO-ENTMCNC: 33.8 G/DL (ref 30–36.5)
MCV RBC AUTO: 94.6 FL (ref 80–99)
NRBC # BLD: 0 K/UL (ref 0–0.01)
NRBC BLD-RTO: 0 PER 100 WBC
PLATELET # BLD AUTO: 236 K/UL (ref 150–400)
PMV BLD AUTO: 10.4 FL (ref 8.9–12.9)
POTASSIUM SERPL-SCNC: 3 MMOL/L (ref 3.5–5.1)
RBC # BLD AUTO: 3.72 M/UL (ref 3.8–5.2)
SODIUM SERPL-SCNC: 141 MMOL/L (ref 136–145)
TRIGL SERPL-MCNC: 148 MG/DL
VLDLC SERPL CALC-MCNC: 29.6 MG/DL
WBC # BLD AUTO: 8.8 K/UL (ref 3.6–11)

## 2023-12-07 PROCEDURE — 2580000003 HC RX 258: Performed by: HOSPITALIST

## 2023-12-07 PROCEDURE — 97162 PT EVAL MOD COMPLEX 30 MIN: CPT

## 2023-12-07 PROCEDURE — 6370000000 HC RX 637 (ALT 250 FOR IP): Performed by: HOSPITALIST

## 2023-12-07 PROCEDURE — 96366 THER/PROPH/DIAG IV INF ADDON: CPT

## 2023-12-07 PROCEDURE — 80061 LIPID PANEL: CPT

## 2023-12-07 PROCEDURE — 99223 1ST HOSP IP/OBS HIGH 75: CPT | Performed by: NURSE PRACTITIONER

## 2023-12-07 PROCEDURE — 6370000000 HC RX 637 (ALT 250 FOR IP): Performed by: NURSE PRACTITIONER

## 2023-12-07 PROCEDURE — 92523 SPEECH SOUND LANG COMPREHEN: CPT

## 2023-12-07 PROCEDURE — 97116 GAIT TRAINING THERAPY: CPT

## 2023-12-07 PROCEDURE — 70551 MRI BRAIN STEM W/O DYE: CPT

## 2023-12-07 PROCEDURE — G0378 HOSPITAL OBSERVATION PER HR: HCPCS

## 2023-12-07 PROCEDURE — 36415 COLL VENOUS BLD VENIPUNCTURE: CPT

## 2023-12-07 PROCEDURE — 97165 OT EVAL LOW COMPLEX 30 MIN: CPT

## 2023-12-07 PROCEDURE — 97535 SELF CARE MNGMENT TRAINING: CPT

## 2023-12-07 PROCEDURE — 85027 COMPLETE CBC AUTOMATED: CPT

## 2023-12-07 PROCEDURE — 83036 HEMOGLOBIN GLYCOSYLATED A1C: CPT

## 2023-12-07 PROCEDURE — 80048 BASIC METABOLIC PNL TOTAL CA: CPT

## 2023-12-07 PROCEDURE — 96361 HYDRATE IV INFUSION ADD-ON: CPT

## 2023-12-07 PROCEDURE — 97530 THERAPEUTIC ACTIVITIES: CPT

## 2023-12-07 PROCEDURE — 97112 NEUROMUSCULAR REEDUCATION: CPT

## 2023-12-07 PROCEDURE — 93010 ELECTROCARDIOGRAM REPORT: CPT | Performed by: SPECIALIST

## 2023-12-07 RX ORDER — ATORVASTATIN CALCIUM 40 MG/1
40 TABLET, FILM COATED ORAL NIGHTLY
Status: DISCONTINUED | OUTPATIENT
Start: 2023-12-07 | End: 2023-12-07 | Stop reason: HOSPADM

## 2023-12-07 RX ORDER — CLOPIDOGREL BISULFATE 75 MG/1
75 TABLET ORAL DAILY
Qty: 30 TABLET | Refills: 3 | Status: CANCELLED | OUTPATIENT
Start: 2023-12-08

## 2023-12-07 RX ORDER — GABAPENTIN 300 MG/1
300 CAPSULE ORAL
Qty: 30 CAPSULE | Refills: 0 | Status: SHIPPED
Start: 2023-12-07 | End: 2024-02-26 | Stop reason: SDUPTHER

## 2023-12-07 RX ORDER — ATORVASTATIN CALCIUM 40 MG/1
40 TABLET, FILM COATED ORAL NIGHTLY
Qty: 30 TABLET | Refills: 3 | Status: SHIPPED | OUTPATIENT
Start: 2023-12-07 | End: 2024-03-12 | Stop reason: SDUPTHER

## 2023-12-07 RX ORDER — LORAZEPAM 1 MG/1
0.5 TABLET ORAL DAILY PRN
Qty: 15 TABLET | Refills: 0 | Status: SHIPPED | OUTPATIENT
Start: 2023-12-07 | End: 2024-01-06

## 2023-12-07 RX ADMIN — POTASSIUM BICARBONATE 40 MEQ: 782 TABLET, EFFERVESCENT ORAL at 12:25

## 2023-12-07 RX ADMIN — GABAPENTIN 300 MG: 300 CAPSULE ORAL at 10:37

## 2023-12-07 RX ADMIN — CLOPIDOGREL BISULFATE 75 MG: 75 TABLET ORAL at 10:36

## 2023-12-07 RX ADMIN — PAROXETINE 30 MG: 20 TABLET, FILM COATED ORAL at 10:37

## 2023-12-07 RX ADMIN — SODIUM CHLORIDE, PRESERVATIVE FREE 10 ML: 5 INJECTION INTRAVENOUS at 10:37

## 2023-12-07 NOTE — DISCHARGE INSTRUCTIONS
Discharge Instructions       PATIENT ID: Nicholas Gomez  MRN: [id number]   YOB: 1935    DATE OF ADMISSION: 12/6/2023   DATE OF DISCHARGE: 12/7/2023    PRIMARY CARE PROVIDER: Doroteo Doss     ATTENDING PHYSICIAN: Jennifer Santoyo MD   DISCHARGING PROVIDER: LUCHO Brady NP    To contact this individual call [phone] and ask the  to page. If unavailable ask to be transferred the Adult Hospitalist Department. DISCHARGE DIAGNOSES: Dizziness, word finding difficulty    CONSULTATIONS: Neurology    PROCEDURES/SURGERIES: * No surgery found *    PENDING TEST RESULTS:   At the time of discharge the following test results are still pending: none    FOLLOW UP APPOINTMENTS:   PCP, Neurology clinic    ADDITIONAL CARE RECOMMENDATIONS:   Decrease Gabapentin to 300 mg at bedtime. Please discuss with your new PCP about the lack of benefit of this medication and ask for assistance to continue to wean this medication down    You have been on Ativan 1 mg at bedtime. We will decrease this to half a milligram at bedtime as needed for anxiety. As discussed the combination of gabapentin and Ativan can have serious effects on your cognition, make you confused and make it difficult for you to speak. Please follow-up with your PCP to this regard    You have finished taking Plavix you do not need to take this any longer. Continue taking a baby aspirin      DIET: Regular    ACTIVITY: activity as tolerated    WOUND CARE: None    EQUIPMENT needed: None      DISCHARGE MEDICATIONS:   See Medication Reconciliation Form    It is important that you take the medication exactly as they are prescribed. Keep your medication in the bottles provided by the pharmacist and keep a list of the medication names, dosages, and times to be taken in your wallet. Do not take other medications without consulting your doctor.        NOTIFY YOUR PHYSICIAN FOR ANY OF THE FOLLOWING:   Fever over 101 degrees for 24 hours.   Chest pain, shortness of breath, fever, chills, nausea, vomiting, diarrhea, change in mentation, falling, weakness, bleeding. Severe pain or pain not relieved by medications. Or, any other signs or symptoms that you may have questions about.       DISPOSITION:    Home With:   OT  PT  HH  RN       SNF/Inpatient Rehab/LTAC    Independent/assisted living    Hospice    Other:     CDMP Checked:   Yes x     PROBLEM LIST Updated:  Yes x       Signed:   LUCHO Ulloa NP  12/7/2023  11:06 AM

## 2023-12-07 NOTE — PROGRESS NOTES
Orders received, chart reviewed and patient evaluated by physical therapy. Pending progression with skilled acute physical therapy, recommend:  Therapy 2 days/week in the home and also see \"other factors to consider\" below for additional discharge concerns/needs. Recommend increased support in the home, lives with  who is unable to provide significant support. Per family, plan to get a caregiver 3x per week in the home. Recommend with nursing OOB to chair 3x/day and walking daily with minimal assist and rolling walker. Thank you for completing as able in order to maintain patient strength, endurance and independence. Full evaluation to follow.       Thank you for your consideration,    Xochilt London, PT, DPT

## 2023-12-07 NOTE — CARE COORDINATION
Transition of Care Plan:    Home with family support  - Home health PT/OT/SLP -  [facility]    Transport: Family    RUR: Obs  Prior Level of Functioning: Independent  Disposition: Home health  Follow up appointments: PCP, Neuro   DME needed: None  Transportation at discharge: Family   IM/IMM Medicare/ letter given: No - Obs   Caregiver Contact:   Discharge Caregiver contacted prior to discharge? Yes  Care Conference needed? No  Barriers to discharge: None    PT/OT/SLP recommending home health at discharge. Pt prefers [facility]. Referral sent in [address]: [facility] has accepted Pt. Follow up info placed on AVS.       12/07/23 1133   Condition of Participation: Discharge Planning   The Plan for Transition of Care is related to the following treatment goals: home health   The Patient and/or Patient Representative was provided with a Choice of Provider? Patient   The Patient and/Or Patient Representative agree with the Discharge Plan? Yes   Freedom of Choice list was provided with basic dialogue that supports the patient's individualized plan of care/goals, treatment preferences, and shares the quality data associated with the providers? Yes     Hiwot Munoz M.S.MICHAEL.

## 2023-12-07 NOTE — ED NOTES
TRANSFER - OUT REPORT:    Verbal report given to Hayley Grant  on Corina Hidalgo  being transferred to [facility] 1027 [id number] for routine progression of patient care       Report consisted of patient's Situation, Background, Assessment and   Recommendations(SBAR). Information from the following report(s) Nurse Handoff Report, Index, ED Encounter Summary, Adult Overview, Intake/Output, MAR, Recent Results, Cardiac Rhythm NSR, and Neuro Assessment was reviewed with the receiving nurse. [place] Fall Assessment:    Presents to emergency department  because of falls (Syncope, seizure, or loss of consciousness): No  Age > 70: Yes  Altered Mental Status, Intoxication with alcohol or substance confusion (Disorientation, impaired judgment, poor safety awaremess, or inability to follow instructions): No  Impaired Mobility: Ambulates or transfers with assistive devices or assistance; Unable to ambulate or transer.: No  Nursing Judgement: Yes          Lines:   Peripheral IV 12/06/23 Left Antecubital (Active)   Site Assessment Clean, dry & intact 12/06/23 1535   Line Status Blood return noted 12/06/23 [address] changed 12/06/23 1535   Phlebitis Assessment No symptoms 12/06/23 1535   Infiltration Assessment 0 12/06/23 1535   Dressing Status New dressing applied;Clean, dry & intact 12/06/23 1535   Dressing Type Transparent 12/06/23 1535   Dressing Intervention New 12/06/23 1535       Peripheral IV 12/06/23 Right;Dorsal Forearm (Active)        Opportunity for questions and clarification was provided.       Patient transported with:  Monitor and Registered Nurse           Melany Kaplan RN  12/06/23 2916

## 2023-12-07 NOTE — PLAN OF CARE
Problem: Discharge Planning  Goal: Discharge to home or other facility with appropriate resources  Outcome: Progressing  Flowsheets (Taken 12/6/2023 2220 by Montserrat Rubio RN)  Discharge to home or other facility with appropriate resources: Identify barriers to discharge with patient and caregiver     Problem: Safety - Adult  Goal: Free from fall injury  Outcome: Progressing  Flowsheets (Taken 12/7/2023 1209)  Free From Fall Injury: Instruct family/caregiver on patient safety

## 2023-12-07 NOTE — PLAN OF CARE
Problem: Occupational Therapy - Adult  Goal: By Discharge: Performs self-care activities at highest level of function for planned discharge setting. See evaluation for individualized goals. Description: FUNCTIONAL STATUS PRIOR TO ADMISSION:  Patient was ambulatory using cane     Receives Help From: Family, ADL Assistance: Independent,  ,  ,  ,  ,  , Homemaking Assistance: Independent, Ambulation Assistance: Independent, Transfer Assistance: Independent, Active : Yes            HOME SUPPORT: Patient lived with spouse but didn't require assistance. Occupational Therapy Goals  Initiated 12/7/2023   1. Patient will perform self-feeding with [place]aines within 7 day(s). 2.  Patient will perform grooming with Modified Gaines within 7 day(s). 3.  Patient will perform bathing with Modified Gaines within 7 day(s). 4.  Patient will perform toilet transfers with Modified Gaines within 7 day(s). 5.  Patient will perform all aspects of toileting with Modified Gaines within 7 day(s). 6.  Patient will participate in upper extremity therapeutic exercise/activities with Modified Gaines within 7 day(s). 7.  Patient will utilize energy conservation techniques during functional activities with verbal cues within 7 day(s). 8.  Patient will improve their Fugl Currie score by 5 points in prep for ADLs within 7 days. Outcome: Progressing   OCCUPATIONAL THERAPY EVALUATION    Patient: Amanda Encinas (89 y.o. female)  Date: 12/7/2023  Primary Diagnosis: TIA (transient ischemic attack) [G45.9]  Dizziness [R42]         Precautions:                    ASSESSMENT :  The patient is limited by decreased functional mobility, independence in ADLs, high-level IADLs, strength, body mechanics, activity tolerance, endurance, safety awareness, attention/concentration, coordination, balance, posture, decreased attention/divided attention during evaluation.   Per chart review pt was demonstrating stroke neurologic event can be used to predict UE recovery at six months post neurologic event. Severe = 0-21 points   Moderately Severe = 22-33 points   Moderate = 34-47 points   Mild = 48-66 points  KULWANT Rojas, NICHOLAS Fritz, & ANIA Alvarado (1992). Measurement of motor recovery after stroke: Outcome assessment and sample size requirements. Stroke, 23, pp. 3311-1752.   --------------------------------------------------------------------------------------------------------------------------------------------------------------------  MCID:  Stroke:   Tato Morris et al, 2001; n = 171; mean age 79 (6) years; assessed within 16 (12) days of stroke, Acute Stroke)  FMA Motor Scores from Admission to Discharge   10 point increase in FMA Upper Extremity = 1.5 change in discharge FIM   10 point increase in FMA Lower Extremity = 1.9 change in discharge FIM  MDC:   Stroke:   Julienne Miles et al, 2008, n = 14, mean age = 59.9 (14.6) years, assessed on average 14 (6.5) months post stroke, Chronic Stroke)   FMA = 5.2 points for the Upper Extremity portion of the assessment                                                                                                                                                                                                                                    [facility] AM-PACTM \"6 Clicks\"                                                       Daily Activity Inpatient Short Form  How much help from another person does the patient currently need. .. Total; A Lot A Little None   1. Putting on and taking off regular lower body clothing? []  1 []  2 []  3 [x]  4   2. Bathing (including washing, rinsing, drying)? []  1 []  2 [x]  3 []  4   3. Toileting, which includes using toilet, bedpan or urinal? [] 1 []  2 [x]  3 []  4   4. Putting on and taking off regular upper body clothing? []  1 []  2 []  3 [x]  4   5. Taking care of personal grooming such as brushing teeth?  []  1 []

## 2023-12-07 NOTE — CONSULTS
NEUROLOGY CONSULT NOTE    Name Raul Martinez Age 81 y.o. MRN [id number]  1935     Consulting Physician: Chip Mckeon MD      Chief Complaint:  dizziness and word finding problems     Assessment:     Principal Problem:    TIA (transient ischemic attack)  Resolved Problems:    * No resolved hospital problems. *      Patient is an 81year-old R-handed female with history of HTN, HLD, remote TIA, OA, s/p PFO closure and recent punctate L parieto-occipital infarct who presents after having dizziness and word finding difficulty in setting of severe hypertension. She was admitted here -23 found to have acute punctate L parieto-occipital infarct that was due to small vessel disease and patient finished course of DAPT with aspirin and Plavix. 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 Symptoms of difficulty getting words out and dizziness non-specific and not localized to this area. Symptoms could be related to hypertension and also medications regimen which need review by primary team. CTA head/neck yesterday no change to previous imaging on 23. MRI brain shows no acute infarct. Patient is also having some trouble recalling events which was present on last admission and concern for memory impairment. BUN/Cr /1.1, LDL 92.4, A1c 5.2  Recommendations:   - Dizziness and any word-finding problems likely nonspecific and not related to area of previous stroke or neurological event  - Continue aspirin 81 mg daily for previous stroke  - Change statin to atorvastatin 40 mg daily  - A1c at goal  - Would recommend home health for managing meds and PT. Son-in-law states they are arranging [address] [place] [address],[address] for ADLs.   - Discussed with patient and son-in-law that patient should not drive for now due to concern for memory impairment.     Patient will need to follow-up in the clinic in 6-8

## 2023-12-07 NOTE — DISCHARGE SUMMARY
Discharge Summary       PATIENT ID: Chucky Matthew  MRN: [id number]   YOB: 1935    DATE OF ADMISSION: 12/6/2023  2:53 PM    DATE OF DISCHARGE: 12/7/2023  PRIMARY CARE PROVIDER: Elizabeth Price MD     ATTENDING PHYSICIAN: Dr. Sami Gutierres  DISCHARGING PROVIDER: LUCHO Groves NP    To contact this individual call [phone] and ask the  to page. If unavailable ask to be transferred the Adult Hospitalist Department. CONSULTATIONS: IP CONSULT TO NEUROLOGY  IP WOUND CARE NURSE CONSULT TO EVAL  IP CONSULT TO CASE MANAGEMENT    PROCEDURES/SURGERIES: * No surgery found *    ADMITTING DIAGNOSES & HOSPITAL COURSE:   Chucky Matthew is a 81 y.o. female with PMH significant for stroke, hypertension, hyperlipidemia presented with dizziness and word finding difficulty and is being admitted for stroke evaluation. Symptoms have started yesterday. She did not have any visual symptoms or focal weakness. Brain MRI during recent hospitalization for similar symptoms showed punctate acute infarction in the medial left parietal occipital junction. 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 She is compliant with aspirin, Plavix. In the ED she was evaluated with CT, CT angiogram of the head and neck which did not show any new acute findings. She was evaluated by teleneurologist recommended continuing Plavix, increasing aspirin from  and admission for observation and further evaluation.      The patient denies any headache, blurry vision, sore throat, trouble swallowing, trouble with speech, chest pain, SOB, cough, fever, chills, N/V/D, abd pain, urinary symptoms, constipation, recent travels, sick contacts, focal or generalized neurological symptoms, falls, injuries, rashes, contact with COVID-19 diagnosed patients, hematemesis, melena, hemoptysis, hematuria, rashes, denies starting any new medications and denies any other concerns or tolerated    WOUND CARE: None    EQUIPMENT needed: None      DISCHARGE MEDICATIONS:     Medication List        START taking these medications      atorvastatin 40 MG tablet  Commonly known as: LIPITOR  Take 1 tablet by mouth nightly            CHANGE how you take these medications      gabapentin 300 MG capsule  Commonly known as: NEURONTIN  Take 1 capsule by mouth nightly for 30 days. Max Daily Amount: 300 mg  What changed: when to take this     LORazepam 1 MG tablet  Commonly known as: ATIVAN  Take 0.5 tablets by mouth daily as needed for Anxiety for up to 30 days.  Max Daily Amount: 0.5 mg  What changed:   how much to take  when to take this  reasons to take this            CONTINUE taking these medications      aspirin 81 MG chewable tablet  Take 1 tablet by mouth daily     levocetirizine 5 MG tablet  Commonly known as: XYZAL     losartan 100 MG tablet  Commonly known as: COZAAR  Take 1 tablet by mouth daily     PARoxetine 30 MG tablet  Commonly known as: PAXIL     vitamin D 25 MCG (1000 UT) Tabs tablet  Commonly known as: CHOLECALCIFEROL            STOP taking these medications      acetaminophen 500 MG tablet  Commonly known as: TYLENOL     amLODIPine 5 MG tablet  Commonly known as: NORVASC     Centrum Silver Adult 50+ Tabs     clopidogrel 75 MG tablet  Commonly known as: PLAVIX     estradiol 0.1 MG/GM vaginal cream  Commonly known as: ESTRACE     Magnesium Glycinate 100 MG Caps     METAMUCIL 4 IN 1 FIBER PO     simvastatin 20 MG tablet  Commonly known as: ZOCOR               Where to Get Your Medications        These medications were sent to [facility]/pharmacy #[id number] [phone] - F [phone]  [place] [address]      Phone: [phone]   atorvastatin 40 MG tablet  LORazepam 1 MG tablet       Information about where to get these medications is not yet available    Ask your nurse or doctor about these medications  gabapentin 300 MG capsule

## 2023-12-07 NOTE — PROGRESS NOTES
Discharge instructions reviewed with patient and patients family. Patient and patients family demonstrated understanding of discharge instructions.

## 2023-12-07 NOTE — TELEPHONE ENCOUNTER
Patient Is getting discharge from [facility] today and the nurse is sending her orders For Speech, physical Therapy, and Occupation therapy  Caregiver would like to know if a nurse Can  call her back and see if we can follow up and sign the  order to continue the services.

## 2023-12-07 NOTE — TELEPHONE ENCOUNTER
Called the patient to schedule her hospital follow up and she stated that she is fine and she doesn't need it right now and she will call us if she needs to schedule and she mentioned that she was at the hospital back again, but not for the stroke , for something else and they gave her some medication and she feels better now.

## 2023-12-07 NOTE — TELEPHONE ENCOUNTER
Hi, this patient needs to be seen for follow-up of punctate R parieto-occipital infarct 11/16/23 who was readmitted today for symptoms not related to stroke location and MRI negative for new stroke. She has stroke risk factors and needs f/u for her previous admission in 6-8 weeks. She can be seen by any provider. Thanks!

## 2023-12-07 NOTE — PLAN OF CARE
within reach, Bed/ chair alarm activated, and Caregiver / family present    COMMUNICATION/EDUCATION:   The patient's plan of care was discussed with: occupational therapist and registered nurse    Patient Education  Education Given To: Patient  Education Provided: Role of Therapy; Fall Prevention Strategies;Precautions  Education Method: Verbal  Education Outcome: Verbalized understanding;Continued education needed    Thank you for this referral.  Francoise Rankin, PT, DPT  Minutes: 48      Physical Therapy Evaluation Charge Determination   History Examination Presentation Decision-Making   HIGH Complexity :3+ comorbidities / personal factors will impact the outcome/ POC  MEDIUM Complexity : 3 Standardized tests and measures addressin body structure, function, activity limitation and / or participation in recreation  MEDIUM Complexity : Evolving with changing characteristics  Senior Balance Test  MEDIUM   Based on the above components, the patient evaluation is determined to be of the following complexity level: Medium

## 2023-12-08 NOTE — TELEPHONE ENCOUNTER
Called and verified name and date of birth. Reports speaking to louie? ? Confirms that sesar nayak NP would follow and sign orders. No further questions.

## 2023-12-12 ENCOUNTER — OFFICE VISIT (OUTPATIENT)
Age: 88
End: 2023-12-12
Payer: MEDICARE

## 2023-12-12 VITALS
HEART RATE: 96 BPM | BODY MASS INDEX: 26.32 KG/M2 | TEMPERATURE: 97 F | DIASTOLIC BLOOD PRESSURE: 77 MMHG | OXYGEN SATURATION: 96 % | SYSTOLIC BLOOD PRESSURE: 132 MMHG | HEIGHT: 57 IN | WEIGHT: 122 LBS | RESPIRATION RATE: 20 BRPM

## 2023-12-12 DIAGNOSIS — E87.6 HYPOKALEMIA: ICD-10-CM

## 2023-12-12 DIAGNOSIS — K59.00 CONSTIPATION, UNSPECIFIED CONSTIPATION TYPE: ICD-10-CM

## 2023-12-12 DIAGNOSIS — I10 PRIMARY HYPERTENSION: Primary | ICD-10-CM

## 2023-12-12 DIAGNOSIS — G45.9 TIA (TRANSIENT ISCHEMIC ATTACK): ICD-10-CM

## 2023-12-12 PROCEDURE — 1090F PRES/ABSN URINE INCON ASSESS: CPT | Performed by: NURSE PRACTITIONER

## 2023-12-12 PROCEDURE — 1036F TOBACCO NON-USER: CPT | Performed by: NURSE PRACTITIONER

## 2023-12-12 PROCEDURE — 1123F ACP DISCUSS/DSCN MKR DOCD: CPT | Performed by: NURSE PRACTITIONER

## 2023-12-12 PROCEDURE — 99214 OFFICE O/P EST MOD 30 MIN: CPT | Performed by: NURSE PRACTITIONER

## 2023-12-12 PROCEDURE — G8419 CALC BMI OUT NRM PARAM NOF/U: HCPCS | Performed by: NURSE PRACTITIONER

## 2023-12-12 PROCEDURE — G8484 FLU IMMUNIZE NO ADMIN: HCPCS | Performed by: NURSE PRACTITIONER

## 2023-12-12 PROCEDURE — G8427 DOCREV CUR MEDS BY ELIG CLIN: HCPCS | Performed by: NURSE PRACTITIONER

## 2023-12-12 RX ORDER — POTASSIUM CHLORIDE 20 MEQ/1
20 TABLET, EXTENDED RELEASE ORAL DAILY
Qty: 30 TABLET | Refills: 5 | Status: SHIPPED | OUTPATIENT
Start: 2023-12-12

## 2023-12-12 RX ORDER — ACETAMINOPHEN 500 MG
500 TABLET ORAL EVERY 6 HOURS PRN
COMMUNITY

## 2023-12-12 ASSESSMENT — ENCOUNTER SYMPTOMS
SHORTNESS OF BREATH: 0
NAUSEA: 0
CHEST TIGHTNESS: 0
DIARRHEA: 0
CHOKING: 0
CONSTIPATION: 1

## 2023-12-18 ENCOUNTER — TELEPHONE (OUTPATIENT)
Age: 88
End: 2023-12-18

## 2023-12-18 NOTE — TELEPHONE ENCOUNTER
[facility] - Joy Hubbard Physical Therapist [phone]. States that pt needs wound care.for right leg. She would like a call back as soon as possible.

## 2024-01-02 RX ORDER — PAROXETINE 30 MG/1
30 TABLET, FILM COATED ORAL DAILY
Qty: 30 TABLET | Refills: 0 | Status: SHIPPED | OUTPATIENT
Start: 2024-01-02

## 2024-01-02 NOTE — TELEPHONE ENCOUNTER
Refill request received from [facility] for   Requested Prescriptions     Pending Prescriptions Disp Refills    PARoxetine (PAXIL) 30 MG tablet 30 tablet      Sig: Take 1 tablet by mouth daily     Last office visit: 12/12/2023   Next office visit: 1/26/2024 at [facility]    Routed to MARLEN Reynoso for review.

## 2024-01-23 SDOH — HEALTH STABILITY: PHYSICAL HEALTH: ON AVERAGE, HOW MANY MINUTES DO YOU ENGAGE IN EXERCISE AT THIS LEVEL?: 0 MIN

## 2024-01-23 SDOH — HEALTH STABILITY: PHYSICAL HEALTH: ON AVERAGE, HOW MANY DAYS PER WEEK DO YOU ENGAGE IN MODERATE TO STRENUOUS EXERCISE (LIKE A BRISK WALK)?: 0 DAYS

## 2024-01-26 ENCOUNTER — OFFICE VISIT (OUTPATIENT)
Age: 89
End: 2024-01-26
Payer: MEDICARE

## 2024-01-26 VITALS
SYSTOLIC BLOOD PRESSURE: 115 MMHG | HEIGHT: 57 IN | WEIGHT: 115.4 LBS | OXYGEN SATURATION: 98 % | HEART RATE: 97 BPM | RESPIRATION RATE: 18 BRPM | TEMPERATURE: 97 F | BODY MASS INDEX: 24.89 KG/M2 | DIASTOLIC BLOOD PRESSURE: 73 MMHG

## 2024-01-26 DIAGNOSIS — I10 PRIMARY HYPERTENSION: ICD-10-CM

## 2024-01-26 DIAGNOSIS — E87.6 HYPOKALEMIA: ICD-10-CM

## 2024-01-26 DIAGNOSIS — N18.31 STAGE 3A CHRONIC KIDNEY DISEASE (HCC): Primary | ICD-10-CM

## 2024-01-26 DIAGNOSIS — F41.9 ANXIETY: ICD-10-CM

## 2024-01-26 DIAGNOSIS — N18.31 STAGE 3A CHRONIC KIDNEY DISEASE (HCC): ICD-10-CM

## 2024-01-26 DIAGNOSIS — Z86.73 HISTORY OF STROKE: ICD-10-CM

## 2024-01-26 PROBLEM — H26.491 POSTERIOR CAPSULAR OPACIFICATION VISUALLY SIGNIFICANT OF RIGHT EYE: Status: RESOLVED | Noted: 2017-10-02 | Resolved: 2024-01-26

## 2024-01-26 PROBLEM — R47.01 APHASIA: Status: RESOLVED | Noted: 2023-11-13 | Resolved: 2024-01-26

## 2024-01-26 PROBLEM — M75.102 LEFT ROTATOR CUFF TEAR ARTHROPATHY: Status: RESOLVED | Noted: 2022-09-01 | Resolved: 2024-01-26

## 2024-01-26 PROBLEM — Z98.42 HISTORY OF YAG LASER CAPSULOTOMY OF LENS OF LEFT EYE: Status: RESOLVED | Noted: 2017-10-02 | Resolved: 2024-01-26

## 2024-01-26 PROBLEM — Z78.9 ADVANCE DIRECTIVE PLACED IN CHART THIS ADMISSION: Status: RESOLVED | Noted: 2017-03-24 | Resolved: 2024-01-26

## 2024-01-26 PROBLEM — H43.819 VITREOUS DEGENERATION: Status: RESOLVED | Noted: 2020-03-03 | Resolved: 2024-01-26

## 2024-01-26 PROBLEM — M12.812 LEFT ROTATOR CUFF TEAR ARTHROPATHY: Status: RESOLVED | Noted: 2022-09-01 | Resolved: 2024-01-26

## 2024-01-26 PROBLEM — Z78.9 ADVANCE DIRECTIVE IN CHART: Status: RESOLVED | Noted: 2019-03-26 | Resolved: 2024-01-26

## 2024-01-26 PROBLEM — M12.819 ROTATOR CUFF ARTHROPATHY: Status: RESOLVED | Noted: 2020-01-30 | Resolved: 2024-01-26

## 2024-01-26 PROCEDURE — G8420 CALC BMI NORM PARAMETERS: HCPCS | Performed by: INTERNAL MEDICINE

## 2024-01-26 PROCEDURE — G8484 FLU IMMUNIZE NO ADMIN: HCPCS | Performed by: INTERNAL MEDICINE

## 2024-01-26 PROCEDURE — 1036F TOBACCO NON-USER: CPT | Performed by: INTERNAL MEDICINE

## 2024-01-26 PROCEDURE — 1123F ACP DISCUSS/DSCN MKR DOCD: CPT | Performed by: INTERNAL MEDICINE

## 2024-01-26 PROCEDURE — 1090F PRES/ABSN URINE INCON ASSESS: CPT | Performed by: INTERNAL MEDICINE

## 2024-01-26 PROCEDURE — 99214 OFFICE O/P EST MOD 30 MIN: CPT | Performed by: INTERNAL MEDICINE

## 2024-01-26 PROCEDURE — G8427 DOCREV CUR MEDS BY ELIG CLIN: HCPCS | Performed by: INTERNAL MEDICINE

## 2024-01-26 RX ORDER — LORAZEPAM 1 MG/1
0.5 TABLET ORAL NIGHTLY PRN
COMMUNITY

## 2024-01-26 ASSESSMENT — ENCOUNTER SYMPTOMS
COUGH: 0
SHORTNESS OF BREATH: 0
ABDOMINAL PAIN: 0

## 2024-01-26 ASSESSMENT — PATIENT HEALTH QUESTIONNAIRE - PHQ9
2. FEELING DOWN, DEPRESSED OR HOPELESS: 0
SUM OF ALL RESPONSES TO PHQ QUESTIONS 1-9: 0
1. LITTLE INTEREST OR PLEASURE IN DOING THINGS: 0
SUM OF ALL RESPONSES TO PHQ9 QUESTIONS 1 & 2: 0
SUM OF ALL RESPONSES TO PHQ QUESTIONS 1-9: 0

## 2024-01-26 NOTE — ASSESSMENT & PLAN NOTE
11/2023  Punctate acute infarction medial left parieto-occipital junction   Recommended neuro consult, number given

## 2024-01-26 NOTE — PROGRESS NOTES
1/26/2024    Jaime Simpson 1935 is a 88 y.o. year old female new patient,   here for evaluation of the following chief complaint(s):  Chief Complaint   Patient presents with    New Patient    Establish Care           ASSESSMENT/PLAN:  Below is the assessment and plan developed based on review of pertinent history, physical exam, labs, studies, and medications.    1. Stage 3a chronic kidney disease (HCC)  Assessment & Plan:  Recently started on K supplement, will check labs today  Orders:  -     Basic Metabolic Panel; Future  2. Hypokalemia  -     Basic Metabolic Panel; Future  3. History of stroke  Assessment & Plan:  11/2023  Punctate acute infarction medial left parieto-occipital junction   Recommended neuro consult, number given  4. Primary hypertension  Assessment & Plan:  Controlled with current regimen, plan to continue  5. Anxiety  Assessment & Plan:  Discussed about risks of benzodiazepines, polypharmacy. She was given suggestions about some newer sleep meds to see if insurance will cover         Return in about 4 months (around 5/26/2024) for annual medicare wellness exam, or sooner as needed.       SUBJECTIVE/OBJECTIVE:  New patient to me, here to establish care  Visit spent in discussion of past and current medical conditions, with statuses as documented in Assessment and Plan section    Recent TIA last month, small CVA in November, some meds were adjusted due to concern of polypharmacy. Gabapentin was reduced (taken for back pain/sciatica) and lorazepam was reduced.  Hasn't seen neuro, does feel better overall    Review of Systems   Constitutional:  Negative for chills and fever.   HENT:          Dry mouth   Respiratory:  Negative for cough and shortness of breath.    Cardiovascular:  Negative for chest pain, palpitations and leg swelling.   Gastrointestinal:  Negative for abdominal pain.   Musculoskeletal:  Positive for arthralgias (hands).   Skin:  Negative for rash.          Physical

## 2024-01-26 NOTE — ASSESSMENT & PLAN NOTE
Discussed about risks of benzodiazepines, polypharmacy. She was given suggestions about some newer sleep meds to see if insurance will cover

## 2024-01-26 NOTE — PROGRESS NOTES
Chief Complaint   Patient presents with    New Patient    Establish Care     Blood pressure 115/73, pulse 97, temperature 97 °F (36.1 °C), temperature source Temporal, resp. rate 18, height 1.455 m (4' 9.3\"), weight 52.3 kg (115 lb 6.4 oz), last menstrual period 01/01/1986, SpO2 98 %.

## 2024-01-27 LAB
ANION GAP SERPL CALC-SCNC: 9 MMOL/L (ref 5–15)
BUN SERPL-MCNC: 16 MG/DL (ref 6–20)
BUN/CREAT SERPL: 15 (ref 12–20)
CALCIUM SERPL-MCNC: 10.1 MG/DL (ref 8.5–10.1)
CHLORIDE SERPL-SCNC: 104 MMOL/L (ref 97–108)
CO2 SERPL-SCNC: 24 MMOL/L (ref 21–32)
CREAT SERPL-MCNC: 1.04 MG/DL (ref 0.55–1.02)
GLUCOSE SERPL-MCNC: 84 MG/DL (ref 65–100)
POTASSIUM SERPL-SCNC: 4.5 MMOL/L (ref 3.5–5.1)
SODIUM SERPL-SCNC: 137 MMOL/L (ref 136–145)

## 2024-01-30 DIAGNOSIS — E87.6 HYPOKALEMIA: ICD-10-CM

## 2024-01-30 DIAGNOSIS — N18.31 STAGE 3A CHRONIC KIDNEY DISEASE (HCC): Primary | ICD-10-CM

## 2024-01-30 RX ORDER — PAROXETINE 30 MG/1
30 TABLET, FILM COATED ORAL DAILY
Qty: 90 TABLET | Refills: 3 | Status: SHIPPED | OUTPATIENT
Start: 2024-01-30

## 2024-01-30 NOTE — TELEPHONE ENCOUNTER
Medication Refill Request    Jaime Simpson is requesting a refill of the following medication(s):     Paroxetine (Paxil) 30 MG tablet    Please send refill to:     [facility] - [place] [address] - P [phone] - F [phone]

## 2024-02-02 DIAGNOSIS — E87.6 HYPOKALEMIA: ICD-10-CM

## 2024-02-02 DIAGNOSIS — N18.31 STAGE 3A CHRONIC KIDNEY DISEASE (HCC): ICD-10-CM

## 2024-02-03 LAB
ANION GAP SERPL CALC-SCNC: 5 MMOL/L (ref 5–15)
BUN SERPL-MCNC: 16 MG/DL (ref 6–20)
BUN/CREAT SERPL: 15 (ref 12–20)
CALCIUM SERPL-MCNC: 10.1 MG/DL (ref 8.5–10.1)
CHLORIDE SERPL-SCNC: 103 MMOL/L (ref 97–108)
CO2 SERPL-SCNC: 27 MMOL/L (ref 21–32)
CREAT SERPL-MCNC: 1.05 MG/DL (ref 0.55–1.02)
GLUCOSE SERPL-MCNC: 92 MG/DL (ref 65–100)
POTASSIUM SERPL-SCNC: 4.3 MMOL/L (ref 3.5–5.1)
SODIUM SERPL-SCNC: 135 MMOL/L (ref 136–145)

## 2024-02-17 NOTE — TELEPHONE ENCOUNTER
C/o severe pain. SW placed call to [facility] for follow up on IP psych transfer. Admissions confirms pt can be accepted under attending Dr. Grimaldo to [facility]. Treatment team notified and ready for d/c prep. ANDREE completed and uploaded into ACM, [facility] called SW placed call to [facility] for follow up on IP psych transfer. Admissions confirms pt can be accepted under attending Dr. Grimaldo [facility]. Treatment team notified and ready for d/c prep. NEAF completed and uploaded into AC, [facility] called to secure transport by gladis, Nurse to Nurse w/ EMS and [facility] completed. Transport ltr provided, transport packet completed and provided to nurse for d/c prep. No further SW needs at this time.

## 2024-02-26 ENCOUNTER — TELEPHONE (OUTPATIENT)
Age: 89
End: 2024-02-26

## 2024-02-26 DIAGNOSIS — G62.9 NEUROPATHY: ICD-10-CM

## 2024-02-26 RX ORDER — GABAPENTIN 300 MG/1
300 CAPSULE ORAL
Qty: 90 CAPSULE | Refills: 1 | Status: SHIPPED | OUTPATIENT
Start: 2024-02-26 | End: 2024-08-24

## 2024-02-26 NOTE — TELEPHONE ENCOUNTER
There was a message from demar about her last week where she requested gabapentin refill, please confirm if this still needs to be sent to mail order, or will she need it sooner?

## 2024-02-28 ENCOUNTER — OFFICE VISIT (OUTPATIENT)
Age: 89
End: 2024-02-28
Payer: MEDICARE

## 2024-02-28 VITALS
DIASTOLIC BLOOD PRESSURE: 70 MMHG | WEIGHT: 115 LBS | SYSTOLIC BLOOD PRESSURE: 116 MMHG | RESPIRATION RATE: 17 BRPM | BODY MASS INDEX: 24.81 KG/M2 | OXYGEN SATURATION: 98 % | HEART RATE: 88 BPM | HEIGHT: 57 IN

## 2024-02-28 DIAGNOSIS — Z09 HOSPITAL DISCHARGE FOLLOW-UP: ICD-10-CM

## 2024-02-28 DIAGNOSIS — Z86.73 HISTORY OF CVA (CEREBROVASCULAR ACCIDENT): Primary | ICD-10-CM

## 2024-02-28 DIAGNOSIS — I67.2 INTRACRANIAL ATHEROSCLEROSIS: ICD-10-CM

## 2024-02-28 PROCEDURE — G8427 DOCREV CUR MEDS BY ELIG CLIN: HCPCS

## 2024-02-28 PROCEDURE — 1090F PRES/ABSN URINE INCON ASSESS: CPT

## 2024-02-28 PROCEDURE — 99215 OFFICE O/P EST HI 40 MIN: CPT

## 2024-02-28 PROCEDURE — 1036F TOBACCO NON-USER: CPT

## 2024-02-28 PROCEDURE — G8420 CALC BMI NORM PARAMETERS: HCPCS

## 2024-02-28 PROCEDURE — 1123F ACP DISCUSS/DSCN MKR DOCD: CPT

## 2024-02-28 PROCEDURE — G8484 FLU IMMUNIZE NO ADMIN: HCPCS

## 2024-02-28 ASSESSMENT — PATIENT HEALTH QUESTIONNAIRE - PHQ9
2. FEELING DOWN, DEPRESSED OR HOPELESS: 0
SUM OF ALL RESPONSES TO PHQ QUESTIONS 1-9: 0
SUM OF ALL RESPONSES TO PHQ9 QUESTIONS 1 & 2: 0
SUM OF ALL RESPONSES TO PHQ QUESTIONS 1-9: 0
1. LITTLE INTEREST OR PLEASURE IN DOING THINGS: 0
SUM OF ALL RESPONSES TO PHQ QUESTIONS 1-9: 0
SUM OF ALL RESPONSES TO PHQ9 QUESTIONS 1 & 2: 0
SUM OF ALL RESPONSES TO PHQ QUESTIONS 1-9: 0
SUM OF ALL RESPONSES TO PHQ QUESTIONS 1-9: 0
1. LITTLE INTEREST OR PLEASURE IN DOING THINGS: 0
2. FEELING DOWN, DEPRESSED OR HOPELESS: 0
SUM OF ALL RESPONSES TO PHQ QUESTIONS 1-9: 0

## 2024-02-28 ASSESSMENT — ENCOUNTER SYMPTOMS: PHOTOPHOBIA: 0

## 2024-02-28 NOTE — PROGRESS NOTES
Chief Complaint   Patient presents with    Follow-Up from Hospital    Cerebrovascular Accident      Vitals:    02/28/24 1335   BP: 116/70   Pulse: 88   Resp: 17   SpO2: 98%      
Relation Age of Onset    Cancer Father         PANCREATIC    Stroke Mother     Other Mother         SUARACHNOID HEMORRHAGE-ANEURYSM    No Known Problems Daughter     Anesth Problems Daughter         N/V     Social History     Socioeconomic History    Marital status:      Spouse name: Not on file    Number of children: Not on file    Years of education: Not on file    Highest education level: Not on file   Occupational History    Not on file   Tobacco Use    Smoking status: Never    Smokeless tobacco: Never   Vaping Use    Vaping Use: Never used   Substance and Sexual Activity    Alcohol use: Not Currently    Drug use: No    Sexual activity: Not Currently     Partners: Male     Comment:    Other Topics Concern    Not on file   Social History Narrative     having prostate, bladder issues 2024    Daughters in [place] and [facility], no grandkids     Social Determinants of Health     Financial Resource Strain: Low Risk  (2/22/2024)    Overall Financial Resource Strain (CARDIA)     Difficulty of Paying Living Expenses: Not very hard   Food Insecurity: No Food Insecurity (2/22/2024)    Hunger Vital Sign     Worried About Running Out of Food in the Last Year: Never true     Ran Out of Food in the Last Year: Never true   Transportation Needs: No Transportation Needs (2/22/2024)    PRAPARE - Transportation     Lack of Transportation (Medical): No     Lack of Transportation (Non-Medical): No   Physical Activity: Sufficiently Active (2/22/2024)    Exercise Vital Sign     Days of Exercise per Week: 7 days     Minutes of Exercise per Session: 30 min   Recent Concern: Physical Activity - Inactive (1/23/2024)    Exercise Vital Sign     Days of Exercise per Week: 0 days     Minutes of Exercise per Session: 0 min   Stress: Stress Concern Present (2/22/2024)    [employer] - Occupational Stress Questionnaire     Feeling of Stress : Rather much   Social Connections: Moderately

## 2024-03-12 RX ORDER — ATORVASTATIN CALCIUM 40 MG/1
40 TABLET, FILM COATED ORAL NIGHTLY
Qty: 90 TABLET | Refills: 3 | Status: SHIPPED | OUTPATIENT
Start: 2024-03-12

## 2024-03-12 RX ORDER — ATORVASTATIN CALCIUM 40 MG/1
40 TABLET, FILM COATED ORAL NIGHTLY
Refills: 0 | Status: CANCELLED | OUTPATIENT
Start: 2024-03-12

## 2024-03-12 NOTE — TELEPHONE ENCOUNTER
Reason for call:  Spoke with she requested a   Refill for 90 days   atorvastatin (LIPITOR) 40 MG tablet   Pt only have 2 pills left.     Send to   [facility] - [place] [address] - P [phone] - F [phone]  [phone]     Is this a new problem: Yes    Date of last appointment:  1/26/2024     Can we respond via [employer]: No    Best call back number: Jaime Simpson   [phone]

## 2024-03-19 RX ORDER — LEVOCETIRIZINE DIHYDROCHLORIDE 5 MG/1
5 TABLET, FILM COATED ORAL NIGHTLY
Qty: 90 TABLET | Refills: 3 | Status: SHIPPED | OUTPATIENT
Start: 2024-03-19

## 2024-05-27 SDOH — HEALTH STABILITY: PHYSICAL HEALTH: ON AVERAGE, HOW MANY DAYS PER WEEK DO YOU ENGAGE IN MODERATE TO STRENUOUS EXERCISE (LIKE A BRISK WALK)?: 0 DAYS

## 2024-05-27 ASSESSMENT — LIFESTYLE VARIABLES
HOW OFTEN DO YOU HAVE SIX OR MORE DRINKS ON ONE OCCASION: 1
HAVE YOU OR SOMEONE ELSE BEEN INJURED AS A RESULT OF YOUR DRINKING: NO
HOW MANY STANDARD DRINKS CONTAINING ALCOHOL DO YOU HAVE ON A TYPICAL DAY: 1
HAS A RELATIVE, FRIEND, DOCTOR, OR ANOTHER HEALTH PROFESSIONAL EXPRESSED CONCERN ABOUT YOUR DRINKING OR SUGGESTED YOU CUT DOWN: NO
HAS A RELATIVE, FRIEND, DOCTOR, OR ANOTHER HEALTH PROFESSIONAL EXPRESSED CONCERN ABOUT YOUR DRINKING OR SUGGESTED YOU CUT DOWN: NO
HOW OFTEN DURING THE LAST YEAR HAVE YOU FAILED TO DO WHAT WAS NORMALLY EXPECTED FROM YOU BECAUSE OF DRINKING: NEVER
HOW OFTEN DURING THE LAST YEAR HAVE YOU FAILED TO DO WHAT WAS NORMALLY EXPECTED FROM YOU BECAUSE OF DRINKING: NEVER
HOW OFTEN DURING THE LAST YEAR HAVE YOU HAD A FEELING OF GUILT OR REMORSE AFTER DRINKING: NEVER
HOW OFTEN DURING THE LAST YEAR HAVE YOU NEEDED AN ALCOHOLIC DRINK FIRST THING IN THE MORNING TO GET YOURSELF GOING AFTER A NIGHT OF HEAVY DRINKING: NEVER
HOW OFTEN DURING THE LAST YEAR HAVE YOU BEEN UNABLE TO REMEMBER WHAT HAPPENED THE NIGHT BEFORE BECAUSE YOU HAD BEEN DRINKING: NEVER
HAVE YOU OR SOMEONE ELSE BEEN INJURED AS A RESULT OF YOUR DRINKING: NO
HOW OFTEN DURING THE LAST YEAR HAVE YOU BEEN UNABLE TO REMEMBER WHAT HAPPENED THE NIGHT BEFORE BECAUSE YOU HAD BEEN DRINKING: NEVER
HOW OFTEN DO YOU HAVE A DRINK CONTAINING ALCOHOL: 4 OR MORE TIMES A WEEK
HOW OFTEN DURING THE LAST YEAR HAVE YOU FOUND THAT YOU WERE NOT ABLE TO STOP DRINKING ONCE YOU HAD STARTED: NEVER
HOW OFTEN DO YOU HAVE A DRINK CONTAINING ALCOHOL: 5
HOW OFTEN DURING THE LAST YEAR HAVE YOU HAD A FEELING OF GUILT OR REMORSE AFTER DRINKING: NEVER
HOW MANY STANDARD DRINKS CONTAINING ALCOHOL DO YOU HAVE ON A TYPICAL DAY: 1 OR 2
HOW OFTEN DURING THE LAST YEAR HAVE YOU FOUND THAT YOU WERE NOT ABLE TO STOP DRINKING ONCE YOU HAD STARTED: NEVER
HOW OFTEN DURING THE LAST YEAR HAVE YOU NEEDED AN ALCOHOLIC DRINK FIRST THING IN THE MORNING TO GET YOURSELF GOING AFTER A NIGHT OF HEAVY DRINKING: NEVER

## 2024-05-27 ASSESSMENT — PATIENT HEALTH QUESTIONNAIRE - PHQ9
SUM OF ALL RESPONSES TO PHQ QUESTIONS 1-9: 0
1. LITTLE INTEREST OR PLEASURE IN DOING THINGS: NOT AT ALL
SUM OF ALL RESPONSES TO PHQ QUESTIONS 1-9: 0
SUM OF ALL RESPONSES TO PHQ QUESTIONS 1-9: 0
2. FEELING DOWN, DEPRESSED OR HOPELESS: NOT AT ALL
SUM OF ALL RESPONSES TO PHQ QUESTIONS 1-9: 0
SUM OF ALL RESPONSES TO PHQ9 QUESTIONS 1 & 2: 0

## 2024-05-29 ENCOUNTER — OFFICE VISIT (OUTPATIENT)
Age: 89
End: 2024-05-29
Payer: MEDICARE

## 2024-05-29 VITALS
HEIGHT: 57 IN | OXYGEN SATURATION: 96 % | DIASTOLIC BLOOD PRESSURE: 82 MMHG | TEMPERATURE: 97.3 F | WEIGHT: 110 LBS | SYSTOLIC BLOOD PRESSURE: 132 MMHG | HEART RATE: 98 BPM | RESPIRATION RATE: 18 BRPM | BODY MASS INDEX: 23.73 KG/M2

## 2024-05-29 DIAGNOSIS — E78.00 PURE HYPERCHOLESTEROLEMIA: ICD-10-CM

## 2024-05-29 DIAGNOSIS — N18.31 STAGE 3A CHRONIC KIDNEY DISEASE (HCC): ICD-10-CM

## 2024-05-29 DIAGNOSIS — E87.6 HYPOKALEMIA: ICD-10-CM

## 2024-05-29 DIAGNOSIS — I77.810 THORACIC AORTIC ECTASIA (HCC): ICD-10-CM

## 2024-05-29 DIAGNOSIS — I10 PRIMARY HYPERTENSION: ICD-10-CM

## 2024-05-29 DIAGNOSIS — I77.810 DILATATION OF THORACIC AORTA (HCC): ICD-10-CM

## 2024-05-29 DIAGNOSIS — M48.062 LUMBAR STENOSIS WITH NEUROGENIC CLAUDICATION: ICD-10-CM

## 2024-05-29 DIAGNOSIS — F51.04 CHRONIC INSOMNIA: ICD-10-CM

## 2024-05-29 DIAGNOSIS — Z00.00 ENCOUNTER FOR SUBSEQUENT ANNUAL WELLNESS VISIT (AWV) IN MEDICARE PATIENT: Primary | ICD-10-CM

## 2024-05-29 DIAGNOSIS — Z86.73 HISTORY OF STROKE: ICD-10-CM

## 2024-05-29 DIAGNOSIS — F41.9 ANXIETY: ICD-10-CM

## 2024-05-29 PROCEDURE — 99213 OFFICE O/P EST LOW 20 MIN: CPT | Performed by: INTERNAL MEDICINE

## 2024-05-29 PROCEDURE — 1090F PRES/ABSN URINE INCON ASSESS: CPT | Performed by: INTERNAL MEDICINE

## 2024-05-29 PROCEDURE — G8420 CALC BMI NORM PARAMETERS: HCPCS | Performed by: INTERNAL MEDICINE

## 2024-05-29 PROCEDURE — G0439 PPPS, SUBSEQ VISIT: HCPCS | Performed by: INTERNAL MEDICINE

## 2024-05-29 PROCEDURE — 1036F TOBACCO NON-USER: CPT | Performed by: INTERNAL MEDICINE

## 2024-05-29 PROCEDURE — 1123F ACP DISCUSS/DSCN MKR DOCD: CPT | Performed by: INTERNAL MEDICINE

## 2024-05-29 PROCEDURE — G8427 DOCREV CUR MEDS BY ELIG CLIN: HCPCS | Performed by: INTERNAL MEDICINE

## 2024-05-29 RX ORDER — LEMBOREXANT 5 MG/1
1 TABLET, FILM COATED ORAL NIGHTLY
Qty: 30 TABLET | Refills: 1 | Status: SHIPPED | OUTPATIENT
Start: 2024-05-29

## 2024-05-29 RX ORDER — GABAPENTIN 100 MG/1
CAPSULE ORAL
Qty: 21 CAPSULE | Refills: 0 | Status: SHIPPED | OUTPATIENT
Start: 2024-05-29 | End: 2024-06-26

## 2024-05-29 RX ORDER — POTASSIUM CHLORIDE 750 MG/1
20 CAPSULE, EXTENDED RELEASE ORAL DAILY
Qty: 180 CAPSULE | Refills: 3 | Status: SHIPPED | OUTPATIENT
Start: 2024-05-29

## 2024-05-29 NOTE — ASSESSMENT & PLAN NOTE
11/2023  Punctate acute infarction medial left parieto-occipital junction   Had neuro consult, to continue asa/statin  No lingering symptoms

## 2024-05-29 NOTE — ASSESSMENT & PLAN NOTE
No results found for: \"LDLDIRECT\"     Tolerating statin therapy, plan to continue current regimen pending lab results  Recheck labs to assess for response to medication, whether LDL is at target, and monitor for side effects

## 2024-05-29 NOTE — ASSESSMENT & PLAN NOTE
Discussed about risks of benzodiazepines, polypharmacy.   Orexin inhibitor suggested, will see about coverage options  New med dosing and potential side effects discussed

## 2024-05-29 NOTE — PATIENT INSTRUCTIONS
comfortable, increase your time.  You may not be where you want to be. But you're in the process of getting there. Everyone starts somewhere.  How can you find safe ways to stay active?  Talk with your doctor about any physical challenges you're facing. Make a plan with your doctor if you have a health problem or aren't sure how to get started with activity.  If you're already active, ask your doctor if there is anything you should change to stay safe as your body and health change.  If you tend to feel dizzy after you take medicine, avoid activity at that time. Try being active before you take your medicine. This will reduce your risk of falls.  If you plan to be active at home, make sure to clear your space before you get started. Remove things like TV cords, coffee tables, and throw rugs. It's safest to have plenty of space to move freely.  The key to getting more active is to take it slow and steady. Try to improve only a little bit at a time. Pick just one area to improve on at first. And if an activity hurts, stop and talk to your doctor.  Where can you learn more?  Go to https://www.Lydia.net/patientEd and enter P600 to learn more about \"Learning About Being Active as an Older Adult.\"  Current as of: June 5, 2023               Content Version: 14.0  © 1600-0918 [employer].   Care instructions adapted under license by [employer]. If you have questions about a medical condition or this instruction, always ask your healthcare professional. [employer] disclaims any warranty or liability for your use of this information.           A Healthy Heart: Care Instructions  Overview     Coronary artery disease, also called heart disease, occurs when a substance called plaque builds up in the vessels that supply oxygen-rich blood to your heart muscle. This can narrow the blood vessels and reduce blood flow. A heart attack happens when blood flow is completely blocked. A high-fat diet,

## 2024-05-29 NOTE — ASSESSMENT & PLAN NOTE
Echo 10/2023  Mildly dilated aortic root. Ao root diameter is 3.7 cm. Mildly dilated ascending aorta. Ao ascending diameter is 3.7 cm

## 2024-05-29 NOTE — ASSESSMENT & PLAN NOTE
Controlled with current regimen, plan to continue  Advised on home monitoring of BP and keep a log

## 2024-05-29 NOTE — PROGRESS NOTES
Chief Complaint   Patient presents with    Medicare AWV     Blood pressure (!) 142/87, pulse 98, temperature 97.3 °F (36.3 °C), temperature source Oral, resp. rate 18, height 1.448 m (4' 9\"), weight 49.9 kg (110 lb), last menstrual period 01/01/1986, SpO2 96 %.    
care):   Patient Care Team:  Lorin Starks MD as PCP - General (Internal Medicine)  Lorin Starks MD as PCP - Empaneled Provider  Tony Camarillo MD as Physician     Reviewed and updated this visit:  Tobacco  Allergies  Meds  Problems  Med Hx  Surg Hx  Soc Hx  Fam Hx

## 2024-05-30 ENCOUNTER — TELEPHONE (OUTPATIENT)
Age: 89
End: 2024-05-30

## 2024-05-30 DIAGNOSIS — F51.04 CHRONIC INSOMNIA: Primary | ICD-10-CM

## 2024-05-30 LAB
25(OH)D3 SERPL-MCNC: 46.5 NG/ML (ref 30–100)
ALBUMIN SERPL-MCNC: 3.8 G/DL (ref 3.5–5)
ALBUMIN/GLOB SERPL: 1.5 (ref 1.1–2.2)
ALP SERPL-CCNC: 105 U/L (ref 45–117)
ALT SERPL-CCNC: 26 U/L (ref 12–78)
ANION GAP SERPL CALC-SCNC: 7 MMOL/L (ref 5–15)
AST SERPL-CCNC: 28 U/L (ref 15–37)
BASOPHILS # BLD: 0.1 K/UL (ref 0–0.1)
BASOPHILS NFR BLD: 1 % (ref 0–1)
BILIRUB SERPL-MCNC: 0.7 MG/DL (ref 0.2–1)
BUN SERPL-MCNC: 14 MG/DL (ref 6–20)
BUN/CREAT SERPL: 15 (ref 12–20)
CALCIUM SERPL-MCNC: 9.5 MG/DL (ref 8.5–10.1)
CHLORIDE SERPL-SCNC: 102 MMOL/L (ref 97–108)
CO2 SERPL-SCNC: 26 MMOL/L (ref 21–32)
CREAT SERPL-MCNC: 0.96 MG/DL (ref 0.55–1.02)
DIFFERENTIAL METHOD BLD: ABNORMAL
EOSINOPHIL # BLD: 0.6 K/UL (ref 0–0.4)
EOSINOPHIL NFR BLD: 8 % (ref 0–7)
ERYTHROCYTE [DISTWIDTH] IN BLOOD BY AUTOMATED COUNT: 13.7 % (ref 11.5–14.5)
GLOBULIN SER CALC-MCNC: 2.5 G/DL (ref 2–4)
GLUCOSE SERPL-MCNC: 105 MG/DL (ref 65–100)
HCT VFR BLD AUTO: 34.6 % (ref 35–47)
HGB BLD-MCNC: 12.2 G/DL (ref 11.5–16)
IMM GRANULOCYTES # BLD AUTO: 0 K/UL (ref 0–0.04)
IMM GRANULOCYTES NFR BLD AUTO: 0 % (ref 0–0.5)
LDLC SERPL DIRECT ASSAY-MCNC: 73 MG/DL (ref 0–100)
LYMPHOCYTES # BLD: 1.6 K/UL (ref 0.8–3.5)
LYMPHOCYTES NFR BLD: 21 % (ref 12–49)
MAGNESIUM SERPL-MCNC: 1.7 MG/DL (ref 1.6–2.4)
MCH RBC QN AUTO: 32.7 PG (ref 26–34)
MCHC RBC AUTO-ENTMCNC: 35.3 G/DL (ref 30–36.5)
MCV RBC AUTO: 92.8 FL (ref 80–99)
MONOCYTES # BLD: 0.5 K/UL (ref 0–1)
MONOCYTES NFR BLD: 7 % (ref 5–13)
NEUTS SEG # BLD: 4.7 K/UL (ref 1.8–8)
NEUTS SEG NFR BLD: 63 % (ref 32–75)
NRBC # BLD: 0 K/UL (ref 0–0.01)
NRBC BLD-RTO: 0 PER 100 WBC
PLATELET # BLD AUTO: 278 K/UL (ref 150–400)
PMV BLD AUTO: 9.7 FL (ref 8.9–12.9)
POTASSIUM SERPL-SCNC: 3.8 MMOL/L (ref 3.5–5.1)
PROT SERPL-MCNC: 6.3 G/DL (ref 6.4–8.2)
RBC # BLD AUTO: 3.73 M/UL (ref 3.8–5.2)
SODIUM SERPL-SCNC: 135 MMOL/L (ref 136–145)
WBC # BLD AUTO: 7.4 K/UL (ref 3.6–11)

## 2024-05-30 RX ORDER — SUVOREXANT 10 MG/1
1 TABLET, FILM COATED ORAL NIGHTLY
Qty: 30 TABLET | Refills: 5 | Status: SHIPPED | OUTPATIENT
Start: 2024-05-30 | End: 2024-11-26

## 2024-06-03 DIAGNOSIS — F51.04 CHRONIC INSOMNIA: Primary | ICD-10-CM

## 2024-06-03 RX ORDER — RAMELTEON 8 MG/1
8 TABLET ORAL NIGHTLY PRN
Qty: 30 TABLET | Refills: 3 | Status: SHIPPED | OUTPATIENT
Start: 2024-06-03 | End: 2024-06-04

## 2024-06-03 RX ORDER — POTASSIUM CHLORIDE 1500 MG/1
20 TABLET, EXTENDED RELEASE ORAL DAILY
Qty: 90 TABLET | Refills: 1 | OUTPATIENT
Start: 2024-06-03

## 2024-06-04 ENCOUNTER — TELEPHONE (OUTPATIENT)
Age: 89
End: 2024-06-04

## 2024-06-04 RX ORDER — TRAZODONE HYDROCHLORIDE 50 MG/1
50 TABLET ORAL NIGHTLY
Qty: 90 TABLET | Refills: 1 | Status: SHIPPED | OUTPATIENT
Start: 2024-06-04

## 2024-06-04 NOTE — TELEPHONE ENCOUNTER
Reason for call:  Spoke with pharmacies Gurinder.   Per Pharmacies medication ramelteon (ROZEREM) 8 MG tablet  is cover by insurance, however pt need to pay $102 . Pt is wandering if it is any other med she can take with have to pay?    Is this a new problem: Yes    Date of last appointment:  5/29/2024     Can we respond via [employer]: No    Best call back number: [facility]/pharmacy #[id number] - [place], [place] - [address] -  [phone] -  [phone] (Pharmacy)

## 2024-06-12 ENCOUNTER — TELEPHONE (OUTPATIENT)
Age: 89
End: 2024-06-12

## 2024-06-12 NOTE — TELEPHONE ENCOUNTER
Medication Refill Request    Jaime S Davisjacky is requesting a refill of the following medication(s):     Klor con  needs to get a pill in a smaller size if possible due to difficulty swallowing   Chokes even cut in half    Please [facility] refill to:   [facility] - [place] [facility] - [address] - P [phone] - F [phone]  [facility]  [place] [address]  Phone: [phone] Fax: [phone]

## 2024-06-13 NOTE — TELEPHONE ENCOUNTER
Sending effer-K to her local [facility] to try that first and if she likes it we can change to mail order.  It's a dissolving tab kind of like myra luo- you put it in a glass of water or juice and then sip it

## 2024-06-13 NOTE — TELEPHONE ENCOUNTER
Patient report taking Klor-Con 20 meq daily, prescribed by previous Dr. Austin. She is cutting the tablets in half. Received prescription on 05/29 for Micro K 10 meq, 2 tablets daily-she is able to swallow medication with no problem. Received a refill today for Effer-K. Patient has been taking both Klor-Con and Micro K since 05/31/24. Will make MD aware and clarification.

## 2024-06-13 NOTE — TELEPHONE ENCOUNTER
Clarified with patient, she is able to take the 2 10meq pills fine so will stick with that, forget the dissolving tabs, forget the chlorK larger tabs

## 2024-08-23 ENCOUNTER — HOSPITAL ENCOUNTER (INPATIENT)
Facility: HOSPITAL | Age: 89
LOS: 3 days | Discharge: HOME | DRG: 065 | End: 2024-08-26
Attending: EMERGENCY MEDICINE | Admitting: INTERNAL MEDICINE
Payer: MEDICARE

## 2024-08-23 ENCOUNTER — APPOINTMENT (OUTPATIENT)
Facility: HOSPITAL | Age: 89
DRG: 065 | End: 2024-08-23
Payer: MEDICARE

## 2024-08-23 DIAGNOSIS — I63.9 CRYPTOGENIC STROKE (HCC): ICD-10-CM

## 2024-08-23 DIAGNOSIS — H53.40 VISUAL FIELD DEFECT: Primary | ICD-10-CM

## 2024-08-23 LAB
ALBUMIN SERPL-MCNC: 4.1 G/DL (ref 3.5–5)
ALBUMIN/GLOB SERPL: 1.5 (ref 1.1–2.2)
ALP SERPL-CCNC: 97 U/L (ref 45–117)
ALT SERPL-CCNC: 17 U/L (ref 12–78)
ANION GAP SERPL CALC-SCNC: 4 MMOL/L (ref 5–15)
AST SERPL-CCNC: 24 U/L (ref 15–37)
BASOPHILS # BLD: 0 K/UL (ref 0–0.1)
BASOPHILS NFR BLD: 1 % (ref 0–1)
BILIRUB SERPL-MCNC: 0.8 MG/DL (ref 0.2–1)
BUN SERPL-MCNC: 17 MG/DL (ref 6–20)
BUN/CREAT SERPL: 17 (ref 12–20)
CALCIUM SERPL-MCNC: 10 MG/DL (ref 8.5–10.1)
CHLORIDE SERPL-SCNC: 100 MMOL/L (ref 97–108)
CHOLEST SERPL-MCNC: 180 MG/DL
CO2 SERPL-SCNC: 28 MMOL/L (ref 21–32)
CREAT SERPL-MCNC: 1.01 MG/DL (ref 0.55–1.02)
DIFFERENTIAL METHOD BLD: ABNORMAL
EKG ATRIAL RATE: 74 BPM
EKG DIAGNOSIS: NORMAL
EKG P AXIS: 28 DEGREES
EKG P-R INTERVAL: 176 MS
EKG Q-T INTERVAL: 370 MS
EKG QRS DURATION: 80 MS
EKG QTC CALCULATION (BAZETT): 410 MS
EKG R AXIS: 10 DEGREES
EKG T AXIS: 35 DEGREES
EKG VENTRICULAR RATE: 74 BPM
EOSINOPHIL # BLD: 0.2 K/UL (ref 0–0.4)
EOSINOPHIL NFR BLD: 2 % (ref 0–7)
ERYTHROCYTE [DISTWIDTH] IN BLOOD BY AUTOMATED COUNT: 13.2 % (ref 11.5–14.5)
EST. AVERAGE GLUCOSE BLD GHB EST-MCNC: 103 MG/DL
GLOBULIN SER CALC-MCNC: 2.7 G/DL (ref 2–4)
GLUCOSE BLD STRIP.AUTO-MCNC: 122 MG/DL (ref 65–117)
GLUCOSE SERPL-MCNC: 129 MG/DL (ref 65–100)
HBA1C MFR BLD: 5.2 % (ref 4–5.6)
HCT VFR BLD AUTO: 36.7 % (ref 35–47)
HDLC SERPL-MCNC: 93 MG/DL
HDLC SERPL: 1.9 (ref 0–5)
HGB BLD-MCNC: 12.5 G/DL (ref 11.5–16)
IMM GRANULOCYTES # BLD AUTO: 0 K/UL (ref 0–0.04)
IMM GRANULOCYTES NFR BLD AUTO: 0 % (ref 0–0.5)
INR PPP: 1 (ref 0.9–1.1)
LDLC SERPL CALC-MCNC: 74.8 MG/DL (ref 0–100)
LYMPHOCYTES # BLD: 1.1 K/UL (ref 0.8–3.5)
LYMPHOCYTES NFR BLD: 14 % (ref 12–49)
MCH RBC QN AUTO: 32.4 PG (ref 26–34)
MCHC RBC AUTO-ENTMCNC: 34.1 G/DL (ref 30–36.5)
MCV RBC AUTO: 95.1 FL (ref 80–99)
MONOCYTES # BLD: 0.5 K/UL (ref 0–1)
MONOCYTES NFR BLD: 6 % (ref 5–13)
NEUTS SEG # BLD: 6.2 K/UL (ref 1.8–8)
NEUTS SEG NFR BLD: 77 % (ref 32–75)
NRBC # BLD: 0 K/UL (ref 0–0.01)
NRBC BLD-RTO: 0 PER 100 WBC
PLATELET # BLD AUTO: 225 K/UL (ref 150–400)
PMV BLD AUTO: 9.2 FL (ref 8.9–12.9)
POTASSIUM SERPL-SCNC: 4.1 MMOL/L (ref 3.5–5.1)
PROT SERPL-MCNC: 6.8 G/DL (ref 6.4–8.2)
PROTHROMBIN TIME: 10.5 SEC (ref 9–11.1)
RBC # BLD AUTO: 3.86 M/UL (ref 3.8–5.2)
SERVICE CMNT-IMP: ABNORMAL
SODIUM SERPL-SCNC: 132 MMOL/L (ref 136–145)
TRIGL SERPL-MCNC: 61 MG/DL
TROPONIN I SERPL HS-MCNC: 12 NG/L (ref 0–51)
VLDLC SERPL CALC-MCNC: 12.2 MG/DL
WBC # BLD AUTO: 8 K/UL (ref 3.6–11)

## 2024-08-23 PROCEDURE — 83036 HEMOGLOBIN GLYCOSYLATED A1C: CPT

## 2024-08-23 PROCEDURE — 85025 COMPLETE CBC W/AUTO DIFF WBC: CPT

## 2024-08-23 PROCEDURE — 80053 COMPREHEN METABOLIC PANEL: CPT

## 2024-08-23 PROCEDURE — 6370000000 HC RX 637 (ALT 250 FOR IP)

## 2024-08-23 PROCEDURE — 2060000000 HC ICU INTERMEDIATE R&B

## 2024-08-23 PROCEDURE — 70496 CT ANGIOGRAPHY HEAD: CPT

## 2024-08-23 PROCEDURE — 6370000000 HC RX 637 (ALT 250 FOR IP): Performed by: INTERNAL MEDICINE

## 2024-08-23 PROCEDURE — 80061 LIPID PANEL: CPT

## 2024-08-23 PROCEDURE — 70450 CT HEAD/BRAIN W/O DYE: CPT

## 2024-08-23 PROCEDURE — 4A03X5D MEASUREMENT OF ARTERIAL FLOW, INTRACRANIAL, EXTERNAL APPROACH: ICD-10-PCS | Performed by: INTERNAL MEDICINE

## 2024-08-23 PROCEDURE — 36415 COLL VENOUS BLD VENIPUNCTURE: CPT

## 2024-08-23 PROCEDURE — 99285 EMERGENCY DEPT VISIT HI MDM: CPT

## 2024-08-23 PROCEDURE — 6360000004 HC RX CONTRAST MEDICATION: Performed by: RADIOLOGY

## 2024-08-23 PROCEDURE — 97535 SELF CARE MNGMENT TRAINING: CPT

## 2024-08-23 PROCEDURE — 84484 ASSAY OF TROPONIN QUANT: CPT

## 2024-08-23 PROCEDURE — 97530 THERAPEUTIC ACTIVITIES: CPT

## 2024-08-23 PROCEDURE — 97165 OT EVAL LOW COMPLEX 30 MIN: CPT

## 2024-08-23 PROCEDURE — 82962 GLUCOSE BLOOD TEST: CPT

## 2024-08-23 PROCEDURE — 93005 ELECTROCARDIOGRAM TRACING: CPT | Performed by: EMERGENCY MEDICINE

## 2024-08-23 PROCEDURE — 0042T CT BRAIN PERFUSION: CPT

## 2024-08-23 PROCEDURE — 85610 PROTHROMBIN TIME: CPT

## 2024-08-23 PROCEDURE — 6360000002 HC RX W HCPCS: Performed by: INTERNAL MEDICINE

## 2024-08-23 PROCEDURE — APPNB30 APP NON BILLABLE TIME 0-30 MINS: Performed by: NURSE PRACTITIONER

## 2024-08-23 RX ORDER — ACETAMINOPHEN 325 MG/1
650 TABLET ORAL EVERY 6 HOURS PRN
Status: DISCONTINUED | OUTPATIENT
Start: 2024-08-23 | End: 2024-08-26 | Stop reason: HOSPADM

## 2024-08-23 RX ORDER — SODIUM CHLORIDE 0.9 % (FLUSH) 0.9 %
5-40 SYRINGE (ML) INJECTION PRN
Status: DISCONTINUED | OUTPATIENT
Start: 2024-08-23 | End: 2024-08-26 | Stop reason: HOSPADM

## 2024-08-23 RX ORDER — ROSUVASTATIN CALCIUM 40 MG/1
40 TABLET, COATED ORAL NIGHTLY
Status: DISCONTINUED | OUTPATIENT
Start: 2024-08-23 | End: 2024-08-24

## 2024-08-23 RX ORDER — ENOXAPARIN SODIUM 100 MG/ML
30 INJECTION SUBCUTANEOUS DAILY
Status: DISCONTINUED | OUTPATIENT
Start: 2024-08-23 | End: 2024-08-25 | Stop reason: ALTCHOICE

## 2024-08-23 RX ORDER — SODIUM CHLORIDE 9 MG/ML
INJECTION, SOLUTION INTRAVENOUS PRN
Status: DISCONTINUED | OUTPATIENT
Start: 2024-08-23 | End: 2024-08-26 | Stop reason: HOSPADM

## 2024-08-23 RX ORDER — POLYETHYLENE GLYCOL 3350 17 G/17G
17 POWDER, FOR SOLUTION ORAL DAILY PRN
Status: DISCONTINUED | OUTPATIENT
Start: 2024-08-23 | End: 2024-08-26 | Stop reason: HOSPADM

## 2024-08-23 RX ORDER — TRAZODONE HYDROCHLORIDE 50 MG/1
50 TABLET, FILM COATED ORAL NIGHTLY
Status: DISCONTINUED | OUTPATIENT
Start: 2024-08-23 | End: 2024-08-26 | Stop reason: HOSPADM

## 2024-08-23 RX ORDER — IOPAMIDOL 755 MG/ML
100 INJECTION, SOLUTION INTRAVASCULAR
Status: COMPLETED | OUTPATIENT
Start: 2024-08-23 | End: 2024-08-23

## 2024-08-23 RX ORDER — ONDANSETRON 4 MG/1
4 TABLET, ORALLY DISINTEGRATING ORAL EVERY 8 HOURS PRN
Status: DISCONTINUED | OUTPATIENT
Start: 2024-08-23 | End: 2024-08-26 | Stop reason: HOSPADM

## 2024-08-23 RX ORDER — ONDANSETRON 2 MG/ML
4 INJECTION INTRAMUSCULAR; INTRAVENOUS EVERY 6 HOURS PRN
Status: DISCONTINUED | OUTPATIENT
Start: 2024-08-23 | End: 2024-08-26 | Stop reason: HOSPADM

## 2024-08-23 RX ORDER — ASPIRIN 81 MG/1
81 TABLET, CHEWABLE ORAL DAILY
Status: DISCONTINUED | OUTPATIENT
Start: 2024-08-23 | End: 2024-08-26 | Stop reason: HOSPADM

## 2024-08-23 RX ORDER — ASPIRIN 300 MG/1
300 SUPPOSITORY RECTAL DAILY
Status: DISCONTINUED | OUTPATIENT
Start: 2024-08-23 | End: 2024-08-26 | Stop reason: HOSPADM

## 2024-08-23 RX ORDER — SODIUM CHLORIDE 0.9 % (FLUSH) 0.9 %
5-40 SYRINGE (ML) INJECTION EVERY 12 HOURS SCHEDULED
Status: DISCONTINUED | OUTPATIENT
Start: 2024-08-23 | End: 2024-08-26 | Stop reason: HOSPADM

## 2024-08-23 RX ORDER — PAROXETINE 20 MG/1
30 TABLET, FILM COATED ORAL DAILY
Status: DISCONTINUED | OUTPATIENT
Start: 2024-08-23 | End: 2024-08-26 | Stop reason: HOSPADM

## 2024-08-23 RX ORDER — LOSARTAN POTASSIUM 50 MG/1
100 TABLET ORAL DAILY
Status: DISCONTINUED | OUTPATIENT
Start: 2024-08-23 | End: 2024-08-26 | Stop reason: HOSPADM

## 2024-08-23 RX ADMIN — ENOXAPARIN SODIUM 30 MG: 100 INJECTION SUBCUTANEOUS at 17:40

## 2024-08-23 RX ADMIN — TRAZODONE HYDROCHLORIDE 50 MG: 50 TABLET ORAL at 21:28

## 2024-08-23 RX ADMIN — ASPIRIN 81 MG CHEWABLE TABLET 81 MG: 81 TABLET CHEWABLE at 17:40

## 2024-08-23 RX ADMIN — ACETAMINOPHEN 650 MG: 325 TABLET ORAL at 20:18

## 2024-08-23 RX ADMIN — IOPAMIDOL 100 ML: 755 INJECTION, SOLUTION INTRAVENOUS at 12:33

## 2024-08-23 RX ADMIN — ROSUVASTATIN CALCIUM 40 MG: 40 TABLET, COATED ORAL at 21:29

## 2024-08-23 RX ADMIN — PAROXETINE HYDROCHLORIDE 30 MG: 20 TABLET, FILM COATED ORAL at 17:39

## 2024-08-23 RX ADMIN — IOPAMIDOL 20 ML: 755 INJECTION, SOLUTION INTRAVENOUS at 12:33

## 2024-08-23 ASSESSMENT — PAIN SCALES - GENERAL
PAINLEVEL_OUTOF10: 3
PAINLEVEL_OUTOF10: 3

## 2024-08-23 ASSESSMENT — PAIN DESCRIPTION - DESCRIPTORS
DESCRIPTORS: ACHING
DESCRIPTORS: ACHING

## 2024-08-23 ASSESSMENT — PAIN DESCRIPTION - LOCATION
LOCATION: HEAD
LOCATION: HEAD

## 2024-08-23 NOTE — ED PROVIDER NOTES
[facility] EMERGENCY DEP  EMERGENCY DEPARTMENT ENCOUNTER      Pt Name: Jaime Simpson  MRN: [id number]  Birthdate 1935  Date of evaluation: 8/23/2024  Provider: Josef Pollock MD    CHIEF COMPLAINT       Chief Complaint   Patient presents with    Loss of Vision         HISTORY OF PRESENT ILLNESS   (Location/Symptom, Timing/Onset, Context/Setting, Quality, Duration, Modifying Factors, Severity)  Note limiting factors.   HPI      89-year-old female with a past medical history significant for carpal tunnel surgery and three back surgeries presented to the Emergency Department via POV. History provided by the patient. She reports vision changes that began last night. Specifically, she describes her vision as fuzzy in the upper right portion of her visual field. She notes that when she looks forward, the vision remains fuzzy in that area. She denies any headaches. She is currently taking baby aspirin and blood pressure medication.  She was seen by ophthalmology who diagnosed her with a visual field defect and referred her to the emergency department.    Review of External Medical Records:     Nursing Notes were reviewed.    REVIEW OF SYSTEMS    (2-9 systems for level 4, 10 or more for level 5)     Review of Systems    Except as noted above the remainder of the review of systems was reviewed and negative.       PAST MEDICAL HISTORY     Past Medical History:   Diagnosis Date    Anxiety     Arthritis back pain    fingers    Asthma     MILD - NO INHALERS    Chronic pain     Colitis     Diverticulitis 06/11/2013    Hypercholesterolemia     Hypertension     IBS (irritable bowel syndrome)     Ill-defined condition     H/O UTI POST DOW CATH    Insomnia     Left rotator cuff tear arthropathy 09/01/2022    Nausea & vomiting     Stroke (HCC) 1996    tia   PATENT  FORAMEN  OVALE    Swollen L ankle          SURGICAL HISTORY       Past Surgical History:   Procedure Laterality Date    BACK SURGERY  2021    CATARACT REMOVAL  has had the opportunity to ask questions about their child's care.  Family expresses understanding and agreement with care plan, follow up and return instructions.  Family agrees to return the child to the ER in 48 hours if their symptoms are not improving or immediately if they have any change in their condition.  Family understands to follow up with their pediatrician as instructed to ensure resolution of the issue seen for today.    (Please note that portions of this note were completed with a voice recognition program.  Efforts were made to edit the dictations but occasionally words are mis-transcribed.)    Josef Pollock MD (electronically signed)  Emergency Attending Physician / Physician Assistant / Nurse Practitioner              Josef Pollock MD  08/23/24 3872

## 2024-08-23 NOTE — PLAN OF CARE
Problem: Occupational Therapy - Adult  Goal: By Discharge: Performs self-care activities at highest level of function for planned discharge setting.  See evaluation for individualized goals.  Description: FUNCTIONAL STATUS PRIOR TO ADMISSION:  Patient was Mod I for ADLs/IADLs and mobility with SPC use. She lives alone ( recently passed in May of this year), reports decreased STM since 's passing. Still drives, has daughter who live a couple hours away.     Occupational Therapy Goals  Initiated 8/23/2024   1.  Patient will perform grooming at the sink with Modified [place] within 7 day(s).  2.  Patient will perform bathing with Modified [place] within 7 day(s).  3.  Patient will perform upper and lower body dressing with Modified [place] within 7 day(s).  4.  Patient will perform toilet transfers with Modified [place] within 7 day(s).  5.  Patient will perform all aspects of toileting with Modified [place] within 7 day(s).  6.  Patient will participate in upper extremity therapeutic exercise/activities with Modified [place] within 7 day(s).    7.  Patient will utilize energy conservation techniques during functional activities with verbal cues within 7 day(s).  8.  Patient will complete the Fugl Currie within 7 days.    Outcome: Progressing     OCCUPATIONAL THERAPY EVALUATION    Patient: Jaime Simpson (89 y.o. 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     She is Mod I  needed    Thank you for this referral.  Aydee Carmona, OTD, OTR/L  Minutes: 36    Occupational Therapy Evaluation Charge Determination   History Examination Decision-Making   LOW Complexity : Brief history review  LOW Complexity: 1-3 Performance deficits relating to physical, cognitive, or psychosocial skills that result in activity limitations and/or participation restrictions LOW Complexity: No comorbidities that affect functional and  no verbal  or physical assist needed to complete eval tasks   Based on the above components, the patient evaluation is determined to be of the following complexity level: Low

## 2024-08-23 NOTE — H&P
History and Physical    Date of Service:  8/23/2024  Primary Care Provider: Lorin Starks MD  Source of information: The patient and Chart review    Chief Complaint: Loss of Vision      History of Presenting Illness:   Jaime Simpson is a 89 y.o. female with Pmh of HTN, depression, recent left carpal tunnel surgery presented to ED for right upper visual field defect. She woke up with vision change. She visited her ophthalmologist this morning who refereed to ED for CVA work up. She denied any weakness, tingling or numbness ( other than left hand for carpal tunnel), chest pain, sob, cough, fever, abd pain, urinary  or bowel changes.   In ED, CT head and CTA head/ neck done. Tele neurology evaluated the pt. Hospitalist consulted for admission        REVIEW OF SYSTEMS:  A comprehensive review of systems was negative except for that written in the History of Present Illness.     Past Medical History:   Diagnosis Date    Anxiety     Arthritis back pain    fingers    Asthma     MILD - NO INHALERS    Chronic pain     Colitis     Diverticulitis 06/11/2013    Hypercholesterolemia     Hypertension     IBS (irritable bowel syndrome)     Ill-defined condition     H/O UTI POST DOW CATH    Insomnia     Left rotator cuff tear arthropathy 09/01/2022    Nausea & vomiting     Stroke (HCC) 1996    tia   PATENT  FORAMEN  OVALE    Swollen L ankle       Past Surgical History:   Procedure Laterality Date    BACK SURGERY  2021    CATARACT REMOVAL Bilateral 2009    CHOLECYSTECTOMY  04/1997    COLONOSCOPY      x3    HYSTERECTOMY (CERVIX STATUS UNKNOWN)  1986    IR INJ INTERLAMINAR LUMBAR/SAC  12/19/2022    IR INJ INTERLAMINAR LUMBAR/SAC  4/11/2023    IR LUMBAR TRANSFORAMINAL EPIDURAL SINGLE  03/21/2022    KNEE ARTHROSCOPY Left 04/1998    LUMBAR FUSION  02/2011    L4-L5    ORTHOPEDIC SURGERY Right     REPAIRED TENDONS ON RIGHT HAND AFTER FALL    OR UNLISTED PROCEDURE CARDIAC SURGERY  08/2004    repair of foramen ovale     defect, sensory grossly within normal limit, Strength 5/5 in all extremities,  Cap refill: Brisk, less than 3 seconds  Pulses: 2+, symmetric in all extremities  Skin: Warm, dry, without rashes or lesions    Data Review:   I have independently reviewed and interpreted patient's lab and all other diagnostic data    Abnormal Labs Reviewed   CBC WITH AUTO DIFFERENTIAL - Abnormal; Notable for the following components:       Result Value    Neutrophils % 77 (*)     All other components within normal limits   COMPREHENSIVE METABOLIC PANEL - Abnormal; Notable for the following components:    Sodium 132 (*)     Anion Gap 4 (*)     Glucose 129 (*)     Est, Glom Filt Rate 54 (*)     All other components within normal limits   POCT GLUCOSE - Abnormal; Notable for the following components:    POC Glucose 122 (*)     All other components within normal limits       [unfilled]    IMAGING:   CTA HEAD NECK W CONTRAST   Final Result   CTA Head/Neck:   1. 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   2. No other flow-limiting stenosis or aneurysm.   3. Unchanged mild stenosis (less than 50% by NASCET criteria) of the proximal   left internal carotid artery.      CT Brain Perfusion:   1. No acute abnormality.         Electronically signed by Eleuterio Rodríguez      CT BRAIN PERFUSION   Final Result   CTA Head/Neck:   1. 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   2. No other flow-limiting stenosis or aneurysm.   3. Unchanged mild stenosis (less than 50% by NASCET criteria) of the proximal   left internal carotid artery.      CT Brain Perfusion:   1. No acute abnormality.         Electronically signed by Eleuterio Rodríguez      CT HEAD WO CONTRAST   Final Result   No evidence of acute intracranial abnormality.      Chronic microangiopathic white matter changes.            Electronically signed by KIAH OLIVEIRA      MRI

## 2024-08-23 NOTE — ED TRIAGE NOTES
Patient arrives by POV for right upper vision field \"fuzziness\" that she first noticed when she woke this morning at 0630. Last known well is 2200 when going to bed. Patient reports a mild headache this morning as well. No other deficits. Patient was taken by POV to [facility] this morning.

## 2024-08-23 NOTE — DISCHARGE INSTR - COC
Continuity of Care Form    Patient Name: Jaime Simpson   :  1935  MRN:  [id number]    Admit date:  2024  Discharge date:  ***    Code Status Order: Prior   Advance Directives:     Admitting Physician:  No admitting provider for patient encounter.  PCP: Lorin Starks MD    Discharging Nurse: ***  Discharging Hospital Unit/Room#: ER18/18  Discharging Unit Phone Number: ***    Emergency Contact:   Extended Emergency Contact Information  Primary Emergency Contact: Lesli Perdomo  Home Phone: [phone]  Relation: Child  Preferred language: English   needed? No  Secondary Emergency Contact: Oneida Jimenez  Home Phone: [phone]  Relation: Child  Preferred language: English   needed? No    Past Surgical History:  Past Surgical History:   Procedure Laterality Date    BACK SURGERY      CATARACT REMOVAL Bilateral     CHOLECYSTECTOMY  1997    COLONOSCOPY      x3    HYSTERECTOMY (CERVIX STATUS UNKNOWN)  1986    IR INJ INTERLAMINAR LUMBAR/SAC  2022    IR INJ INTERLAMINAR LUMBAR/SAC  2023    IR LUMBAR TRANSFORAMINAL EPIDURAL SINGLE  2022    KNEE ARTHROSCOPY Left 1998    LUMBAR FUSION  2011    L4-L5    ORTHOPEDIC SURGERY Right     REPAIRED TENDONS ON RIGHT HAND AFTER FALL    OK UNLISTED PROCEDURE CARDIAC SURGERY  2004    repair of foramen ovale    SHOULDER ARTHROPLASTY Right 2020    SHOULDER ARTHROPLASTY Left     TONSILLECTOMY  CHILDHOOD    WISDOM TOOTH EXTRACTION      X3       Immunization History:   Immunization History   Administered Date(s) Administered    COVID-19, PFIZER GRAY top, DO NOT Dilute, (age 12 y+), IM, 30 mcg/0.3 mL 2022    COVID-19, PFIZER PURPLE top, DILUTE for use, (age 12 y+), 30mcg/0.3mL 2021, 2021, 2021, 2022, 10/05/2023    Influenza Virus Vaccine 10/02/2012, 2013, 10/01/2014, 2015, 2016    Influenza, FLUAD, (age 65 y+), IM, Quadv, 0.5mL 2022    Influenza, FLUAD, (age 65

## 2024-08-23 NOTE — PROGRESS NOTES
Neurocritical Care Code Stroke Documentation      Symptoms:  Patient presents to the ED for complaints of right upper field blurry vision.  She also reports a mild frontal headache with her symptoms this morning while she was reading the newspaper.  Patient went to [facility] this morning and had her eyes dilated and was instructed to come to the ED for stroke evaluation. No retinal tear/retinal detachment/vitreous hemorrhage per review of eye exam note.    She reports having history of headaches and having wavy lines in her vision at times.  She reports never been diagnosed with migraines.  She usually takes 2 Tylenol and her symptoms resolve.   Last Known Well:  last night   Medical hx:     Past Medical History:   Diagnosis Date    Anxiety     Arthritis back pain    fingers    Asthma     MILD - NO INHALERS    Chronic pain     Colitis     Diverticulitis 2013    Hypercholesterolemia     Hypertension     IBS (irritable bowel syndrome)     Ill-defined condition     H/O UTI POST DOW CATH    Insomnia     Left rotator cuff tear arthropathy 2022    Nausea & vomiting     Stroke (HCC)     tia   PATENT  FORAMEN  OVALE    Swollen L ankle       Anticoagulation: On 81 mg of aspirin daily    VAN:   Negative   NIHSS:   1a-LOC:0    1b-Month/Age:0    1c-Open/Close Hand:0    2-Best Gaze:0    3-Visual Fields:0 (able to name fingers, but appears to have some blurry vision)    4-Facial Palsy:0    5a-Left Arm:0    5b-Right Arm:0    6a-Left Le    6b-Right Le    7-Limb Ataxia:0    8-Sensory:0    9-Best Language:0    10-Dysarthria:0    11-Extinction/Inattention:0  TOTAL SCORE:0   Imaging:   CT: No acute process. Chronic microangiopathic white matter changes.     CTA: No large vessel occlusion. 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 No other flow-limiting stenosis or aneurysm. Unchanged mild stenosis (less than 50% by NASCET criteria) of

## 2024-08-24 ENCOUNTER — APPOINTMENT (OUTPATIENT)
Facility: HOSPITAL | Age: 89
DRG: 065 | End: 2024-08-24
Attending: INTERNAL MEDICINE
Payer: MEDICARE

## 2024-08-24 LAB
ANION GAP SERPL CALC-SCNC: 7 MMOL/L (ref 5–15)
BUN SERPL-MCNC: 19 MG/DL (ref 6–20)
BUN/CREAT SERPL: 18 (ref 12–20)
CALCIUM SERPL-MCNC: 9 MG/DL (ref 8.5–10.1)
CHLORIDE SERPL-SCNC: 99 MMOL/L (ref 97–108)
CO2 SERPL-SCNC: 26 MMOL/L (ref 21–32)
CREAT SERPL-MCNC: 1.08 MG/DL (ref 0.55–1.02)
ERYTHROCYTE [DISTWIDTH] IN BLOOD BY AUTOMATED COUNT: 13.2 % (ref 11.5–14.5)
GLUCOSE SERPL-MCNC: 111 MG/DL (ref 65–100)
HCT VFR BLD AUTO: 33.6 % (ref 35–47)
HGB BLD-MCNC: 11.7 G/DL (ref 11.5–16)
MAGNESIUM SERPL-MCNC: 1.6 MG/DL (ref 1.6–2.4)
MCH RBC QN AUTO: 32.5 PG (ref 26–34)
MCHC RBC AUTO-ENTMCNC: 34.8 G/DL (ref 30–36.5)
MCV RBC AUTO: 93.3 FL (ref 80–99)
NRBC # BLD: 0 K/UL (ref 0–0.01)
NRBC BLD-RTO: 0 PER 100 WBC
PLATELET # BLD AUTO: 224 K/UL (ref 150–400)
PMV BLD AUTO: 9.5 FL (ref 8.9–12.9)
POTASSIUM SERPL-SCNC: 3.2 MMOL/L (ref 3.5–5.1)
RBC # BLD AUTO: 3.6 M/UL (ref 3.8–5.2)
SODIUM SERPL-SCNC: 132 MMOL/L (ref 136–145)
WBC # BLD AUTO: 9.1 K/UL (ref 3.6–11)

## 2024-08-24 PROCEDURE — 85027 COMPLETE CBC AUTOMATED: CPT

## 2024-08-24 PROCEDURE — 6370000000 HC RX 637 (ALT 250 FOR IP): Performed by: PSYCHIATRY & NEUROLOGY

## 2024-08-24 PROCEDURE — 99223 1ST HOSP IP/OBS HIGH 75: CPT | Performed by: PSYCHIATRY & NEUROLOGY

## 2024-08-24 PROCEDURE — 6370000000 HC RX 637 (ALT 250 FOR IP): Performed by: FAMILY MEDICINE

## 2024-08-24 PROCEDURE — 83735 ASSAY OF MAGNESIUM: CPT

## 2024-08-24 PROCEDURE — 6370000000 HC RX 637 (ALT 250 FOR IP)

## 2024-08-24 PROCEDURE — 2580000003 HC RX 258: Performed by: INTERNAL MEDICINE

## 2024-08-24 PROCEDURE — 80048 BASIC METABOLIC PNL TOTAL CA: CPT

## 2024-08-24 PROCEDURE — 6360000002 HC RX W HCPCS: Performed by: INTERNAL MEDICINE

## 2024-08-24 PROCEDURE — 97161 PT EVAL LOW COMPLEX 20 MIN: CPT | Performed by: PHYSICAL THERAPIST

## 2024-08-24 PROCEDURE — 2060000000 HC ICU INTERMEDIATE R&B

## 2024-08-24 PROCEDURE — 97116 GAIT TRAINING THERAPY: CPT | Performed by: PHYSICAL THERAPIST

## 2024-08-24 PROCEDURE — 6370000000 HC RX 637 (ALT 250 FOR IP): Performed by: INTERNAL MEDICINE

## 2024-08-24 RX ORDER — ATORVASTATIN CALCIUM 40 MG/1
40 TABLET, FILM COATED ORAL NIGHTLY
Status: DISCONTINUED | OUTPATIENT
Start: 2024-08-24 | End: 2024-08-26 | Stop reason: HOSPADM

## 2024-08-24 RX ORDER — POTASSIUM CHLORIDE 750 MG/1
40 TABLET, EXTENDED RELEASE ORAL ONCE
Status: COMPLETED | OUTPATIENT
Start: 2024-08-24 | End: 2024-08-24

## 2024-08-24 RX ORDER — VITAMIN B COMPLEX
1000 TABLET ORAL DAILY
Status: DISCONTINUED | OUTPATIENT
Start: 2024-08-24 | End: 2024-08-26 | Stop reason: HOSPADM

## 2024-08-24 RX ORDER — POTASSIUM CHLORIDE 750 MG/1
40 TABLET, EXTENDED RELEASE ORAL ONCE
Status: DISCONTINUED | OUTPATIENT
Start: 2024-08-24 | End: 2024-08-26 | Stop reason: HOSPADM

## 2024-08-24 RX ORDER — POTASSIUM CHLORIDE 750 MG/1
20 TABLET, EXTENDED RELEASE ORAL DAILY
Status: DISCONTINUED | OUTPATIENT
Start: 2024-08-25 | End: 2024-08-26 | Stop reason: HOSPADM

## 2024-08-24 RX ADMIN — PSYLLIUM HUSK 1 PACKET: 3.4 POWDER ORAL at 13:29

## 2024-08-24 RX ADMIN — ASPIRIN 81 MG CHEWABLE TABLET 81 MG: 81 TABLET CHEWABLE at 09:11

## 2024-08-24 RX ADMIN — ENOXAPARIN SODIUM 30 MG: 100 INJECTION SUBCUTANEOUS at 09:12

## 2024-08-24 RX ADMIN — ACETAMINOPHEN 650 MG: 325 TABLET ORAL at 09:10

## 2024-08-24 RX ADMIN — TRAZODONE HYDROCHLORIDE 50 MG: 50 TABLET ORAL at 20:53

## 2024-08-24 RX ADMIN — Medication 1000 UNITS: at 13:28

## 2024-08-24 RX ADMIN — SODIUM CHLORIDE, PRESERVATIVE FREE 10 ML: 5 INJECTION INTRAVENOUS at 20:58

## 2024-08-24 RX ADMIN — ACETAMINOPHEN 650 MG: 325 TABLET ORAL at 19:01

## 2024-08-24 RX ADMIN — SODIUM CHLORIDE, PRESERVATIVE FREE 10 ML: 5 INJECTION INTRAVENOUS at 09:12

## 2024-08-24 RX ADMIN — PAROXETINE HYDROCHLORIDE 30 MG: 20 TABLET, FILM COATED ORAL at 09:11

## 2024-08-24 RX ADMIN — ATORVASTATIN CALCIUM 40 MG: 40 TABLET, FILM COATED ORAL at 20:53

## 2024-08-24 RX ADMIN — POTASSIUM CHLORIDE 40 MEQ: 750 TABLET, EXTENDED RELEASE ORAL at 13:28

## 2024-08-24 ASSESSMENT — PAIN DESCRIPTION - LOCATION
LOCATION: HEAD
LOCATION: HEAD

## 2024-08-24 ASSESSMENT — PAIN DESCRIPTION - DESCRIPTORS
DESCRIPTORS: ACHING
DESCRIPTORS: ACHING

## 2024-08-24 ASSESSMENT — PAIN SCALES - GENERAL
PAINLEVEL_OUTOF10: 0
PAINLEVEL_OUTOF10: 3
PAINLEVEL_OUTOF10: 0
PAINLEVEL_OUTOF10: 4
PAINLEVEL_OUTOF10: 0

## 2024-08-24 NOTE — CARE COORDINATION
Care Management Initial Assessment       RUR:11%  Readmission? No  1st IM letter given? Yes - Given by CM on 8/24/24  1st  letter given: No      08/24/24 1760   Service Assessment   Patient Orientation Alert and Oriented   Cognition Alert   History Provided By Patient;Medical Record   Primary Caregiver Self   Support Systems Children;Friends/Neighbors   PCP Verified by CM Yes   Prior Functional Level Mobility   Current Functional Level   (TBD)   Can patient return to prior living arrangement Yes   Ability to make needs known: Good   Family able to assist with home care needs: Yes   Would you like for me to discuss the discharge plan with any other family members/significant others, and if so, who? No   Financial Resources None     CM met with pt to introduce her to the role of CM and transition of care. This pt lives alone in her [facility] home with 3 steps to enter. She stated that she does not go up to the second level of the home. At baseline, she uses a cane for ambulation. Per pt, her daughter, Oneida, is coming from out of town to stay with her for a few days post d/c. Therapy is currently recommending outpt OT. Will follow.MOHAMUD Wilkerson,ACM-SW

## 2024-08-24 NOTE — PLAN OF CARE
address the above impairments.         PLAN :  Recommendations and Planned Interventions:   bed mobility training, transfer training, gait training, therapeutic exercises, neuromuscular re-education, patient and family training/education, and therapeutic activities    Frequency/Duration: Patient will be followed by physical therapy to address goals, PT Plan of Care: 3 times/week to address goals.        Recommendation for discharge: (in order for the patient to meet his/her long term goals): Therapy 2x a week in the home OR OP OT for vision if this continues to be her main deficit    Other factors to consider for discharge: lives alone and at risk for falls, below baseline    IF patient discharges home will need the following DME: continuing to assess with progress                SUBJECTIVE:   Patient stated “I'm doing better.”    OBJECTIVE DATA SUMMARY:       Past Medical History:   Diagnosis Date    Anxiety     Arthritis back pain    fingers    Asthma     MILD - NO INHALERS    Chronic pain     Colitis     Diverticulitis 06/11/2013    Hypercholesterolemia     Hypertension     IBS (irritable bowel syndrome)     Ill-defined condition     H/O UTI POST DOW CATH    Insomnia     Left rotator cuff tear arthropathy 09/01/2022    Nausea & vomiting     Stroke (HCC) 1996    tia   PATENT  FORAMEN  OVALE    Swollen L ankle      Past Surgical History:   Procedure Laterality Date    BACK SURGERY  2021    CATARACT REMOVAL Bilateral 2009    CHOLECYSTECTOMY  04/1997    COLONOSCOPY      x3    HYSTERECTOMY (CERVIX STATUS UNKNOWN)  1986    IR INJ INTERLAMINAR LUMBAR/SAC  12/19/2022    IR INJ INTERLAMINAR LUMBAR/SAC  4/11/2023    IR LUMBAR TRANSFORAMINAL EPIDURAL SINGLE  03/21/2022    KNEE ARTHROSCOPY Left 04/1998    LUMBAR FUSION  02/2011    L4-L5    ORTHOPEDIC SURGERY Right     REPAIRED TENDONS ON RIGHT HAND AFTER FALL    IL UNLISTED PROCEDURE CARDIAC SURGERY  08/2004    repair of foramen ovale    SHOULDER ARTHROPLASTY Right 01/2020     SHOULDER ARTHROPLASTY Left     TONSILLECTOMY  CHILDHOOD    WISDOM TOOTH EXTRACTION      X3       Home Situation:  Social/Functional History  Lives With: Alone  Type of Home: House  Home Layout: Two level, Able to Live on Main level with bedroom/bathroom  Home Access: Stairs to enter with rails  Entrance Stairs - Number of Steps: 3  Entrance Stairs - Rails: Both  Bathroom Shower/Tub: Walk-in shower  Bathroom Equipment: Built-in shower seat, Grab bars around toilet, Grab bars in shower  Home Equipment: Cane, Walker - Rolling  Has the patient had two or more falls in the past year or any fall with injury in the past year?: No  ADL Assistance: Independent  Homemaking Assistance: Independent  Homemaking Responsibilities: Yes  Ambulation Assistance: Independent  Transfer Assistance: Independent  Active : Yes    Cognitive/Behavioral Status:  Orientation  Orientation Level: Oriented X4         Strength:    Strength: Generally decreased, functional    Tone & Sensation:   Tone: Normal  Sensation: Intact    Coordination:  Coordination: Within functional limits    Range Of Motion:  AROM: Generally decreased, functional  PROM: Generally decreased, functional    Functional Mobility:  Bed Mobility:     Bed Mobility Training  Rolling: Supervision  Supine to Sit: Stand-by assistance  Scooting: Stand-by assistance  Transfers:     Transfer Training  Sit to Stand: Stand-by assistance  Stand to Sit: Stand-by assistance  Balance:               Balance  Sitting: Intact  Standing: Intact;With support  Ambulation/Gait Training:                       Gait  Gait Training: Yes  Overall Level of Assistance: Contact-guard assistance  Distance (ft): 100 Feet  Assistive Device: Gait belt;Cane, straight  Base of Support: Widened  Speed/Brittany: Pace decreased (< 100 feet/min);Slow  Step Length: Right shortened;Left shortened  Gait Abnormalities: Decreased step clearance;Shuffling gait                                 Pain Ratin/10

## 2024-08-24 NOTE — PROGRESS NOTES
reviewed and interpreted patient's lab and all other diagnostic data    Notes reviewed from all clinical/nonclinical/nursing services involved in patient's clinical care. Care coordination discussions were held with appropriate clinical/nonclinical/ nursing providers based on care coordination needs.     Labs:     Recent Labs     08/23/24  1233 08/24/24  0541   WBC 8.0 9.1   HGB 12.5 11.7   HCT 36.7 33.6*    224     Recent Labs     08/23/24  1233 08/24/24  0541   * 132*   K 4.1 3.2*    99   CO2 28 26   BUN 17 19   MG  --  1.6     Recent Labs     08/23/24  1233   ALT 17   GLOB 2.7     Recent Labs     08/23/24  1245   INR 1.0      No results for input(s): \"TIBC\" in the last 72 hours.    Invalid input(s): \"FE\", \"PSAT\", \"FERR\"   No results found for: \"RBCF\"   No results for input(s): \"PH\", \"PCO2\", \"PO2\" in the last 72 hours.  No results for input(s): \"CPK\" in the last 72 hours.    Invalid input(s): \"CPKMB\", \"CKNDX\", \"TROIQ\"  Lab Results   Component Value Date/Time    CHOL 180 08/23/2024 12:33 PM    HDL 93 08/23/2024 12:33 PM    LDL 74.8 08/23/2024 12:33 PM    LDL 92.4 12/07/2023 03:01 AM     No results found for: \"GLUCPOC\"        Medications Reviewed:     Current Facility-Administered Medications   Medication Dose Route Frequency    potassium chloride (KLOR-CON) extended release tablet 40 mEq  40 mEq Oral Once    vitamin D (CHOLECALCIFEROL) tablet 1,000 Units  1,000 Units Oral Daily    psyllium (KONSYL) 28.3 % powder 3.4 g  3.4 g Oral Daily    [START ON 8/25/2024] potassium chloride (MICRO-K) extended release capsule 20 mEq  20 mEq Oral Daily    potassium chloride (KLOR-CON) extended release tablet 40 mEq  40 mEq Oral Once    sodium chloride flush 0.9 % injection 5-40 mL  5-40 mL IntraVENous 2 times per day    sodium chloride flush 0.9 % injection 5-40 mL  5-40 mL IntraVENous PRN    0.9 % sodium chloride infusion   IntraVENous PRN    ondansetron (ZOFRAN-ODT) disintegrating tablet 4 mg  4 mg Oral Q8H

## 2024-08-24 NOTE — PLAN OF CARE
Problem: Discharge Planning  Goal: Discharge to home or other facility with appropriate resources  Outcome: Progressing  Flowsheets (Taken 8/24/2024 0800)  Discharge to home or other facility with appropriate resources:   Identify barriers to discharge with patient and caregiver   Arrange for needed discharge resources and transportation as appropriate   Identify discharge learning needs (meds, wound care, etc)   Arrange for interpreters to assist at discharge as needed   Refer to discharge planning if patient needs post-hospital services based on physician order or complex needs related to functional status, cognitive ability or social support system     Problem: Pain  Goal: Verbalizes/displays adequate comfort level or baseline comfort level  Outcome: Progressing  Flowsheets (Taken 8/24/2024 1010)  Verbalizes/displays adequate comfort level or baseline comfort level:   Encourage patient to monitor pain and request assistance   Assess pain using appropriate pain scale   Implement non-pharmacological measures as appropriate and evaluate response   Administer analgesics based on type and severity of pain and evaluate response   Consider cultural and social influences on pain and pain management   Notify Licensed Independent Practitioner if interventions unsuccessful or patient reports new pain     Problem: Safety - Adult  Goal: Free from fall injury  Outcome: Progressing     Problem: Chronic Conditions and Co-morbidities  Goal: Patient's chronic conditions and co-morbidity symptoms are monitored and maintained or improved  8/24/2024 1303 by Goldie Garza, RN  Outcome: Progressing  Flowsheets (Taken 8/24/2024 0800)  Care Plan - Patient's Chronic Conditions and Co-Morbidity Symptoms are Monitored and Maintained or Improved:   Collaborate with multidisciplinary team to address chronic and comorbid conditions and prevent exacerbation or deterioration   Monitor and assess patient's chronic conditions and comorbid  symptoms for stability, deterioration, or improvement   Update acute care plan with appropriate goals if chronic or comorbid symptoms are exacerbated and prevent overall improvement and discharge  8/24/2024 0558 by An Frias RN  Outcome: Progressing     Problem: Physical Therapy - Adult  Goal: By Discharge: Performs mobility at highest level of function for planned discharge setting.  See evaluation for individualized goals.  Description: FUNCTIONAL STATUS PRIOR TO ADMISSION: Patient was modified independent using a single point cane for functional mobility.    HOME SUPPORT PRIOR TO ADMISSION: The patient lived alone with daughters to provide intemittent assistance.    Physical Therapy Goals  Initiated 8/24/2024  1.  Patient will move from supine to sit and sit to supine in bed with modified independence within 7 day(s).    2.  Patient will perform sit to stand with modified independence within 7 day(s).  3.  Patient will transfer from bed to chair and chair to bed with modified independence using the least restrictive device within 7 day(s).  4.  Patient will ambulate with modified independence for 200 feet with the least restrictive device within 7 day(s).   5.  Patient will ascend/descend 3 stairs with 1 handrail(s) with modified independence within 7 day(s).  8/24/2024 0856 by Dolores Ferrera PT  Outcome: Progressing

## 2024-08-24 NOTE — PLAN OF CARE
Problem: Chronic Conditions and Co-morbidities  Goal: Patient's chronic conditions and co-morbidity symptoms are monitored and maintained or improved  Outcome: Progressing     Problem: Discharge Planning  Goal: Discharge to home or other facility with appropriate resources  Recent Flowsheet Documentation  Taken 8/23/2024 2000 by An Frias, PAMELA  Discharge to home or other facility with appropriate resources: Identify barriers to discharge with patient and caregiver

## 2024-08-24 NOTE — CONSULTS
Neurology Consult Note     NAME: Jaime Simpson   :  1935   MRN:  [id number]   DATE:   2024       HPI:  Pt is an 88yo RH female admitted 24 with c/o right upper VF fuzziness and mild frontal HA, first noticed on awakening at 0630, LKN at 2200 24. She was seen at [facility] and no intrinsic eye issue found, was told to come to ED for stroke eval. NIHSS score 0. CTH neg, CIWM changes. CTA H/N with chronic unchanged occlusion of left VA, no other LVO or significant stenosis. CTP neg. EKG - NSR. MRI brain pending. TTE pending. LDL 74.8, HgbA1C 5.2.  Na 132. Taking ASA 81mg daily at home.  Patient is seen with her daughter at the bedside.  Patient confirms the above history.  Additionally she reports she was not taking her 81 mg aspirin from 24 until 24 for carpal tunnel surgery on her left hand on 24.  She is still taking atorvastatin 40 mg a day.  She and her daughter confirmed that she is compliant with her meds now using a pillbox and her memory is improved after medication clean up was done since she was seen by neurology in December.  She continues to have difficulty seeing in her right upper visual field.  No other neurological deficits.    PMH:  HLD  HTN  CVA - punctate left PO 23, not on APT/OAC at the time  TIA  IBS  Anxiety  OA  Asthma  Diverticulitis  S/p marilia  S/p cataract surgery  S/p hysterectomy   S/p lumbar fusion L4-L5  S/p right hand tendon repair  S/p PFO closure  S/p tonsillectomy  S/p wisdom tooth extraction x 3  S/p bilateral shoulder surgery  S/p left knee surgery      ROS:  Per HPI otherwise negative      MEDS:  Home:  Current Outpatient Medications   Medication Instructions    acetaminophen (TYLENOL) 1,000 mg, Oral, EVERY 6 HOURS PRN    aspirin 81 mg, Oral, DAILY    atorvastatin (LIPITOR) 40 mg, Oral, NIGHTLY     severe at C5-C6 and C6-C7.    CT Brain Perfusion:  There are no regional areas of elevated Tmax, decreased cerebral blood flow or  blood volume.  rCBF < 30% = 0 cc.  Tmax > 6 seconds = 0 cc.    Impression  CTA Head/Neck:  1. 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  2. No other flow-limiting stenosis or aneurysm.  3. Unchanged mild stenosis (less than 50% by NASCET criteria) of the proximal  left internal carotid artery.    CT Brain Perfusion:  1. No acute abnormality.      Electronically signed by Eleuterio Rodríguez          Assessment and Plan:   Pt is an 88yo RH female with HLD with LDL 74.8 on Lipitor 40 mg daily, HTN, lacunar CVA 11/2023, on ASA 81 mg daily at home, though was held from 8/11-17/24 for CTS on 8/16/24, admitted 8/23/24 with c/o right upper VF fuzziness and mild frontal HA, first noticed on awakening at 0630. She was seen at [facility] and no intrinsic eye issue found, was told to come to ED for stroke eval.  CTH neg, CIWM changes. CTA H/N with chronic unchanged occlusion of left VA, no other LVO or significant stenosis. CTP neg. EKG - NSR. HgbA1C 5.2.  Exam with right upper quadrantanopia on VF testing, o/w non-focal.     Suspected small left occipital CVA  -MRI brain pending.   -TTE pending.   -Continue ASA 81mg daily   -Continue Lipitor 40mg daily, goal LDL<70  -HgbA1C at goal  -May now normalize BP  -PT/OT    Signed:Michela Billings MD

## 2024-08-25 ENCOUNTER — APPOINTMENT (OUTPATIENT)
Facility: HOSPITAL | Age: 89
DRG: 065 | End: 2024-08-25
Attending: INTERNAL MEDICINE
Payer: MEDICARE

## 2024-08-25 ENCOUNTER — TELEPHONE (OUTPATIENT)
Facility: HOSPITAL | Age: 89
End: 2024-08-25

## 2024-08-25 ENCOUNTER — APPOINTMENT (OUTPATIENT)
Facility: HOSPITAL | Age: 89
DRG: 065 | End: 2024-08-25
Payer: MEDICARE

## 2024-08-25 PROCEDURE — 6370000000 HC RX 637 (ALT 250 FOR IP): Performed by: INTERNAL MEDICINE

## 2024-08-25 PROCEDURE — 6360000002 HC RX W HCPCS: Performed by: INTERNAL MEDICINE

## 2024-08-25 PROCEDURE — 92522 EVALUATE SPEECH PRODUCTION: CPT

## 2024-08-25 PROCEDURE — 70551 MRI BRAIN STEM W/O DYE: CPT

## 2024-08-25 PROCEDURE — 99233 SBSQ HOSP IP/OBS HIGH 50: CPT | Performed by: PSYCHIATRY & NEUROLOGY

## 2024-08-25 PROCEDURE — 6370000000 HC RX 637 (ALT 250 FOR IP): Performed by: FAMILY MEDICINE

## 2024-08-25 PROCEDURE — 2060000000 HC ICU INTERMEDIATE R&B

## 2024-08-25 PROCEDURE — 6370000000 HC RX 637 (ALT 250 FOR IP)

## 2024-08-25 PROCEDURE — 6370000000 HC RX 637 (ALT 250 FOR IP): Performed by: PSYCHIATRY & NEUROLOGY

## 2024-08-25 PROCEDURE — 92610 EVALUATE SWALLOWING FUNCTION: CPT

## 2024-08-25 PROCEDURE — 2580000003 HC RX 258: Performed by: INTERNAL MEDICINE

## 2024-08-25 PROCEDURE — 93308 TTE F-UP OR LMTD: CPT

## 2024-08-25 RX ORDER — CLOPIDOGREL BISULFATE 75 MG/1
75 TABLET ORAL DAILY
Status: DISCONTINUED | OUTPATIENT
Start: 2024-08-25 | End: 2024-08-26 | Stop reason: HOSPADM

## 2024-08-25 RX ORDER — HEPARIN SODIUM 5000 [USP'U]/ML
5000 INJECTION, SOLUTION INTRAVENOUS; SUBCUTANEOUS EVERY 8 HOURS SCHEDULED
Status: DISCONTINUED | OUTPATIENT
Start: 2024-08-26 | End: 2024-08-26 | Stop reason: HOSPADM

## 2024-08-25 RX ADMIN — ENOXAPARIN SODIUM 30 MG: 100 INJECTION SUBCUTANEOUS at 10:19

## 2024-08-25 RX ADMIN — POTASSIUM CHLORIDE 20 MEQ: 750 TABLET, EXTENDED RELEASE ORAL at 10:22

## 2024-08-25 RX ADMIN — ATORVASTATIN CALCIUM 40 MG: 40 TABLET, FILM COATED ORAL at 21:20

## 2024-08-25 RX ADMIN — TRAZODONE HYDROCHLORIDE 50 MG: 50 TABLET ORAL at 21:20

## 2024-08-25 RX ADMIN — PAROXETINE HYDROCHLORIDE 30 MG: 20 TABLET, FILM COATED ORAL at 10:21

## 2024-08-25 RX ADMIN — ASPIRIN 81 MG CHEWABLE TABLET 81 MG: 81 TABLET CHEWABLE at 10:21

## 2024-08-25 RX ADMIN — SODIUM CHLORIDE, PRESERVATIVE FREE 10 ML: 5 INJECTION INTRAVENOUS at 10:18

## 2024-08-25 RX ADMIN — ACETAMINOPHEN 650 MG: 325 TABLET ORAL at 12:46

## 2024-08-25 RX ADMIN — PSYLLIUM HUSK 1 PACKET: 3.4 POWDER ORAL at 10:20

## 2024-08-25 RX ADMIN — ACETAMINOPHEN 650 MG: 325 TABLET ORAL at 20:24

## 2024-08-25 RX ADMIN — Medication 1000 UNITS: at 10:21

## 2024-08-25 RX ADMIN — CLOPIDOGREL BISULFATE 75 MG: 75 TABLET, FILM COATED ORAL at 17:39

## 2024-08-25 ASSESSMENT — PAIN SCALES - GENERAL
PAINLEVEL_OUTOF10: 0
PAINLEVEL_OUTOF10: 0
PAINLEVEL_OUTOF10: 4
PAINLEVEL_OUTOF10: 2
PAINLEVEL_OUTOF10: 0
PAINLEVEL_OUTOF10: 0

## 2024-08-25 ASSESSMENT — PAIN DESCRIPTION - LOCATION
LOCATION: HEAD

## 2024-08-25 ASSESSMENT — PAIN DESCRIPTION - ORIENTATION
ORIENTATION: ANTERIOR

## 2024-08-25 ASSESSMENT — PAIN DESCRIPTION - DESCRIPTORS
DESCRIPTORS: ACHING

## 2024-08-25 NOTE — PROGRESS NOTES
Neurology Progress Note     NAME: Jaime Simpson   :  1935   MRN:  [id number]   DATE:  2024    Assessment:     Principal Problem:    Visual field defect  Resolved Problems:    * No resolved hospital problems. *    Pt is an 88yo RH female with HLD with LDL 74.8 on Lipitor 40 mg daily, HTN, lacunar CVA 2023, on ASA 81 mg daily at home, though was held from - for CTS on 24, admitted 24 with c/o right upper VF fuzziness and mild frontal HA, first noticed on awakening at 0630. She was seen at [facility] and no intrinsic eye issue found, was told to come to ED for stroke eval.  CTH neg, CIWM changes. CTA H/N with chronic unchanged occlusion of left VA, no other LVO or significant stenosis. CTP neg. EKG - NSR. HgbA1C 5.2.  MRI brain with acute left occipital CVA.   Exam with right upper quadrantanopia on VF testing, o/w non-focal.      Left occipital CVA, cryptogenic, but work up not completed.   Plan:   -Discussed stroke and unknown etiology so far.  Discussed driving regulations.   -TTE still pending.   -Continue ASA 81mg daily   -Start Plavix 75mg daily with ASA x 21 days, then ASA monotherapy.  -Continue Lipitor 40mg daily, goal LDL<70  -HgbA1C at goal  -May now normalize BP  -If TTE is unremarkable, pt will need cardiac monitoring at d/c  -PT/OT  -No driving x 6 months after a stroke per DMV regulations, however, if cleared by OT and neurology at f/u, this could be a shorter duration.   -F/u in neurology in approx 4 weeks, telephone note sent to schedulers to call pt to make appt.     Subjective:    Pt is seen with her daughter, RN, and hospitalist at bedside.  She is doing well, feels like her right UQ vision is improved.     Objective:   Chart reviewed since last seen    Current Facility-Administered Medications   Medication Dose Route Frequency  Mild calcific atherosclerosis of the right carotid  bifurcation without significant stenosis. Calcific atherosclerosis of the  proximal left internal carotid artery with mild (less than 50%) stenosis,  unchanged.    Unchanged chronic occlusion of the left vertebral artery in the V1 segment, with  reconstitution of the V4 segment. No significant stenosis in the cervical right  vertebral artery.    Visualized soft tissues of the neck are unremarkable. Stable 13 mm left thyroid  nodule. Visualized lung apices are clear. No acute fracture or aggressive  osseous lesion. Grade 1 anterolisthesis of C2 on C3, C3 on C4 and C4 on C5  secondary to severe facet arthropathy in the upper cervical spine. Grade 1  anterolisthesis of C7 on T1 secondary to severe facet arthropathy. Multilevel  degenerative disc disease, severe at C5-C6 and C6-C7.    CT Brain Perfusion:  There are no regional areas of elevated Tmax, decreased cerebral blood flow or  blood volume.  rCBF < 30% = 0 cc.  Tmax > 6 seconds = 0 cc.    Impression  CTA Head/Neck:  1. 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  2. No other flow-limiting stenosis or aneurysm.  3. Unchanged mild stenosis (less than 50% by NASCET criteria) of the proximal  left internal carotid artery.    CT Brain Perfusion:  1. No acute abnormality.      Electronically signed by Eleuterio Rodríguez      Care Plan discussed with:  Patient x   Family x   RN x   Care Manager    Consultant/Specialist:  x     Signed:Michela Billings MD

## 2024-08-25 NOTE — TELEPHONE ENCOUNTER
Pt needs a hospital follow up appointment  Provider: Jagdish Escobedo NP, has seen the pt in the past  Location: [facility]  In person or virtual: In person  When: approx 4 weeks  Diagnosis/reason for follow up:  acute stroke  Additional information: None

## 2024-08-25 NOTE — PROGRESS NOTES
Lovenox Monitoring  Indication: DVT Prophylaxis  Recent Labs     08/23/24  1233 08/23/24  1245 08/24/24  0541   HGB 12.5  --  11.7     --  224   INR  --  1.0  --      Current Weight: 46.4 kg  Est. CrCl = 26 ml/min  Current Dose: 30 mg subcutaneously every 24 hours.  Plan: Change to heparin 5000 units subcutaneously every 8 hours per [facility] P&T Committee Protocol with respect to weight <50 kg and renal function.  Pharmacy will continue to monitor patient daily and will make dosage adjustments based upon changing renal function.

## 2024-08-25 NOTE — PLAN OF CARE
Problem: Discharge Planning  Goal: Discharge to home or other facility with appropriate resources  8/24/2024 2356 by Tania Fatima LPN  Outcome: Progressing  Flowsheets (Taken 8/24/2024 2000)  Discharge to home or other facility with appropriate resources: Identify barriers to discharge with patient and caregiver  8/24/2024 1303 by Goldie Garza, RN  Outcome: Progressing  Flowsheets (Taken 8/24/2024 0800)  Discharge to home or other facility with appropriate resources:   Identify barriers to discharge with patient and caregiver   Arrange for needed discharge resources and transportation as appropriate   Identify discharge learning needs (meds, wound care, etc)   Arrange for interpreters to assist at discharge as needed   Refer to discharge planning if patient needs post-hospital services based on physician order or complex needs related to functional status, cognitive ability or social support system     Problem: Pain  Goal: Verbalizes/displays adequate comfort level or baseline comfort level  8/24/2024 2356 by Tania Fatima LPN  Outcome: Progressing  8/24/2024 1303 by Goldie Garza, RN  Outcome: Progressing  Flowsheets (Taken 8/24/2024 1010)  Verbalizes/displays adequate comfort level or baseline comfort level:   Encourage patient to monitor pain and request assistance   Assess pain using appropriate pain scale   Implement non-pharmacological measures as appropriate and evaluate response   Administer analgesics based on type and severity of pain and evaluate response   Consider cultural and social influences on pain and pain management   Notify Licensed Independent Practitioner if interventions unsuccessful or patient reports new pain     Problem: Safety - Adult  Goal: Free from fall injury  8/24/2024 2356 by Tania Fatima LPN  Outcome: Progressing  8/24/2024 1303 by Goldie Garza, RN  Outcome: Progressing     Problem: Chronic Conditions and Co-morbidities  Goal: Patient's chronic conditions and  co-morbidity symptoms are monitored and maintained or improved  8/24/2024 2356 by Tania Fatima LPN  Outcome: Progressing  Flowsheets (Taken 8/24/2024 2000)  Care Plan - Patient's Chronic Conditions and Co-Morbidity Symptoms are Monitored and Maintained or Improved: Monitor and assess patient's chronic conditions and comorbid symptoms for stability, deterioration, or improvement  8/24/2024 1303 by Goldie Garza, RN  Outcome: Progressing  Flowsheets (Taken 8/24/2024 0800)  Care Plan - Patient's Chronic Conditions and Co-Morbidity Symptoms are Monitored and Maintained or Improved:   Collaborate with multidisciplinary team to address chronic and comorbid conditions and prevent exacerbation or deterioration   Monitor and assess patient's chronic conditions and comorbid symptoms for stability, deterioration, or improvement   Update acute care plan with appropriate goals if chronic or comorbid symptoms are exacerbated and prevent overall improvement and discharge     Problem: Skin/Tissue Integrity  Goal: Absence of new skin breakdown  Description: 1.  Monitor for areas of redness and/or skin breakdown  2.  Assess vascular access sites hourly  3.  Every 4-6 hours minimum:  Change oxygen saturation probe site  4.  Every 4-6 hours:  If on nasal continuous positive airway pressure, respiratory therapy assess nares and determine need for appliance change or resting period.  Outcome: Progressing

## 2024-08-25 NOTE — PROGRESS NOTES
Hospitalist Progress Note  Clement Romo MD  Answering service: [phone] OR [phone] from in house phone        Date of Service:  2024  NAME:  Jaime Simpson  :  1935  MRN:  [id number]      Admission Summary:   89 y.o. female with Pmh of HTN, depression, recent left carpal tunnel surgery presented to ED for right upper visual field defect. She woke up with vision change. She visited her ophthalmologist this morning who refereed to ED for CVA work up. She denied any weakness, tingling or numbness ( other than left hand for carpal tunnel), chest pain, sob, cough, fever, abd pain, urinary  or bowel changes.   In ED, CT head and CTA head/ neck done. Tele neurology evaluated the pt. Hospitalist consulted for admission        Interval history / Subjective:   Patient said her right eye vision is improving.  Patient otherwise is feeling better.  Patient denied any other acute issues including weakness, tingling or change in his speech pattern.  Patient is tolerating food.  Patient denies any cough, chest pain, breathing problems     Assessment & Plan:     Acute cerebrovascular accident   - MRI today with acute infarction at let occipital lobe.   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    -Follow TTE, neurology, PT/OT/SLT recommendation. .     -Lipid panel with LDL of 74,, HDL 93.  A1c is 5.2%.    -Keep SBP about 140.   -Continue aspirin and rosuvastatin.    -I spoke with neurologist, patient will not able to drive for 6 months unless care by OT.       Hypertension  -Continue to hold losartan for now.        Hypokalemia  Replace potassium and monitor.     Depression  Continue paxil, trazodone.     Hyponatremia - mild and chronic.  Continue  section of CPT  Discussion of test results with performing physician    I have independently reviewed and interpreted patient's lab and all other diagnostic data    Notes reviewed from all clinical/nonclinical/nursing services involved in patient's clinical care. Care coordination discussions were held with appropriate clinical/nonclinical/ nursing providers based on care coordination needs.     Labs:     Recent Labs     08/23/24  1233 08/24/24  0541   WBC 8.0 9.1   HGB 12.5 11.7   HCT 36.7 33.6*    224     Recent Labs     08/23/24  1233 08/24/24  0541   * 132*   K 4.1 3.2*    99   CO2 28 26   BUN 17 19   MG  --  1.6     Recent Labs     08/23/24  1233   ALT 17   GLOB 2.7     Recent Labs     08/23/24  1245   INR 1.0      No results for input(s): \"TIBC\" in the last 72 hours.    Invalid input(s): \"FE\", \"PSAT\", \"FERR\"   No results found for: \"RBCF\"   No results for input(s): \"PH\", \"PCO2\", \"PO2\" in the last 72 hours.  No results for input(s): \"CPK\" in the last 72 hours.    Invalid input(s): \"CPKMB\", \"CKNDX\", \"TROIQ\"  Lab Results   Component Value Date/Time    CHOL 180 08/23/2024 12:33 PM    HDL 93 08/23/2024 12:33 PM    LDL 74.8 08/23/2024 12:33 PM    LDL 92.4 12/07/2023 03:01 AM     No results found for: \"GLUCPOC\"        Medications Reviewed:     Current Facility-Administered Medications   Medication Dose Route Frequency    [START ON 8/26/2024] heparin (porcine) injection 5,000 Units  5,000 Units SubCUTAneous 3 times per day    potassium chloride (KLOR-CON) extended release tablet 40 mEq  40 mEq Oral Once    Vitamin D (CHOLECALCIFEROL) tablet 1,000 Units  1,000 Units Oral Daily    potassium chloride (KLOR-CON) extended release tablet 20 mEq  20 mEq Oral Daily    psyllium husk-aspartame (METAMUCIL FIBER) packet 1 packet  1 packet Oral Daily    atorvastatin (LIPITOR) tablet 40 mg  40 mg Oral Nightly    sodium chloride flush 0.9 % injection 5-40 mL  5-40 mL IntraVENous 2 times per day    sodium chloride

## 2024-08-25 NOTE — PLAN OF CARE
Problem: Discharge Planning  Goal: Discharge to home or other facility with appropriate resources  8/25/2024 1325 by Goldie Garza, RN  Outcome: Progressing  Flowsheets (Taken 8/25/2024 0800)  Discharge to home or other facility with appropriate resources:   Identify barriers to discharge with patient and caregiver   Arrange for needed discharge resources and transportation as appropriate   Identify discharge learning needs (meds, wound care, etc)   Arrange for interpreters to assist at discharge as needed   Refer to discharge planning if patient needs post-hospital services based on physician order or complex needs related to functional status, cognitive ability or social support system  8/24/2024 2356 by Tania Fatima LPN  Outcome: Progressing  Flowsheets (Taken 8/24/2024 2000)  Discharge to home or other facility with appropriate resources: Identify barriers to discharge with patient and caregiver     Problem: Pain  Goal: Verbalizes/displays adequate comfort level or baseline comfort level  8/25/2024 1325 by Goldie Garza, RN  Outcome: Progressing  Flowsheets (Taken 8/25/2024 1015)  Verbalizes/displays adequate comfort level or baseline comfort level:   Encourage patient to monitor pain and request assistance   Assess pain using appropriate pain scale   Administer analgesics based on type and severity of pain and evaluate response   Implement non-pharmacological measures as appropriate and evaluate response   Consider cultural and social influences on pain and pain management   Notify Licensed Independent Practitioner if interventions unsuccessful or patient reports new pain  8/24/2024 2356 by Tania Fatima LPN  Outcome: Progressing     Problem: Safety - Adult  Goal: Free from fall injury  8/25/2024 1325 by Goldie Garza, RN  Outcome: Progressing  8/24/2024 2356 by Tania Fatima LPN  Outcome: Progressing     Problem: Chronic Conditions and Co-morbidities  Goal: Patient's chronic conditions and

## 2024-08-25 NOTE — PROGRESS NOTES
Speech LAnguage Pathology EVALUATION    Patient: Jaime Simpson (89 y.o. female)  Date: 8/25/2024  Primary Diagnosis: Visual field defect [H53.40]       Precautions:  Fall Risk                  ASSESSMENT :  Patient seen for swallow evaluation and voice evaluation at the behest of the patient and patient's daughter. Patient presents with seemingly functional oropharyngeal phases of swallow. Pt does have a known history of esophageal dysmotility, hiatal hernia, and significant reflux. Patient has an inconsistent throat clear with PO that is likely consistent with hx of esophageal dysphagia, but cannot complete rule out a pharyngeal etiology. Recommend pt continue baseline diet with strict esophageal/reflux precautions. Will continue to follow for diet tolerance and further neuro work-up.    Additionally, pt with a reported history of vocal nodules. Patient noted to have rough, dysphonic vocal quality with tightness appreciated. 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 Pt also does appear to be straining her voice at times. Discussed vocal hygiene strategies with her and provided her with handouts. Encouraged her to follow up with outpatient SLP for voice treatment. Pt and pt's daughter expressed understanding.     Patient will benefit from skilled intervention to address the above impairments.     PLAN :  Recommendations and Planned Interventions:  Diet: Regular and thin liquids  Reflux/esophageal precautions  Good oral care           Acute SLP Services: SLP Plan of Care: 2 times/week. Patient's rehabilitation potential is considered to be Good.  Discharge Recommendations: Yes, recommend SLP treatment at next level of care     SUBJECTIVE:   Patient stated, “This is so fun,” Pt extremely kind and talkative throughout session.     OBJECTIVE:     Past Medical History:   Diagnosis Date    Anxiety     Arthritis back pain    fingers     Asthma     MILD - NO INHALERS    Chronic pain     Colitis     Diverticulitis 06/11/2013    Hypercholesterolemia     Hypertension     IBS (irritable bowel syndrome)     Ill-defined condition     H/O UTI POST DOW CATH    Insomnia     Left rotator cuff tear arthropathy 09/01/2022    Nausea & vomiting     Stroke (HCC) 1996    tia   PATENT  FORAMEN  OVALE    Swollen L ankle      Past Surgical History:   Procedure Laterality Date    BACK SURGERY  2021    CATARACT REMOVAL Bilateral 2009    CHOLECYSTECTOMY  04/1997    COLONOSCOPY      x3    HYSTERECTOMY (CERVIX STATUS UNKNOWN)  1986    IR INJ INTERLAMINAR LUMBAR/SAC  12/19/2022    IR INJ INTERLAMINAR LUMBAR/SAC  4/11/2023    IR LUMBAR TRANSFORAMINAL EPIDURAL SINGLE  03/21/2022    KNEE ARTHROSCOPY Left 04/1998    LUMBAR FUSION  02/2011    L4-L5    ORTHOPEDIC SURGERY Right     REPAIRED TENDONS ON RIGHT HAND AFTER FALL    MA UNLISTED PROCEDURE CARDIAC SURGERY  08/2004    repair of foramen ovale    SHOULDER ARTHROPLASTY Right 01/2020    SHOULDER ARTHROPLASTY Left 2022    TONSILLECTOMY  CHILDHOOD    WISDOM TOOTH EXTRACTION      X3     Prior Level of Function/Home Situation:   Social/Functional History  Lives With: Alone  Type of Home: House  Home Layout: Two level, Able to Live on Main level with bedroom/bathroom  Home Access: Stairs to enter with rails  Entrance Stairs - Number of Steps: 3  Entrance Stairs - Rails: Both  Bathroom Shower/Tub: Walk-in shower  Bathroom Equipment: Built-in shower seat, Grab bars around toilet, Grab bars in shower  Home Equipment: Cane, Walker - Rolling  Has the patient had two or more falls in the past year or any fall with injury in the past year?: No  ADL Assistance: Independent  Homemaking Assistance: Independent  Homemaking Responsibilities: Yes  Ambulation Assistance: Independent  Transfer Assistance: Independent  Active : Yes        Cognitive and Communication Status:  Neurologic State: Alert  Orientation Level: Oriented

## 2024-08-26 ENCOUNTER — APPOINTMENT (OUTPATIENT)
Facility: HOSPITAL | Age: 89
DRG: 065 | End: 2024-08-26
Payer: MEDICARE

## 2024-08-26 VITALS
RESPIRATION RATE: 18 BRPM | OXYGEN SATURATION: 100 % | DIASTOLIC BLOOD PRESSURE: 60 MMHG | HEIGHT: 57 IN | TEMPERATURE: 97.9 F | WEIGHT: 102.3 LBS | SYSTOLIC BLOOD PRESSURE: 121 MMHG | HEART RATE: 79 BPM | BODY MASS INDEX: 22.07 KG/M2

## 2024-08-26 LAB
ALBUMIN SERPL-MCNC: 3.3 G/DL (ref 3.5–5)
ALBUMIN/GLOB SERPL: 1.4 (ref 1.1–2.2)
ALP SERPL-CCNC: 75 U/L (ref 45–117)
ALT SERPL-CCNC: 16 U/L (ref 12–78)
ANION GAP SERPL CALC-SCNC: 7 MMOL/L (ref 5–15)
AST SERPL-CCNC: 22 U/L (ref 15–37)
BASOPHILS # BLD: 0.1 K/UL (ref 0–0.1)
BASOPHILS NFR BLD: 1 % (ref 0–1)
BILIRUB SERPL-MCNC: 0.6 MG/DL (ref 0.2–1)
BUN SERPL-MCNC: 13 MG/DL (ref 6–20)
BUN/CREAT SERPL: 16 (ref 12–20)
CALCIUM SERPL-MCNC: 9 MG/DL (ref 8.5–10.1)
CHLORIDE SERPL-SCNC: 101 MMOL/L (ref 97–108)
CO2 SERPL-SCNC: 26 MMOL/L (ref 21–32)
CREAT SERPL-MCNC: 0.82 MG/DL (ref 0.55–1.02)
DIFFERENTIAL METHOD BLD: ABNORMAL
ECHO AO ROOT DIAM: 3.4 CM
ECHO AO ROOT INDEX: 2.52 CM/M2
ECHO AV AREA PEAK VELOCITY: 2.6 CM2
ECHO AV AREA VTI: 2.4 CM2
ECHO AV AREA/BSA PEAK VELOCITY: 1.9 CM2/M2
ECHO AV AREA/BSA VTI: 1.8 CM2/M2
ECHO AV MEAN GRADIENT: 3 MMHG
ECHO AV MEAN VELOCITY: 0.9 M/S
ECHO AV PEAK GRADIENT: 6 MMHG
ECHO AV PEAK VELOCITY: 1.2 M/S
ECHO AV VELOCITY RATIO: 0.83
ECHO AV VTI: 28.1 CM
ECHO BSA: 1.46 M2
ECHO BSA: 1.46 M2
ECHO LV EF PHYSICIAN: 55 %
ECHO LV FRACTIONAL SHORTENING: 35 % (ref 28–44)
ECHO LV INTERNAL DIMENSION DIASTOLE INDEX: 2.74 CM/M2
ECHO LV INTERNAL DIMENSION DIASTOLIC: 3.7 CM (ref 3.9–5.3)
ECHO LV INTERNAL DIMENSION SYSTOLIC INDEX: 1.78 CM/M2
ECHO LV INTERNAL DIMENSION SYSTOLIC: 2.4 CM
ECHO LV IVSD: 1 CM (ref 0.6–0.9)
ECHO LV MASS 2D: 112.5 G (ref 67–162)
ECHO LV MASS INDEX 2D: 83.4 G/M2 (ref 43–95)
ECHO LV POSTERIOR WALL DIASTOLIC: 1 CM (ref 0.6–0.9)
ECHO LV RELATIVE WALL THICKNESS RATIO: 0.54
ECHO LVOT AREA: 3.1 CM2
ECHO LVOT AV VTI INDEX: 0.75
ECHO LVOT DIAM: 2 CM
ECHO LVOT MEAN GRADIENT: 2 MMHG
ECHO LVOT PEAK GRADIENT: 4 MMHG
ECHO LVOT PEAK VELOCITY: 1 M/S
ECHO LVOT STROKE VOLUME INDEX: 49.1 ML/M2
ECHO LVOT SV: 66.3 ML
ECHO LVOT VTI: 21.1 CM
EOSINOPHIL # BLD: 0.5 K/UL (ref 0–0.4)
EOSINOPHIL NFR BLD: 8 % (ref 0–7)
ERYTHROCYTE [DISTWIDTH] IN BLOOD BY AUTOMATED COUNT: 13.1 % (ref 11.5–14.5)
GLOBULIN SER CALC-MCNC: 2.3 G/DL (ref 2–4)
GLUCOSE SERPL-MCNC: 101 MG/DL (ref 65–100)
HCT VFR BLD AUTO: 31.9 % (ref 35–47)
HGB BLD-MCNC: 10.8 G/DL (ref 11.5–16)
IMM GRANULOCYTES # BLD AUTO: 0 K/UL (ref 0–0.04)
IMM GRANULOCYTES NFR BLD AUTO: 0 % (ref 0–0.5)
LYMPHOCYTES # BLD: 0.8 K/UL (ref 0.8–3.5)
LYMPHOCYTES NFR BLD: 12 % (ref 12–49)
MCH RBC QN AUTO: 32 PG (ref 26–34)
MCHC RBC AUTO-ENTMCNC: 33.9 G/DL (ref 30–36.5)
MCV RBC AUTO: 94.4 FL (ref 80–99)
MONOCYTES # BLD: 0.4 K/UL (ref 0–1)
MONOCYTES NFR BLD: 7 % (ref 5–13)
NEUTS SEG # BLD: 4.6 K/UL (ref 1.8–8)
NEUTS SEG NFR BLD: 72 % (ref 32–75)
NRBC # BLD: 0 K/UL (ref 0–0.01)
NRBC BLD-RTO: 0 PER 100 WBC
PLATELET # BLD AUTO: 215 K/UL (ref 150–400)
PMV BLD AUTO: 10.4 FL (ref 8.9–12.9)
POTASSIUM SERPL-SCNC: 3.8 MMOL/L (ref 3.5–5.1)
PROT SERPL-MCNC: 5.6 G/DL (ref 6.4–8.2)
RBC # BLD AUTO: 3.38 M/UL (ref 3.8–5.2)
RBC MORPH BLD: ABNORMAL
SODIUM SERPL-SCNC: 134 MMOL/L (ref 136–145)
WBC # BLD AUTO: 6.4 K/UL (ref 3.6–11)

## 2024-08-26 PROCEDURE — 97535 SELF CARE MNGMENT TRAINING: CPT

## 2024-08-26 PROCEDURE — 97112 NEUROMUSCULAR REEDUCATION: CPT

## 2024-08-26 PROCEDURE — 99232 SBSQ HOSP IP/OBS MODERATE 35: CPT | Performed by: PSYCHIATRY & NEUROLOGY

## 2024-08-26 PROCEDURE — 6370000000 HC RX 637 (ALT 250 FOR IP): Performed by: INTERNAL MEDICINE

## 2024-08-26 PROCEDURE — 2580000003 HC RX 258: Performed by: INTERNAL MEDICINE

## 2024-08-26 PROCEDURE — 80053 COMPREHEN METABOLIC PANEL: CPT

## 2024-08-26 PROCEDURE — 6360000002 HC RX W HCPCS: Performed by: FAMILY MEDICINE

## 2024-08-26 PROCEDURE — 6370000000 HC RX 637 (ALT 250 FOR IP): Performed by: FAMILY MEDICINE

## 2024-08-26 PROCEDURE — 36415 COLL VENOUS BLD VENIPUNCTURE: CPT

## 2024-08-26 PROCEDURE — 6370000000 HC RX 637 (ALT 250 FOR IP): Performed by: PSYCHIATRY & NEUROLOGY

## 2024-08-26 PROCEDURE — 6370000000 HC RX 637 (ALT 250 FOR IP)

## 2024-08-26 PROCEDURE — 85025 COMPLETE CBC W/AUTO DIFF WBC: CPT

## 2024-08-26 PROCEDURE — 93246 EXT ECG>7D<15D RECORDING: CPT

## 2024-08-26 RX ORDER — LOSARTAN POTASSIUM 50 MG/1
50 TABLET ORAL DAILY
Qty: 30 TABLET | Refills: 0 | Status: SHIPPED | OUTPATIENT
Start: 2024-08-26 | End: 2024-09-25

## 2024-08-26 RX ORDER — CLOPIDOGREL BISULFATE 75 MG/1
75 TABLET ORAL DAILY
Qty: 19 TABLET | Refills: 0 | Status: SHIPPED | OUTPATIENT
Start: 2024-08-27 | End: 2024-09-15

## 2024-08-26 RX ADMIN — ASPIRIN 81 MG CHEWABLE TABLET 81 MG: 81 TABLET CHEWABLE at 09:10

## 2024-08-26 RX ADMIN — POTASSIUM CHLORIDE 20 MEQ: 750 TABLET, EXTENDED RELEASE ORAL at 09:11

## 2024-08-26 RX ADMIN — Medication 1000 UNITS: at 09:11

## 2024-08-26 RX ADMIN — CLOPIDOGREL BISULFATE 75 MG: 75 TABLET, FILM COATED ORAL at 09:10

## 2024-08-26 RX ADMIN — SODIUM CHLORIDE, PRESERVATIVE FREE 10 ML: 5 INJECTION INTRAVENOUS at 09:14

## 2024-08-26 RX ADMIN — HEPARIN SODIUM 5000 UNITS: 5000 INJECTION INTRAVENOUS; SUBCUTANEOUS at 06:55

## 2024-08-26 RX ADMIN — PAROXETINE HYDROCHLORIDE 30 MG: 20 TABLET, FILM COATED ORAL at 09:11

## 2024-08-26 RX ADMIN — ACETAMINOPHEN 650 MG: 325 TABLET ORAL at 12:47

## 2024-08-26 RX ADMIN — PSYLLIUM HUSK 1 PACKET: 3.4 POWDER ORAL at 09:14

## 2024-08-26 ASSESSMENT — PAIN SCALES - GENERAL
PAINLEVEL_OUTOF10: 4
PAINLEVEL_OUTOF10: 0

## 2024-08-26 ASSESSMENT — PAIN DESCRIPTION - DESCRIPTORS: DESCRIPTORS: DULL

## 2024-08-26 ASSESSMENT — PAIN DESCRIPTION - LOCATION: LOCATION: HEAD

## 2024-08-26 ASSESSMENT — PAIN DESCRIPTION - ORIENTATION: ORIENTATION: LEFT;POSTERIOR

## 2024-08-26 NOTE — PLAN OF CARE
Problem: Discharge Planning  Goal: Discharge to home or other facility with appropriate resources  8/26/2024 1149 by Wilma More RN  Outcome: Progressing  Flowsheets (Taken 8/26/2024 0800 by Becky Perdomo)  Discharge to home or other facility with appropriate resources: Arrange for needed discharge resources and transportation as appropriate  8/26/2024 0251 by An Frias RN  Outcome: Progressing  Flowsheets (Taken 8/25/2024 2000)  Discharge to home or other facility with appropriate resources: Identify barriers to discharge with patient and caregiver     Problem: Pain  Goal: Verbalizes/displays adequate comfort level or baseline comfort level  Outcome: Progressing     Problem: Safety - Adult  Goal: Free from fall injury  8/26/2024 1149 by Wilma More RN  Outcome: Progressing  8/26/2024 0251 by An Frias RN  Outcome: Progressing     Problem: Chronic Conditions and Co-morbidities  Goal: Patient's chronic conditions and co-morbidity symptoms are monitored and maintained or improved  8/26/2024 1149 by Wilma More RN  Outcome: Progressing  Flowsheets (Taken 8/26/2024 0800 by Becky Perdomo)  Care Plan - Patient's Chronic Conditions and Co-Morbidity Symptoms are Monitored and Maintained or Improved: Monitor and assess patient's chronic conditions and comorbid symptoms for stability, deterioration, or improvement  8/26/2024 0251 by An Frias RN  Outcome: Progressing  Flowsheets (Taken 8/25/2024 2000)  Care Plan - Patient's Chronic Conditions and Co-Morbidity Symptoms are Monitored and Maintained or Improved: Monitor and assess patient's chronic conditions and comorbid symptoms for stability, deterioration, or improvement     Problem: Skin/Tissue Integrity  Goal: Absence of new skin breakdown  Description: 1.  Monitor for areas of redness and/or skin breakdown  2.  Assess vascular access sites hourly  3.  Every 4-6 hours minimum:  Change  oxygen saturation probe site  4.  Every 4-6 hours:  If on nasal continuous positive airway pressure, respiratory therapy assess nares and determine need for appliance change or resting period.  8/26/2024 1149 by Wilma More, RN  Outcome: Progressing  8/26/2024 0251 by An Frias, PAMELA  Outcome: Progressing

## 2024-08-26 NOTE — PROGRESS NOTES
Stroke Education provided to patient and relative(s) and the following topics were discussed    1. Patients personal risk factors for stroke are hypercoagulable state, hyperlipidemia, and hypertension    2. Warning signs of Stroke:        * Sudden numbness or weakness of the face, arm or leg, especially on one side of          The body            * Sudden confusion, trouble speaking or understanding        * Sudden trouble seeing in one or both eyes        * Sudden trouble walking, dizziness, loss of balance or coordination        * Sudden severe headache with no known cause      3. Importance of activation Emergency Medical Services ( 9-1-1 ) immediately if experience any warning signs of stroke.    4. Be sure and schedule a follow-up appointment with your primary care doctor or any specialists as instructed.     5. You must take medicine every day to treat your risk factors for stroke.  Be sure to take your medicines exactly as your doctor tells you: no more, no less.  Know what your medicines are for , what they do.  Anti-thrombotics /anticoagulants can help prevent strokes.  You are taking the following medicine(s)  Plavix, aspirin, lipitor     6.  Smoking and second-hand smoke greatly increase your risk of stroke, cardiovascular disease and death. Smoking history never    7. Information provided was [facility] Stroke Education Binder, Stroke Handouts, or Verbal Education    8. Documentation of teaching completed in Patient Education Activity and on Care Plan with teaching response noted?  yes

## 2024-08-26 NOTE — PLAN OF CARE
Problem: Discharge Planning  Goal: Discharge to home or other facility with appropriate resources  8/26/2024 0251 by An Frias RN  Outcome: Progressing  Flowsheets (Taken 8/25/2024 2000)  Discharge to home or other facility with appropriate resources: Identify barriers to discharge with patient and caregiver     Problem: Safety - Adult  Goal: Free from fall injury  8/26/2024 0251 by An Frias RN  Outcome: Progressing     Problem: Chronic Conditions and Co-morbidities  Goal: Patient's chronic conditions and co-morbidity symptoms are monitored and maintained or improved  8/26/2024 0251 by An Frias RN  Outcome: Progressing  Flowsheets (Taken 8/25/2024 2000)  Care Plan - Patient's Chronic Conditions and Co-Morbidity Symptoms are Monitored and Maintained or Improved: Monitor and assess patient's chronic conditions and comorbid symptoms for stability, deterioration, or improvement     Problem: Skin/Tissue Integrity  Goal: Absence of new skin breakdown  Description: 1.  Monitor for areas of redness and/or skin breakdown  2.  Assess vascular access sites hourly  3.  Every 4-6 hours minimum:  Change oxygen saturation probe site  4.  Every 4-6 hours:  If on nasal continuous positive airway pressure, respiratory therapy assess nares and determine need for appliance change or resting period.  8/26/2024 0251 by An Frias RN  Outcome: Progressing

## 2024-08-26 NOTE — PROGRESS NOTES
Comprehensive Nutrition Assessment    Type and Reason for Visit: Initial    Nutrition Recommendations/Plan:   Continue regular diet, encourage intakes  Please obtain a standing scaled wt as able   Please document % intake of meals in flowsheets        Malnutrition Assessment:  Malnutrition Status:  At risk for malnutrition (Comment) (24 [id number])    Context:  Acute Illness     Findings of the 6 clinical characteristics of malnutrition:  Energy Intake:  Mild decrease in energy intake (Comment)  Weight Loss:  7.5% over 3 months (per EMR)     Body Fat Loss:  No significant body fat loss     Muscle Mass Loss:  No significant muscle mass loss (consider age-related)    Fluid Accumulation:  No significant fluid accumulation     Strength:  Not Performed       Nutrition Assessment:    PMH of HTN, depression, recent L carpal tunnel surgery, NKFA.     Presents for R upper visual field defect. MRI brain w/ acute L occipital CVA. Pending discharge today.     Chart reviewed for MST 5. Daughter at bedside, assisted w/ nutr/wt hx. Pt reports an adequate appetite/intake; eating >/=50% of most trays (pt consumed 100% of breakfast this AM per pt report). Pt states that this is her baseline, she normally eats ~3 meals/day, usually something light for dinner if she has a big lunch. Pt endorses ~20# wt loss in May as she was caring for her  who is now . Since May, she states that her weight has been stable. Per EMR wts trending down, ~7% wt loss x 3 months; ~11 % wt loss x 6 months (nutritionally significant). Note, some wt discrepancies noted during admission, likely bedscale error. Need updated scaled. Continue regular diet, RD encouraged intakes at each meal, including snacks.       Nutritionally Significant Medications:  Lipitor, Plavix, NaCl, Vit D, Kcl      Estimated Daily Nutrient Needs:  Energy Requirements Based On: Formula  Weight Used for Energy Requirements: Current  Energy (kcal/day): 1392 kcal/day (30

## 2024-08-26 NOTE — CONSULTS
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  There is osteoarthritic changes most of the right small finger PIP and the left middle DIP more than the other digits.  No acute fractures.      CT Result (most recent):  CTA HEAD NECK W CONTRAST 08/23/2024    Narrative  EXAM:  CTA CODE NEURO HEAD AND NECK W CONT, CT BRAIN PERFUSION    INDICATION:   right upper visual field defect    COMPARISON: CT head 8/23/2024, CTA head and neck 12/6/2023.    CONTRAST: 120 mL of Isovue-370.    TECHNIQUE:  Unenhanced  images were obtained to localize the volume for  acquisition.  Multislice helical axial CT angiography was performed from the  aortic arch to the top of the head during uneventful rapid bolus intravenous  contrast administration.   Coronal and sagittal reformations and 3D post  processing was performed.  CT dose reduction was achieved through use of a  standardized protocol tailored for this examination and automatic exposure  control for dose modulation.    CT brain perfusion was performed with generation of hemodynamic maps of multiple  parameters, including cerebral blood flow, cerebral blood volume, and MTT (mean  transit time). CT dose reduction was achieved through use of a standardized  protocol tailored for this examination and automatic exposure control for dose  modulation.    This study was analyzed by the Viz. algorithm.    FINDINGS:    CTA Head:  There is no evidence of large vessel occlusion or flow-limiting stenosis of the  intracranial internal carotid, anterior cerebral, and middle cerebral arteries.  Mild calcification of the bilateral carotid siphons without significant  stenosis. Incidental fenestration of the right A2 segment. The anterior  communicating artery is patent.    There is no evidence of large vessel occlusion or flow-limiting stenosis of the  intracranial vertebral  3.8 08/26/2024 05:14 AM     08/26/2024 05:14 AM    CO2 26 08/26/2024 05:14 AM    BUN 13 08/26/2024 05:14 AM    CREATININE 0.82 08/26/2024 05:14 AM    GLUCOSE 101 08/26/2024 05:14 AM    CALCIUM 9.0 08/26/2024 05:14 AM    LABGLOM 69 08/26/2024 05:14 AM    LABGLOM 51 02/02/2024 10:32 AM    LABGLOM >60 05/04/2023 08:59 AM      No results found for: \"BNP\"  Lab Results   Component Value Date    WBC 6.4 08/26/2024    HGB 10.8 (L) 08/26/2024    HCT 31.9 (L) 08/26/2024    MCV 94.4 08/26/2024     08/26/2024     No results for input(s): \"CHOL\", \"HDLC\", \"HBA1C\" in the last 72 hours.    Invalid input(s): \"TGL\", \"LDLC\"    Current meds:    Current Facility-Administered Medications:     heparin (porcine) injection 5,000 Units, 5,000 Units, SubCUTAneous, 3 times per day, Clement Romo MD, 5,000 Units at 08/26/24 0655    clopidogrel (PLAVIX) tablet 75 mg, 75 mg, Oral, Daily, Michela Billings MD, 75 mg at 08/26/24 0910    potassium chloride (KLOR-CON) extended release tablet 40 mEq, 40 mEq, Oral, Once, Clement Romo MD    Vitamin D (CHOLECALCIFEROL) tablet 1,000 Units, 1,000 Units, Oral, Daily, Clement Romo MD, 1,000 Units at 08/26/24 0911    potassium chloride (KLOR-CON) extended release tablet 20 mEq, 20 mEq, Oral, Daily, Clement Romo MD, 20 mEq at 08/26/24 0911    psyllium husk-aspartame (METAMUCIL FIBER) packet 1 packet, 1 packet, Oral, Daily, Clement Romo MD, 1 packet at 08/26/24 0914    atorvastatin (LIPITOR) tablet 40 mg, 40 mg, Oral, Nightly, Michela Billings MD, 40 mg at 08/25/24 2120    sodium chloride flush 0.9 % injection 5-40 mL, 5-40 mL, IntraVENous, 2 times per day, Madala, Sushma, MD, 10 mL at 08/26/24 0914    sodium chloride flush 0.9 % injection 5-40 mL, 5-40 mL, IntraVENous, PRN, Madala, Sushma, MD    0.9 % sodium chloride infusion, , IntraVENous, PRN, Madala, Sushma, MD    ondansetron (ZOFRAN-ODT) disintegrating tablet 4 mg, 4 mg, Oral, Q8H PRN **OR** ondansetron (ZOFRAN) injection

## 2024-08-26 NOTE — PROGRESS NOTES
Neurology Progress Note     NAME: Jaime Simpson   :  1935   MRN:  [id number]   DATE:  2024    Assessment:     Principal Problem:    Visual field defect  Resolved Problems:    * No resolved hospital problems. *    Pt is an 88yo RH female with HLD with LDL 74.8 on Lipitor 40 mg daily, HTN, lacunar CVA 2023, on ASA 81 mg daily at home, though was held from - for CTS on 24, admitted 24 with c/o right upper VF fuzziness and mild frontal HA, first noticed on awakening at 0630. She was seen at [facility] and no intrinsic eye issue found, was told to come to ED for stroke eval.  CTH neg, CIWM changes. CTA H/N with chronic unchanged occlusion of left VA, no other LVO or significant stenosis. CTP neg. EKG - NSR. HgbA1C 5.2.  MRI brain with acute left occipital CVA.  TTE with EF 55%, normal wall motion, normal valves, no PFO, no cardioembolic source of stroke found.   Exam with right upper quadrantanopia on VF testing, o/w non-focal.      Left occipital CVA, cryptogenic  Plan:   -Discussed stroke and unknown etiology so far.  Discussed driving regulations. .   -Continue ASA 81mg daily   -Start Plavix 75mg daily with ASA x 21 days, then ASA monotherapy.  -Continue Lipitor 40mg daily, goal LDL<70  -HgbA1C at goal  -May now normalize BP  -Cardiac monitoring at d/c  -PT/OT  -No driving x 6 months after a stroke per DMV regulations, however, if cleared by OT and neurology at f/u, this could be a shorter duration.   -F/u in neurology in approx 4 weeks, telephone note sent to schedulers to call pt to make appt.     Subjective:    Pt is seen with her daughter at bedside. Reviewed echo results and discussed heart monitor     Objective:   Chart reviewed since last seen    Current Facility-Administered Medications   Medication Dose Route Frequency    heparin  reconstitution.  2. No other flow-limiting stenosis or aneurysm.  3. Unchanged mild stenosis (less than 50% by NASCET criteria) of the proximal  left internal carotid artery.    CT Brain Perfusion:  1. No acute abnormality.      Electronically signed by Eleuterio Rodríguez      Care Plan discussed with:  Patient x   Family x   RN x   Care Manager    Consultant/Specialist:  yecenia     Signed:Michela Billings MD

## 2024-08-26 NOTE — DISCHARGE INSTRUCTIONS
Discharge Instructions       PATIENT ID: Jaime Simpson  MRN: [id number]   YOB: 1935    DATE OF ADMISSION: 8/23/2024   DATE OF DISCHARGE: 8/26/2024    PRIMARY CARE PROVIDER: Lorin Starks     ATTENDING PHYSICIAN: Amarilys Brown MD   DISCHARGING PROVIDER: Amarilys Brown MD    To contact this individual call [phone] and ask the  to page.   If unavailable ask to be transferred the Adult Hospitalist Department.    DISCHARGE DIAGNOSES Acute cerebrovascular accident   - MRI with acute infarction at let occipital lobe.    CONSULTATIONS: [unfilled]    PROCEDURES/SURGERIES: * No surgery found *    PENDING TEST RESULTS:   At the time of discharge the following test results are still pending:     [facility] UP APPOINTMENTS:   [facility]     ADDITIONAL CARE RECOMMENDATIONS:     DIET: cardiac diet      ACTIVITY: activity as tolerated    WOUND CARE:     EQUIPMENT needed:       DISCHARGE MEDICATIONS:   See Medication Reconciliation Form    It is important that you take the medication exactly as they are prescribed.   Keep your medication in the bottles provided by the pharmacist and keep a list of the medication names, dosages, and times to be taken in your wallet.   Do not take other medications without consulting your doctor.       NOTIFY YOUR PHYSICIAN FOR ANY OF THE FOLLOWING:   Fever over 101 degrees for 24 hours.   Chest pain, shortness of breath, fever, chills, nausea, vomiting, diarrhea, change in mentation, falling, weakness, bleeding. Severe pain or pain not relieved by medications.  Or, any other signs or symptoms that you may have questions about.      DISPOSITION:   x Home With:   OT  PT  HH  RN       SNF/Inpatient Rehab/LTAC    Independent/assisted living    Hospice    Other:     CDMP Checked:   Yes x     PROBLEM LIST Updated:  Yes x       Signed:   Amarilys Brown MD  8/26/2024  10:04 AM

## 2024-08-26 NOTE — DISCHARGE SUMMARY
Discharge Summary       PATIENT ID: Jaime Simpson  MRN: [id number]   YOB: 1935    DATE OF ADMISSION: 8/23/2024 12:18 PM    DATE OF DISCHARGE: 8/26/24   PRIMARY CARE PROVIDER: Lorin Starks MD     ATTENDING PHYSICIAN: amarilys rubio  DISCHARGING PROVIDER: Amarilys Rubio MD    To contact this individual call [phone] and ask the  to page.  If unavailable ask to be transferred the Adult Hospitalist Department.    CONSULTATIONS: IP CONSULT TO TELE-NEUROLOGY  IP CONSULT TO NEUROLOGY  IP CONSULT TO CARDIOLOGY    PROCEDURES/SURGERIES: * No surgery found *    ADMITTING DIAGNOSES & HOSPITAL COURSE:     89 y.o. female with Pmh of HTN, depression, recent left carpal tunnel surgery presented to ED for right upper visual field defect. She woke up with vision change. She visited her ophthalmologist this morning who refereed to ED for CVA work up. She denied any weakness, tingling or numbness ( other than left hand for carpal tunnel), chest pain, sob, cough, fever, abd pain, urinary  or bowel changes.   In ED, CT head and CTA head/ neck done. Tele neurology evaluated the pt. Hospitalist consulted for admission     Acute cerebrovascular accident   - MRI with acute infarction at let occipital lobe.   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    -Seen by cardiology 14 days outpatient monitor ordered  -Lipid panel with LDL of 74,, HDL 93.  A1c is 5.2%.    -Continue aspirin and rosuvastatin.       Hypertension  -On losartan resume     Hypokalemia  Replace potassium and monitor.     Depression  Continue paxil, trazodone.     Hyponatremia - mild and chronic.  Continue to monitor.    DISCHARGE DIAGNOSES / PLAN:      Discharged home follow-up with his PCP       PENDING TEST RESULTS:   At the time of discharge the following test

## 2024-08-26 NOTE — PLAN OF CARE
from midline. Reviewed recommendation for OP services, tucker driving assessment given acute CVA and visual deficits. Reviewed BE FAST and environmental scanning & safety techniques, patient with excellent understanding. No further acute OT needs, pending d/c later today.           PLAN :  Discharge from acute OT    Recommendation for discharge: (in order for the patient to meet his/her long term goals):   Outpatient occupational therapy for vision & driving assessment    IF patient discharges home will need the following DME: none     SUBJECTIVE:   Patient stated “My daughter is going to help set me up with the [employer] to get around.”    OBJECTIVE DATA SUMMARY:     Cognitive/Behavioral Status:  Orientation  Overall Orientation Status: Within Normal Limits  Orientation Level: Oriented X4  Cognition  Overall Cognitive Status: WNL    Functional Mobility and Transfers for ADLs:  Bed Mobility:  Bed Mobility Training  Bed Mobility Training: No     Transfers:   Transfer Training  Transfer Training: Yes  Overall Level of Assistance: Modified independent;Adaptive equipment (SPC)  Sit to Stand: Modified independent;Adaptive equipment  Stand to Sit: Modified independent;Adaptive equipment  Toilet Transfer: Modified independent;Adaptive equipment    Balance:     Balance  Sitting: Intact  Standing: Intact;With support (SPC)  Standing - Static: Good  Standing - Dynamic: Good    ADL Intervention:                 Grooming: Modified independent   Grooming Skilled Clinical Factors: completing at the sink Mod I upon arrival    UE Bathing: Modified independent        LE Bathing: Modified independent        UE Dressing: Modified independent                Toileting: Modified independent               Functional Mobility: Modified independent ;Adaptive equipment  Functional Mobility Skilled Clinical Factors: ambulation in room and bathroom with SPC, excellent safety awareness     Skin Care: Soap and water;Lotion    Intervention/Education  Training  Education Method: Demonstration;Verbal;Teach Back  Barriers to Learning: None  Education Outcome: Verbalized understanding;Demonstrated understanding    Thank you for this referral.  BOBBI Leonardo, OTR/L  Minutes: 28

## 2024-08-27 ENCOUNTER — TELEPHONE (OUTPATIENT)
Age: 89
End: 2024-08-27

## 2024-08-27 NOTE — TELEPHONE ENCOUNTER
Reason for call:  pt was told by [facility]'s doc to stop the potassium pills her levels are normal but to check with her PCP.  Please call and advise what she should do.  OK to leave a message    Is this a new problem: Yes    Date of last appointment:  5/29/2024     Can we respond via [employer]: No    Best call back number:     Jaime Simpson (Self) [phone] (Mobile)

## 2024-08-27 NOTE — TELEPHONE ENCOUNTER
Called pt, ID x 2. Advised per 's note regarding recent potassium and her recommendations. Pt verbalized understanding.

## 2024-09-04 ENCOUNTER — OFFICE VISIT (OUTPATIENT)
Age: 89
End: 2024-09-04

## 2024-09-04 VITALS
OXYGEN SATURATION: 95 % | SYSTOLIC BLOOD PRESSURE: 121 MMHG | HEART RATE: 77 BPM | RESPIRATION RATE: 16 BRPM | BODY MASS INDEX: 23.34 KG/M2 | TEMPERATURE: 97.8 F | DIASTOLIC BLOOD PRESSURE: 75 MMHG | HEIGHT: 57 IN | WEIGHT: 108.2 LBS

## 2024-09-04 DIAGNOSIS — Z09 HOSPITAL DISCHARGE FOLLOW-UP: Primary | ICD-10-CM

## 2024-09-04 DIAGNOSIS — E87.6 HYPOKALEMIA: ICD-10-CM

## 2024-09-04 DIAGNOSIS — H53.40 VISUAL FIELD DEFECT: ICD-10-CM

## 2024-09-04 DIAGNOSIS — N18.31 STAGE 3A CHRONIC KIDNEY DISEASE (HCC): ICD-10-CM

## 2024-09-04 DIAGNOSIS — I10 PRIMARY HYPERTENSION: ICD-10-CM

## 2024-09-04 DIAGNOSIS — I63.9 CEREBROVASCULAR ACCIDENT (CVA), UNSPECIFIED MECHANISM (HCC): ICD-10-CM

## 2024-09-04 ASSESSMENT — ENCOUNTER SYMPTOMS
SHORTNESS OF BREATH: 0
ABDOMINAL PAIN: 0
COUGH: 0

## 2024-09-04 NOTE — PROGRESS NOTES
visits appropriately    Inpatient course: Discharge summary reviewed- see chart.    Interval history/Current status: Stable visual field loss    Patient Active Problem List   Diagnosis    Chronic insomnia    HTN (hypertension)    Lumbar stenosis with neurogenic claudication    Hypokalemia    Pseudophakia of both eyes    Diverticulitis    Hyperlipemia    Environmental allergies    Primary osteoarthritis involving multiple joints    Anxiety    Dilatation of thoracic aorta (HCC)    Chronic renal disease, stage III (HCC)    Bilateral leg edema    Cerebrovascular accident (CVA) ([facility])    History of stroke    Dry eye syndrome of bilateral lacrimal glands    Visual field defect       Medications listed as ordered at the time of discharge from hospital     Medication List            Accurate as of September 4, 2024  4:40 PM. If you have any questions, ask your nurse or doctor.                CONTINUE taking these medications      acetaminophen 500 MG tablet  Commonly known as: TYLENOL     aspirin 81 MG chewable tablet  Take 1 tablet by mouth daily     atorvastatin 40 MG tablet  Commonly known as: LIPITOR  Take 1 tablet by mouth nightly     clopidogrel 75 MG tablet  Commonly known as: PLAVIX  Take 1 tablet by mouth daily for 19 doses     levocetirizine 5 MG tablet  Commonly known as: XYZAL  Take 1 tablet by mouth nightly     losartan 50 MG tablet  Commonly known as: COZAAR  Take 1 tablet by mouth daily     PARoxetine 30 MG tablet  Commonly known as: PAXIL  Take 1 tablet by mouth daily     psyllium 28.3 % Powd powder  Commonly known as: KONSYL     traZODone 50 MG tablet  Commonly known as: DESYREL  Take 1 tablet by mouth nightly     vitamin D 25 MCG (1000 UT) Tabs tablet  Commonly known as: CHOLECALCIFEROL               Medications marked \"taking\" at this time  Outpatient Medications Marked as Taking for the 9/4/24 encounter (Office Visit) with Lorin Starks MD   Medication Sig Dispense Refill    clopidogrel (PLAVIX) 75

## 2024-09-04 NOTE — ASSESSMENT & PLAN NOTE
Losartan was lowered  Controlled with current regimen, plan to continue  Advised on home monitoring of BP and keep a log

## 2024-09-04 NOTE — ASSESSMENT & PLAN NOTE
8/2024  MRI with acute infarction at let occipital lobe.   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    -Seen by cardiology- 14 days outpatient monitor ordered- will follow up on report  Echo was OK  -Lipids at goal, BP controlled  -Continue aspirin and rosuvastatin  Complete 30 day course of plavix, med precautions discussed

## 2024-09-05 DIAGNOSIS — E87.6 HYPOKALEMIA: ICD-10-CM

## 2024-09-05 LAB
ANION GAP SERPL CALC-SCNC: 5 MMOL/L (ref 5–15)
BUN SERPL-MCNC: 14 MG/DL (ref 6–20)
BUN/CREAT SERPL: 16 (ref 12–20)
CALCIUM SERPL-MCNC: 9.8 MG/DL (ref 8.5–10.1)
CHLORIDE SERPL-SCNC: 102 MMOL/L (ref 97–108)
CO2 SERPL-SCNC: 30 MMOL/L (ref 21–32)
CREAT SERPL-MCNC: 0.85 MG/DL (ref 0.55–1.02)
GLUCOSE SERPL-MCNC: 117 MG/DL (ref 65–100)
POTASSIUM SERPL-SCNC: 3.4 MMOL/L (ref 3.5–5.1)
SODIUM SERPL-SCNC: 137 MMOL/L (ref 136–145)

## 2024-09-05 RX ORDER — POTASSIUM CHLORIDE 750 MG/1
10 CAPSULE, EXTENDED RELEASE ORAL DAILY
Qty: 90 CAPSULE | Refills: 3
Start: 2024-09-05

## 2024-09-16 ENCOUNTER — TELEPHONE (OUTPATIENT)
Age: 89
End: 2024-09-16

## 2024-09-16 RX ORDER — LOSARTAN POTASSIUM 50 MG/1
50 TABLET ORAL DAILY
Qty: 90 TABLET | Refills: 1 | Status: SHIPPED | OUTPATIENT
Start: 2024-09-16 | End: 2025-03-15

## 2024-09-16 NOTE — TELEPHONE ENCOUNTER
Called patient and had to [facility] for patient to return call and get HOSP F/U with LAVERN Escobedo.   Patient has seen LAVERN Escobedo before.

## 2024-09-20 ENCOUNTER — TELEPHONE (OUTPATIENT)
Age: 89
End: 2024-09-20

## 2024-09-20 LAB — ECHO BSA: 1.46 M2

## 2024-09-27 ENCOUNTER — OFFICE VISIT (OUTPATIENT)
Age: 89
End: 2024-09-27
Payer: MEDICARE

## 2024-09-27 VITALS
SYSTOLIC BLOOD PRESSURE: 98 MMHG | BODY MASS INDEX: 22.22 KG/M2 | HEIGHT: 57 IN | OXYGEN SATURATION: 98 % | HEART RATE: 87 BPM | DIASTOLIC BLOOD PRESSURE: 68 MMHG | RESPIRATION RATE: 16 BRPM | WEIGHT: 103 LBS

## 2024-09-27 DIAGNOSIS — E87.6 HYPOKALEMIA: ICD-10-CM

## 2024-09-27 DIAGNOSIS — I63.9 OCCIPITAL STROKE (HCC): Primary | ICD-10-CM

## 2024-09-27 DIAGNOSIS — Z79.82 LONG-TERM USE OF ASPIRIN THERAPY: ICD-10-CM

## 2024-09-27 DIAGNOSIS — Z86.73 HISTORY OF STROKE: ICD-10-CM

## 2024-09-27 DIAGNOSIS — Z09 HOSPITAL DISCHARGE FOLLOW-UP: ICD-10-CM

## 2024-09-27 PROCEDURE — G8420 CALC BMI NORM PARAMETERS: HCPCS

## 2024-09-27 PROCEDURE — 1090F PRES/ABSN URINE INCON ASSESS: CPT

## 2024-09-27 PROCEDURE — G8427 DOCREV CUR MEDS BY ELIG CLIN: HCPCS

## 2024-09-27 PROCEDURE — 1036F TOBACCO NON-USER: CPT

## 2024-09-27 PROCEDURE — 99215 OFFICE O/P EST HI 40 MIN: CPT

## 2024-09-27 PROCEDURE — 1123F ACP DISCUSS/DSCN MKR DOCD: CPT

## 2024-09-27 RX ORDER — POTASSIUM CHLORIDE 750 MG/1
10 CAPSULE, EXTENDED RELEASE ORAL DAILY
Qty: 90 CAPSULE | Refills: 3 | Status: SHIPPED | OUTPATIENT
Start: 2024-09-27

## 2024-09-27 ASSESSMENT — PATIENT HEALTH QUESTIONNAIRE - PHQ9
SUM OF ALL RESPONSES TO PHQ QUESTIONS 1-9: 0
1. LITTLE INTEREST OR PLEASURE IN DOING THINGS: NOT AT ALL
2. FEELING DOWN, DEPRESSED OR HOPELESS: NOT AT ALL
SUM OF ALL RESPONSES TO PHQ9 QUESTIONS 1 & 2: 0
SUM OF ALL RESPONSES TO PHQ QUESTIONS 1-9: 0

## 2024-09-27 ASSESSMENT — ENCOUNTER SYMPTOMS: PHOTOPHOBIA: 0

## 2024-09-30 ENCOUNTER — TRANSCRIBE ORDERS (OUTPATIENT)
Facility: HOSPITAL | Age: 89
End: 2024-09-30

## 2024-09-30 DIAGNOSIS — Z12.31 SCREENING MAMMOGRAM FOR HIGH-RISK PATIENT: Primary | ICD-10-CM

## 2024-10-10 ENCOUNTER — HOSPITAL ENCOUNTER (OUTPATIENT)
Facility: HOSPITAL | Age: 89
Discharge: HOME | End: 2024-10-13
Payer: MEDICARE

## 2024-10-10 VITALS — WEIGHT: 103 LBS | BODY MASS INDEX: 22.22 KG/M2 | HEIGHT: 57 IN

## 2024-10-10 DIAGNOSIS — Z12.31 ENCOUNTER FOR SCREENING MAMMOGRAM FOR HIGH-RISK PATIENT: ICD-10-CM

## 2024-10-10 PROCEDURE — 77067 SCR MAMMO BI INCL CAD: CPT

## 2024-11-20 DIAGNOSIS — F51.04 CHRONIC INSOMNIA: Primary | ICD-10-CM

## 2024-11-20 RX ORDER — TRAZODONE HYDROCHLORIDE 50 MG/1
50 TABLET, FILM COATED ORAL NIGHTLY
Qty: 90 TABLET | Refills: 1 | Status: SHIPPED | OUTPATIENT
Start: 2024-11-20

## 2024-12-04 ENCOUNTER — OFFICE VISIT (OUTPATIENT)
Age: 89
End: 2024-12-04
Payer: MEDICARE

## 2024-12-04 VITALS
TEMPERATURE: 97.5 F | HEIGHT: 57 IN | BODY MASS INDEX: 23.56 KG/M2 | SYSTOLIC BLOOD PRESSURE: 121 MMHG | HEART RATE: 82 BPM | RESPIRATION RATE: 16 BRPM | DIASTOLIC BLOOD PRESSURE: 74 MMHG | WEIGHT: 109.2 LBS | OXYGEN SATURATION: 97 %

## 2024-12-04 DIAGNOSIS — E87.6 HYPOKALEMIA: ICD-10-CM

## 2024-12-04 DIAGNOSIS — I77.810 DILATATION OF THORACIC AORTA (HCC): ICD-10-CM

## 2024-12-04 DIAGNOSIS — E78.00 PURE HYPERCHOLESTEROLEMIA: ICD-10-CM

## 2024-12-04 DIAGNOSIS — N18.31 STAGE 3A CHRONIC KIDNEY DISEASE (HCC): ICD-10-CM

## 2024-12-04 DIAGNOSIS — Z86.73 HISTORY OF STROKE: ICD-10-CM

## 2024-12-04 DIAGNOSIS — I10 PRIMARY HYPERTENSION: Primary | ICD-10-CM

## 2024-12-04 DIAGNOSIS — F41.9 ANXIETY: ICD-10-CM

## 2024-12-04 DIAGNOSIS — M48.062 LUMBAR STENOSIS WITH NEUROGENIC CLAUDICATION: ICD-10-CM

## 2024-12-04 DIAGNOSIS — F51.04 CHRONIC INSOMNIA: ICD-10-CM

## 2024-12-04 PROBLEM — I63.9 CEREBROVASCULAR ACCIDENT (CVA) (HCC): Status: RESOLVED | Noted: 2023-11-14 | Resolved: 2024-12-04

## 2024-12-04 PROCEDURE — 1123F ACP DISCUSS/DSCN MKR DOCD: CPT | Performed by: INTERNAL MEDICINE

## 2024-12-04 PROCEDURE — 1159F MED LIST DOCD IN RCRD: CPT | Performed by: INTERNAL MEDICINE

## 2024-12-04 PROCEDURE — G8420 CALC BMI NORM PARAMETERS: HCPCS | Performed by: INTERNAL MEDICINE

## 2024-12-04 PROCEDURE — 1090F PRES/ABSN URINE INCON ASSESS: CPT | Performed by: INTERNAL MEDICINE

## 2024-12-04 PROCEDURE — 1036F TOBACCO NON-USER: CPT | Performed by: INTERNAL MEDICINE

## 2024-12-04 PROCEDURE — 1126F AMNT PAIN NOTED NONE PRSNT: CPT | Performed by: INTERNAL MEDICINE

## 2024-12-04 PROCEDURE — G8427 DOCREV CUR MEDS BY ELIG CLIN: HCPCS | Performed by: INTERNAL MEDICINE

## 2024-12-04 PROCEDURE — 1160F RVW MEDS BY RX/DR IN RCRD: CPT | Performed by: INTERNAL MEDICINE

## 2024-12-04 PROCEDURE — 99214 OFFICE O/P EST MOD 30 MIN: CPT | Performed by: INTERNAL MEDICINE

## 2024-12-04 PROCEDURE — G8484 FLU IMMUNIZE NO ADMIN: HCPCS | Performed by: INTERNAL MEDICINE

## 2024-12-04 RX ORDER — LIDOCAINE 50 MG/G
1 PATCH TOPICAL DAILY
Qty: 30 PATCH | Refills: 5 | Status: SHIPPED | OUTPATIENT
Start: 2024-12-04 | End: 2025-06-02

## 2024-12-04 ASSESSMENT — ENCOUNTER SYMPTOMS
BACK PAIN: 1
COUGH: 0
ABDOMINAL PAIN: 0
SHORTNESS OF BREATH: 0

## 2024-12-04 NOTE — ASSESSMENT & PLAN NOTE
Not sleeping well lately, takes too long to get sleep  Rec trial of calm along with trazodone, if not effective can increase to 100mg dose

## 2024-12-04 NOTE — ASSESSMENT & PLAN NOTE
Doesn't feel any benefit from gabapentin for back/leg pains, titrated off  Continue lidocaine patch

## 2024-12-04 NOTE — PROGRESS NOTES
edema.   Skin:     General: Skin is warm and dry.   Neurological:      General: No focal deficit present.      Mental Status: She is alert and oriented to person, place, and time. Mental status is at baseline.      Gait: Gait abnormal (slow and cautious using cane).   Psychiatric:         Mood and Affect: Mood normal.         Behavior: Behavior normal.          Vitals:    12/04/24 1300   BP: 121/74   Pulse: 82   Resp: 16   Temp: 97.5 °F (36.4 °C)   SpO2: 97%   Weight: 49.5 kg (109 lb 3.2 oz)   Height: 1.448 m (4' 9\")        The following sections were reviewed & updated as appropriate: Problem List, Allergies, PMH, PSH, FH, and SH.    Patient Active Problem List   Diagnosis    Chronic insomnia    HTN (hypertension)    Lumbar stenosis with neurogenic claudication    Hypokalemia    Pseudophakia of both eyes    Diverticulitis    Hyperlipemia    Environmental allergies    Primary osteoarthritis involving multiple joints    Anxiety    Dilatation of thoracic aorta (HCC)    Chronic renal disease, stage III (HCC)    Bilateral leg edema    History of stroke    Dry eye syndrome of bilateral lacrimal glands    Visual field defect        Current Outpatient Medications   Medication Sig Dispense Refill    lidocaine (LIDODERM) 5 % Place 1 patch onto the skin daily 12 hours on, 12 hours off. 30 patch 5    traZODone (DESYREL) 50 MG tablet Take 1 tablet by mouth nightly 90 tablet 1    acetaminophen (TYLENOL) 500 MG tablet Take 2 tablets by mouth every 6 hours as needed for Pain      potassium chloride (MICRO-K) 10 MEQ extended release capsule Take 1 capsule by mouth daily 90 capsule 3    losartan (COZAAR) 50 MG tablet Take 1 tablet by mouth daily 90 tablet 1    levocetirizine (XYZAL) 5 MG tablet Take 1 tablet by mouth nightly 90 tablet 3    atorvastatin (LIPITOR) 40 MG tablet Take 1 tablet by mouth nightly 90 tablet 3    PARoxetine (PAXIL) 30 MG tablet Take 1 tablet by mouth daily 90 tablet 3    aspirin 81 MG chewable tablet Take 1

## 2024-12-04 NOTE — ASSESSMENT & PLAN NOTE
Echo 10/2023  Mildly dilated aortic root. Ao root diameter is 3.7 cm. Mildly dilated ascending aorta. Ao ascending diameter is 3.7 cm  Echo 8/2024- no dilation noted

## 2024-12-04 NOTE — ASSESSMENT & PLAN NOTE
Lab Results   Component Value Date    LDLDIRECT 73 05/29/2024        Tolerating statin therapy, plan to continue current regimen pending lab results  Recheck labs to assess for response to medication, whether LDL is at target, and monitor for side effects

## 2024-12-05 LAB
ALBUMIN SERPL-MCNC: 4.1 G/DL (ref 3.5–5)
ALBUMIN/GLOB SERPL: 1.8 (ref 1.1–2.2)
ALP SERPL-CCNC: 90 U/L (ref 45–117)
ALT SERPL-CCNC: 21 U/L (ref 12–78)
ANION GAP SERPL CALC-SCNC: 8 MMOL/L (ref 2–12)
AST SERPL-CCNC: 24 U/L (ref 15–37)
BASOPHILS # BLD: 0.1 K/UL (ref 0–0.1)
BASOPHILS NFR BLD: 1 % (ref 0–1)
BILIRUB SERPL-MCNC: 0.9 MG/DL (ref 0.2–1)
BUN SERPL-MCNC: 16 MG/DL (ref 6–20)
BUN/CREAT SERPL: 17 (ref 12–20)
CALCIUM SERPL-MCNC: 8.9 MG/DL (ref 8.5–10.1)
CHLORIDE SERPL-SCNC: 102 MMOL/L (ref 97–108)
CHOLEST SERPL-MCNC: 174 MG/DL
CO2 SERPL-SCNC: 26 MMOL/L (ref 21–32)
CREAT SERPL-MCNC: 0.95 MG/DL (ref 0.55–1.02)
DIFFERENTIAL METHOD BLD: ABNORMAL
EOSINOPHIL # BLD: 0.3 K/UL (ref 0–0.4)
EOSINOPHIL NFR BLD: 4 % (ref 0–7)
ERYTHROCYTE [DISTWIDTH] IN BLOOD BY AUTOMATED COUNT: 12.6 % (ref 11.5–14.5)
GLOBULIN SER CALC-MCNC: 2.3 G/DL (ref 2–4)
GLUCOSE SERPL-MCNC: 83 MG/DL (ref 65–100)
HCT VFR BLD AUTO: 35.1 % (ref 35–47)
HDLC SERPL-MCNC: 96 MG/DL
HDLC SERPL: 1.8 (ref 0–5)
HGB BLD-MCNC: 11.8 G/DL (ref 11.5–16)
IMM GRANULOCYTES # BLD AUTO: 0 K/UL (ref 0–0.04)
IMM GRANULOCYTES NFR BLD AUTO: 0 % (ref 0–0.5)
LDLC SERPL CALC-MCNC: 66.8 MG/DL (ref 0–100)
LYMPHOCYTES # BLD: 1.4 K/UL (ref 0.8–3.5)
LYMPHOCYTES NFR BLD: 19 % (ref 12–49)
MCH RBC QN AUTO: 31.7 PG (ref 26–34)
MCHC RBC AUTO-ENTMCNC: 33.6 G/DL (ref 30–36.5)
MCV RBC AUTO: 94.4 FL (ref 80–99)
MONOCYTES # BLD: 0.6 K/UL (ref 0–1)
MONOCYTES NFR BLD: 8 % (ref 5–13)
NEUTS SEG # BLD: 5 K/UL (ref 1.8–8)
NEUTS SEG NFR BLD: 68 % (ref 32–75)
NRBC # BLD: 0 K/UL (ref 0–0.01)
NRBC BLD-RTO: 0 PER 100 WBC
PLATELET # BLD AUTO: 256 K/UL (ref 150–400)
PMV BLD AUTO: 9.8 FL (ref 8.9–12.9)
POTASSIUM SERPL-SCNC: 3.8 MMOL/L (ref 3.5–5.1)
PROT SERPL-MCNC: 6.4 G/DL (ref 6.4–8.2)
RBC # BLD AUTO: 3.72 M/UL (ref 3.8–5.2)
SODIUM SERPL-SCNC: 136 MMOL/L (ref 136–145)
TRIGL SERPL-MCNC: 56 MG/DL
VLDLC SERPL CALC-MCNC: 11.2 MG/DL
WBC # BLD AUTO: 7.3 K/UL (ref 3.6–11)

## 2025-01-29 DIAGNOSIS — F41.9 ANXIETY: Primary | ICD-10-CM

## 2025-01-30 RX ORDER — PAROXETINE 30 MG/1
30 TABLET, FILM COATED ORAL DAILY
Qty: 90 TABLET | Refills: 3 | Status: SHIPPED | OUTPATIENT
Start: 2025-01-30

## 2025-02-18 ENCOUNTER — OFFICE VISIT (OUTPATIENT)
Age: 89
End: 2025-02-18

## 2025-02-18 VITALS
BODY MASS INDEX: 23.51 KG/M2 | SYSTOLIC BLOOD PRESSURE: 136 MMHG | WEIGHT: 109 LBS | OXYGEN SATURATION: 96 % | HEART RATE: 81 BPM | DIASTOLIC BLOOD PRESSURE: 84 MMHG | TEMPERATURE: 97.8 F | HEIGHT: 57 IN

## 2025-02-18 DIAGNOSIS — R03.0 ELEVATED BLOOD PRESSURE READING: ICD-10-CM

## 2025-02-18 DIAGNOSIS — R39.9 UTI SYMPTOMS: Primary | ICD-10-CM

## 2025-02-18 PROBLEM — G43.909 MIGRAINES: Status: ACTIVE | Noted: 2025-02-18

## 2025-02-18 PROBLEM — M43.25 FUSION OF SPINE OF THORACOLUMBAR REGION: Status: ACTIVE | Noted: 2025-02-18

## 2025-02-18 PROBLEM — M43.20 FUSION OF SPINE: Status: ACTIVE | Noted: 2025-02-18

## 2025-02-18 PROBLEM — M19.90 ARTHRITIS: Status: ACTIVE | Noted: 2025-02-18

## 2025-02-18 LAB
BILIRUBIN, URINE, POC: NEGATIVE
BLOOD URINE, POC: NEGATIVE
GLUCOSE URINE, POC: NEGATIVE
KETONES, URINE, POC: NEGATIVE
LEUKOCYTE ESTERASE, URINE, POC: ABNORMAL
NITRITE, URINE, POC: NEGATIVE
PH, URINE, POC: 5.5 (ref 4.6–8)
PROTEIN,URINE, POC: NEGATIVE
SPECIFIC GRAVITY, URINE, POC: 1.01 (ref 1–1.03)
URINALYSIS CLARITY, POC: CLEAR
URINALYSIS COLOR, POC: YELLOW
UROBILINOGEN, POC: ABNORMAL MG/DL

## 2025-02-18 RX ORDER — CEPHALEXIN 500 MG/1
500 CAPSULE ORAL 2 TIMES DAILY
Qty: 14 CAPSULE | Refills: 0 | Status: SHIPPED | OUTPATIENT
Start: 2025-02-18 | End: 2025-02-25

## 2025-02-18 NOTE — PROGRESS NOTES
Subjective     Chief Complaint   Patient presents with    Urinary Tract Infection     UTI       Patient is 89 year old female presenting with urinary pressure and frequency.  Symptoms began one week ago.  Denies back or abdominal pain.  Denies fever.            Past Medical History:   Diagnosis Date    Anxiety     Arthritis back pain    fingers    Asthma     MILD - NO INHALERS    Chronic pain     Colitis     Diverticulitis 06/11/2013    Hypercholesterolemia     Hypertension     IBS (irritable bowel syndrome)     Ill-defined condition     H/O UTI POST DOW CATH    Insomnia     Left rotator cuff tear arthropathy 09/01/2022    Nausea & vomiting     Stroke (HCC) 1996    tia   PATENT  FORAMEN  OVALE    Swollen L ankle        Past Surgical History:   Procedure Laterality Date    BACK SURGERY  2021    CATARACT REMOVAL Bilateral 2009    CHOLECYSTECTOMY  04/1997    COLONOSCOPY      x3    HYSTERECTOMY (CERVIX STATUS UNKNOWN)  1986    IR GUIDED INJ LUMB/SAC TRANSFORAMINAL EPI SINGLE LEVEL  03/21/2022    IR INJ INTERLAMINAR EPI/SUBARACHNOID LUMB/SAC  12/19/2022    IR INJ INTERLAMINAR EPI/SUBARACHNOID LUMB/SAC  4/11/2023    KNEE ARTHROSCOPY Left 04/1998    LUMBAR FUSION  02/2011    L4-L5    ORTHOPEDIC SURGERY Right     REPAIRED TENDONS ON RIGHT HAND AFTER FALL    CT UNLISTED PROCEDURE CARDIAC SURGERY  08/2004    repair of foramen ovale    SHOULDER ARTHROPLASTY Right 01/2020    SHOULDER ARTHROPLASTY Left 2022    TONSILLECTOMY  CHILDHOOD    WISDOM TOOTH EXTRACTION      X3       Family History   Problem Relation Age of Onset    Cancer Father         PANCREATIC    Stroke Mother     Other Mother         SUARACHNOID HEMORRHAGE-ANEURYSM    No Known Problems Daughter     Anesth Problems Daughter         N/V       Allergies   Allergen Reactions    Adhesive Tape Other (See Comments)    Cefdinir Other (See Comments)     Sick to stomach    Oxycodone-Acetaminophen Other (See Comments)     dizziness    Phenylephrine Other (See Comments)

## 2025-02-18 NOTE — PATIENT INSTRUCTIONS
Thank you for visiting [facility] today.    -Increase fluid intake.  Some people say cranberry juice helps with discomfort.  -Don't hold your urine.  Urinate when you feel like you need to.  -Wipe from front to back after bowel movements.  -Avoid taking bubble baths.  -Wear loose fitting clothing.  -Decrease caffeine or carbonated drinks  -Repeat urine screen in two weeks  -Take antibiotic with food and consider probiotic    Follow up with your PCP if symptoms persist or worsen.    Please go to the Emergency Department immediately for symptoms of a urinary tract infection along with any of the following:  fever with severe and sudden shaking (rigors), nausea, vomiting and the inability to keep down clear fluids or medications.      DASH Diet: After Your Visit  Your Care Instructions  The DASH diet is an eating plan that can help lower your blood pressure. DASH stands for Dietary Approaches to Stop Hypertension. Hypertension is high blood pressure.  The DASH diet focuses on eating foods that are high in calcium, potassium, and magnesium. These nutrients can lower blood pressure. The foods that are highest in these nutrients are fruits, vegetables, low-fat dairy products, nuts, seeds, and legumes. But taking calcium, potassium, and magnesium supplements instead of eating foods that are high in those nutrients does not have the same effect. The DASH diet also includes whole grains, fish, and poultry.  The DASH diet is one of several lifestyle changes your doctor may recommend to lower your high blood pressure. Your doctor may also want you to decrease the amount of sodium in your diet. Lowering sodium while following the DASH diet can lower blood pressure even further than just the DASH diet alone.  Follow-up care is a key part of your treatment and safety. Be sure to make and go to all appointments, and call your doctor if you are having problems. It’s also a good idea to know your test results and keep a

## 2025-02-19 DIAGNOSIS — I10 PRIMARY HYPERTENSION: Primary | ICD-10-CM

## 2025-02-19 NOTE — PROGRESS NOTES
Remote Patient Monitoring Treatment Plan    Received request from ACM/Tracie Rivera RN to order remote patient monitoring for in home monitoring of HTN; Condition managed by PCP and order completed.     Patient will be monitoring blood pressure .      Patient will engage in Remote Patient Monitoring each day to develop the skills necessary for self management.       RPM Care Team Responsibilities:   Alerts will be reviewed daily and addressed within 2-4 hours during operational hours (Monday -Friday 9 am-4 pm)  Alert response and intervention documented in patient medical record  Alert response escalated to PCP per protocol and documented in patient medical record  Patient monitored over approximately  days  Discharge from program based on self-management readiness    See care coordination encounters for additional details.

## 2025-02-19 NOTE — TELEPHONE ENCOUNTER
Per formulary, both Belsomra and ramelteon are covered.  Only way to figure out cost is for pharmacy to process a claim after medication has been sent in.      
Pt reported dayvigo too expensive  Can we ask pharmacy to check coverage on Belsomra, and Ramelteon  
Spoke with [employer] (pharmacist), insurance will cover but out of pocket expense $368.00. when faxed sent alternative coverage was included. He is not able to provide additional coverage info. Will forward to authorization dept for more reseach.  
Yes - the patient is able to be screened

## 2025-02-20 ENCOUNTER — CARE COORDINATION (OUTPATIENT)
Facility: CLINIC | Age: 89
End: 2025-02-20

## 2025-02-20 NOTE — CARE COORDINATION
Kit Order   Remote Patient Kit Ordering Note      Date/Time:  2/20/2025 1:00 PM      CCSS placed phone call to patient/family today to notify of RPM kit order; patient/family was available; discussed the following topics below and all questions answered.    [x] CCSS confirmed patient shipping address  [x] Patient will receive package over the next 1-3 business days. Someone 21 years or older must be present to sign for UPS delivery.  [x] HRS will contact patient within 24 hours, an HRS  will call the patient directly: If the patient does not answer, HRS will follow up with the clinical team notifying them about the unsuccessful attempt to contact the patient. HRS will make three call attempts to the patient.Provide patient with [facility] Virtual install number is: [phone].  [x] The RPM Nurse will contact patient once equipment is active to welcome them to the program.                                                         [x] Hours of RPM monitoring - Monday-Friday [id number]; encourage patient to get vitals entered by Noon each day to have the alert addressed same day.  [x][facility]                      ACM made aware the RPM kit has been ordered.

## 2025-02-21 LAB
BACTERIA SPEC CULT: ABNORMAL
CC UR VC: ABNORMAL
SERVICE CMNT-IMP: ABNORMAL

## 2025-02-21 RX ORDER — NITROFURANTOIN 25; 75 MG/1; MG/1
100 CAPSULE ORAL 2 TIMES DAILY
Qty: 20 CAPSULE | Refills: 0 | Status: SHIPPED | OUTPATIENT
Start: 2025-02-21 | End: 2025-03-03

## 2025-02-26 ENCOUNTER — CARE COORDINATION (OUTPATIENT)
Dept: CARE COORDINATION | Age: 89
End: 2025-02-26

## 2025-02-26 NOTE — CARE COORDINATION
2025 11:30 AM  RPM Verification RPM Verification and Welcome Call Successful? Yes,   Remote Patient Monitoring Welcome Note  Date/Time:  2025 11:30 AM  Patient Current Location: [place]  Verified patients name and  as identifiers.     Completed and confirmed the following:  [x] Patient received all RPM equipment (tablet, scale, blood pressure device and cuff, and pulse oximeter)  Cuff Size: small (6.3\"-9.4\")    Weight Scale:   Weight monitoring not ordered                     [x] Instructed patient keep box for use when returning equipment                                                          [x] Reviewed Patient Welcome Letter with patient    [x]  Reviewed Consent Form (Consent form signed.  Not scanned into EPIC at this time)  Copy of consent form in chart.                 [x] Reviewed expectations for patient and care team  Monitoring hours M-F 9-4pm  It is important to take your vitals every day, even on the weekends,to keep your care team aware of how you are doing every day of the week.  Completing monitoring by 12pm on  so that alerts can be responded to in the same day  Patient weighs self at same time every day (or after urinating and waking up)  Take blood pressure 1-2 hrs after medications   RPM team may have different phone area code (including [place], [place], [place] or [place])                              [] Instructed patient to keep scale on flat surface (Weight monitoring not ordered)                                                        [x] Instructed patient to keep tablet plugged in at all times                         [x] Instructed how to contact IT support  ([phone])  [x] Provided Remote Patient Monitoring care  information   Emergency Contact Verified: Lesli Perdomo [phone]        All questions answered at this time.    LPN reviewed patients reported daily Remote Patient Monitoring metrics. All reported metrics are within alert parameters.   Will route to

## 2025-03-03 RX ORDER — ATORVASTATIN CALCIUM 40 MG/1
40 TABLET, FILM COATED ORAL NIGHTLY
Qty: 90 TABLET | Refills: 0 | Status: SHIPPED | OUTPATIENT
Start: 2025-03-03 | End: 2025-03-05 | Stop reason: SDUPTHER

## 2025-03-05 DIAGNOSIS — E78.00 PURE HYPERCHOLESTEROLEMIA: Primary | ICD-10-CM

## 2025-03-05 RX ORDER — ATORVASTATIN CALCIUM 40 MG/1
40 TABLET, FILM COATED ORAL NIGHTLY
Qty: 90 TABLET | Refills: 3 | Status: SHIPPED | OUTPATIENT
Start: 2025-03-05

## 2025-03-06 ENCOUNTER — CARE COORDINATION (OUTPATIENT)
Dept: CARE COORDINATION | Age: 89
End: 2025-03-06

## 2025-03-06 NOTE — CARE COORDINATION
She is requesting a print out of RPM metrics and will send to pt through My Chart.     Plan/Follow Up: Will continue to review, monitor and address alerts with follow up based on severity of symptoms and risk factors.  **For any new or worsening symptoms or you are concerned in anyway, please contact your Provider or report to the nearest Emergency Room.**

## 2025-03-07 VITALS — SYSTOLIC BLOOD PRESSURE: 142 MMHG | HEART RATE: 76 BPM | DIASTOLIC BLOOD PRESSURE: 77 MMHG

## 2025-03-13 RX ORDER — LOSARTAN POTASSIUM 50 MG/1
50 TABLET ORAL DAILY
Qty: 90 TABLET | Refills: 0 | Status: SHIPPED | OUTPATIENT
Start: 2025-03-13

## 2025-03-13 NOTE — TELEPHONE ENCOUNTER
Chief Complaint   Patient presents with    Medication Refill     Last Appointment with Dr. Lorin Starks:  12/4/2024   Future Appointments   Date Time Provider Department Center   6/5/2025  1:00 PM [facility]in [facility]       The above orders were approved via VORB per Dr. Lorin Starks.

## 2025-03-31 ENCOUNTER — CARE COORDINATION (OUTPATIENT)
Dept: CASE MANAGEMENT | Age: 89
End: 2025-03-31

## 2025-03-31 NOTE — CARE COORDINATION
Remote Patient Monitoring Note      Date/Time:  3/31/2025 10:42 AM  Patient Current Location: Home: [address] [place] [id number]  LPN contacted patient by telephone regarding  RPM RESUMED  Verified patients name and  as identifiers.  Background: ENROLLED IN RPM FOR HTN  Clinical Interventions: Reviewed and followed up on alerts and treatments-CALL TO CHANTELLE to update that rpm has been resumed. Verbalized understanding and will begin checking today    Plan/Follow Up: Will continue to review, monitor and address alerts with follow up based on severity of symptoms and risk factors.

## 2025-04-02 DIAGNOSIS — F51.04 CHRONIC INSOMNIA: ICD-10-CM

## 2025-04-02 RX ORDER — TRAZODONE HYDROCHLORIDE 100 MG/1
100 TABLET ORAL NIGHTLY
Qty: 90 TABLET | Refills: 1 | Status: SHIPPED | OUTPATIENT
Start: 2025-04-02

## 2025-04-02 RX ORDER — LEVOCETIRIZINE DIHYDROCHLORIDE 5 MG/1
5 TABLET, FILM COATED ORAL NIGHTLY
Qty: 90 TABLET | Refills: 3 | Status: SHIPPED | OUTPATIENT
Start: 2025-04-02

## 2025-05-15 RX ORDER — TRAZODONE HYDROCHLORIDE 50 MG/1
50 TABLET ORAL NIGHTLY
Qty: 90 TABLET | Refills: 1 | OUTPATIENT
Start: 2025-05-15

## 2025-06-04 SDOH — HEALTH STABILITY: PHYSICAL HEALTH: ON AVERAGE, HOW MANY DAYS PER WEEK DO YOU ENGAGE IN MODERATE TO STRENUOUS EXERCISE (LIKE A BRISK WALK)?: 0 DAYS

## 2025-06-04 SDOH — HEALTH STABILITY: PHYSICAL HEALTH: ON AVERAGE, HOW MANY MINUTES DO YOU ENGAGE IN EXERCISE AT THIS LEVEL?: 0 MIN

## 2025-06-04 ASSESSMENT — PATIENT HEALTH QUESTIONNAIRE - PHQ9
SUM OF ALL RESPONSES TO PHQ QUESTIONS 1-9: 0
2. FEELING DOWN, DEPRESSED OR HOPELESS: NOT AT ALL
1. LITTLE INTEREST OR PLEASURE IN DOING THINGS: NOT AT ALL
SUM OF ALL RESPONSES TO PHQ QUESTIONS 1-9: 0

## 2025-06-04 ASSESSMENT — LIFESTYLE VARIABLES
HOW OFTEN DURING THE LAST YEAR HAVE YOU BEEN UNABLE TO REMEMBER WHAT HAPPENED THE NIGHT BEFORE BECAUSE YOU HAD BEEN DRINKING: NEVER
HOW OFTEN DURING THE LAST YEAR HAVE YOU FOUND THAT YOU WERE NOT ABLE TO STOP DRINKING ONCE YOU HAD STARTED: NEVER
HOW OFTEN DURING THE LAST YEAR HAVE YOU HAD A FEELING OF GUILT OR REMORSE AFTER DRINKING: NEVER
HOW OFTEN DO YOU HAVE A DRINK CONTAINING ALCOHOL: 5
HOW MANY STANDARD DRINKS CONTAINING ALCOHOL DO YOU HAVE ON A TYPICAL DAY: 1
HOW OFTEN DURING THE LAST YEAR HAVE YOU FOUND THAT YOU WERE NOT ABLE TO STOP DRINKING ONCE YOU HAD STARTED: NEVER
HOW OFTEN DURING THE LAST YEAR HAVE YOU BEEN UNABLE TO REMEMBER WHAT HAPPENED THE NIGHT BEFORE BECAUSE YOU HAD BEEN DRINKING: NEVER
HOW OFTEN DO YOU HAVE A DRINK CONTAINING ALCOHOL: 4 OR MORE TIMES A WEEK
HAS A RELATIVE, FRIEND, DOCTOR, OR ANOTHER HEALTH PROFESSIONAL EXPRESSED CONCERN ABOUT YOUR DRINKING OR SUGGESTED YOU CUT DOWN: NO
HOW OFTEN DURING THE LAST YEAR HAVE YOU NEEDED AN ALCOHOLIC DRINK FIRST THING IN THE MORNING TO GET YOURSELF GOING AFTER A NIGHT OF HEAVY DRINKING: NEVER
HOW MANY STANDARD DRINKS CONTAINING ALCOHOL DO YOU HAVE ON A TYPICAL DAY: 1 OR 2
HOW OFTEN DURING THE LAST YEAR HAVE YOU FAILED TO DO WHAT WAS NORMALLY EXPECTED FROM YOU BECAUSE OF DRINKING: NEVER
HAS A RELATIVE, FRIEND, DOCTOR, OR ANOTHER HEALTH PROFESSIONAL EXPRESSED CONCERN ABOUT YOUR DRINKING OR SUGGESTED YOU CUT DOWN: NO
HAVE YOU OR SOMEONE ELSE BEEN INJURED AS A RESULT OF YOUR DRINKING: NO
HOW OFTEN DURING THE LAST YEAR HAVE YOU NEEDED AN ALCOHOLIC DRINK FIRST THING IN THE MORNING TO GET YOURSELF GOING AFTER A NIGHT OF HEAVY DRINKING: NEVER
HOW OFTEN DO YOU HAVE SIX OR MORE DRINKS ON ONE OCCASION: 1
HOW OFTEN DURING THE LAST YEAR HAVE YOU FAILED TO DO WHAT WAS NORMALLY EXPECTED FROM YOU BECAUSE OF DRINKING: NEVER
HOW OFTEN DURING THE LAST YEAR HAVE YOU HAD A FEELING OF GUILT OR REMORSE AFTER DRINKING: NEVER
HAVE YOU OR SOMEONE ELSE BEEN INJURED AS A RESULT OF YOUR DRINKING: NO

## 2025-06-05 ENCOUNTER — OFFICE VISIT (OUTPATIENT)
Age: 89
End: 2025-06-05
Payer: MEDICARE

## 2025-06-05 VITALS
TEMPERATURE: 97.7 F | WEIGHT: 108.2 LBS | HEART RATE: 85 BPM | OXYGEN SATURATION: 97 % | DIASTOLIC BLOOD PRESSURE: 73 MMHG | BODY MASS INDEX: 24.34 KG/M2 | RESPIRATION RATE: 16 BRPM | SYSTOLIC BLOOD PRESSURE: 128 MMHG | HEIGHT: 56 IN

## 2025-06-05 DIAGNOSIS — Z00.00 MEDICARE ANNUAL WELLNESS VISIT, SUBSEQUENT: Primary | ICD-10-CM

## 2025-06-05 DIAGNOSIS — E78.00 PURE HYPERCHOLESTEROLEMIA: ICD-10-CM

## 2025-06-05 DIAGNOSIS — Z86.73 HISTORY OF STROKE: ICD-10-CM

## 2025-06-05 DIAGNOSIS — F41.9 ANXIETY: ICD-10-CM

## 2025-06-05 DIAGNOSIS — F51.04 CHRONIC INSOMNIA: ICD-10-CM

## 2025-06-05 DIAGNOSIS — I10 PRIMARY HYPERTENSION: ICD-10-CM

## 2025-06-05 DIAGNOSIS — N18.31 STAGE 3A CHRONIC KIDNEY DISEASE (HCC): ICD-10-CM

## 2025-06-05 PROCEDURE — 99213 OFFICE O/P EST LOW 20 MIN: CPT | Performed by: INTERNAL MEDICINE

## 2025-06-05 PROCEDURE — 1123F ACP DISCUSS/DSCN MKR DOCD: CPT | Performed by: INTERNAL MEDICINE

## 2025-06-05 PROCEDURE — 1090F PRES/ABSN URINE INCON ASSESS: CPT | Performed by: INTERNAL MEDICINE

## 2025-06-05 PROCEDURE — G8427 DOCREV CUR MEDS BY ELIG CLIN: HCPCS | Performed by: INTERNAL MEDICINE

## 2025-06-05 PROCEDURE — 1159F MED LIST DOCD IN RCRD: CPT | Performed by: INTERNAL MEDICINE

## 2025-06-05 PROCEDURE — 1126F AMNT PAIN NOTED NONE PRSNT: CPT | Performed by: INTERNAL MEDICINE

## 2025-06-05 PROCEDURE — G8420 CALC BMI NORM PARAMETERS: HCPCS | Performed by: INTERNAL MEDICINE

## 2025-06-05 PROCEDURE — 1160F RVW MEDS BY RX/DR IN RCRD: CPT | Performed by: INTERNAL MEDICINE

## 2025-06-05 PROCEDURE — G0439 PPPS, SUBSEQ VISIT: HCPCS | Performed by: INTERNAL MEDICINE

## 2025-06-05 PROCEDURE — 1036F TOBACCO NON-USER: CPT | Performed by: INTERNAL MEDICINE

## 2025-06-05 RX ORDER — TRAZODONE HYDROCHLORIDE 100 MG/1
100 TABLET ORAL NIGHTLY
Qty: 90 TABLET | Refills: 1 | Status: SHIPPED | OUTPATIENT
Start: 2025-06-05

## 2025-06-05 RX ORDER — LOSARTAN POTASSIUM 50 MG/1
50 TABLET ORAL EVERY EVENING
Qty: 90 TABLET | Refills: 1 | Status: SHIPPED | OUTPATIENT
Start: 2025-06-05

## 2025-06-05 NOTE — PATIENT INSTRUCTIONS
device in the bathroom with you.   Where can you learn more?  Go to https://www.[email or web address].net/patientEd and enter G117 to learn more about \"Preventing Falls: Care Instructions.\"  Current as of: July 31, 2024  Content Version: 14.5  © 4855-4430 [employer].   Care instructions adapted under license by [employer]. If you have questions about a medical condition or this instruction, always ask your healthcare professional. [employer] disclaims any warranty or liability for your use of this information.         Learning About Being Active as an Older Adult  Why is being active important as you get older?     Being active is one of the best things you can do for your health. And it's never too late to start. Being active--or getting active, if you aren't already--has definite benefits. It can:  Give you more energy,  Keep your mind sharp.  Improve balance to reduce your risk of falls.  Help you manage chronic illness with fewer medicines.  No matter how old you are, how fit you are, or what health problems you have, there is a form of activity that will work for you. And the more physical activity you can do, the better your overall health will be.  What kinds of activity can help you stay healthy?  Being more active will make your daily activities easier. Physical activity includes planned exercise and things you do in daily life. There are four types of activity:  Aerobic.  Doing aerobic activity makes your heart and lungs strong.  Includes walking, dancing, and gardening.  Aim for at least 2½ hours spread throughout the week.  It improves your energy and can help you sleep better.  Muscle-strengthening.  This type of activity can help maintain muscle and strengthen bones.  Includes climbing stairs, using resistance bands, and lifting or carrying heavy loads.  Aim for at least twice a week.  It can help protect the knees and other joints.  Stretching.  Stretching gives you better range of

## 2025-06-05 NOTE — PROGRESS NOTES
Medicare Annual Wellness Visit    Jaime Simpson is here for Medicare AWV    Assessment & Plan   Medicare annual wellness visit, subsequent  Primary hypertension  Assessment & Plan:   Controlled with current regimen, plan to continue   Orders:  -     Basic Metabolic Panel; Future  -     Albumin/Creatinine Ratio, Urine; Future  -     losartan (COZAAR) 50 MG tablet; Take 1 tablet by mouth every evening, Disp-90 tablet, R-1Normal  Stage 3a chronic kidney disease (HCC)  Assessment & Plan:  Overall stable, mild decline, continue routine monitoring q6mo  Orders:  -     Basic Metabolic Panel; Future  -     Albumin/Creatinine Ratio, Urine; Future  Pure hypercholesterolemia  Assessment & Plan:     Lab Results   Component Value Date    LDLDIRECT 73 05/29/2024        Tolerating statin therapy, plan to continue current regimen pending lab results  Recheck labs to assess for response to medication, whether LDL is at target, and monitor for side effects    Orders:  -     LDL Cholesterol, Direct; Future  History of stroke  Assessment & Plan:  2023 & 2024  -Lipids at goal, BP controlled  -Continue aspirin and rosuvastatin  Chronic insomnia  Assessment & Plan:  Trazodone has been helpful and no bothersome side effects, will continue  Orders:  -     traZODone (DESYREL) 100 MG tablet; Take 1 tablet by mouth nightly, Disp-90 tablet, R-1Normal  Anxiety  Assessment & Plan:  Mood symptoms controlled on current regimen and no adverse effects noted, plan to continue        Return in about 6 months (around 12/5/2025) for chronic follow up.     Subjective   Patient also here for review of chronic conditions, with statuses as documented in Assessment and Plan  Bruised her nose when gardening, no breathing issues    Patient's complete Health Risk Assessment and screening values have been reviewed and are found in Flowsheets. The following problems were reviewed today and where indicated follow up appointments were made and/or referrals

## 2025-06-06 ENCOUNTER — CARE COORDINATION (OUTPATIENT)
Facility: CLINIC | Age: 89
End: 2025-06-06

## 2025-06-06 ENCOUNTER — RESULTS FOLLOW-UP (OUTPATIENT)
Age: 89
End: 2025-06-06

## 2025-06-06 DIAGNOSIS — I10 PRIMARY HYPERTENSION: Primary | ICD-10-CM

## 2025-06-06 LAB
ANION GAP SERPL CALC-SCNC: 7 MMOL/L (ref 2–12)
BUN SERPL-MCNC: 13 MG/DL (ref 6–20)
BUN/CREAT SERPL: 13 (ref 12–20)
CALCIUM SERPL-MCNC: 9.6 MG/DL (ref 8.5–10.1)
CHLORIDE SERPL-SCNC: 101 MMOL/L (ref 97–108)
CO2 SERPL-SCNC: 27 MMOL/L (ref 21–32)
CREAT SERPL-MCNC: 0.97 MG/DL (ref 0.55–1.02)
CREAT UR-MCNC: 46.8 MG/DL
GLUCOSE SERPL-MCNC: 100 MG/DL (ref 65–100)
LDLC SERPL DIRECT ASSAY-MCNC: 56 MG/DL (ref 0–100)
MICROALBUMIN UR-MCNC: 0.95 MG/DL
MICROALBUMIN/CREAT UR-RTO: 20 MG/G (ref 0–30)
POTASSIUM SERPL-SCNC: 3.9 MMOL/L (ref 3.5–5.1)
SODIUM SERPL-SCNC: 135 MMOL/L (ref 136–145)

## 2025-06-06 NOTE — PROGRESS NOTES
Remote Patient Order Discontinued    Received request from Tracie Gustafson RN   to discontinue order for remote patient monitoring of HTN and order completed.

## 2025-06-06 NOTE — CARE COORDINATION
Kit Return Jaime SUMNER Tatiana  6/6/25    Care Coordination  placed call to Patient  to arrange RPM kit  through [employer].     CCSS spoke to Patient reviewed with Patient how to pack equipment in original packing. Patient aware [employer] will  equipment in 2-4 days.   All questions and concerns answered.

## 2025-06-27 ENCOUNTER — OFFICE VISIT (OUTPATIENT)
Age: 89
End: 2025-06-27
Payer: MEDICARE

## 2025-06-27 VITALS
SYSTOLIC BLOOD PRESSURE: 137 MMHG | OXYGEN SATURATION: 99 % | WEIGHT: 107.8 LBS | TEMPERATURE: 98.2 F | HEART RATE: 86 BPM | BODY MASS INDEX: 24.25 KG/M2 | HEIGHT: 56 IN | RESPIRATION RATE: 15 BRPM | DIASTOLIC BLOOD PRESSURE: 73 MMHG

## 2025-06-27 DIAGNOSIS — N39.0 URINARY TRACT INFECTION WITHOUT HEMATURIA, SITE UNSPECIFIED: ICD-10-CM

## 2025-06-27 DIAGNOSIS — N39.0 URINARY TRACT INFECTION WITHOUT HEMATURIA, SITE UNSPECIFIED: Primary | ICD-10-CM

## 2025-06-27 DIAGNOSIS — R19.00 PELVIC MASS: ICD-10-CM

## 2025-06-27 LAB
BILIRUBIN, URINE, POC: NEGATIVE
BLOOD URINE, POC: NORMAL
GLUCOSE URINE, POC: NEGATIVE
KETONES, URINE, POC: NEGATIVE
LEUKOCYTE ESTERASE, URINE, POC: NORMAL
NITRITE, URINE, POC: NORMAL
PH, URINE, POC: 5.5 (ref 4.6–8)
PROTEIN,URINE, POC: NEGATIVE
SPECIFIC GRAVITY, URINE, POC: 1 (ref 1–1.03)
URINALYSIS CLARITY, POC: NORMAL
URINALYSIS COLOR, POC: YELLOW
UROBILINOGEN, POC: NORMAL MG/DL

## 2025-06-27 PROCEDURE — 1123F ACP DISCUSS/DSCN MKR DOCD: CPT | Performed by: INTERNAL MEDICINE

## 2025-06-27 PROCEDURE — 99213 OFFICE O/P EST LOW 20 MIN: CPT | Performed by: INTERNAL MEDICINE

## 2025-06-27 PROCEDURE — G8420 CALC BMI NORM PARAMETERS: HCPCS | Performed by: INTERNAL MEDICINE

## 2025-06-27 PROCEDURE — 1159F MED LIST DOCD IN RCRD: CPT | Performed by: INTERNAL MEDICINE

## 2025-06-27 PROCEDURE — 1090F PRES/ABSN URINE INCON ASSESS: CPT | Performed by: INTERNAL MEDICINE

## 2025-06-27 PROCEDURE — 1036F TOBACCO NON-USER: CPT | Performed by: INTERNAL MEDICINE

## 2025-06-27 PROCEDURE — 81001 URINALYSIS AUTO W/SCOPE: CPT | Performed by: INTERNAL MEDICINE

## 2025-06-27 PROCEDURE — PBSHW AMB POC URINALYSIS DIP STICK AUTO W/ MICRO: Performed by: INTERNAL MEDICINE

## 2025-06-27 PROCEDURE — G8427 DOCREV CUR MEDS BY ELIG CLIN: HCPCS | Performed by: INTERNAL MEDICINE

## 2025-06-27 PROCEDURE — 1126F AMNT PAIN NOTED NONE PRSNT: CPT | Performed by: INTERNAL MEDICINE

## 2025-06-27 RX ORDER — NITROFURANTOIN 25; 75 MG/1; MG/1
100 CAPSULE ORAL 2 TIMES DAILY
Qty: 10 CAPSULE | Refills: 0 | Status: SHIPPED | OUTPATIENT
Start: 2025-06-27 | End: 2025-07-02

## 2025-06-27 NOTE — PROGRESS NOTES
Jaime Simpson is a 89 y.o. female Established patient who presents today for evaluation of the following -           Assessment & Plan  1. Urinary tract infection: Acute.  - Reports symptoms of cloudy urine and a persistent urge to urinate, consistent with a UTI. Physical exam reveals a notably large and firm bladder.  - Maintain adequate hydration.  - Continue proper hygiene practices, such as wiping from front to back.  - Antibiotic prescription will be sent to pharmacy.  - Imaging study of the bladder will be ordered to investigate the cause of physical findings    2. Decreased kidney function: Stable.  - Blood work from a few weeks ago confirms stable kidney function compared to the previous year.  - No changes in management are needed at this time.  - Reassured about the stability of the condition.             Orders Placed This Encounter    Culture, Urine     Standing Status:   Future     Expected Date:   6/27/2025     Expiration Date:   6/27/2026     Specify (ex-cath, midstream, cysto, etc)?:   midstream    AMB POC URINALYSIS DIP STICK AUTO W/ MICRO                          History of Present Illness  The patient came in today because she thinks she has a urinary tract infection (UTI). She mentioned that her urine has been cloudy and she felt the typical UTI symptoms, like needing to go to the bathroom urgently, especially last night. This morning, she didn't feel those symptoms. She has been drinking plenty of water with ice to stay hydrated. She also makes sure to wipe from front to back after using the bathroom to keep things clean. She had a similar UTI back in February.    The patient is also worried about a recent lab result that showed her kidney function has decreased. The result said it was stable compared to last year, but it still concerns her.              Objective:     Wt Readings from Last 3 Encounters:   06/27/25 48.9 kg (107 lb 12.8 oz)   06/05/25 49.1 kg (108 lb 3.2 oz)   02/18/25 49.4 kg

## 2025-06-29 LAB
BACTERIA SPEC CULT: ABNORMAL
CC UR VC: ABNORMAL
SERVICE CMNT-IMP: ABNORMAL

## 2025-06-30 ENCOUNTER — RESULTS FOLLOW-UP (OUTPATIENT)
Age: 89
End: 2025-06-30

## 2025-06-30 RX ORDER — LEVOFLOXACIN 500 MG/1
TABLET, FILM COATED ORAL
Qty: 7 TABLET | Refills: 0 | Status: SHIPPED | OUTPATIENT
Start: 2025-06-30

## 2025-06-30 NOTE — TELEPHONE ENCOUNTER
2025     -urine culture showing pseudomonas.  Was given macrobid at time of visit,  will switch to levofloxacin.  Patient shows intolerance to ciprofloxacin, but trying to keep treatment to oral form for patient.  Other sensitivities are to iv treatments.     Orders Placed This Encounter    levoFLOXacin (LEVAQUIN) 500 MG tablet     Si tablet daily with food for 7 days.     Dispense:  7 tablet     Refill:  0

## 2025-07-01 ENCOUNTER — TELEPHONE (OUTPATIENT)
Age: 89
End: 2025-07-01

## 2025-07-01 NOTE — TELEPHONE ENCOUNTER
Reason for call:  TC from patient. Patient would like to speak with Chanda in regards to levoFLOXacin (LEVAQUIN) 500 MG that was prescribed my Dr. Ashraf. She states that this medication is on her list that she is unable to take.   Is this a new problem: No    Date of last appointment:  6/27/2025     Can we respond via [employer]: No    Best call back number:     Jaime Simpson (Self) [phone] (Home)

## 2025-07-01 NOTE — TELEPHONE ENCOUNTER
Patient returned the call and states she picked up the medicine and will start tonight.  Advised to eat bread or crackers before taking it.

## 2025-07-03 ENCOUNTER — HOSPITAL ENCOUNTER (OUTPATIENT)
Age: 89
Discharge: HOME | End: 2025-07-03
Payer: MEDICARE

## 2025-07-03 DIAGNOSIS — R19.00 PELVIC MASS: ICD-10-CM

## 2025-07-03 PROCEDURE — 6360000004 HC RX CONTRAST MEDICATION: Performed by: RADIOLOGY

## 2025-07-03 PROCEDURE — 72193 CT PELVIS W/DYE: CPT

## 2025-07-03 RX ORDER — IOPAMIDOL 755 MG/ML
100 INJECTION, SOLUTION INTRAVASCULAR
Status: COMPLETED | OUTPATIENT
Start: 2025-07-03 | End: 2025-07-03

## 2025-07-03 RX ADMIN — IOPAMIDOL 100 ML: 755 INJECTION, SOLUTION INTRAVENOUS at 08:56

## 2025-07-08 NOTE — TELEPHONE ENCOUNTER
Spoke with patient, 7/8/2025     -notified that pelvic CT did not show any abnormal masses.  No further testing needed.

## (undated) DEVICE — 450 ML BOTTLE OF 0.05% CHLORHEXIDINE GLUCONATE IN 99.95% STERILE WATER FOR IRRIGATION, USP AND APPLICATOR.: Brand: IRRISEPT ANTIMICROBIAL WOUND LAVAGE

## (undated) DEVICE — REM POLYHESIVE ADULT PATIENT RETURN ELECTRODE: Brand: VALLEYLAB

## (undated) DEVICE — LAMINECTOMY-SMH: Brand: MEDLINE INDUSTRIES, INC.

## (undated) DEVICE — PAD,ABDOMINAL,5"X9",ST,LF,25/BX: Brand: MEDLINE INDUSTRIES, INC.

## (undated) DEVICE — FLOSEAL HEMOSTATIC MATRIX, 10ML: Brand: FLOSEAL HEMOSTATIC MATRIX

## (undated) DEVICE — SOLUTION IRRIG 1000ML H2O STRL BLT

## (undated) DEVICE — 3M™ STERI-DRAPE™ U-DRAPE 1015: Brand: STERI-DRAPE™

## (undated) DEVICE — TUBING HYDR IRR --

## (undated) DEVICE — GLOVE ORANGE PI 8 1/2   MSG9085

## (undated) DEVICE — PREP SKN PREVAIL 40ML APPL --

## (undated) DEVICE — SUTURE ETHBND EXCEL SZ 1 L30IN NONABSORBABLE GRN L36MM CT-1 X425H

## (undated) DEVICE — GOWN,SIRUS,NONRNF,SETINSLV,2XL,18/CS: Brand: MEDLINE

## (undated) DEVICE — BLADE SAW W051XL276IN THK005IN CUT THK005IN REPL SAG FLR

## (undated) DEVICE — FORCEPS BX L240CM JAW DIA2.8MM L CAP W/ NDL MIC MESH TOOTH

## (undated) DEVICE — MARKER UTIL W/RULER AND LBL -- STRL

## (undated) DEVICE — NEEDLE HYPO 22GA L1.5IN BLK S STL HUB POLYPR SHLD REG BVL

## (undated) DEVICE — BIPOLAR IRRIGATOR INTEGRATED TUBING AND BIPOLAR CORD SET, DISPOSABLE

## (undated) DEVICE — HANDLE LT SNAP ON ULT DURABLE LENS FOR TRUMPF ALC DISPOSABLE

## (undated) DEVICE — TUBING SUCT 12FR MAL ALUM SHFT FN CAP VENT UNIV CONN W/ OBT

## (undated) DEVICE — DURASEAL® DURAL SEALANT SYSTEM 5ML 5 PACK: Brand: DURASEAL®

## (undated) DEVICE — Device

## (undated) DEVICE — SPONGE GZ W4XL4IN COT RADPQ HIGHLY ABSRB

## (undated) DEVICE — KIT EVAC 0.13IN RECT TB DIA10FR 400CC PVC 3 SPR Y CONN DRN

## (undated) DEVICE — DEVICE TRNSF SPIK STL 2008S] MICROTEK MEDICAL INC]

## (undated) DEVICE — DRAPE,U/ SHT,SPLIT,PLAS,STERIL: Brand: MEDLINE

## (undated) DEVICE — POSITIONER HD REST FOAM CMFRT TCH

## (undated) DEVICE — SUTURE VCRL SZ 2-0 L36IN ABSRB UD L40MM CT 1/2 CIR J957H

## (undated) DEVICE — HANDPIECE SET WITH COAXIAL FAN SPRAY TIP AND SUCTION TUBE: Brand: INTERPULSE

## (undated) DEVICE — GLOVE ORANGE PI 7 1/2   MSG9075

## (undated) DEVICE — IMMOBILIZER SHLDR M UNIV 65X17IN BLK LIGHWEIGHT PCH SZ REUSE

## (undated) DEVICE — 3M™ IOBAN™ 2 ANTIMICROBIAL INCISE DRAPE 6651EZ: Brand: IOBAN™ 2

## (undated) DEVICE — COVER,MAYO STAND,STERILE: Brand: MEDLINE

## (undated) DEVICE — SYRINGE CATH TIP 50ML

## (undated) DEVICE — TIP IRRIG FEM CNL BRSH W SUCT 9IN PULSAVAC

## (undated) DEVICE — SUTURE STRATAFIX SPRL MCRYL + SZ 3 0 L27IN ABSRB UD SH L26MM SXMP1B428

## (undated) DEVICE — SOLUTION SURG PREP 26 CC PURPREP

## (undated) DEVICE — INTENT OT USE PROVIDES A STERILE INTERFACE BETWEEN THE OPERATING ROOM SURGICAL LAMPS (NON-STERILE) AND THE SURGEON OR STAFF WORKING IN THE STERILE FIELD.: Brand: ASPEN® ALC PLUS LIGHT HANDLE COVER

## (undated) DEVICE — SURGIFOAM SPNG SZ 100

## (undated) DEVICE — SUTURE FIBERWIRE SZ 2 W/ TAPERED NEEDLE BLUE L38IN NONABSORB BLU L26.5MM 1/2 CIRCLE AR7200

## (undated) DEVICE — STRIP,CLOSURE,WOUND,MEDI-STRIP,1/2X4: Brand: MEDLINE

## (undated) DEVICE — SUTURE NONABSORBABLE MONOFILAMENT CV-6 TTC-9 24 IN GORTX 6K02B

## (undated) DEVICE — BONE WAX WHITE: Brand: BONE WAX WHITE

## (undated) DEVICE — BLADE SAW W098XL354IN THK0047IN CUT THK0047IN SAG FLR

## (undated) DEVICE — SOLUTION IRRIG 1000ML STRL H2O USP PLAS POUR BTL

## (undated) DEVICE — SOLUTION SCRB 2OZ 10% POVIDONE IOD ANTIMIC BTL

## (undated) DEVICE — PREP KIT PEEL PTCH POVIDONE IOD

## (undated) DEVICE — PENCIL ES L10FT COAT BLDE HOLSTER

## (undated) DEVICE — SOLUTION IRRIG 3000ML 0.9% SOD CHL USP UROMATIC PLAS CONT

## (undated) DEVICE — INFECTION CONTROL KIT SYS

## (undated) DEVICE — HANDPIECE SET WITH BONE CLEANING TIP AND SUCTION TUBE: Brand: INTERPULSE

## (undated) DEVICE — SUTURE VCRL SZ 1 L36IN ABSRB UD L36MM CT-1 1/2 CIR J947H

## (undated) DEVICE — SYR 3ML LL TIP 1/10ML GRAD --

## (undated) DEVICE — STRAP,POSITIONING,KNEE/BODY,FOAM,4X60": Brand: MEDLINE

## (undated) DEVICE — C-ARM: Brand: UNBRANDED

## (undated) DEVICE — CLOSURE SKIN FLX NONINVASIVE PRELOC TECHNOLOGY FOR 24IN

## (undated) DEVICE — COVER,TABLE,HEAVY DUTY,60"X90",STRL: Brand: MEDLINE

## (undated) DEVICE — TUBING, SUCTION, 1/4" X 12', STRAIGHT: Brand: MEDLINE

## (undated) DEVICE — 2108 SERIES SAGITTAL BLADE AGGRESSIVE  (25.0 X 1.19 X 85.0MM)

## (undated) DEVICE — SPONGE GZ W4XL4IN COT 12 PLY TYP VII WVN C FLD DSGN

## (undated) DEVICE — TOOL 14MH30 LEGEND 14CM 3MM: Brand: MIDAS REX ™

## (undated) DEVICE — TOTAL JOINT - SMH: Brand: MEDLINE INDUSTRIES, INC.

## (undated) DEVICE — TOTAL TRAY, 16FR 10ML SIL FOLEY, URN: Brand: MEDLINE

## (undated) DEVICE — PACK SURG PROC KNEE USER GPS

## (undated) DEVICE — NEEDLE HYPO 22GA L1.5IN BLK POLYPR HUB S STL REG BVL STR

## (undated) DEVICE — SOLUTION IV 250ML 0.9% SOD CHL CLR INJ FLX BG CONT PRT CLSR

## (undated) DEVICE — SUTURE VCRL 2-0 L27IN ABSRB UD CP-2 L26MM 1/2 CIR REV CUT J869H

## (undated) DEVICE — SCRUB DRY SURG EZ SCRUB BRUSH PREOPERATIVE GRN

## (undated) DEVICE — GARMENT,MEDLINE,DVT,INT,CALF,MED, GEN2: Brand: MEDLINE

## (undated) DEVICE — C-WIRE PAK DOUBLE ENDED ORTHOPAEDIC WIRE, TROCAR, .062" (1.57 MM): Brand: C-WIRE

## (undated) DEVICE — 4-PORT MANIFOLD: Brand: NEPTUNE 2

## (undated) DEVICE — GLOVE SURG SZ 8 L12IN FNGR THK94MIL STD WHT LTX FREE

## (undated) DEVICE — SPHERE STRL PASSIVE MARKER 60

## (undated) DEVICE — SUTURE ABS ANTIBACT 1-0 CTX 24IN STRATAFIX PDS+ SXPP1A445

## (undated) DEVICE — SOLUTION IRRIG 3000ML 0.9% SOD CHL FLX CONT 0797208] ICU MEDICAL INC]

## (undated) DEVICE — SUTURE MCRYL SZ 4-0 L27IN ABSRB UD L24MM PS-1 3/8 CIR PRIM Y935H

## (undated) DEVICE — STERILE POLYISOPRENE POWDER-FREE SURGICAL GLOVES WITH EMOLLIENT COATING: Brand: PROTEXIS

## (undated) DEVICE — BLADE SAW W098XL354IN THK0047IN CUT THK0047IN SAG

## (undated) DEVICE — SUTURE ABS MF 2-0 CT1 27IN STRATAFIX PDS+ SXPP1B412

## (undated) DEVICE — DRESSING PETRO W3XL8IN N ADH OIL EMUL GZ CURAD

## (undated) DEVICE — STERILE-Z SURGICAL PATIENT COVERS CLEAR POLYETHYLENE STERILE UNIVERSAL FIT 10 PER CASE: Brand: STERILE-Z